# Patient Record
Sex: FEMALE | Race: WHITE | NOT HISPANIC OR LATINO | Employment: UNEMPLOYED | URBAN - METROPOLITAN AREA
[De-identification: names, ages, dates, MRNs, and addresses within clinical notes are randomized per-mention and may not be internally consistent; named-entity substitution may affect disease eponyms.]

---

## 2017-02-04 ENCOUNTER — HOSPITAL ENCOUNTER (EMERGENCY)
Facility: HOSPITAL | Age: 52
Discharge: HOME | End: 2017-02-05
Attending: EMERGENCY MEDICINE
Payer: MEDICAID

## 2017-02-04 DIAGNOSIS — F41.0 ANXIETY ATTACK: Primary | ICD-10-CM

## 2017-02-04 PROCEDURE — 99283 EMERGENCY DEPT VISIT LOW MDM: CPT

## 2017-02-04 RX ORDER — ALPRAZOLAM 0.25 MG/1
0.25 TABLET ORAL 3 TIMES DAILY
Qty: 60 TABLET | Refills: 2 | Status: ON HOLD | OUTPATIENT
Start: 2017-02-04 | End: 2019-04-09

## 2017-02-04 RX ORDER — ALPRAZOLAM 0.25 MG/1
0.5 TABLET ORAL
Status: COMPLETED | OUTPATIENT
Start: 2017-02-05 | End: 2017-02-05

## 2017-02-04 NOTE — ED AVS SNAPSHOT
[facility]  [address]  [place] [address]               Elif Archibald   2017 10:09 PM   ED    Description:  Female : 1965   Department:  [facility]           Your Care was Coordinated By:     Provider Role From To    Caitlin Feliciano MD Attending Provider [phone] --      Reason for Visit     Anxiety           Diagnoses this Visit        Comments    Anxiety attack    -  Primary       ED Disposition     None           To Do List           Follow-up Information     Follow up with Terrie Ireland MD In 3 days.    Specialty:  Internal Medicine    Why:  For further care    Contact information:    [address] [facility]  [place] [address]  [phone]         These Medications        Disp Refills Start End    alprazolam (XANAX) 0.25 MG tablet 60 tablet 2 2017     Take 1 tablet (0.25 mg total) by mouth 3 (three) times daily. - Oral      [facility]     [facility] -  Assistance  Registered nurses in the [facility] provide clinical advisement, health education, appointment booking, and other advisory services.  Call for this free service at [phone].             Medications                Verify that the below list of medications is an accurate representation of the medications you are currently taking.  If none reported, the list may be blank. If incorrect, please contact your healthcare provider. Carry this list with you in case of emergency.           Current Medications     albuterol 90 mcg/actuation inhaler Inhale 1 puff into the lungs every 4 (four) hours as needed for Wheezing.    albuterol 90 mcg/actuation inhaler Inhale 1 puff into the lungs every 4 (four) hours as needed for Wheezing.    alprazolam (XANAX) 0.25 MG tablet Take 1 tablet (0.25 mg total) by mouth 3 (three) times daily.    hydrocodone-acetaminophen 5-325mg (NORCO) 5-325 mg per tablet Take 1 tablet by mouth every 6 (six)  "hours as needed for Pain.    lorazepam (ATIVAN) 1 MG tablet Take 1 tablet (1 mg total) by mouth every 8 (eight) hours as needed for Anxiety.    ondansetron (ZOFRAN) 4 MG tablet Take 1 tablet (4 mg total) by mouth every 8 (eight) hours as needed.    paroxetine (PAXIL) 10 MG tablet Take 10 mg by mouth every morning.    topiramate 25 mg capsule Take 25 mg by mouth 2 (two) times daily.           Clinical Reference Information           Your Vitals Were     BP Pulse Temp Resp Height Weight    150/101 106 97.8 °F (36.6 °C) 18 5' 3" (1.6 m) 49.4 kg (109 lb)    SpO2 BMI             94% 19.31 kg/m2         Allergies as of 2/4/2017     No Known Allergies      Immunizations Administered on Date of Encounter - 2/4/2017     None      ED Micro, Lab, POCT     None      ED Imaging Orders     None        Discharge Instructions         Treating Anxiety Disorders with Therapy    If you have an anxiety disorder, you dont have to suffer anymore. Treatment is available. Therapy (also called counseling) is often a helpful treatment for anxiety disorders. With therapy, a specially trained professional (therapist) helps you face and learn to manage your anxiety. Therapy can be short-term or long-term depending on your needs. In some cases, medication may also be prescribed with therapy. It may take time before you notice how much therapy is helping, but stick with it. With therapy, you can feel better.  Cognitive behavioral therapy (CBT)  Cognitive behavioral therapy (CBT) teaches you to manage anxiety. It does this by helping you understand how you think and act when youre anxious. Research has shown CBT to be a very effective treatment for anxiety disorders. How CBT is run is almost like a class. It involves homework and activities to build skills that teach you to cope with anxiety step by step. It can be done in a group or one-on-one, and often takes place for a set number of sessions. CBT has two main parts:  · Cognitive " therapy helps you identify the negative, irrational thoughts that occur with your anxiety. Youll learn to replace these with more positive, realistic thoughts.  · Behavioral therapy helps you change how you react to anxiety. Youll learn coping skills and methods for relaxing to help you better deal with anxiety.  Other forms of therapy  Other therapy methods may work better for you than CBT. Or, you may move from CBT to another form of therapy as your treatment needs change. This may mean meeting with a therapist by yourself or in a group. Therapy can also help you work through problems in your life, such as drug or alcohol dependence, that may be making your anxiety worse.  Getting better takes time  Therapy will help you feel better and teach you skills to help manage anxiety long term. But change doesnt happen right away. It takes a commitment from you. And treatment only works if you learn to face the causes of your anxiety. So, you might feel worse before you feel better. This can sometimes make it hard to stick with it. But remember: Therapy is a very effective treatment. The results will be well worth it.  Helping yourself  If anxiety is wearing you down, here are some things you can do to cope:  · Dont fight your feelings. Anxiety feeds itself. The more you worry about it, the worse it gets. Instead, try to identify what might have triggered your anxiety. Then try to put this threat in perspective.  · Keep in mind that you cant control everything about a situation. Change what you can and let the rest take its course.  · Exercise -- its a great way to relieve tension and help your body feel relaxed.  · Examine your life for stress, and try to find ways to reduce it.  · Avoid caffeine and nicotine, which can make anxiety symptoms worse.  · Fight the temptation to turn to alcohol or unprescribed drugs for relief. They only make things worse in the long run.   Date Last Reviewed: 1/19/2015  © 1862-7621  [employer]. [address] [place] [address], [place] [address]. All rights reserved. This information is not intended as a substitute for professional medical care. Always follow your healthcare professional's instructions.          Treating Anxiety Disorders with Medication  An anxiety disorder can make you feel nervous or apprehensive, even without a clear reason. Certain anxiety disorders can cause intense feelings of fear or panic. You may even have physical symptoms, such as a racing heartbeat or dizziness. If you have these feelings, you dont have to suffer anymore. Treatment to help you overcome your fears will likely include therapy (also called counseling). Medication may also be prescribed to help control your symptoms.    Medications  Certain medications may be prescribed to help control your symptoms. As a result, you may feel less anxious. You may also feel able to move forward with therapy. At first, medications and dosages may need to be adjusted to find what works best for you. Try to be patient. Tell your health care provider how a medication makes you feel. This way, you can work together to find the treatment thats best for you. Keep in mind that medications can have side effects. Talk to your provider about any side effects that are bothering you. Changing the dose or type of medication may help. Dont stop taking medication on your own because it can cause symptoms to come back.  · Anti-anxiety medication: This medication eases symptoms and helps you relax. Your health care provider will explain when and how to use it. It may be prescribed for use before situations that makes you anxious. Or, you may be told to take it on a regular schedule. Anti-anxiety medication may make you feel a little sleepy or out of it. Dont drive a car or operate machinery while on this medication, until you know how it affects you.  Caution  Never use alcohol or other drugs with anti-anxiety medications.  This could result in loss of muscular control, sedation, coma or death. Also, use only the amount of medication prescribed for you. If you think you may have taken too much, get emergency care right away.   · Antidepressant medication: This kind of medication is often used to treat anxiety, even if you arent depressed. An antidepressant helps balance out brain chemicals. This helps keep anxiety under control. This medication is taken on a schedule. It takes a few weeks to start working. If you dont notice a change at first, you may just need more time. But if you dont notice results after the first few weeks, tell your provider.  Keep taking medications as prescribed  Never change your dosage or stop taking your medications without talking to your health care provider first. Keep the following in mind:  · Some medications must be taken on a schedule. Make this part of your daily routine. For instance, always take your pill before brushing your teeth. A pillbox can help you remember if youve taken your medication each day.  · Medications are often taken for 6 to 12 months. Your health care provider will then evaluate whether you need to stay on them. Many people who have also had therapy may no longer need medication to manage anxiety.  · You may need to stop taking medication slowly to give your body time to adjust. When its time to stop, your health care provider will tell you more. Remember: Never stop taking your medication without talking to your provider first.  · If symptoms return, you may need to start taking medications again. This isnt your fault. Its just the nature of your anxiety disorder.  Special concerns  · Side effects: Medications may cause side effects. Ask your health care provider or pharmacist what you can expect. They may have ideas for avoiding some side effects.  · Sexual problems: Some antidepressants can affect your desire for sex or your ability to have an orgasm. A change in  dosage or medication often solves the problem. If you have a sexual side effect that concerns you, tell your health care provider.  · Addiction: Antidepressants are not addictive. And if youve never had a problem with drugs or alcohol, you likely wont have a problem with anti-anxiety medication. But if you have history of addiction, you may need to avoid this medication.   Date Last Reviewed: 3/27/2015  © 6074-4778 [employer]. [address] [place] [address], [place] [address]. All rights reserved. This information is not intended as a substitute for professional medical care. Always follow your healthcare professional's instructions.          [facility] Sign-Up     Activating your [facility] account is as easy as 1-2-3!     1) Visit [email or web address], select Sign Up Now, enter this activation code and your date of birth, then select Next.  [email or web address]  Expires: 3/21/2017 11:52 PM      2) Create a username and password to use when you visit [facility] in the future and select a security question in case you lose your password and select Next.    3) Enter your e-mail address and click Sign Up!    Additional Information  If you have questions, please e-mail [email or web address] or call [phone] to talk to our [facility] staff. Remember, [facility] is NOT to be used for urgent needs. For medical emergencies, dial 911.         Smoking Cessation     If you would like to quit smoking:   You may be eligible for free services if you are a [place] resident and started smoking cigarettes before September 1, 1988.  Call the Smoking Cessation Trust (SCT) toll free at (575) 685-8409 or (948) 091-7087.   Call 5-800-QUIT-NOW if you do not meet the above criteria.             [facility] complies with applicable Federal civil rights laws and does not discriminate on the basis of race, color, national origin, age, disability, or sex.        Language Assistance Services     ATTENTION: Language  assistance services are available, free of charge. Please call [phone].      ATENCIÓN: Si habla español, tiene a silva disposición servicios gratuitos de asistencia lingüística. Llame al [phone].     CHÚ Ý: N?u b?n nói Ti?ng Vi?t, có các d?ch v? h? tr? ngôn ng? mi?n phí dành cho b?n. G?i s? [phone].

## 2017-02-05 VITALS
WEIGHT: 109 LBS | OXYGEN SATURATION: 94 % | BODY MASS INDEX: 19.31 KG/M2 | HEART RATE: 103 BPM | HEIGHT: 63 IN | TEMPERATURE: 98 F | SYSTOLIC BLOOD PRESSURE: 139 MMHG | DIASTOLIC BLOOD PRESSURE: 100 MMHG | RESPIRATION RATE: 16 BRPM

## 2017-02-05 PROCEDURE — 25000003 PHARM REV CODE 250: Performed by: EMERGENCY MEDICINE

## 2017-02-05 RX ADMIN — ALPRAZOLAM 0.5 MG: 0.25 TABLET ORAL at 12:02

## 2017-02-05 NOTE — ED PROVIDER NOTES
Encounter Date: 2/4/2017       History     Chief Complaint   Patient presents with    Anxiety     pt states she's been drinking today and having anxiety because she's out of her medication     Review of patient's allergies indicates:  No Known Allergies  Patient is a 51 y.o. female presenting with the following complaint: mental health disorder. The history is provided by the patient.   Mental Health Problem   The primary symptoms include dysphoric mood. The current episode started several days ago. This is a chronic problem.   The onset of the illness is precipitated by emotional stress. The degree of incapacity that she is experiencing as a consequence of her illness is mild. Additional symptoms of the illness do not include no anhedonia, no fatigue, no agitation or no euphoric mood. She does not admit to suicidal ideas. She does not have a plan to commit suicide. She does not contemplate harming herself. She has not already injured self. She does not contemplate injuring another person. She has not already  injured another person.     Past Medical History   Diagnosis Date    Anxiety     Asthma     Depression     Seizures      No past medical history pertinent negatives.  Past Surgical History   Procedure Laterality Date    Esophagogastroduodenoscopy      Appendectomy       History reviewed. No pertinent family history.  Social History   Substance Use Topics    Smoking status: Current Every Day Smoker     Packs/day: 0.50     Types: Cigarettes    Smokeless tobacco: None    Alcohol use 25.2 oz/week     42 Cans of beer per week      Comment: daily     Review of Systems   Constitutional: Negative for fatigue.   Psychiatric/Behavioral: Positive for dysphoric mood. Negative for agitation. The patient is nervous/anxious.    All other systems reviewed and are negative.      Physical Exam   Initial Vitals   BP Pulse Resp Temp SpO2   02/04/17 2018 02/04/17 2018 02/04/17 2018 02/04/17 2018 02/04/17 2018   157/104  106 20 96.8 °F (36 °C) 96 %     Physical Exam    Nursing note and vitals reviewed.  Constitutional: She appears well-developed and well-nourished.   HENT:   Head: Normocephalic and atraumatic.   Eyes: EOM are normal.   Neck: Normal range of motion. Neck supple.   Cardiovascular: Normal rate, regular rhythm, normal heart sounds and intact distal pulses.   Pulmonary/Chest: Breath sounds normal.   Abdominal: Soft.   Musculoskeletal: Normal range of motion.   Neurological: She is alert and oriented to person, place, and time.   Skin: Skin is warm and dry.   Psychiatric: Her behavior is normal. Judgment and thought content normal. Her speech is tangential. Her speech is not rapid and/or pressured. She exhibits a depressed mood.         ED Course   Procedures  Labs Reviewed - No data to display                            ED Course     Clinical Impression:   The encounter diagnosis was Anxiety attack.    Disposition:   Disposition: Discharged  Condition: Stable       Caitlin Feliciano MD  02/05/17 1803

## 2017-02-05 NOTE — ED NOTES
"Pt states "I've been out of my medications for a while. I take meds for schizophrenia and bipolar and I haven't been taking them. Last time I was here the doctor gave me xanax and that helped. I was sleeping and gaining weight. Now I'm out and I can't sleep and I get anxiety attacks and get angry. I had a seizure yesterday." Pt reports last anxiety attack today PTA and seizure x last night.  "

## 2017-02-05 NOTE — ED NOTES
"Pt states "I went to the doctor to get a prescription for xanax but they told me they don't write them."  "

## 2017-02-05 NOTE — ED NOTES
"Out of medications - was told she needed to be seen at behavioral health for continued follow up and therapy - has not done so - was recently told she would have to start that process all over again - experiencing severe anxiety, "punched wall last night", brother passed away recently - hx schizophrenia, bi-polar - has been hearing voices that are not present, denies voices telling her to harm others or herself - stated she had seizure activity last night  "

## 2017-02-05 NOTE — DISCHARGE INSTRUCTIONS
Treating Anxiety Disorders with Therapy    If you have an anxiety disorder, you dont have to suffer anymore. Treatment is available. Therapy (also called counseling) is often a helpful treatment for anxiety disorders. With therapy, a specially trained professional (therapist) helps you face and learn to manage your anxiety. Therapy can be short-term or long-term depending on your needs. In some cases, medication may also be prescribed with therapy. It may take time before you notice how much therapy is helping, but stick with it. With therapy, you can feel better.  Cognitive behavioral therapy (CBT)  Cognitive behavioral therapy (CBT) teaches you to manage anxiety. It does this by helping you understand how you think and act when youre anxious. Research has shown CBT to be a very effective treatment for anxiety disorders. How CBT is run is almost like a class. It involves homework and activities to build skills that teach you to cope with anxiety step by step. It can be done in a group or one-on-one, and often takes place for a set number of sessions. CBT has two main parts:  · Cognitive therapy helps you identify the negative, irrational thoughts that occur with your anxiety. Youll learn to replace these with more positive, realistic thoughts.  · Behavioral therapy helps you change how you react to anxiety. Youll learn coping skills and methods for relaxing to help you better deal with anxiety.  Other forms of therapy  Other therapy methods may work better for you than CBT. Or, you may move from CBT to another form of therapy as your treatment needs change. This may mean meeting with a therapist by yourself or in a group. Therapy can also help you work through problems in your life, such as drug or alcohol dependence, that may be making your anxiety worse.  Getting better takes time  Therapy will help you feel better and teach you skills to help manage anxiety long term. But change doesnt happen right away.  It takes a commitment from you. And treatment only works if you learn to face the causes of your anxiety. So, you might feel worse before you feel better. This can sometimes make it hard to stick with it. But remember: Therapy is a very effective treatment. The results will be well worth it.  Helping yourself  If anxiety is wearing you down, here are some things you can do to cope:  · Dont fight your feelings. Anxiety feeds itself. The more you worry about it, the worse it gets. Instead, try to identify what might have triggered your anxiety. Then try to put this threat in perspective.  · Keep in mind that you cant control everything about a situation. Change what you can and let the rest take its course.  · Exercise -- its a great way to relieve tension and help your body feel relaxed.  · Examine your life for stress, and try to find ways to reduce it.  · Avoid caffeine and nicotine, which can make anxiety symptoms worse.  · Fight the temptation to turn to alcohol or unprescribed drugs for relief. They only make things worse in the long run.   Date Last Reviewed: 1/19/2015 © 2000-2016 [employer]. [address] [place] [address]. All rights reserved. This information is not intended as a substitute for professional medical care. Always follow your healthcare professional's instructions.          Treating Anxiety Disorders with Medication  An anxiety disorder can make you feel nervous or apprehensive, even without a clear reason. Certain anxiety disorders can cause intense feelings of fear or panic. You may even have physical symptoms, such as a racing heartbeat or dizziness. If you have these feelings, you dont have to suffer anymore. Treatment to help you overcome your fears will likely include therapy (also called counseling). Medication may also be prescribed to help control your symptoms.    Medications  Certain medications may be prescribed to help control your symptoms. As a  result, you may feel less anxious. You may also feel able to move forward with therapy. At first, medications and dosages may need to be adjusted to find what works best for you. Try to be patient. Tell your health care provider how a medication makes you feel. This way, you can work together to find the treatment thats best for you. Keep in mind that medications can have side effects. Talk to your provider about any side effects that are bothering you. Changing the dose or type of medication may help. Dont stop taking medication on your own because it can cause symptoms to come back.  · Anti-anxiety medication: This medication eases symptoms and helps you relax. Your health care provider will explain when and how to use it. It may be prescribed for use before situations that makes you anxious. Or, you may be told to take it on a regular schedule. Anti-anxiety medication may make you feel a little sleepy or out of it. Dont drive a car or operate machinery while on this medication, until you know how it affects you.  Caution  Never use alcohol or other drugs with anti-anxiety medications. This could result in loss of muscular control, sedation, coma or death. Also, use only the amount of medication prescribed for you. If you think you may have taken too much, get emergency care right away.   · Antidepressant medication: This kind of medication is often used to treat anxiety, even if you arent depressed. An antidepressant helps balance out brain chemicals. This helps keep anxiety under control. This medication is taken on a schedule. It takes a few weeks to start working. If you dont notice a change at first, you may just need more time. But if you dont notice results after the first few weeks, tell your provider.  Keep taking medications as prescribed  Never change your dosage or stop taking your medications without talking to your health care provider first. Keep the following in mind:  · Some medications  must be taken on a schedule. Make this part of your daily routine. For instance, always take your pill before brushing your teeth. A pillbox can help you remember if youve taken your medication each day.  · Medications are often taken for 6 to 12 months. Your health care provider will then evaluate whether you need to stay on them. Many people who have also had therapy may no longer need medication to manage anxiety.  · You may need to stop taking medication slowly to give your body time to adjust. When its time to stop, your health care provider will tell you more. Remember: Never stop taking your medication without talking to your provider first.  · If symptoms return, you may need to start taking medications again. This isnt your fault. Its just the nature of your anxiety disorder.  Special concerns  · Side effects: Medications may cause side effects. Ask your health care provider or pharmacist what you can expect. They may have ideas for avoiding some side effects.  · Sexual problems: Some antidepressants can affect your desire for sex or your ability to have an orgasm. A change in dosage or medication often solves the problem. If you have a sexual side effect that concerns you, tell your health care provider.  · Addiction: Antidepressants are not addictive. And if youve never had a problem with drugs or alcohol, you likely wont have a problem with anti-anxiety medication. But if you have history of addiction, you may need to avoid this medication.   Date Last Reviewed: 3/27/2015  © 7805-3305 [employer]. [address] [place] [address], [place] [address]. All rights reserved. This information is not intended as a substitute for professional medical care. Always follow your healthcare professional's instructions.

## 2017-07-13 DIAGNOSIS — Z12.31 SCREENING MAMMOGRAM FOR HIGH-RISK PATIENT: Primary | ICD-10-CM

## 2017-07-29 ENCOUNTER — HOSPITAL ENCOUNTER (EMERGENCY)
Facility: HOSPITAL | Age: 52
Discharge: HOME | End: 2017-07-29
Payer: MEDICAID

## 2017-07-29 VITALS
RESPIRATION RATE: 16 BRPM | BODY MASS INDEX: 21.6 KG/M2 | HEART RATE: 92 BPM | HEIGHT: 60 IN | DIASTOLIC BLOOD PRESSURE: 104 MMHG | WEIGHT: 110 LBS | SYSTOLIC BLOOD PRESSURE: 132 MMHG | TEMPERATURE: 98 F

## 2017-07-29 DIAGNOSIS — M25.519 SHOULDER PAIN: ICD-10-CM
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
## 2017-07-29 DIAGNOSIS — S01.01XA SCALP LACERATION, INITIAL ENCOUNTER: ICD-10-CM

## 2017-07-29 PROCEDURE — 63600175 PHARM REV CODE 636 W HCPCS: Performed by: NURSE PRACTITIONER

## 2017-07-29 PROCEDURE — 99284 EMERGENCY DEPT VISIT MOD MDM: CPT | Mod: 25

## 2017-07-29 PROCEDURE — 90715 TDAP VACCINE 7 YRS/> IM: CPT | Performed by: NURSE PRACTITIONER

## 2017-07-29 PROCEDURE — 25000003 PHARM REV CODE 250: Performed by: NURSE PRACTITIONER

## 2017-07-29 PROCEDURE — 12001 RPR S/N/AX/GEN/TRNK 2.5CM/<: CPT

## 2017-07-29 PROCEDURE — 90471 IMMUNIZATION ADMIN: CPT | Performed by: NURSE PRACTITIONER

## 2017-07-29 RX ORDER — HYDROCODONE BITARTRATE AND ACETAMINOPHEN 5; 325 MG/1; MG/1
1 TABLET ORAL EVERY 6 HOURS PRN
Qty: 5 TABLET | Refills: 0 | Status: ON HOLD | OUTPATIENT
Start: 2017-07-29 | End: 2019-04-09

## 2017-07-29 RX ORDER — MUPIROCIN 20 MG/G
OINTMENT TOPICAL 3 TIMES DAILY
Qty: 15 G | Refills: 0 | Status: ON HOLD | OUTPATIENT
Start: 2017-07-29 | End: 2019-04-09

## 2017-07-29 RX ADMIN — Medication 5 ML: at 12:07

## 2017-07-29 RX ADMIN — CLOSTRIDIUM TETANI TOXOID ANTIGEN (FORMALDEHYDE INACTIVATED), CORYNEBACTERIUM DIPHTHERIAE TOXOID ANTIGEN (FORMALDEHYDE INACTIVATED), BORDETELLA PERTUSSIS TOXOID ANTIGEN (GLUTARALDEHYDE INACTIVATED), BORDETELLA PERTUSSIS FILAMENTOUS HEMAGGLUTININ ANTIGEN (FORMALDEHYDE INACTIVATED), BORDETELLA PERTUSSIS PERTACTIN ANTIGEN, AND BORDETELLA PERTUSSIS FIMBRIAE 2/3 ANTIGEN 0.5 ML: 5; 2; 2.5; 5; 3; 5 INJECTION, SUSPENSION INTRAMUSCULAR at 12:07

## 2017-07-29 NOTE — ED NOTES
Pt c/o tenderness and swelling to L side of scalp, + dried blood noted to area.  Jennifer JONES at bs for evaluation.
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
## 2017-08-03 ENCOUNTER — HOSPITAL ENCOUNTER (OUTPATIENT)
Dept: RADIOLOGY | Facility: HOSPITAL | Age: 52
Discharge: HOME | End: 2017-08-03
Attending: FAMILY MEDICINE
Payer: MEDICAID

## 2017-08-03 VITALS — BODY MASS INDEX: 21.6 KG/M2 | HEIGHT: 60 IN | WEIGHT: 110 LBS

## 2017-08-03 DIAGNOSIS — Z12.31 SCREENING MAMMOGRAM FOR HIGH-RISK PATIENT: ICD-10-CM

## 2017-08-03 PROCEDURE — 77067 SCR MAMMO BI INCL CAD: CPT | Mod: TC

## 2017-08-09 ENCOUNTER — HOSPITAL ENCOUNTER (EMERGENCY)
Facility: HOSPITAL | Age: 52
Discharge: HOME | End: 2017-08-09
Attending: EMERGENCY MEDICINE
Payer: MEDICAID

## 2017-08-09 VITALS
BODY MASS INDEX: 18.25 KG/M2 | HEIGHT: 63 IN | SYSTOLIC BLOOD PRESSURE: 145 MMHG | TEMPERATURE: 98 F | OXYGEN SATURATION: 96 % | WEIGHT: 103 LBS | DIASTOLIC BLOOD PRESSURE: 90 MMHG | RESPIRATION RATE: 16 BRPM | HEART RATE: 107 BPM

## 2017-08-09 DIAGNOSIS — Z48.02 ENCOUNTER FOR STAPLE REMOVAL: Primary | ICD-10-CM

## 2017-08-09 DIAGNOSIS — M79.18 MUSCULOSKELETAL PAIN: ICD-10-CM

## 2017-08-09 PROCEDURE — 63600175 PHARM REV CODE 636 W HCPCS: Performed by: NURSE PRACTITIONER

## 2017-08-09 PROCEDURE — 99283 EMERGENCY DEPT VISIT LOW MDM: CPT | Mod: 25

## 2017-08-09 PROCEDURE — 96372 THER/PROPH/DIAG INJ SC/IM: CPT

## 2017-08-09 RX ORDER — METHOCARBAMOL 500 MG/1
1000 TABLET, FILM COATED ORAL 3 TIMES DAILY
Qty: 30 TABLET | Refills: 0 | Status: SHIPPED | OUTPATIENT
Start: 2017-08-09 | End: 2017-08-14

## 2017-08-09 RX ORDER — ETODOLAC 400 MG/1
400 TABLET, FILM COATED ORAL 2 TIMES DAILY
Status: ON HOLD | COMMUNITY
End: 2019-04-09

## 2017-08-09 RX ORDER — AMITRIPTYLINE HYDROCHLORIDE 25 MG/1
25 TABLET, FILM COATED ORAL NIGHTLY
Status: ON HOLD | COMMUNITY
End: 2020-03-12 | Stop reason: SDUPTHER

## 2017-08-09 RX ORDER — CLONAZEPAM 1 MG/1
0.5 TABLET ORAL 2 TIMES DAILY PRN
Status: ON HOLD | COMMUNITY
End: 2019-04-09

## 2017-08-09 RX ORDER — RISPERIDONE 0.5 MG/1
0.5 TABLET ORAL 2 TIMES DAILY
Status: ON HOLD | COMMUNITY
End: 2020-03-12 | Stop reason: SDUPTHER

## 2017-08-09 RX ORDER — ORPHENADRINE CITRATE 30 MG/ML
30 INJECTION INTRAMUSCULAR; INTRAVENOUS
Status: COMPLETED | OUTPATIENT
Start: 2017-08-09 | End: 2017-08-09

## 2017-08-09 RX ORDER — ESCITALOPRAM OXALATE 20 MG/1
20 TABLET ORAL DAILY
COMMUNITY
End: 2019-10-25 | Stop reason: ALTCHOICE

## 2017-08-09 RX ADMIN — ORPHENADRINE CITRATE 30 MG: 30 INJECTION INTRAMUSCULAR; INTRAVENOUS at 01:08
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
## 2018-01-27 ENCOUNTER — HOSPITAL ENCOUNTER (EMERGENCY)
Facility: HOSPITAL | Age: 53
Discharge: HOME | End: 2018-01-27
Attending: EMERGENCY MEDICINE
Payer: MEDICAID

## 2018-01-27 VITALS
TEMPERATURE: 99 F | BODY MASS INDEX: 20.76 KG/M2 | HEIGHT: 59 IN | SYSTOLIC BLOOD PRESSURE: 158 MMHG | DIASTOLIC BLOOD PRESSURE: 93 MMHG | HEART RATE: 104 BPM | OXYGEN SATURATION: 97 % | RESPIRATION RATE: 16 BRPM | WEIGHT: 103 LBS

## 2018-01-27 DIAGNOSIS — R07.89 LEFT-SIDED CHEST WALL PAIN: Primary | ICD-10-CM

## 2018-01-27 DIAGNOSIS — S50.311A ABRASION OF RIGHT ELBOW, INITIAL ENCOUNTER: ICD-10-CM
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
## 2018-01-27 PROCEDURE — 99283 EMERGENCY DEPT VISIT LOW MDM: CPT

## 2018-01-27 RX ORDER — PHENYLPROPANOLAMINE/CLEMASTINE 75-1.34MG
200-400 TABLET, EXTENDED RELEASE ORAL
Qty: 30 EACH | Refills: 0 | Status: ON HOLD | OUTPATIENT
Start: 2018-01-27 | End: 2019-04-09
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
## 2018-01-27 NOTE — ED NOTES
Pt arrived to ED stated she has had 2 beers this am and is dischelved. She was assisted into a hospital gown and yellow socks.

## 2018-03-24 ENCOUNTER — HOSPITAL ENCOUNTER (EMERGENCY)
Facility: HOSPITAL | Age: 53
Discharge: HOME | End: 2018-03-24
Attending: EMERGENCY MEDICINE
Payer: MEDICAID

## 2018-03-24 VITALS
BODY MASS INDEX: 21.4 KG/M2 | HEART RATE: 97 BPM | WEIGHT: 109 LBS | TEMPERATURE: 98 F | HEIGHT: 60 IN | SYSTOLIC BLOOD PRESSURE: 119 MMHG | OXYGEN SATURATION: 96 % | RESPIRATION RATE: 20 BRPM | DIASTOLIC BLOOD PRESSURE: 73 MMHG

## 2018-03-24 DIAGNOSIS — M79.601 RIGHT ARM PAIN: ICD-10-CM

## 2018-03-24 DIAGNOSIS — M25.539 WRIST PAIN, ACUTE: ICD-10-CM

## 2018-03-24 DIAGNOSIS — S63.501A WRIST SPRAIN, RIGHT, INITIAL ENCOUNTER: Primary | ICD-10-CM

## 2018-03-24 DIAGNOSIS — L03.114 CELLULITIS OF LEFT HAND: ICD-10-CM

## 2018-03-24 PROCEDURE — 90471 IMMUNIZATION ADMIN: CPT | Performed by: EMERGENCY MEDICINE

## 2018-03-24 PROCEDURE — 63600175 PHARM REV CODE 636 W HCPCS: Performed by: EMERGENCY MEDICINE

## 2018-03-24 PROCEDURE — 99283 EMERGENCY DEPT VISIT LOW MDM: CPT

## 2018-03-24 PROCEDURE — 90715 TDAP VACCINE 7 YRS/> IM: CPT | Performed by: EMERGENCY MEDICINE

## 2018-03-24 RX ORDER — CLINDAMYCIN HYDROCHLORIDE 150 MG/1
300 CAPSULE ORAL 4 TIMES DAILY
Qty: 56 CAPSULE | Refills: 0 | Status: SHIPPED | OUTPATIENT
Start: 2018-03-24 | End: 2018-03-31

## 2018-03-24 RX ORDER — IBUPROFEN 800 MG/1
800 TABLET ORAL EVERY 6 HOURS PRN
Qty: 20 TABLET | Refills: 0 | Status: ON HOLD | OUTPATIENT
Start: 2018-03-24 | End: 2019-04-09

## 2018-03-24 RX ADMIN — CLOSTRIDIUM TETANI TOXOID ANTIGEN (FORMALDEHYDE INACTIVATED), CORYNEBACTERIUM DIPHTHERIAE TOXOID ANTIGEN (FORMALDEHYDE INACTIVATED), BORDETELLA PERTUSSIS TOXOID ANTIGEN (GLUTARALDEHYDE INACTIVATED), BORDETELLA PERTUSSIS FILAMENTOUS HEMAGGLUTININ ANTIGEN (FORMALDEHYDE INACTIVATED), BORDETELLA PERTUSSIS PERTACTIN ANTIGEN, AND BORDETELLA PERTUSSIS FIMBRIAE 2/3 ANTIGEN 0.5 ML: 5; 2; 2.5; 5; 3; 5 INJECTION, SUSPENSION INTRAMUSCULAR at 09:03

## 2018-03-24 NOTE — ED PROVIDER NOTES
"Encounter Date: 3/24/2018       History     Chief Complaint   Patient presents with    Hand Injury     Pt states fell off bed last pm and left wrist "bent backwards."  + redness and swelling noted to left wrist, pt also noted with multiple scratches and abrasions to left wrist, pt states "my cat scratched me."       Patient is a 52-year-old woman with a history of schizophrenia and asthma she comes to emergency Department right hand wrist and left forearm pain.  Symptoms began after rolling out of bed last night and waning for she denies loss of consciousness or neck pain.  Pain is worse with movement.  Patient denies numbness or weakness.  Patient denies fevers.          Review of patient's allergies indicates:  No Known Allergies  Past Medical History:   Diagnosis Date    Anxiety     Asthma     Bipolar 1 disorder     Depression     Schizophrenia     Seizures      Past Surgical History:   Procedure Laterality Date    APPENDECTOMY      ESOPHAGOGASTRODUODENOSCOPY       Family History   Problem Relation Age of Onset    No Known Problems Mother     No Known Problems Father      Social History   Substance Use Topics    Smoking status: Current Every Day Smoker     Packs/day: 0.50     Types: Cigarettes    Smokeless tobacco: Never Used    Alcohol use 25.2 oz/week     42 Cans of beer per week      Comment: daily     Review of Systems   Constitutional: Negative for fatigue and fever.   HENT: Negative for sore throat.    Respiratory: Negative for chest tightness and shortness of breath.    Cardiovascular: Negative for chest pain.   Gastrointestinal: Negative for abdominal distention, abdominal pain, diarrhea, nausea and vomiting.   Genitourinary: Negative for dysuria.   Musculoskeletal: Negative for back pain, neck pain and neck stiffness.        See HPI   Skin: Negative for color change and rash.   Neurological: Negative for dizziness, facial asymmetry, weakness, light-headedness, numbness and headaches. "   Hematological: Does not bruise/bleed easily.   All other systems reviewed and are negative.      Physical Exam     Initial Vitals [03/24/18 0854]   BP Pulse Resp Temp SpO2   118/72 110 20 97.6 °F (36.4 °C) 96 %      MAP       87.33         Physical Exam    Nursing note and vitals reviewed.  Constitutional: Vital signs are normal. She appears well-developed and well-nourished. She is not diaphoretic. No distress.   HENT:   Head: Normocephalic and atraumatic.   Eyes: Conjunctivae and EOM are normal. Pupils are equal, round, and reactive to light.   Neck: Normal range of motion. Neck supple.   Cardiovascular: Normal rate, regular rhythm and normal heart sounds.   Pulmonary/Chest: Breath sounds normal. No respiratory distress. She has no wheezes. She has no rhonchi. She has no rales.   Abdominal: Soft. She exhibits no distension. There is no tenderness. There is no rebound and no guarding.   Musculoskeletal: Normal range of motion. She exhibits tenderness (right dorsal wrist). She exhibits no edema.   Neurological: She is alert and oriented to person, place, and time. She displays normal reflexes. No sensory deficit.   Skin: Skin is warm and dry. There is erythema.   Skin is erythematous and swollen with multiple superficial lacerations patient states these are from her cat and these were present before she fell.   Psychiatric: She has a normal mood and affect.         ED Course   Procedures  Labs Reviewed - No data to display                               Clinical Impression:   The primary encounter diagnosis was Wrist sprain, right, initial encounter. Diagnoses of Wrist pain, acute, Right arm pain, and Cellulitis of left hand were also pertinent to this visit.                           Damian Pool MD  03/24/18 7836

## 2018-03-28 NOTE — PHYSICIAN QUERY
PT Name: Elif Archibald  MR #: [id number]     Physician Query Form - Documentation Clarification      CDS/: Vandana Salazar               Contact information:    This form is a permanent document in the medical record.     Query Date: March 27, 2018    By submitting this query, we are merely seeking further clarification of documentation. Please utilize your independent clinical judgment when addressing the question(s) below.    The Medical record reflects the following:    Supporting Clinical Findings Location in Medical Record     Clincal Impression need Clarity     Chief Complaint states left wrist & Clinical Impression states (R)   Clinical Impression        Clarity needed on which side Chief Complaint state (L) and Clinical Impression state (R)                                                                            Doctor, Please specify diagnosis or diagnoses associated with above clinical findings.    Provider Use Only    Left wrist sprain and cellulitis.  Right forearm contusion                                                                                                                           [  ] Clinically undetermined

## 2018-05-16 ENCOUNTER — HOSPITAL ENCOUNTER (EMERGENCY)
Facility: HOSPITAL | Age: 53
Discharge: PSYCHIATRIC OR SUBSTANCE USE FACILITY | End: 2018-05-16
Attending: SURGERY
Payer: MEDICAID

## 2018-05-16 VITALS
DIASTOLIC BLOOD PRESSURE: 89 MMHG | HEART RATE: 102 BPM | RESPIRATION RATE: 16 BRPM | SYSTOLIC BLOOD PRESSURE: 127 MMHG | HEIGHT: 63 IN | BODY MASS INDEX: 19.84 KG/M2 | WEIGHT: 112 LBS | TEMPERATURE: 98 F | OXYGEN SATURATION: 96 %

## 2018-05-16 DIAGNOSIS — F20.3 UNDIFFERENTIATED SCHIZOPHRENIA: ICD-10-CM

## 2018-05-16 DIAGNOSIS — F14.10 COCAINE ABUSE: ICD-10-CM

## 2018-05-16 DIAGNOSIS — F10.20 ALCOHOLISM: ICD-10-CM

## 2018-05-16 DIAGNOSIS — F23 ACUTE PSYCHOSIS: Primary | ICD-10-CM

## 2018-05-16 DIAGNOSIS — N30.00 ACUTE CYSTITIS WITHOUT HEMATURIA: ICD-10-CM

## 2018-05-16 LAB
ALBUMIN SERPL BCP-MCNC: 4.6 G/DL
ALP SERPL-CCNC: 80 U/L
ALT SERPL W/O P-5'-P-CCNC: 104 U/L
AMMONIA BLD-SCNC: 19 UMOL/L
AMPHET+METHAMPHET UR QL: NEGATIVE
ANION GAP SERPL CALC-SCNC: 14 MMOL/L
APAP SERPL-MCNC: <10 UG/ML
AST SERPL-CCNC: 86 U/L
BACTERIA #/AREA URNS AUTO: ABNORMAL /HPF
BARBITURATES UR QL SCN>200 NG/ML: NEGATIVE
BASOPHILS # BLD AUTO: 0.02 K/UL
BASOPHILS NFR BLD: 0.3 %
BENZODIAZ UR QL SCN>200 NG/ML: NEGATIVE
BILIRUB SERPL-MCNC: 0.4 MG/DL
BILIRUB UR QL STRIP: NEGATIVE
BUN SERPL-MCNC: 6 MG/DL
BZE UR QL SCN: NORMAL
CALCIUM SERPL-MCNC: 9.6 MG/DL
CANNABINOIDS UR QL SCN: NEGATIVE
CHLORIDE SERPL-SCNC: 95 MMOL/L
CLARITY UR REFRACT.AUTO: CLEAR
CO2 SERPL-SCNC: 25 MMOL/L
COLOR UR AUTO: ABNORMAL
CREAT SERPL-MCNC: 0.56 MG/DL
CREAT UR-MCNC: 21.3 MG/DL
DIFFERENTIAL METHOD: ABNORMAL
EOSINOPHIL # BLD AUTO: 0.1 K/UL
EOSINOPHIL NFR BLD: 1.8 %
ERYTHROCYTE [DISTWIDTH] IN BLOOD BY AUTOMATED COUNT: 12.9 %
EST. GFR  (AFRICAN AMERICAN): >60 ML/MIN/1.73 M^2
EST. GFR  (NON AFRICAN AMERICAN): >60 ML/MIN/1.73 M^2
ETHANOL SERPL-MCNC: 42 MG/DL
GLUCOSE SERPL-MCNC: 96 MG/DL
GLUCOSE UR QL STRIP: NEGATIVE
HCT VFR BLD AUTO: 39.9 %
HGB BLD-MCNC: 14 G/DL
HGB UR QL STRIP: NEGATIVE
KETONES UR QL STRIP: NEGATIVE
LACTATE SERPL-SCNC: 1 MMOL/L
LEUKOCYTE ESTERASE UR QL STRIP: NEGATIVE
LYMPHOCYTES # BLD AUTO: 2.1 K/UL
LYMPHOCYTES NFR BLD: 33 %
MCH RBC QN AUTO: 31 PG
MCHC RBC AUTO-ENTMCNC: 35.1 G/DL
MCV RBC AUTO: 89 FL
METHADONE UR QL SCN>300 NG/ML: NEGATIVE
MICROSCOPIC COMMENT: ABNORMAL
MONOCYTES # BLD AUTO: 0.8 K/UL
MONOCYTES NFR BLD: 12.3 %
NEUTROPHILS # BLD AUTO: 3.3 K/UL
NEUTROPHILS NFR BLD: 52.3 %
NITRITE UR QL STRIP: POSITIVE
OPIATES UR QL SCN: NEGATIVE
PCP UR QL SCN>25 NG/ML: NEGATIVE
PH UR STRIP: 5 [PH] (ref 5–8)
PLATELET # BLD AUTO: 217 K/UL
PMV BLD AUTO: 8.3 FL
POTASSIUM SERPL-SCNC: 4.1 MMOL/L
PROT SERPL-MCNC: 8.6 G/DL
PROT UR QL STRIP: NEGATIVE
RBC # BLD AUTO: 4.51 M/UL
RBC #/AREA URNS AUTO: 1 /HPF (ref 0–4)
SODIUM SERPL-SCNC: 134 MMOL/L
SP GR UR STRIP: 1.01 (ref 1–1.03)
SQUAMOUS #/AREA URNS AUTO: ABNORMAL /HPF
TOXICOLOGY INFORMATION: NORMAL
URN SPEC COLLECT METH UR: ABNORMAL
UROBILINOGEN UR STRIP-ACNC: NEGATIVE EU/DL
WBC # BLD AUTO: 6.25 K/UL
WBC #/AREA URNS AUTO: 1 /HPF (ref 0–5)

## 2018-05-16 PROCEDURE — 96361 HYDRATE IV INFUSION ADD-ON: CPT

## 2018-05-16 PROCEDURE — 93010 ELECTROCARDIOGRAM REPORT: CPT | Mod: ,,, | Performed by: INTERNAL MEDICINE

## 2018-05-16 PROCEDURE — 82140 ASSAY OF AMMONIA: CPT

## 2018-05-16 PROCEDURE — 93005 ELECTROCARDIOGRAM TRACING: CPT

## 2018-05-16 PROCEDURE — 99285 EMERGENCY DEPT VISIT HI MDM: CPT | Mod: 25

## 2018-05-16 PROCEDURE — 81000 URINALYSIS NONAUTO W/SCOPE: CPT | Mod: 59

## 2018-05-16 PROCEDURE — 63600175 PHARM REV CODE 636 W HCPCS: Performed by: SURGERY

## 2018-05-16 PROCEDURE — 85025 COMPLETE CBC W/AUTO DIFF WBC: CPT

## 2018-05-16 PROCEDURE — 80329 ANALGESICS NON-OPIOID 1 OR 2: CPT

## 2018-05-16 PROCEDURE — 25000003 PHARM REV CODE 250: Performed by: SURGERY

## 2018-05-16 PROCEDURE — 96374 THER/PROPH/DIAG INJ IV PUSH: CPT

## 2018-05-16 PROCEDURE — 80053 COMPREHEN METABOLIC PANEL: CPT

## 2018-05-16 PROCEDURE — 83605 ASSAY OF LACTIC ACID: CPT

## 2018-05-16 PROCEDURE — 96375 TX/PRO/DX INJ NEW DRUG ADDON: CPT

## 2018-05-16 PROCEDURE — 96376 TX/PRO/DX INJ SAME DRUG ADON: CPT

## 2018-05-16 PROCEDURE — 80320 DRUG SCREEN QUANTALCOHOLS: CPT

## 2018-05-16 PROCEDURE — 80307 DRUG TEST PRSMV CHEM ANLYZR: CPT

## 2018-05-16 RX ORDER — ACETAMINOPHEN 500 MG
1000 TABLET ORAL
Status: COMPLETED | OUTPATIENT
Start: 2018-05-16 | End: 2018-05-16

## 2018-05-16 RX ORDER — CEFTRIAXONE 1 G/1
1 INJECTION, POWDER, FOR SOLUTION INTRAMUSCULAR; INTRAVENOUS
Status: COMPLETED | OUTPATIENT
Start: 2018-05-16 | End: 2018-05-16

## 2018-05-16 RX ADMIN — LORAZEPAM 1 MG: 2 INJECTION INTRAMUSCULAR; INTRAVENOUS at 03:05

## 2018-05-16 RX ADMIN — SODIUM CHLORIDE 1000 ML: 0.9 INJECTION, SOLUTION INTRAVENOUS at 03:05

## 2018-05-16 RX ADMIN — LORAZEPAM 1 MG: 2 INJECTION INTRAMUSCULAR; INTRAVENOUS at 04:05

## 2018-05-16 RX ADMIN — CEFTRIAXONE SODIUM 1 G: 1 INJECTION, POWDER, FOR SOLUTION INTRAMUSCULAR; INTRAVENOUS at 04:05

## 2018-05-16 RX ADMIN — ACETAMINOPHEN 1000 MG: 500 TABLET, FILM COATED ORAL at 03:05

## 2018-05-16 NOTE — ED NOTES
TATUMERE: Faxed packet to [facility],  [place], [place], [place], [place], [facility], [place].

## 2018-05-16 NOTE — ED NOTES
Admit packet faxed to [facility], [facility], [place], [place], [place], [place], [facility], [place], [facility] [place], Waiting for response.

## 2018-05-16 NOTE — ED NOTES
PEC faxed to [facility] and  notified of PEC.  Medical clearance documented in chart by MD.

## 2018-05-16 NOTE — ED NOTES
Admit packet faxed to [facility], [facility], [facility], and [facility].

## 2018-05-16 NOTE — ED NOTES
Pt asleep at this time. Respirations even non labored skin warm and dry. Will do further assessment upon awakening.

## 2018-05-16 NOTE — ED NOTES
"Pt resting with eyes closed, wakens easily to voice.  Pt states headache pain "is easing up" and she still feels worms traveling under her skin "but not as bad."  Dr. Thibodeaux at bedside for re-evaluation, pt describing how "One really big worm traveled from my shoulder all the way down my body, and I stepped on some big ones too."  1:1 sitter remains at bedside for patient safety, will continue to closely monitor.   "

## 2018-05-16 NOTE — ED NOTES
"Pt medicated as ordered for c/o headache rated 9/10 "I get 'em bad in my family."  IV infusing, site benign, 1:1 sitter at bedside for patient safety.   "

## 2018-05-16 NOTE — ED NOTES
Patient accepted by Lisa at [facility] ([facility] [place], [place]) for the service of Dr. Mejia.  Report to be called to [phone], option 2.

## 2018-05-16 NOTE — ED NOTES
"1st encounter, patient ambulatory to exam 10 from triage with steady gait.  Pt alert and oriented to self, covered only in blanket "I had to leave my clothes at home because everything I put on felt funny."  Pt denies hearing voices/delusions but is c/o feeling "worms crawling under my skin.  You see them?"  Denies any SI/HI, states she has been compliant with her psychiatric medications yesterday "But it's been a few days, I haven't been good with them."  Admits to daily alcohol use of 3 16 ounce beers and "Smoking a little weed now and then."  Denies any other drug use, no other c/o at this time.  Pt provided with paper scrubs, IV started and labs collected/sent.  Pt aware of need for urine specimen, will continue to closely monitor.   "

## 2018-05-16 NOTE — ED NOTES
Pt resting quietly, NAD noted, respirations even/unlabored.  1:1 sitter remains at bedside for patient safety.

## 2018-05-16 NOTE — ED PROVIDER NOTES
"Encounter Date: 5/16/2018       History     Chief Complaint   Patient presents with    Psychiatric Evaluation     Pt states "I have worms crawling under my skin.  Watch you can see them moving" Pt reports she has eaten salt this PM which has "made the worms activated".  Pt has presented to ED with only a blanket covering her person     53-year-old female with a history of anxiety and bipolar disease and schizophrenia was dropped off by her sister for evaluation.  The patient presents to the emergency room naked except for a blanket and a sensation of worms crawling all over her body.  She has a history of alcohol use as well as steady use of Klonopin and has a past history of cocaine use.  She denies suicidal or homicidal ideation but is unfocused with tangential thoughts      The history is provided by the patient.   Mental Health Problem   The primary symptoms include hallucinations. The current episode started today. This is a recurrent problem.   The onset of the illness is precipitated by stressful event and emotional stress. The degree of incapacity that she is experiencing as a consequence of her illness is moderate. Sequelae of the illness include an inability to work and harmed interpersonal relations. Additional symptoms of the illness include agitation, attention impairment, flight of ideas, distractible and poor judgment. She does not admit to suicidal ideas. She does not have a plan to commit suicide. She does not contemplate harming herself. She has not already injured self. She does not contemplate injuring another person. She has not already  injured another person.     Review of patient's allergies indicates:  No Known Allergies  Past Medical History:   Diagnosis Date    Anxiety     Asthma     Bipolar 1 disorder     Depression     Schizophrenia     Seizures      Past Surgical History:   Procedure Laterality Date    APPENDECTOMY      ESOPHAGOGASTRODUODENOSCOPY       Family History   Problem " Relation Age of Onset    No Known Problems Mother     No Known Problems Father      Social History   Substance Use Topics    Smoking status: Current Every Day Smoker     Packs/day: 0.50     Types: Cigarettes    Smokeless tobacco: Never Used    Alcohol use 25.2 oz/week     42 Cans of beer per week      Comment: daily     Review of Systems   Constitutional: Negative.    HENT: Negative.    Eyes: Negative.    Respiratory: Negative.    Cardiovascular: Negative.    Gastrointestinal: Negative.    Endocrine: Negative.    Genitourinary: Negative.    Musculoskeletal: Negative.    Skin: Negative.    Allergic/Immunologic: Negative.    Neurological: Negative for dizziness and numbness.   Psychiatric/Behavioral: Positive for agitation, behavioral problems, confusion, decreased concentration, hallucinations and sleep disturbance. The patient is nervous/anxious.    All other systems reviewed and are negative.      Physical Exam     Initial Vitals [05/16/18 0259]   BP Pulse Resp Temp SpO2   (!) 148/89 100 18 97.6 °F (36.4 °C) 100 %      MAP       108.67         Physical Exam    Nursing note and vitals reviewed.  Constitutional: She appears well-developed and well-nourished.   HENT:   Head: Normocephalic.   Eyes: Pupils are equal, round, and reactive to light.   Neck: Normal range of motion.   Cardiovascular: Normal rate, regular rhythm and normal heart sounds.   Pulmonary/Chest: Breath sounds normal.   Abdominal: Soft.   Neurological: No cranial nerve deficit or sensory deficit.   Skin: Skin is warm and dry.   Psychiatric: Her mood appears anxious. Her affect is inappropriate. Her speech is rapid and/or pressured and tangential. She is hyperactive and actively hallucinating. Thought content is delusional. Cognition and memory are impaired. She expresses inappropriate judgment. She is inattentive.         ED Course   Procedures  Labs Reviewed   CBC W/ AUTO DIFFERENTIAL - Abnormal; Notable for the following:        Result Value     MPV 8.3 (*)     All other components within normal limits   COMPREHENSIVE METABOLIC PANEL - Abnormal; Notable for the following:     Sodium 134 (*)     BUN, Bld 6 (*)     Total Protein 8.6 (*)     AST 86 (*)      (*)     All other components within normal limits   URINALYSIS - Abnormal; Notable for the following:     Nitrite, UA Positive (*)     All other components within normal limits   ALCOHOL,MEDICAL (ETHANOL) - Abnormal; Notable for the following:     Alcohol, Medical, Serum 42 (*)     All other components within normal limits   URINALYSIS MICROSCOPIC - Abnormal; Notable for the following:     Bacteria, UA Many (*)     All other components within normal limits   LACTIC ACID, PLASMA   DRUG SCREEN PANEL, URINE EMERGENCY   ACETAMINOPHEN LEVEL   AMMONIA     EKG Readings: (Independently Interpreted)   Rhythm: Normal Sinus Rhythm. Other Findings: Prolonged QT Interval.          Medical Decision Making:   Initial Assessment:   Acute psychosis   ED Management:  Medical workup shows positive cocaine use and a urinary tract infection  on the basis of psychiatric exam she is acutely psychotic with a history of schizophrenia  EKG and blood work was normal  and she is medically cleared for psychiatric placement                      Clinical Impression:   The primary encounter diagnosis was Acute psychosis. Diagnoses of Cocaine abuse, Undifferentiated schizophrenia, Acute cystitis without hematuria, and Alcoholism were also pertinent to this visit.                           ERWIN Thibodeaux III, MD  05/16/18 0437       ERWIN Thibodeaux III, MD  05/16/18 0438       ERWIN Thibodeaux III, MD  05/16/18 0449

## 2018-08-09 DIAGNOSIS — Z12.39 SCREENING BREAST EXAMINATION: Primary | ICD-10-CM

## 2018-08-14 ENCOUNTER — HOSPITAL ENCOUNTER (OUTPATIENT)
Dept: RADIOLOGY | Facility: HOSPITAL | Age: 53
Discharge: HOME | End: 2018-08-14
Attending: NURSE PRACTITIONER
Payer: MEDICAID

## 2018-08-14 DIAGNOSIS — Z12.39 SCREENING BREAST EXAMINATION: ICD-10-CM

## 2018-08-14 PROCEDURE — 77067 SCR MAMMO BI INCL CAD: CPT | Mod: TC,PO

## 2019-01-17 ENCOUNTER — HOSPITAL ENCOUNTER (EMERGENCY)
Facility: HOSPITAL | Age: 54
Discharge: ANOTHER HOSPITAL | End: 2019-01-17
Attending: EMERGENCY MEDICINE
Payer: MEDICAID

## 2019-01-17 VITALS
DIASTOLIC BLOOD PRESSURE: 80 MMHG | OXYGEN SATURATION: 100 % | BODY MASS INDEX: 20.24 KG/M2 | SYSTOLIC BLOOD PRESSURE: 116 MMHG | WEIGHT: 110 LBS | RESPIRATION RATE: 20 BRPM | HEART RATE: 114 BPM | HEIGHT: 62 IN | TEMPERATURE: 98 F
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
## 2019-01-17 DIAGNOSIS — E87.29 RESPIRATORY ACIDOSIS: ICD-10-CM

## 2019-01-17 DIAGNOSIS — S22.42XA CLOSED FRACTURE OF MULTIPLE RIBS OF LEFT SIDE, INITIAL ENCOUNTER: ICD-10-CM

## 2019-01-17 DIAGNOSIS — S27.2XXA TRAUMATIC PNEUMOHEMOTHORAX, INITIAL ENCOUNTER: Primary | ICD-10-CM

## 2019-01-17 DIAGNOSIS — S42.035A CLOSED NONDISPLACED FRACTURE OF ACROMIAL END OF LEFT CLAVICLE, INITIAL ENCOUNTER: ICD-10-CM

## 2019-01-17 DIAGNOSIS — S50.312A ABRASION OF LEFT ELBOW, INITIAL ENCOUNTER: ICD-10-CM

## 2019-01-17 DIAGNOSIS — F20.9 SCHIZOPHRENIA, UNSPECIFIED TYPE: ICD-10-CM

## 2019-01-17 DIAGNOSIS — T14.90XA TRAUMA: ICD-10-CM

## 2019-01-17 DIAGNOSIS — J43.9 PULMONARY EMPHYSEMA, UNSPECIFIED EMPHYSEMA TYPE: ICD-10-CM

## 2019-01-17 DIAGNOSIS — Z78.9 ALCOHOL USE: ICD-10-CM

## 2019-01-17 LAB
ABO + RH BLD: NORMAL
ALBUMIN SERPL BCP-MCNC: 3.9 G/DL
ALLENS TEST: ABNORMAL
ALP SERPL-CCNC: 62 U/L
ALT SERPL W/O P-5'-P-CCNC: 46 U/L
AMPHET+METHAMPHET UR QL: NEGATIVE
ANION GAP SERPL CALC-SCNC: 12 MMOL/L
AST SERPL-CCNC: 51 U/L
B-HCG UR QL: NEGATIVE
BARBITURATES UR QL SCN>200 NG/ML: NEGATIVE
BASOPHILS # BLD AUTO: 0.01 K/UL
BASOPHILS NFR BLD: 0.1 %
BENZODIAZ UR QL SCN>200 NG/ML: NORMAL
BILIRUB SERPL-MCNC: 0.3 MG/DL
BILIRUB UR QL STRIP: NEGATIVE
BLD GP AB SCN CELLS X3 SERPL QL: NORMAL
BUN SERPL-MCNC: 8 MG/DL
BZE UR QL SCN: NORMAL
CALCIUM SERPL-MCNC: 9.2 MG/DL
CANNABINOIDS UR QL SCN: NEGATIVE
CHLORIDE SERPL-SCNC: 97 MMOL/L
CK SERPL-CCNC: 178 U/L
CLARITY UR: CLEAR
CO2 SERPL-SCNC: 24 MMOL/L
COLOR UR: YELLOW
CREAT SERPL-MCNC: 0.8 MG/DL
CREAT UR-MCNC: 70.6 MG/DL
CTP QC/QA: YES
DELSYS: ABNORMAL
DIFFERENTIAL METHOD: ABNORMAL
EOSINOPHIL # BLD AUTO: 0.1 K/UL
EOSINOPHIL NFR BLD: 0.3 %
ERYTHROCYTE [DISTWIDTH] IN BLOOD BY AUTOMATED COUNT: 12.3 %
EST. GFR  (AFRICAN AMERICAN): >60 ML/MIN/1.73 M^2
EST. GFR  (NON AFRICAN AMERICAN): >60 ML/MIN/1.73 M^2
ETHANOL SERPL-MCNC: 51 MG/DL
GLUCOSE SERPL-MCNC: 106 MG/DL
GLUCOSE UR QL STRIP: NEGATIVE
HCO3 UR-SCNC: 24.6 MMOL/L (ref 24–28)
HCT VFR BLD AUTO: 41.5 %
HCT VFR BLD CALC: 39 %PCV (ref 36–54)
HGB BLD-MCNC: 13 G/DL
HGB BLD-MCNC: 14.3 G/DL
HGB UR QL STRIP: NEGATIVE
INR PPP: 1
KETONES UR QL STRIP: NEGATIVE
LEUKOCYTE ESTERASE UR QL STRIP: NEGATIVE
LIPASE SERPL-CCNC: 37 U/L
LYMPHOCYTES # BLD AUTO: 1.5 K/UL
LYMPHOCYTES NFR BLD: 10.2 %
MAGNESIUM SERPL-MCNC: 1.9 MG/DL
MCH RBC QN AUTO: 32.1 PG
MCHC RBC AUTO-ENTMCNC: 34.5 G/DL
MCV RBC AUTO: 93 FL
METHADONE UR QL SCN>300 NG/ML: NEGATIVE
MONOCYTES # BLD AUTO: 0.7 K/UL
MONOCYTES NFR BLD: 4.8 %
NEUTROPHILS # BLD AUTO: 12.2 K/UL
NEUTROPHILS NFR BLD: 84 %
NITRITE UR QL STRIP: NEGATIVE
OPIATES UR QL SCN: NEGATIVE
PCO2 BLDA: 57.3 MMHG (ref 35–45)
PCP UR QL SCN>25 NG/ML: NEGATIVE
PH SMN: 7.24 [PH] (ref 7.35–7.45)
PH UR STRIP: 6 [PH] (ref 5–8)
PLATELET # BLD AUTO: 181 K/UL
PMV BLD AUTO: 8.4 FL
PO2 BLDA: 75 MMHG (ref 80–100)
POC BE: -3 MMOL/L
POC IONIZED CALCIUM: 1.22 MMOL/L (ref 1.06–1.42)
POC SATURATED O2: 92 % (ref 95–100)
POC TCO2: 26 MMOL/L (ref 23–27)
POTASSIUM BLD-SCNC: 3.9 MMOL/L (ref 3.5–5.1)
POTASSIUM SERPL-SCNC: 4.5 MMOL/L
PROT SERPL-MCNC: 7.9 G/DL
PROT UR QL STRIP: NEGATIVE
PROTHROMBIN TIME: 10.6 SEC
RBC # BLD AUTO: 4.45 M/UL
SAMPLE: ABNORMAL
SITE: ABNORMAL
SODIUM BLD-SCNC: 134 MMOL/L (ref 136–145)
SODIUM SERPL-SCNC: 133 MMOL/L
SP GR UR STRIP: <=1.005 (ref 1–1.03)
TOXICOLOGY INFORMATION: NORMAL
TROPONIN I SERPL DL<=0.01 NG/ML-MCNC: <0.006 NG/ML
URN SPEC COLLECT METH UR: ABNORMAL
UROBILINOGEN UR STRIP-ACNC: NEGATIVE EU/DL
WBC # BLD AUTO: 14.48 K/UL

## 2019-01-17 PROCEDURE — 36600 WITHDRAWAL OF ARTERIAL BLOOD: CPT

## 2019-01-17 PROCEDURE — 25500020 PHARM REV CODE 255: Performed by: EMERGENCY MEDICINE

## 2019-01-17 PROCEDURE — 96375 TX/PRO/DX INJ NEW DRUG ADDON: CPT | Mod: 59

## 2019-01-17 PROCEDURE — 99285 EMERGENCY DEPT VISIT HI MDM: CPT | Mod: 25

## 2019-01-17 PROCEDURE — 83690 ASSAY OF LIPASE: CPT

## 2019-01-17 PROCEDURE — 25000003 PHARM REV CODE 250: Performed by: EMERGENCY MEDICINE

## 2019-01-17 PROCEDURE — 25000242 PHARM REV CODE 250 ALT 637 W/ HCPCS: Performed by: EMERGENCY MEDICINE

## 2019-01-17 PROCEDURE — 96374 THER/PROPH/DIAG INJ IV PUSH: CPT | Mod: 59

## 2019-01-17 PROCEDURE — 82550 ASSAY OF CK (CPK): CPT

## 2019-01-17 PROCEDURE — 83735 ASSAY OF MAGNESIUM: CPT

## 2019-01-17 PROCEDURE — 81003 URINALYSIS AUTO W/O SCOPE: CPT | Mod: 59

## 2019-01-17 PROCEDURE — 94640 AIRWAY INHALATION TREATMENT: CPT

## 2019-01-17 PROCEDURE — 81025 URINE PREGNANCY TEST: CPT | Performed by: EMERGENCY MEDICINE

## 2019-01-17 PROCEDURE — 82803 BLOOD GASES ANY COMBINATION: CPT

## 2019-01-17 PROCEDURE — 84484 ASSAY OF TROPONIN QUANT: CPT

## 2019-01-17 PROCEDURE — 85025 COMPLETE CBC W/AUTO DIFF WBC: CPT

## 2019-01-17 PROCEDURE — 85610 PROTHROMBIN TIME: CPT

## 2019-01-17 PROCEDURE — 96361 HYDRATE IV INFUSION ADD-ON: CPT

## 2019-01-17 PROCEDURE — 63600175 PHARM REV CODE 636 W HCPCS: Performed by: EMERGENCY MEDICINE

## 2019-01-17 PROCEDURE — 86901 BLOOD TYPING SEROLOGIC RH(D): CPT

## 2019-01-17 PROCEDURE — 80053 COMPREHEN METABOLIC PANEL: CPT

## 2019-01-17 PROCEDURE — 80320 DRUG SCREEN QUANTALCOHOLS: CPT

## 2019-01-17 PROCEDURE — 96376 TX/PRO/DX INJ SAME DRUG ADON: CPT

## 2019-01-17 PROCEDURE — 80307 DRUG TEST PRSMV CHEM ANLYZR: CPT

## 2019-01-17 RX ORDER — ONDANSETRON 2 MG/ML
4 INJECTION INTRAMUSCULAR; INTRAVENOUS
Status: COMPLETED | OUTPATIENT
Start: 2019-01-17 | End: 2019-01-17

## 2019-01-17 RX ORDER — FENTANYL CITRATE 50 UG/ML
100 INJECTION, SOLUTION INTRAMUSCULAR; INTRAVENOUS
Status: COMPLETED | OUTPATIENT
Start: 2019-01-17 | End: 2019-01-17

## 2019-01-17 RX ORDER — FENTANYL CITRATE 50 UG/ML
50 INJECTION, SOLUTION INTRAMUSCULAR; INTRAVENOUS
Status: COMPLETED | OUTPATIENT
Start: 2019-01-17 | End: 2019-01-17

## 2019-01-17 RX ORDER — FENTANYL CITRATE 50 UG/ML
50 INJECTION, SOLUTION INTRAMUSCULAR; INTRAVENOUS
Status: DISCONTINUED | OUTPATIENT
Start: 2019-01-17 | End: 2019-01-17

## 2019-01-17 RX ORDER — LEVALBUTEROL INHALATION SOLUTION 1.25 MG/3ML
1.25 SOLUTION RESPIRATORY (INHALATION) 4 TIMES DAILY
Status: DISCONTINUED | OUTPATIENT
Start: 2019-01-17 | End: 2019-01-18 | Stop reason: HOSPADM

## 2019-01-17 RX ADMIN — ONDANSETRON 4 MG: 2 INJECTION INTRAMUSCULAR; INTRAVENOUS at 07:01

## 2019-01-17 RX ADMIN — FENTANYL CITRATE 100 MCG: 50 INJECTION, SOLUTION INTRAMUSCULAR; INTRAVENOUS at 07:01

## 2019-01-17 RX ADMIN — FENTANYL CITRATE 50 MCG: 50 INJECTION, SOLUTION INTRAMUSCULAR; INTRAVENOUS at 08:01

## 2019-01-17 RX ADMIN — IOHEXOL 100 ML: 350 INJECTION, SOLUTION INTRAVENOUS at 07:01

## 2019-01-17 RX ADMIN — LEVALBUTEROL 1.25 MG: 1.25 SOLUTION RESPIRATORY (INHALATION) at 08:01

## 2019-01-17 RX ADMIN — SODIUM CHLORIDE 1000 ML: 0.9 INJECTION, SOLUTION INTRAVENOUS at 07:01

## 2019-01-18 NOTE — ED NOTES
Spoke with rodriguez bed assignment [facility] ed [phone] Dr. Morrison. Stat transfer being set up at this time.

## 2019-01-18 NOTE — ED TRIAGE NOTES
Patient state she was on her porch leaning up against the rails when the rail gave out and she fell. Patient complains of L side, shoulder and arm pain at this time with ROM limited to L shoulder. Pain 10/10 at this time.

## 2019-01-18 NOTE — ED PROVIDER NOTES
Encounter Date: 1/17/2019    SCRIBE #1 NOTE: I, Yareli Dykes, am scribing for, and in the presence of,  Dr. Lefort. I have scribed the following portions of the note - the EKG reading. Other sections scribed: HPI, ROS, PE.       History     Chief Complaint   Patient presents with    Fall     patient states she was leaning on her porch railing when it gave out and she fell off of porch about 3-4 ft; patient complains of L side pain, shoulder pain, and leg pain denies LOC.     Elif Archibald is a 53 y.o. female who  has a past medical history of Anxiety, Asthma, Bipolar 1 disorder, Depression, Schizophrenia, and Seizures.    The patient presents to the ED via EMS due to a fall that occurred PTA. The patient reports she was leaning on her porch door when the door came loose and she flipped over falling onto her left side onto the rocks. The patient reports she may have fell approximately 3-4ft. She reports of 10/10 sharp left sided rib, shoulder, and back pain. She reports her pain worsens when she sits up, moves, or talks. She denies head injury or LOC. She denies any neck pain, numbness to her extremities, hip pain, or leg pain. The patient denies being on blood thinners. The patient arrived with C-collar in place. She reports of a history of HTN and is a current everyday smoker.       The history is provided by the patient.     Review of patient's allergies indicates:  No Known Allergies  Past Medical History:   Diagnosis Date    Anxiety     Asthma     Bipolar 1 disorder     Depression     Schizophrenia     Seizures      Past Surgical History:   Procedure Laterality Date    APPENDECTOMY      ESOPHAGOGASTRODUODENOSCOPY      ESOPHAGOSCOPY N/A 12/9/2014    Performed by [facility] at [facility] OR [facility]    LARYNGOSCOPY BRONCHOSCOPY-DIRECT N/A 12/9/2014    Performed by [facility] M [facility]ye MD at [facility] OR [facility]     Family History   Problem Relation Age of Onset    No Known Problems Mother     No Known  Problems Father      Social History     Tobacco Use    Smoking status: Current Every Day Smoker     Packs/day: 0.50     Types: Cigarettes    Smokeless tobacco: Never Used   Substance Use Topics    Alcohol use: Yes     Alcohol/week: 25.2 oz     Types: 42 Cans of beer per week     Comment: daily    Drug use: No     Review of Systems   Constitutional: Negative for chills and fever.   HENT: Negative for congestion, rhinorrhea and sore throat.    Eyes: Negative for redness and visual disturbance.   Respiratory: Negative for cough, shortness of breath and wheezing.    Cardiovascular: Negative for chest pain and palpitations.   Gastrointestinal: Negative for abdominal pain, diarrhea, nausea and vomiting.   Genitourinary: Negative for dysuria and hematuria.   Musculoskeletal: Positive for back pain. Negative for myalgias and neck pain.        (+) left sided shoulder pain  (+) left sided rib pain   Skin: Negative for rash.   Neurological: Negative for dizziness, weakness and light-headedness.   Psychiatric/Behavioral: Negative for confusion.       Physical Exam     Initial Vitals [01/17/19 1848]   BP Pulse Resp Temp SpO2   (!) 137/94 (!) 116 17 97.6 °F (36.4 °C) 97 %      MAP       --         Physical Exam    Nursing note and vitals reviewed.  Constitutional:   Underweight, uncomfortable     HENT:   Head: Normocephalic and atraumatic.   Right Ear: External ear normal.   Left Ear: External ear normal.   Nose: Nose normal.   Mouth/Throat: Oropharynx is clear and moist.   No facial trauma   Eyes: Conjunctivae and EOM are normal. Pupils are equal, round, and reactive to light.   Neck: Neck supple. No tracheal deviation present.   C-collar in place   Cardiovascular: Regular rhythm, normal heart sounds and intact distal pulses. Tachycardia present.    Tachycardic     Pulmonary/Chest: No stridor. Tachypnea noted. No respiratory distress. She has wheezes (mild diffuse bilaterally). She exhibits tenderness and bony tenderness.  She exhibits no crepitus and no swelling.       Abrasion to left lateral ribs  Left chest wall tenderness  Painful respirations  Normal bilateral breath sounds   Abdominal: Soft. Bowel sounds are normal. There is no tenderness.   Musculoskeletal: She exhibits edema and tenderness.   Swelling and ecchymosis on left AC joint.   ROM limited secondary to pain  Pelvis stable     Neurological: She is alert and oriented to person, place, and time. She has normal strength. GCS score is 15. GCS eye subscore is 4. GCS verbal subscore is 5. GCS motor subscore is 6.   Skin: Skin is warm and dry. Capillary refill takes less than 2 seconds.   Small abrasion L posterior elbow     Psychiatric: She has a normal mood and affect.         ED Course   Critical Care  Date/Time: 1/17/2019 7:25 PM  Performed by: Guy J. Lefort, MD  Authorized by: Guy J. Lefort, MD   Direct patient critical care time: 10 minutes  Additional history critical care time: 5 minutes  Ordering / reviewing critical care time: 5 minutes  Documentation critical care time: 5 minutes  Consulting other physicians critical care time: 5 minutes  Consult with family critical care time: 5 minutes  Total critical care time (exclusive of procedural time) : 35 minutes  Critical care time was exclusive of separately billable procedures and treating other patients and teaching time.  Critical care was necessary to treat or prevent imminent or life-threatening deterioration of the following conditions: trauma.  Critical care was time spent personally by me on the following activities: development of treatment plan with patient or surrogate, evaluation of patient's response to treatment, examination of patient, obtaining history from patient or surrogate, ordering and performing treatments and interventions, ordering and review of radiographic studies, blood draw for specimens, ordering and review of laboratory studies and re-evaluation of patient's condition.        Labs Reviewed    CBC W/ AUTO DIFFERENTIAL - Abnormal; Notable for the following components:       Result Value    WBC 14.48 (*)     MCH 32.1 (*)     MPV 8.4 (*)     Gran # (ANC) 12.2 (*)     Gran% 84.0 (*)     Lymph% 10.2 (*)     All other components within normal limits   COMPREHENSIVE METABOLIC PANEL - Abnormal; Notable for the following components:    Sodium 133 (*)     AST 51 (*)     ALT 46 (*)     All other components within normal limits   URINALYSIS - Abnormal; Notable for the following components:    Specific Gravity, UA <=1.005 (*)     All other components within normal limits   ALCOHOL,MEDICAL (ETHANOL) - Abnormal; Notable for the following components:    Alcohol, Medical, Serum 51 (*)     All other components within normal limits   ISTAT PROCEDURE - Abnormal; Notable for the following components:    POC PH 7.241 (*)     POC PCO2 57.3 (*)     POC PO2 75 (*)     POC SATURATED O2 92 (*)     POC Sodium 134 (*)     All other components within normal limits   PROTIME-INR   DRUG SCREEN PANEL, URINE EMERGENCY   MAGNESIUM   LIPASE   CK   TROPONIN I   POCT URINE PREGNANCY   TYPE & SCREEN     EKG Readings: (Independently Interpreted)   Sinus tachycardia, rate of 118 bpm, right atrial enlargement, QRS at 68 ms       Imaging Results          X-Ray Elbow Complete Left (Final result)  Result time 01/17/19 20:32:58    Final result by Luis M Early MD (01/17/19 20:32:58)                 Impression:      No acute displaced fracture-dislocation identified.      Electronically signed by: Luis M Early MD  Date:    01/17/2019  Time:    20:32             Narrative:    EXAMINATION:  XR ELBOW COMPLETE 3 VIEW LEFT    CLINICAL HISTORY:  Unspecified fall, initial encounter    TECHNIQUE:  AP, lateral, and oblique views of the left elbow were performed.    COMPARISON:  Left elbow series 09/12/2016    FINDINGS:  Bones are well mineralized. Alignment is within normal limits. No displaced fracture, dislocation or destructive osseous process.  No  large elbow joint effusion seen.  Joint spaces appear maintained. No subcutaneous emphysema.  IV line in place at the antecubital fossa..                               CT Chest Abdoment Pelvis With Contrast (Final result)  Result time 01/17/19 20:34:46    Final result by Geoff Diop MD (01/17/19 20:34:46)                 Impression:      1. Small left pneumothorax and hemothorax with multiple acute left-sided rib fractures involving the left 3rd through 10th ribs.  2. Acute left distal clavicle fracture.  3. No acute intra-abdominal abnormalities identified.  4. Pulmonary emphysema with scattered bilateral pulmonary nodules.  For multiple solid nodules with any 6 mm or greater, Fleischner Society guidelines recommend follow up with non-contrast chest CT at 3-6 months and 18-24 months after discovery.    COMMUNICATION  This critical result was discovered/received on 1/17/2019 at 20:29.    The critical information above was relayed directly by Geoff Diop MD by telephone to Dr. Guy J. Lefort on 1/17/2019 at 20:29.      Electronically signed by: Geoff Diop MD  Date:    01/17/2019  Time:    20:34             Narrative:    EXAMINATION:  CT CHEST ABDOMEN PELVIS WITH CONTRAST (XPD)    CLINICAL HISTORY:  Chest-abdomen-pelvis trauma, serious/severe, blunt;    TECHNIQUE:  CT of the chest abdomen and pelvis was performed following administration of 100 cc Omnipaque 350 IV contrast.    COMPARISON:  None    FINDINGS:  Aorta is nonaneurysmal.  No evidence of aortic injury or dissection.  Heart is normal in size without pericardial effusion.  Scattered fluid and ingested material is seen within the esophagus.    There is small left-sided pneumothorax and small left hemothorax.  Emphysematous changes are visualized throughout the lungs.  Bilateral scattered pulmonary nodules are seen.  Largest of these measure 1.1 cm within the right upper lobe and 0.6 cm within the right lower lobe.    No significant hepatic  abnormalities are identified.  There is no intra-or extrahepatic biliary ductal dilatation.  The gallbladder is unremarkable.  The stomach, pancreas, spleen, and adrenal glands are unremarkable.    Kidneys, ureters, urinary bladder, and uterus show no significant abnormalities.    Appendix is not definitely visualized.  The visualized loops of small and large bowel show no evidence of obstruction or inflammation.  No free air or free fluid.    Aorta tapers normally.    There is acute fracture seen of the left distal clavicle.  Multiple acute posterior and left lateral rib fractures are seen involving the left 3rd through 10th ribs.  Subcutaneous soft tissue structures are unremarkable.                               CT CERVICAL SPINE WITHOUT CONTRAST (Final result)  Result time 01/17/19 20:26:25    Final result by Luis M Early MD (01/17/19 20:26:25)                 Impression:      No CT evidence of cervical spine acute osseous traumatic injury.    Left apical pneumothorax better delineated on CT thorax performed same date.    Few additional findings as above.      Electronically signed by: Luis M Early MD  Date:    01/17/2019  Time:    20:26             Narrative:    EXAMINATION:  CT CERVICAL SPINE WITHOUT CONTRAST    CLINICAL HISTORY:  C-spine trauma, NEXUS/CCR positive, +risk factor(s);    TECHNIQUE:  Low dose axial images, sagittal and coronal reformations were performed though the cervical spine.  Contrast was not administered.    COMPARISON:  Cervical spine CT 06/02/2015    FINDINGS:  Slight levocurvature with straightening of the cervical lordosis, likely related to positioning or muscle strain.    Vertebral body heights are unchanged.  No displaced fracture, dislocation or significant listhesis.  No prevertebral soft tissue swelling.  No paraspinal mass or fluid collection.    Mild to moderate degenerative change at the atlantodental interval.    Multilevel degenerative disc disease with marginal  osteophytes, endplate changes and uncovertebral and facet arthrosis most prominent at C4-5 through C6-7 levels resulting in at most borderline acquired canal stenosis at C5-6 level and moderate neural foraminal narrowing at left C6-7 level, overall slightly progressed from 2015 exam.    Included airway is midline and patent.  Biapical pleuroparenchymal scarring.  There is small to moderate-sized left-sided pneumothorax, better visualized on CT thorax performed same day.  Centrilobular and paraseptal pulmonary emphysema noted at the imaged lung apices.    Franco cisterna magna noted.  11 mm well-circumscribed partially calcified hyperdense mass within the subcutaneous soft tissues of the left paramedian upper neck which abuts the overlying skin suggestive of a complex sebaceous cyst.                               CT Head Without Contrast (Final result)  Result time 01/17/19 20:01:52    Final result by Scooter Saha MD (01/17/19 20:01:52)                 Impression:      No acute intracranial abnormalities.    Stable senescent changes.    Right maxillary sinus opacification with bony wall thickening suggesting chronic sinusitis.    Metal foreign body versus metal piercing in the soft tissues of the right frontal scalp over the calvarium above the orbit.    Remote blowout fracture medial wall right orbit, unchanged.      Electronically signed by: Scooter Saha MD  Date:    01/17/2019  Time:    20:01             Narrative:    EXAMINATION:  CT HEAD WITHOUT CONTRAST    CLINICAL HISTORY:  Head trauma, minor, GCS>=13, NOC/NEXUS/CCR positive, first study;    TECHNIQUE:  Low dose axial images were obtained through the head.  Coronal and sagittal reformations were also performed. Contrast was not administered.    COMPARISON:  January 29, 2017.    FINDINGS:  The brain parenchyma appears normal for age with good corticomedullary differentiation.  There is no evidence of acute infarct, hemorrhage, or mass.  The ventricular  system is mildly enlarged from generalized atrophy but stable compared to prior.  Mild periventricular decreased supratentorial white matter attenuation most likely related to chronic nonspecific small vessel disease.  Prominent cisterna magna, unchanged.  No mass-effect or midline shift.  There are no abnormal extra-axial fluid collections.  Complete opacification the right maxillary antrum with some thickening of the bony walls.  Partial opacification of the right ethmoid air cell complex.  The remaining paranasal sinuses and mastoid air cells are essentially clear.  The calvarium appears intact. Metal foreign body versus metal piercing in the soft tissues of the right frontal scalp over the calvarium above the orbit.  This was not present on the prior exam.  Remote blowout fracture of the medial wall right orbit, unchanged.                               X-Ray Chest AP Portable (Final result)  Result time 01/17/19 19:26:05    Final result by Alexis Guzmán MD (01/17/19 19:26:05)                 Impression:      No focal consolidation      Electronically signed by: Alexis Guzmán MD  Date:    01/17/2019  Time:    19:26             Narrative:    EXAMINATION:  XR CHEST AP PORTABLE    CLINICAL HISTORY:  Chest Pain;    TECHNIQUE:  Single frontal view of the chest was performed.    COMPARISON:  Chest radiograph 01/27/2018    FINDINGS:  The lungs are clear.  The cardiac silhouette is unremarkable.  Again noted are healing left posterior rib fractures.  The osseous and soft tissue structures demonstrate no acute abnormality.                               X-Ray Pelvis Routine AP (Final result)  Result time 01/17/19 19:29:44    Final result by Alexis Guzmán MD (01/17/19 19:29:44)                 Impression:      No radiographic evidence of fracture      Electronically signed by: Alexis Guzmán MD  Date:    01/17/2019  Time:    19:29             Narrative:    EXAMINATION:  XR PELVIS ROUTINE AP    CLINICAL  HISTORY:  trauma;    TECHNIQUE:  AP view of the pelvis was performed.    COMPARISON:  Pelvic radiograph 09/12/2016    FINDINGS:  There is no evidence of acute fracture or subluxation.  The soft tissues are unremarkable.                                 Medical Decision Making:   Initial Assessment:   Pt with concerning mechanism and evidence of chest wall injury, will evaluate for life threatening trauma.  Fall actually higher than 3-4 feet as initially reported.  Endorses Etoh use.  Differential Diagnosis:   PTX, GM, flail chest, tamponade, tension, c-spine injury, penetrating solid organ injury 2/2 rib fx, pelvic fx,TBI, ICH  Independently Interpreted Test(s):   I have ordered and independently interpreted EKG Reading(s) - see prior notes  Clinical Tests:   Lab Tests: Ordered and Reviewed  Radiological Study: Ordered and Reviewed  Medical Tests: Ordered and Reviewed  ED Management:  Initial blood gas concerning, placed on NRB.  Serial reassessments with no detrimental change.  Satting 100% on NRB.  Discussed imaging findings with radiology, small HPTX with multiple rib fx without flail segment.  Clavicle injury noted, o/w no other injuries observed.  Discussed with Gen sx, Dr. Lazar, given complexity of injury, age, underlying pulmonary dz, agree with plan to transfer to [facility] for further management.  Given relatively small size of HPTX and no worsening in ED, will continue NRB and defer on thoracostomy at this time.  Pt stable for ground transport, discussed with Dr. Morrison who will accept ED transfer.  Other:   I have discussed this case with another health care provider.                      Clinical Impression:     1. Traumatic pneumohemothorax, initial encounter    2. Trauma    3. Fall    4. Closed fracture of multiple ribs of left side, initial encounter    5. Closed nondisplaced fracture of acromial end of left clavicle, initial encounter    6. Pulmonary emphysema, unspecified emphysema type    7.  Respiratory acidosis    8. Schizophrenia, unspecified type    9. Alcohol use    10. Abrasion of left elbow, initial encounter      Disposition:   Disposition: Transferred  Condition: Fair    Scribe attestation I, Dr. Guy Lefort, personally performed the services described in this documentation. All medical record entries made by the scribe were at my direction and in my presence. I have reviewed the chart and agree that the record reflects my personal performance and is accurate and complete. Guy Lefort, MD.  9:39 PM 01/17/2019                      Guy J. Lefort, MD  01/17/19 2144

## 2019-01-27 ENCOUNTER — HOSPITAL ENCOUNTER (EMERGENCY)
Facility: HOSPITAL | Age: 54
Discharge: HOME | End: 2019-01-27
Attending: EMERGENCY MEDICINE
Payer: MEDICAID

## 2019-01-27 VITALS
TEMPERATURE: 98 F | SYSTOLIC BLOOD PRESSURE: 135 MMHG | HEIGHT: 63 IN | OXYGEN SATURATION: 95 % | RESPIRATION RATE: 20 BRPM | WEIGHT: 107.38 LBS | HEART RATE: 103 BPM | BODY MASS INDEX: 19.03 KG/M2 | DIASTOLIC BLOOD PRESSURE: 91 MMHG

## 2019-01-27 DIAGNOSIS — S22.42XD CLOSED FRACTURE OF MULTIPLE RIBS OF LEFT SIDE WITH ROUTINE HEALING, SUBSEQUENT ENCOUNTER: ICD-10-CM

## 2019-01-27 DIAGNOSIS — J93.9 PNEUMOTHORAX: ICD-10-CM

## 2019-01-27 DIAGNOSIS — J43.9 PULMONARY EMPHYSEMA, UNSPECIFIED EMPHYSEMA TYPE: Primary | ICD-10-CM

## 2019-01-27 PROCEDURE — 99283 EMERGENCY DEPT VISIT LOW MDM: CPT | Mod: 25,ER

## 2019-01-27 PROCEDURE — 25000242 PHARM REV CODE 250 ALT 637 W/ HCPCS: Mod: ER

## 2019-01-27 PROCEDURE — 94640 AIRWAY INHALATION TREATMENT: CPT | Mod: ER

## 2019-01-27 RX ORDER — IPRATROPIUM BROMIDE AND ALBUTEROL SULFATE 2.5; .5 MG/3ML; MG/3ML
SOLUTION RESPIRATORY (INHALATION)
Status: COMPLETED
Start: 2019-01-27 | End: 2019-01-27

## 2019-01-27 RX ADMIN — IPRATROPIUM BROMIDE AND ALBUTEROL SULFATE 3 ML: .5; 3 SOLUTION RESPIRATORY (INHALATION) at 10:01

## 2019-01-28 NOTE — ED PROVIDER NOTES
Encounter Date: 1/27/2019       History     Chief Complaint   Patient presents with    Shortness of Breath     Patient states she has SOB that started this morning.   She states she has a collapsed lung and 8 broken ribs from a fall last week.      The patient fell at home on January 17th.  She was seen at [facility].  She was diagnosed with multiple rib fractures and many ribs are broken in 2 places.  She has fractures of the ribs 3rd through 10th on the left side.  She had a small pneumothorax as well.      The history is provided by the patient.   Shortness of Breath   This is a chronic problem. The problem occurs intermittently.Pertinent negatives include no fever, no rhinorrhea, no sore throat, no cough, no hemoptysis, no wheezing, no PND, no abdominal pain and no rash. She has tried beta-agonist inhalers for the symptoms. She has had prior hospitalizations. She has had prior ED visits. She has had no prior ICU admissions.     Review of patient's allergies indicates:  No Known Allergies  Past Medical History:   Diagnosis Date    Anxiety     Asthma     Bipolar 1 disorder     Depression     Schizophrenia     Seizures      Past Surgical History:   Procedure Laterality Date    APPENDECTOMY      ESOPHAGOGASTRODUODENOSCOPY      ESOPHAGOSCOPY N/A 12/9/2014    Performed by [facility] at [facility] OR [address] [place] [address]    LARYNGOSCOPY BRONCHOSCOPY-DIRECT N/A 12/9/2014    Performed by Luis M Nye MD at [facility] OR [address] [place] [address]     Family History   Problem Relation Age of Onset    No Known Problems Mother     No Known Problems Father      Social History     Tobacco Use    Smoking status: Current Every Day Smoker     Packs/day: 2.00     Types: Cigarettes    Smokeless tobacco: Never Used   Substance Use Topics    Alcohol use: Yes     Alcohol/week: 25.2 oz     Types: 42 Cans of beer per week     Comment: daily    Drug use: No     Review of Systems   Constitutional: Negative for fever.   HENT: Negative for rhinorrhea  and sore throat.    Respiratory: Positive for shortness of breath. Negative for cough, hemoptysis and wheezing.    Cardiovascular: Negative for PND.   Gastrointestinal: Negative for abdominal pain.   Skin: Negative for rash.   All other systems reviewed and are negative.      Physical Exam     Initial Vitals [01/27/19 2208]   BP Pulse Resp Temp SpO2   (!) 196/103 (!) 114 (!) 24 97.5 °F (36.4 °C) (!) 91 %      MAP       --         Physical Exam    Nursing note and vitals reviewed.  Constitutional: She appears well-developed and well-nourished.   HENT:   Head: Normocephalic and atraumatic.   Eyes: Conjunctivae and EOM are normal.   Neck: Normal range of motion. Neck supple.   Cardiovascular: Normal rate, regular rhythm and normal heart sounds.   Pulmonary/Chest: She has decreased breath sounds in the right upper field, the right middle field, the right lower field, the left upper field, the left middle field and the left lower field. She has no wheezes. She has no rhonchi. She has no rales.   Patient has a poor inspiratory effort which is causing decreased breath sounds bilaterally. And I am unable to determine if there is wheezing because of this   Abdominal: Soft. She exhibits no distension. There is no rebound and no guarding.   Musculoskeletal: Normal range of motion.   Neurological: She is alert and oriented to person, place, and time. GCS score is 15. GCS eye subscore is 4. GCS verbal subscore is 5. GCS motor subscore is 6.   Skin: Skin is warm and dry. Capillary refill takes less than 2 seconds.   Psychiatric: She has a normal mood and affect. Her behavior is normal. Judgment and thought content normal.         ED Course   Procedures  Labs Reviewed - No data to display       Imaging Results          X-Ray Chest PA And Lateral (Preliminary result)  Result time 01/27/19 22:52:00    ED Interpretation by Caitlin Feliciano MD (01/27/19 22:52:00, [facility], Emergency Medicine)    Multiple left-sided  rib fractures as noted previously.  Previous pneumothorax has improved                            X-Rays:   Independently Interpreted Readings:   Chest X-Ray: Normal heart size.  No infiltrates.  No acute abnormalities. Rib Fracture(s): Left: 3rd, 4th, 5th, 6th, 7th, 8th, 9th and 10th. Patient has multiple rib fractures on the left side which were diagnosed previously.  The pneumothorax that was present previously has resolved.     Medical Decision Making:   Clinical Tests:   Radiological Study: Ordered and Reviewed                      Clinical Impression:   The primary encounter diagnosis was Pulmonary emphysema, unspecified emphysema type. Diagnoses of Pneumothorax and Closed fracture of multiple ribs of left side with routine healing, subsequent encounter were also pertinent to this visit.      Disposition:   Disposition: Discharged  Condition: Stable                        Caitlin Feliciano MD  01/27/19 [id number]

## 2019-01-30 ENCOUNTER — HOSPITAL ENCOUNTER (EMERGENCY)
Facility: HOSPITAL | Age: 54
Discharge: HOME | End: 2019-01-30
Attending: EMERGENCY MEDICINE
Payer: MEDICAID

## 2019-01-30 VITALS
WEIGHT: 100 LBS | HEART RATE: 100 BPM | RESPIRATION RATE: 18 BRPM | HEIGHT: 63 IN | BODY MASS INDEX: 17.72 KG/M2 | TEMPERATURE: 98 F | OXYGEN SATURATION: 96 % | DIASTOLIC BLOOD PRESSURE: 86 MMHG | SYSTOLIC BLOOD PRESSURE: 121 MMHG

## 2019-01-30 DIAGNOSIS — M79.18 MUSCULOSKELETAL PAIN: Primary | ICD-10-CM

## 2019-01-30 DIAGNOSIS — Z87.81 HISTORY OF RIB FRACTURE: ICD-10-CM

## 2019-01-30 PROCEDURE — 99284 EMERGENCY DEPT VISIT MOD MDM: CPT | Mod: 25

## 2019-01-30 PROCEDURE — 63600175 PHARM REV CODE 636 W HCPCS: Performed by: EMERGENCY MEDICINE

## 2019-01-30 PROCEDURE — 96372 THER/PROPH/DIAG INJ SC/IM: CPT

## 2019-01-30 RX ORDER — NAPROXEN 375 MG/1
375 TABLET ORAL 2 TIMES DAILY WITH MEALS
Qty: 60 TABLET | Refills: 0 | Status: ON HOLD | OUTPATIENT
Start: 2019-01-30 | End: 2019-04-09

## 2019-01-30 RX ORDER — LORAZEPAM 0.5 MG/1
0.5 TABLET ORAL
Status: DISCONTINUED | OUTPATIENT
Start: 2019-01-30 | End: 2019-01-30

## 2019-01-30 RX ORDER — KETOROLAC TROMETHAMINE 30 MG/ML
30 INJECTION, SOLUTION INTRAMUSCULAR; INTRAVENOUS
Status: COMPLETED | OUTPATIENT
Start: 2019-01-30 | End: 2019-01-30

## 2019-01-30 RX ORDER — LIDOCAINE 50 MG/G
1 PATCH TOPICAL DAILY
Qty: 15 PATCH | Refills: 0 | Status: ON HOLD | OUTPATIENT
Start: 2019-01-30 | End: 2019-04-09

## 2019-01-30 RX ADMIN — KETOROLAC TROMETHAMINE 30 MG: 30 INJECTION, SOLUTION INTRAMUSCULAR at 08:01

## 2019-01-31 NOTE — ED PROVIDER NOTES
"Encounter Date: 1/30/2019    SCRIBE #1 NOTE: I, Keshia Russell, am scribing for, and in the presence of,  Dr. Quigley. I have scribed the entire note.       History     Chief Complaint   Patient presents with    Fall     To ER per EMS with c/o fall two weeks ago.  Pt c/o Pain to left shoulder arm and back.  Pt states that she is out of pain medication.  Pt staes that she has a broken collar bone and a broken rib.     Elif Archibald is a 53 y.o. female who  has a past medical history of Anxiety, Asthma, Bipolar 1 disorder, Depression, Schizophrenia, and Seizures.    Pt presents to the ED via EMS due to fall x 2 weeks ago. Pt states that she is out of her pain medication prescribed to her from the fall. Pt says "I took one more than usual one day." Pt denies Hx of GI bleed, renal problems, or allergies to NSAIDs. She notes that she has not fallen since the previous accident.   Of note, according to EMS, pt received 100 micrograms of Fentanyl PTA. Pt had two prescriptions filled on 1/22, one for Tramadol (30 tablets) and the other for Oxycodone 10-3.25 (30 tablets), both of which she is out of now.       The history is provided by the patient and the EMS personnel.     Review of patient's allergies indicates:  No Known Allergies  Past Medical History:   Diagnosis Date    Anxiety     Asthma     Bipolar 1 disorder     Depression     Schizophrenia     Seizures      Past Surgical History:   Procedure Laterality Date    APPENDECTOMY      ESOPHAGOGASTRODUODENOSCOPY      ESOPHAGOSCOPY N/A 12/9/2014    Performed by [facility] at [facility] OR [address] [place] [address]    LARYNGOSCOPY BRONCHOSCOPY-DIRECT N/A 12/9/2014    Performed by [facility] M [facility]ye MD at [facility] OR [facility] [place]     Family History   Problem Relation Age of Onset    No Known Problems Mother     No Known Problems Father      Social History     Tobacco Use    Smoking status: Current Every Day Smoker     Packs/day: 2.00     Types: Cigarettes    Smokeless tobacco: Never Used "   Substance Use Topics    Alcohol use: Yes     Alcohol/week: 25.2 oz     Types: 42 Cans of beer per week     Comment: daily    Drug use: No     Review of Systems   Constitutional: Negative for chills and fever.   HENT: Negative for sore throat.    Respiratory: Negative for cough and shortness of breath.    Cardiovascular: Negative for chest pain.   Gastrointestinal: Negative for nausea and vomiting.   Genitourinary: Negative for dysuria, frequency and urgency.   Musculoskeletal: Positive for arthralgias and myalgias. Negative for neck pain and neck stiffness.   Skin: Negative for rash and wound.   Neurological: Negative for syncope and weakness.   Hematological: Does not bruise/bleed easily.   Psychiatric/Behavioral: Negative for agitation, behavioral problems and confusion.       Physical Exam     Initial Vitals [01/30/19 2025]   BP Pulse Resp Temp SpO2   (!) 141/85 101 10 97.5 °F (36.4 °C) 99 %      MAP       --         Physical Exam    Nursing note and vitals reviewed.  Constitutional: She appears well-developed and well-nourished. She is not diaphoretic. No distress.   HENT:   Head: Normocephalic and atraumatic.   Mouth/Throat: Oropharynx is clear and moist.   Eyes: EOM are normal. Pupils are equal, round, and reactive to light.   Neck: No tracheal deviation present.   Cardiovascular: Normal rate, regular rhythm, normal heart sounds and intact distal pulses.   Pulmonary/Chest: Breath sounds normal. No stridor. No respiratory distress. She has no wheezes.   Abdominal: Soft. Bowel sounds are normal. She exhibits no distension and no mass. There is no tenderness.   Musculoskeletal: Normal range of motion. She exhibits no edema.   Neurological: She is alert and oriented to person, place, and time. No cranial nerve deficit or sensory deficit.   Skin: Skin is warm and dry. Capillary refill takes less than 2 seconds. No rash noted.   Ecchymosis to left upper extremity   Psychiatric: She has a normal mood and affect.  Her behavior is normal. Thought content normal.         ED Course   Procedures  Labs Reviewed - No data to display         X-Rays:   Independently Interpreted Readings:   Other Readings:  Reviewed by myself, read by radiology.     Imaging Results          X-Ray Chest AP Portable (Final result)  Result time 01/30/19 21:22:50    Final result by Geoff Diop MD (01/30/19 21:22:50)                 Impression:      Redemonstration of multiple recent left-sided rib fractures and left distal clavicular fracture.    Otherwise no acute cardiopulmonary process identified.      Electronically signed by: Geoff Diop MD  Date:    01/30/2019  Time:    21:22             Narrative:    EXAMINATION:  XR CHEST AP PORTABLE    CLINICAL HISTORY:  broken rib;    TECHNIQUE:  Single frontal view of the chest was performed.    COMPARISON:  01/27/2019.    FINDINGS:  Multiple recent non healed left-sided rib fractures are again seen as well as recent non healed distal left clavicular fracture.  Heart is normal in size.  Lungs are symmetrically expanded.  No pneumothorax seen.  No evidence of new focal consolidation or effusion.                                Medical Decision Making:   Clinical Tests:   Radiological Study: Ordered and Reviewed  ED Management:  53-year-old male status post fall January 17th with clavicle fracture and rib fractures with associated pneumothorax.  Patient presents today with persistent pain. She has been taking tramadol and not Percocet for pain has ran out of those.  She denies shortness of breath or other complaints. She also has prescriptions for gabapentin which she says does not help.  Patient presented with heart rate of 101 however this has improved after given IM Toradol.  X-ray negative for new fracture or pneumonia.  Explained to patient that I will not be prescribing narcotics at this time.  Patient to be discharged with naproxen and lidocaine patches.  I recommended she follow up with PCP for  continued care.  Return precautions for worsening pain shortness of breath new symptoms such as numbness tingling or any other concerns.    After taking into careful account the historical factors and physical exam findings of the patient's presentation today, in conjunction with the empirical and objective data obtained on ED workup, no acute emergent medical condition has been identified. The patient appears to be low risk for an emergent medical condition and I feel it is safe and appropriate at this time for the patient to be discharged to follow-up as detailed in their discharge instructions for reevaluation and possible continued outpatient workup and management. I have discussed the specifics of the workup with the patient and the patient has verbalized understanding of the details of the workup, the diagnosis, the treatment plan, and the need for outpatient follow-up.  Although the patient has no emergent etiology today this does not preclude the development of an emergent condition so in addition, I have advised the patient that they can return to the ED and/or activate EMS at any time with worsening of their symptoms, change of their symptoms, or with any other medical complaint.  The patient remained comfortable and stable during their visit in the ED.  Discharge and follow-up instructions discussed with the patient who expressed understanding and willingness to comply with my recommendations.                        Clinical Impression:     1. Musculoskeletal pain    2. History of rib fracture            Disposition:   Disposition: Discharged  Condition: Stable       Scribe attestation I, Levi Quigley,  personally performed the services described in this documentation. All medical record entries made by the scribe were at my direction and in my presence.  I have reviewed the chart and agree that the record reflects my personal performance and is accurate and complete. Levi Quigley M.D. 9:36  PM01/30/2019                   Levi Quigley Jr., MD  01/30/19 2139

## 2019-04-04 ENCOUNTER — HOSPITAL ENCOUNTER (INPATIENT)
Facility: HOSPITAL | Age: 54
LOS: 5 days | DRG: 871 | End: 2019-04-09
Attending: EMERGENCY MEDICINE | Admitting: HOSPITALIST
Payer: MEDICAID

## 2019-04-04 DIAGNOSIS — J18.9 HCAP (HEALTHCARE-ASSOCIATED PNEUMONIA): ICD-10-CM

## 2019-04-04 DIAGNOSIS — J15.1: ICD-10-CM

## 2019-04-04 DIAGNOSIS — A41.9 SEPSIS, DUE TO UNSPECIFIED ORGANISM: Primary | ICD-10-CM

## 2019-04-04 DIAGNOSIS — R00.0 TACHYCARDIA: ICD-10-CM

## 2019-04-04 LAB
BASOPHILS # BLD AUTO: 0 K/UL (ref 0–0.2)
BASOPHILS NFR BLD: 0.2 % (ref 0–1.9)
DIFFERENTIAL METHOD: ABNORMAL
EOSINOPHIL # BLD AUTO: 0.1 K/UL (ref 0–0.5)
EOSINOPHIL NFR BLD: 1.3 % (ref 0–8)
ERYTHROCYTE [DISTWIDTH] IN BLOOD BY AUTOMATED COUNT: 16.1 % (ref 11.5–14.5)
HCT VFR BLD AUTO: 30.2 % (ref 37–48.5)
HGB BLD-MCNC: 9.5 G/DL (ref 12–16)
LYMPHOCYTES # BLD AUTO: 1.3 K/UL (ref 1–4.8)
LYMPHOCYTES NFR BLD: 15.3 % (ref 18–48)
MCH RBC QN AUTO: 27.8 PG (ref 27–31)
MCHC RBC AUTO-ENTMCNC: 31.5 G/DL (ref 32–36)
MCV RBC AUTO: 88 FL (ref 82–98)
MONOCYTES # BLD AUTO: 0.6 K/UL (ref 0.3–1)
MONOCYTES NFR BLD: 7.1 % (ref 4–15)
NEUTROPHILS # BLD AUTO: 6.6 K/UL (ref 1.8–7.7)
NEUTROPHILS NFR BLD: 76.1 % (ref 38–73)
PLATELET # BLD AUTO: 288 K/UL (ref 150–350)
PMV BLD AUTO: 7.6 FL (ref 9.2–12.9)
RBC # BLD AUTO: 3.42 M/UL (ref 4–5.4)
WBC # BLD AUTO: 8.7 K/UL (ref 3.9–12.7)

## 2019-04-04 PROCEDURE — 83880 ASSAY OF NATRIURETIC PEPTIDE: CPT

## 2019-04-04 PROCEDURE — 83605 ASSAY OF LACTIC ACID: CPT

## 2019-04-04 PROCEDURE — 36415 COLL VENOUS BLD VENIPUNCTURE: CPT

## 2019-04-04 PROCEDURE — 80053 COMPREHEN METABOLIC PANEL: CPT

## 2019-04-04 PROCEDURE — 12000002 HC ACUTE/MED SURGE SEMI-PRIVATE ROOM

## 2019-04-04 PROCEDURE — 85025 COMPLETE CBC W/AUTO DIFF WBC: CPT

## 2019-04-04 PROCEDURE — 87040 BLOOD CULTURE FOR BACTERIA: CPT | Mod: 59

## 2019-04-04 PROCEDURE — 99291 CRITICAL CARE FIRST HOUR: CPT | Mod: 25

## 2019-04-04 PROCEDURE — 84145 PROCALCITONIN (PCT): CPT

## 2019-04-04 PROCEDURE — 25000003 PHARM REV CODE 250: Performed by: EMERGENCY MEDICINE

## 2019-04-04 PROCEDURE — 93005 ELECTROCARDIOGRAM TRACING: CPT

## 2019-04-04 RX ORDER — VANCOMYCIN HCL IN 5 % DEXTROSE 1G/250ML
1000 PLASTIC BAG, INJECTION (ML) INTRAVENOUS
Status: COMPLETED | OUTPATIENT
Start: 2019-04-05 | End: 2019-04-05

## 2019-04-04 RX ORDER — CEFEPIME HYDROCHLORIDE 1 G/50ML
1 INJECTION, SOLUTION INTRAVENOUS
Status: COMPLETED | OUTPATIENT
Start: 2019-04-05 | End: 2019-04-05

## 2019-04-04 RX ADMIN — SODIUM CHLORIDE 1362 ML: 0.9 INJECTION, SOLUTION INTRAVENOUS at 11:04

## 2019-04-05 PROBLEM — Z93.0 TRACHEOSTOMY STATUS: Status: ACTIVE | Noted: 2019-04-05

## 2019-04-05 PROBLEM — Z93.1 PEG (PERCUTANEOUS ENDOSCOPIC GASTROSTOMY) STATUS: Status: ACTIVE | Noted: 2019-04-05

## 2019-04-05 PROBLEM — E86.0 DEHYDRATION: Status: ACTIVE | Noted: 2019-04-05

## 2019-04-05 PROBLEM — R74.8 ELEVATED LIVER ENZYMES: Status: ACTIVE | Noted: 2019-04-05

## 2019-04-05 PROBLEM — J96.10 CHRONIC RESPIRATORY FAILURE: Status: ACTIVE | Noted: 2019-04-05

## 2019-04-05 PROBLEM — R53.2 FUNCTIONAL QUADRIPLEGIA: Status: ACTIVE | Noted: 2019-04-05

## 2019-04-05 PROBLEM — F33.41 RECURRENT MAJOR DEPRESSIVE DISORDER, IN PARTIAL REMISSION: Status: ACTIVE | Noted: 2019-04-05

## 2019-04-05 PROBLEM — A41.9 SEPSIS: Status: ACTIVE | Noted: 2019-04-05

## 2019-04-05 PROBLEM — E88.09 HYPOALBUMINEMIA DUE TO PROTEIN-CALORIE MALNUTRITION: Status: ACTIVE | Noted: 2019-04-05

## 2019-04-05 PROBLEM — R91.8 LUNG NODULES: Status: ACTIVE | Noted: 2019-04-05

## 2019-04-05 PROBLEM — J44.0 CHRONIC OBSTRUCTIVE PULMONARY DISEASE WITH ACUTE LOWER RESPIRATORY INFECTION: Status: ACTIVE | Noted: 2019-04-05

## 2019-04-05 PROBLEM — I10 ESSENTIAL HYPERTENSION: Status: ACTIVE | Noted: 2019-04-05

## 2019-04-05 PROBLEM — R00.0 SINUS TACHYCARDIA: Status: ACTIVE | Noted: 2019-04-05

## 2019-04-05 PROBLEM — Z86.79 HX OF SUBDURAL HEMATOMA: Status: ACTIVE | Noted: 2019-04-05

## 2019-04-05 PROBLEM — D64.9 NORMOCYTIC ANEMIA: Status: ACTIVE | Noted: 2019-04-05

## 2019-04-05 PROBLEM — K70.30 ALCOHOLIC CIRRHOSIS OF LIVER WITHOUT ASCITES: Status: ACTIVE | Noted: 2019-04-05

## 2019-04-05 PROBLEM — E46 HYPOALBUMINEMIA DUE TO PROTEIN-CALORIE MALNUTRITION: Status: ACTIVE | Noted: 2019-04-05

## 2019-04-05 PROBLEM — R53.81 DEBILITY: Status: ACTIVE | Noted: 2019-04-05

## 2019-04-05 PROBLEM — J18.9 HCAP (HEALTHCARE-ASSOCIATED PNEUMONIA): Status: ACTIVE | Noted: 2019-04-05

## 2019-04-05 PROBLEM — E83.42 HYPOMAGNESEMIA: Status: ACTIVE | Noted: 2019-04-05

## 2019-04-05 LAB
ALBUMIN SERPL BCP-MCNC: 2.3 G/DL (ref 3.5–5.2)
ALBUMIN SERPL BCP-MCNC: 2.7 G/DL (ref 3.5–5.2)
ALP SERPL-CCNC: 111 U/L (ref 55–135)
ALP SERPL-CCNC: 136 U/L (ref 55–135)
ALT SERPL W/O P-5'-P-CCNC: 201 U/L (ref 10–44)
ALT SERPL W/O P-5'-P-CCNC: 254 U/L (ref 10–44)
ANION GAP SERPL CALC-SCNC: 10 MMOL/L (ref 8–16)
ANION GAP SERPL CALC-SCNC: 6 MMOL/L (ref 8–16)
AST SERPL-CCNC: 128 U/L (ref 10–40)
AST SERPL-CCNC: 167 U/L (ref 10–40)
BASOPHILS # BLD AUTO: 0 K/UL (ref 0–0.2)
BASOPHILS NFR BLD: 0.3 % (ref 0–1.9)
BILIRUB SERPL-MCNC: 0.3 MG/DL (ref 0.1–1)
BILIRUB SERPL-MCNC: 0.4 MG/DL (ref 0.1–1)
BILIRUB UR QL STRIP: NEGATIVE
BNP SERPL-MCNC: 38 PG/ML (ref 0–99)
BUN SERPL-MCNC: 31 MG/DL (ref 6–20)
BUN SERPL-MCNC: 38 MG/DL (ref 6–20)
CALCIUM SERPL-MCNC: 10.2 MG/DL (ref 8.7–10.5)
CALCIUM SERPL-MCNC: 9.4 MG/DL (ref 8.7–10.5)
CHLORIDE SERPL-SCNC: 102 MMOL/L (ref 95–110)
CHLORIDE SERPL-SCNC: 98 MMOL/L (ref 95–110)
CLARITY UR: CLEAR
CO2 SERPL-SCNC: 28 MMOL/L (ref 23–29)
CO2 SERPL-SCNC: 29 MMOL/L (ref 23–29)
COLOR UR: YELLOW
CREAT SERPL-MCNC: 0.7 MG/DL (ref 0.5–1.4)
CREAT SERPL-MCNC: 0.8 MG/DL (ref 0.5–1.4)
DIFFERENTIAL METHOD: ABNORMAL
EOSINOPHIL # BLD AUTO: 0.1 K/UL (ref 0–0.5)
EOSINOPHIL NFR BLD: 1.6 % (ref 0–8)
ERYTHROCYTE [DISTWIDTH] IN BLOOD BY AUTOMATED COUNT: 15.5 % (ref 11.5–14.5)
EST. GFR  (AFRICAN AMERICAN): >60 ML/MIN/1.73 M^2
EST. GFR  (AFRICAN AMERICAN): >60 ML/MIN/1.73 M^2
EST. GFR  (NON AFRICAN AMERICAN): >60 ML/MIN/1.73 M^2
EST. GFR  (NON AFRICAN AMERICAN): >60 ML/MIN/1.73 M^2
FERRITIN SERPL-MCNC: 413 NG/ML (ref 20–300)
FOLATE SERPL-MCNC: 19.4 NG/ML (ref 4–24)
GLUCOSE SERPL-MCNC: 100 MG/DL (ref 70–110)
GLUCOSE SERPL-MCNC: 112 MG/DL (ref 70–110)
GLUCOSE UR QL STRIP: NEGATIVE
HCT VFR BLD AUTO: 25 % (ref 37–48.5)
HGB BLD-MCNC: 8.2 G/DL (ref 12–16)
HGB UR QL STRIP: NEGATIVE
IRON SERPL-MCNC: 21 UG/DL (ref 30–160)
KETONES UR QL STRIP: NEGATIVE
LACTATE SERPL-SCNC: 1.3 MMOL/L (ref 0.5–2.2)
LACTATE SERPL-SCNC: 1.4 MMOL/L (ref 0.5–2.2)
LEUKOCYTE ESTERASE UR QL STRIP: NEGATIVE
LYMPHOCYTES # BLD AUTO: 1.4 K/UL (ref 1–4.8)
LYMPHOCYTES NFR BLD: 15.6 % (ref 18–48)
MAGNESIUM SERPL-MCNC: 1.6 MG/DL (ref 1.6–2.6)
MCH RBC QN AUTO: 28.4 PG (ref 27–31)
MCHC RBC AUTO-ENTMCNC: 32.6 G/DL (ref 32–36)
MCV RBC AUTO: 87 FL (ref 82–98)
MONOCYTES # BLD AUTO: 0.6 K/UL (ref 0.3–1)
MONOCYTES NFR BLD: 7 % (ref 4–15)
NEUTROPHILS # BLD AUTO: 6.7 K/UL (ref 1.8–7.7)
NEUTROPHILS NFR BLD: 75.5 % (ref 38–73)
NITRITE UR QL STRIP: NEGATIVE
PH UR STRIP: 6 [PH] (ref 5–8)
PLATELET # BLD AUTO: 262 K/UL (ref 150–350)
PMV BLD AUTO: 7.9 FL (ref 9.2–12.9)
POTASSIUM SERPL-SCNC: 4.1 MMOL/L (ref 3.5–5.1)
POTASSIUM SERPL-SCNC: 4.2 MMOL/L (ref 3.5–5.1)
PROCALCITONIN SERPL IA-MCNC: 0.12 NG/ML
PROT SERPL-MCNC: 10.1 G/DL (ref 6–8.4)
PROT SERPL-MCNC: 8.4 G/DL (ref 6–8.4)
PROT UR QL STRIP: NEGATIVE
RBC # BLD AUTO: 2.87 M/UL (ref 4–5.4)
SATURATED IRON: 7 % (ref 20–50)
SODIUM SERPL-SCNC: 136 MMOL/L (ref 136–145)
SODIUM SERPL-SCNC: 137 MMOL/L (ref 136–145)
SP GR UR STRIP: 1.01 (ref 1–1.03)
TOTAL IRON BINDING CAPACITY: 308 UG/DL (ref 250–450)
TRANSFERRIN SERPL-MCNC: 208 MG/DL (ref 200–375)
URN SPEC COLLECT METH UR: NORMAL
UROBILINOGEN UR STRIP-ACNC: NEGATIVE EU/DL
VIT B12 SERPL-MCNC: 683 PG/ML (ref 210–950)
WBC # BLD AUTO: 8.9 K/UL (ref 3.9–12.7)

## 2019-04-05 PROCEDURE — 82607 VITAMIN B-12: CPT

## 2019-04-05 PROCEDURE — 97162 PT EVAL MOD COMPLEX 30 MIN: CPT | Performed by: PHYSICAL THERAPIST

## 2019-04-05 PROCEDURE — 87077 CULTURE AEROBIC IDENTIFY: CPT

## 2019-04-05 PROCEDURE — 25000242 PHARM REV CODE 250 ALT 637 W/ HCPCS: Performed by: NURSE PRACTITIONER

## 2019-04-05 PROCEDURE — G8987 SELF CARE CURRENT STATUS: HCPCS | Mod: CL

## 2019-04-05 PROCEDURE — 25000003 PHARM REV CODE 250: Performed by: NURSE PRACTITIONER

## 2019-04-05 PROCEDURE — 87186 SC STD MICRODIL/AGAR DIL: CPT

## 2019-04-05 PROCEDURE — 83540 ASSAY OF IRON: CPT

## 2019-04-05 PROCEDURE — G8988 SELF CARE GOAL STATUS: HCPCS | Mod: CL

## 2019-04-05 PROCEDURE — 81003 URINALYSIS AUTO W/O SCOPE: CPT

## 2019-04-05 PROCEDURE — 80053 COMPREHEN METABOLIC PANEL: CPT

## 2019-04-05 PROCEDURE — 83735 ASSAY OF MAGNESIUM: CPT

## 2019-04-05 PROCEDURE — 63600175 PHARM REV CODE 636 W HCPCS: Performed by: NURSE PRACTITIONER

## 2019-04-05 PROCEDURE — 25000003 PHARM REV CODE 250: Performed by: EMERGENCY MEDICINE

## 2019-04-05 PROCEDURE — 36415 COLL VENOUS BLD VENIPUNCTURE: CPT

## 2019-04-05 PROCEDURE — 94640 AIRWAY INHALATION TREATMENT: CPT

## 2019-04-05 PROCEDURE — 99233 PR SUBSEQUENT HOSPITAL CARE,LEVL III: ICD-10-PCS | Mod: ,,, | Performed by: INTERNAL MEDICINE

## 2019-04-05 PROCEDURE — 63600175 PHARM REV CODE 636 W HCPCS: Performed by: EMERGENCY MEDICINE

## 2019-04-05 PROCEDURE — 85025 COMPLETE CBC W/AUTO DIFF WBC: CPT

## 2019-04-05 PROCEDURE — 99233 SBSQ HOSP IP/OBS HIGH 50: CPT | Mod: ,,, | Performed by: INTERNAL MEDICINE

## 2019-04-05 PROCEDURE — G8979 MOBILITY GOAL STATUS: HCPCS | Mod: CJ | Performed by: PHYSICAL THERAPIST

## 2019-04-05 PROCEDURE — 97166 OT EVAL MOD COMPLEX 45 MIN: CPT

## 2019-04-05 PROCEDURE — 99900026 HC AIRWAY MAINTENANCE (STAT)

## 2019-04-05 PROCEDURE — 82728 ASSAY OF FERRITIN: CPT

## 2019-04-05 PROCEDURE — 97535 SELF CARE MNGMENT TRAINING: CPT

## 2019-04-05 PROCEDURE — 82746 ASSAY OF FOLIC ACID SERUM: CPT

## 2019-04-05 PROCEDURE — 97530 THERAPEUTIC ACTIVITIES: CPT

## 2019-04-05 PROCEDURE — 97802 MEDICAL NUTRITION INDIV IN: CPT

## 2019-04-05 PROCEDURE — 97530 THERAPEUTIC ACTIVITIES: CPT | Performed by: PHYSICAL THERAPIST

## 2019-04-05 PROCEDURE — 96374 THER/PROPH/DIAG INJ IV PUSH: CPT

## 2019-04-05 PROCEDURE — 83605 ASSAY OF LACTIC ACID: CPT

## 2019-04-05 PROCEDURE — 97110 THERAPEUTIC EXERCISES: CPT | Performed by: PHYSICAL THERAPIST

## 2019-04-05 PROCEDURE — 87205 SMEAR GRAM STAIN: CPT

## 2019-04-05 PROCEDURE — 96375 TX/PRO/DX INJ NEW DRUG ADDON: CPT

## 2019-04-05 PROCEDURE — 12000002 HC ACUTE/MED SURGE SEMI-PRIVATE ROOM

## 2019-04-05 PROCEDURE — G8978 MOBILITY CURRENT STATUS: HCPCS | Mod: CL | Performed by: PHYSICAL THERAPIST

## 2019-04-05 PROCEDURE — 27000221 HC OXYGEN, UP TO 24 HOURS

## 2019-04-05 PROCEDURE — 87070 CULTURE OTHR SPECIMN AEROBIC: CPT

## 2019-04-05 RX ORDER — LEVETIRACETAM 500 MG/1
1000 TABLET ORAL 2 TIMES DAILY
Status: DISCONTINUED | OUTPATIENT
Start: 2019-04-05 | End: 2019-04-09 | Stop reason: HOSPADM

## 2019-04-05 RX ORDER — IBUPROFEN 200 MG
16 TABLET ORAL
Status: DISCONTINUED | OUTPATIENT
Start: 2019-04-05 | End: 2019-04-09 | Stop reason: HOSPADM

## 2019-04-05 RX ORDER — METOPROLOL TARTRATE 50 MG/1
50 TABLET ORAL 4 TIMES DAILY
Status: DISCONTINUED | OUTPATIENT
Start: 2019-04-05 | End: 2019-04-09 | Stop reason: HOSPADM

## 2019-04-05 RX ORDER — AMITRIPTYLINE HYDROCHLORIDE 25 MG/1
25 TABLET, FILM COATED ORAL NIGHTLY PRN
Status: DISCONTINUED | OUTPATIENT
Start: 2019-04-05 | End: 2019-04-09 | Stop reason: HOSPADM

## 2019-04-05 RX ORDER — CYANOCOBALAMIN 1000 UG/ML
1000 INJECTION, SOLUTION INTRAMUSCULAR; SUBCUTANEOUS ONCE
Status: COMPLETED | OUTPATIENT
Start: 2019-04-05 | End: 2019-04-05

## 2019-04-05 RX ORDER — ASCORBIC ACID 500 MG
500 TABLET ORAL 2 TIMES DAILY
COMMUNITY
End: 2019-10-25

## 2019-04-05 RX ORDER — CHLORDIAZEPOXIDE HYDROCHLORIDE 5 MG/1
5 CAPSULE, GELATIN COATED ORAL NIGHTLY
COMMUNITY
End: 2019-11-29

## 2019-04-05 RX ORDER — IBUPROFEN 200 MG
24 TABLET ORAL
Status: DISCONTINUED | OUTPATIENT
Start: 2019-04-05 | End: 2019-04-09 | Stop reason: HOSPADM

## 2019-04-05 RX ORDER — ESCITALOPRAM OXALATE 10 MG/1
20 TABLET ORAL DAILY
Status: DISCONTINUED | OUTPATIENT
Start: 2019-04-05 | End: 2019-04-09 | Stop reason: HOSPADM

## 2019-04-05 RX ORDER — SODIUM CHLORIDE 9 MG/ML
INJECTION, SOLUTION INTRAVENOUS CONTINUOUS
Status: DISCONTINUED | OUTPATIENT
Start: 2019-04-05 | End: 2019-04-05

## 2019-04-05 RX ORDER — GLUCAGON 1 MG
1 KIT INJECTION
Status: DISCONTINUED | OUTPATIENT
Start: 2019-04-05 | End: 2019-04-09 | Stop reason: HOSPADM

## 2019-04-05 RX ORDER — FOLIC ACID 1 MG/1
1 TABLET ORAL DAILY
COMMUNITY
End: 2019-10-25 | Stop reason: ALTCHOICE

## 2019-04-05 RX ORDER — OXYCODONE AND ACETAMINOPHEN 7.5; 325 MG/1; MG/1
1 TABLET ORAL 2 TIMES DAILY PRN
Status: ON HOLD | COMMUNITY
End: 2020-03-09

## 2019-04-05 RX ORDER — VANCOMYCIN HCL IN 5 % DEXTROSE 1G/250ML
1000 PLASTIC BAG, INJECTION (ML) INTRAVENOUS
Status: DISCONTINUED | OUTPATIENT
Start: 2019-04-06 | End: 2019-04-07

## 2019-04-05 RX ORDER — METOPROLOL TARTRATE 50 MG/1
50 TABLET ORAL 2 TIMES DAILY
COMMUNITY
End: 2023-01-12 | Stop reason: SDUPTHER

## 2019-04-05 RX ORDER — POLYETHYLENE GLYCOL 3350 17 G/17G
17 POWDER, FOR SOLUTION ORAL DAILY PRN
COMMUNITY
End: 2021-04-21

## 2019-04-05 RX ORDER — LEVALBUTEROL 1.25 MG/.5ML
1.25 SOLUTION, CONCENTRATE RESPIRATORY (INHALATION) EVERY 8 HOURS
Status: DISCONTINUED | OUTPATIENT
Start: 2019-04-05 | End: 2019-04-09 | Stop reason: HOSPADM

## 2019-04-05 RX ORDER — ONDANSETRON 2 MG/ML
8 INJECTION INTRAMUSCULAR; INTRAVENOUS EVERY 8 HOURS PRN
Status: DISCONTINUED | OUTPATIENT
Start: 2019-04-05 | End: 2019-04-09 | Stop reason: HOSPADM

## 2019-04-05 RX ORDER — LANOLIN ALCOHOL/MO/W.PET/CERES
800 CREAM (GRAM) TOPICAL
Status: DISCONTINUED | OUTPATIENT
Start: 2019-04-05 | End: 2019-04-05

## 2019-04-05 RX ORDER — ACETYLCYSTEINE 100 MG/ML
4 SOLUTION ORAL; RESPIRATORY (INHALATION) 3 TIMES DAILY
Status: DISCONTINUED | OUTPATIENT
Start: 2019-04-05 | End: 2019-04-05

## 2019-04-05 RX ORDER — POTASSIUM CHLORIDE 20 MEQ/15ML
60 SOLUTION ORAL
Status: DISCONTINUED | OUTPATIENT
Start: 2019-04-05 | End: 2019-04-09 | Stop reason: HOSPADM

## 2019-04-05 RX ORDER — DIAZEPAM 5 MG/1
5 TABLET ORAL EVERY 8 HOURS PRN
COMMUNITY
End: 2019-12-26

## 2019-04-05 RX ORDER — IPRATROPIUM BROMIDE AND ALBUTEROL SULFATE 2.5; .5 MG/3ML; MG/3ML
3 SOLUTION RESPIRATORY (INHALATION) EVERY 6 HOURS
Status: DISCONTINUED | OUTPATIENT
Start: 2019-04-05 | End: 2019-04-05

## 2019-04-05 RX ORDER — GABAPENTIN 300 MG/1
300 CAPSULE ORAL 3 TIMES DAILY
Status: DISCONTINUED | OUTPATIENT
Start: 2019-04-05 | End: 2019-04-09 | Stop reason: HOSPADM

## 2019-04-05 RX ORDER — METOPROLOL TARTRATE 50 MG/1
50 TABLET ORAL 4 TIMES DAILY
Status: DISCONTINUED | OUTPATIENT
Start: 2019-04-05 | End: 2019-04-05

## 2019-04-05 RX ORDER — CHLORDIAZEPOXIDE HYDROCHLORIDE 5 MG/1
5 CAPSULE, GELATIN COATED ORAL NIGHTLY
Status: DISCONTINUED | OUTPATIENT
Start: 2019-04-05 | End: 2019-04-09 | Stop reason: HOSPADM

## 2019-04-05 RX ORDER — POTASSIUM CHLORIDE 20 MEQ/15ML
40 SOLUTION ORAL
Status: DISCONTINUED | OUTPATIENT
Start: 2019-04-05 | End: 2019-04-09 | Stop reason: HOSPADM

## 2019-04-05 RX ORDER — RISPERIDONE 0.25 MG/1
0.5 TABLET ORAL NIGHTLY
Status: DISCONTINUED | OUTPATIENT
Start: 2019-04-05 | End: 2019-04-09 | Stop reason: HOSPADM

## 2019-04-05 RX ORDER — PAROXETINE 10 MG/1
10 TABLET, FILM COATED ORAL EVERY MORNING
Status: DISCONTINUED | OUTPATIENT
Start: 2019-04-05 | End: 2019-04-09 | Stop reason: HOSPADM

## 2019-04-05 RX ORDER — ACETAMINOPHEN 500 MG
1000 TABLET ORAL EVERY 6 HOURS PRN
Status: DISCONTINUED | OUTPATIENT
Start: 2019-04-05 | End: 2019-04-09 | Stop reason: HOSPADM

## 2019-04-05 RX ORDER — SODIUM CHLORIDE 0.9 % (FLUSH) 0.9 %
10 SYRINGE (ML) INJECTION
Status: DISCONTINUED | OUTPATIENT
Start: 2019-04-05 | End: 2019-04-09 | Stop reason: HOSPADM

## 2019-04-05 RX ORDER — LANOLIN ALCOHOL/MO/W.PET/CERES
400 CREAM (GRAM) TOPICAL 2 TIMES DAILY
Status: DISCONTINUED | OUTPATIENT
Start: 2019-04-05 | End: 2019-04-09 | Stop reason: HOSPADM

## 2019-04-05 RX ORDER — ASCORBIC ACID 500 MG
500 TABLET ORAL DAILY
Status: DISCONTINUED | OUTPATIENT
Start: 2019-04-05 | End: 2019-04-09 | Stop reason: HOSPADM

## 2019-04-05 RX ORDER — LEVETIRACETAM 100 MG/ML
5 SOLUTION ORAL 2 TIMES DAILY
Status: ON HOLD | COMMUNITY
End: 2020-03-09

## 2019-04-05 RX ORDER — CEFEPIME HYDROCHLORIDE 1 G/50ML
1 INJECTION, SOLUTION INTRAVENOUS
Status: DISCONTINUED | OUTPATIENT
Start: 2019-04-05 | End: 2019-04-09 | Stop reason: HOSPADM

## 2019-04-05 RX ORDER — OXYCODONE AND ACETAMINOPHEN 7.5; 325 MG/1; MG/1
1 TABLET ORAL EVERY 6 HOURS PRN
Status: DISCONTINUED | OUTPATIENT
Start: 2019-04-05 | End: 2019-04-09 | Stop reason: HOSPADM

## 2019-04-05 RX ORDER — ACETYLCYSTEINE 100 MG/ML
4 SOLUTION ORAL; RESPIRATORY (INHALATION) EVERY 8 HOURS
Status: DISCONTINUED | OUTPATIENT
Start: 2019-04-05 | End: 2019-04-09 | Stop reason: HOSPADM

## 2019-04-05 RX ORDER — FOLIC ACID 1 MG/1
1 TABLET ORAL DAILY
Status: DISCONTINUED | OUTPATIENT
Start: 2019-04-05 | End: 2019-04-09 | Stop reason: HOSPADM

## 2019-04-05 RX ORDER — GABAPENTIN 400 MG/1
400 CAPSULE ORAL 3 TIMES DAILY
COMMUNITY
End: 2019-10-25

## 2019-04-05 RX ORDER — DIAZEPAM 5 MG/1
5 TABLET ORAL EVERY 8 HOURS PRN
Status: DISCONTINUED | OUTPATIENT
Start: 2019-04-05 | End: 2019-04-09 | Stop reason: HOSPADM

## 2019-04-05 RX ADMIN — ACETYLCYSTEINE 4 ML: 100 INHALANT RESPIRATORY (INHALATION) at 07:04

## 2019-04-05 RX ADMIN — LEVALBUTEROL 1.25 MG: 1.25 SOLUTION, CONCENTRATE RESPIRATORY (INHALATION) at 03:04

## 2019-04-05 RX ADMIN — CEFEPIME HYDROCHLORIDE 1 G: 1 INJECTION, POWDER, FOR SOLUTION INTRAMUSCULAR; INTRAVENOUS at 07:04

## 2019-04-05 RX ADMIN — RISPERIDONE 0.5 MG: 0.25 TABLET ORAL at 09:04

## 2019-04-05 RX ADMIN — CHLORDIAZEPOXIDE HYDROCHLORIDE 5 MG: 5 CAPSULE ORAL at 09:04

## 2019-04-05 RX ADMIN — LEVETIRACETAM 1000 MG: 500 TABLET ORAL at 09:04

## 2019-04-05 RX ADMIN — MAGNESIUM OXIDE TAB 400 MG (241.3 MG ELEMENTAL MG) 400 MG: 400 (241.3 MG) TAB at 09:04

## 2019-04-05 RX ADMIN — METOPROLOL TARTRATE 50 MG: 50 TABLET, FILM COATED ORAL at 01:04

## 2019-04-05 RX ADMIN — FOLIC ACID 1 MG: 1 TABLET ORAL at 09:04

## 2019-04-05 RX ADMIN — METOPROLOL TARTRATE 50 MG: 50 TABLET, FILM COATED ORAL at 09:04

## 2019-04-05 RX ADMIN — MULTIVITAMIN 15 ML: LIQUID ORAL at 12:04

## 2019-04-05 RX ADMIN — CEFEPIME HYDROCHLORIDE 1 G: 1 INJECTION, POWDER, FOR SOLUTION INTRAMUSCULAR; INTRAVENOUS at 04:04

## 2019-04-05 RX ADMIN — OXYCODONE HYDROCHLORIDE AND ACETAMINOPHEN 1 TABLET: 7.5; 325 TABLET ORAL at 01:04

## 2019-04-05 RX ADMIN — CEFEPIME HYDROCHLORIDE 1 G: 1 INJECTION, SOLUTION INTRAVENOUS at 01:04

## 2019-04-05 RX ADMIN — METOPROLOL TARTRATE 50 MG: 50 TABLET, FILM COATED ORAL at 05:04

## 2019-04-05 RX ADMIN — GABAPENTIN 300 MG: 300 CAPSULE ORAL at 09:04

## 2019-04-05 RX ADMIN — ACETYLCYSTEINE 4 ML: 100 INHALANT RESPIRATORY (INHALATION) at 03:04

## 2019-04-05 RX ADMIN — CYANOCOBALAMIN 1000 MCG: 1000 INJECTION, SOLUTION INTRAMUSCULAR; SUBCUTANEOUS at 12:04

## 2019-04-05 RX ADMIN — SODIUM CHLORIDE: 0.9 INJECTION, SOLUTION INTRAVENOUS at 01:04

## 2019-04-05 RX ADMIN — LEVALBUTEROL 1.25 MG: 1.25 SOLUTION, CONCENTRATE RESPIRATORY (INHALATION) at 07:04

## 2019-04-05 RX ADMIN — OXYCODONE HYDROCHLORIDE AND ACETAMINOPHEN 500 MG: 500 TABLET ORAL at 09:04

## 2019-04-05 RX ADMIN — ESCITALOPRAM OXALATE 20 MG: 10 TABLET, FILM COATED ORAL at 09:04

## 2019-04-05 RX ADMIN — MAGNESIUM SULFATE HEPTAHYDRATE 3 G: 500 INJECTION, SOLUTION INTRAMUSCULAR; INTRAVENOUS at 12:04

## 2019-04-05 RX ADMIN — SODIUM CHLORIDE: 0.9 INJECTION, SOLUTION INTRAVENOUS at 07:04

## 2019-04-05 RX ADMIN — GABAPENTIN 300 MG: 300 CAPSULE ORAL at 03:04

## 2019-04-05 RX ADMIN — PAROXETINE HYDROCHLORIDE 10 MG: 10 TABLET, FILM COATED ORAL at 06:04

## 2019-04-05 RX ADMIN — VANCOMYCIN HYDROCHLORIDE 1000 MG: 1 INJECTION, POWDER, LYOPHILIZED, FOR SOLUTION INTRAVENOUS at 01:04

## 2019-04-05 NOTE — CONSULTS
Elif Archibald [id number] is a 53 y.o. female who has been consulted for vancomycin dosing.    The patient has the following labs:     Date Creatinine (mg/dl)    BUN WBC Count   4/5/2019 Estimated Creatinine Clearance: 61 mL/min (based on SCr of 0.8 mg/dL). Lab Results   Component Value Date    BUN 38 (H) 04/04/2019     Lab Results   Component Value Date    WBC 8.70 04/04/2019        Current weight is 47.5 kg (104 lb 11.5 oz)    pneumonia    The patient received  1000 mg on 4/5 at 0100    The patient will be started on vancomycin at a dose of 1000 mg every 24 hours (20 mg/kg/dose).  The vancomycin trough has been ordered for 4/7 at 0030.      Patient will be followed by pharmacy for changes in renal function, toxicity, and efficacy.   Thank you for allowing us to participate in this patient's care.     Dianna Barger

## 2019-04-05 NOTE — ASSESSMENT & PLAN NOTE
Pt with COPD and chronic respiratory failure requiring trach placement.  Continue supplemental oxygen per trach collar.  Monitor sats closely and titrate oxygen as needed to maintain sats greater than 92%.

## 2019-04-05 NOTE — ASSESSMENT & PLAN NOTE
Pt with left lobar pneumonia.  Hx of MRSA pneumonia.  Was last treated with Abx in February per records.  Continue IV vancomycin and cefepime.  Check sputum culture and adjust Abx as clinically indicated.  Consult with pulmonology-appreciate recommendations.  Utilize bronchodilators and Mucomyst to assist in secretion expectoration.  Titrate oxygen per trach collar as needed to maintain sats greater than 92%.

## 2019-04-05 NOTE — ASSESSMENT & PLAN NOTE
Chronic, stable.  Continue oral antihypertensives, monitor BP closely, and titrate medication as required for sustained BP control.

## 2019-04-05 NOTE — PLAN OF CARE
Problem: Occupational Therapy Goal  Goal: Occupational Therapy Goal  Goals to be met by: 4/19/2019     Patient will increase functional independence with ADLs by performing:    UE Dressing with Stand-by Assistance.  LE Dressing with Minimal Assistance and Assistive Devices as needed.  Grooming while seated at sink with Contact Guard Assistance.  Toileting from bedside commode with Contact Guard Assistance for hygiene and clothing management.   Supine to sit with Minimal Assistance.  Toilet transfer to bedside commode with Minimal Assistance.    Outcome: Ongoing (interventions implemented as appropriate)  OT evaluation completed today. Goals & care plan established.    Sam Samson, OT  4/5/2019

## 2019-04-05 NOTE — CONSULTS
04/05/2019      Admit Date: 4/4/2019  Elif Archibald  New Patient Consult    Chief Complaint   Patient presents with    Chest Congestion     +cough, +green sputum, tachycardia. Pt. sent from [place] for evaluation.       History of Present Illness:   Pt had multiple left rib fx in Jan 2019 ppt couple er visits.  Pt had one visit asking for more pain meds.      Pt was at home recovering from fall/rib rx when pt fell again with  sub dural hematoma with craniotomy with resultant trach.  Pt still needs bone replaced - needs emmett now.      History from daughter.  Pt had mrsa lung infection while at [place].  Pt has right upper extremity weakness and some aphasia- she has been progressing over time. Uses Passey emily valve.  Plans were for decannulation post bone flap replacement.  Pt had prior chr bronchitis with cough and inhaler use.  Pt npo- uses peg.        Pt had cough/green mucous with tachycardia ppt [place] transfer.      PFSH:  Past Medical History:   Diagnosis Date    Anxiety     Asthma     Bipolar 1 disorder     Cirrhosis, alcoholic     Cocaine abuse     COPD (chronic obstructive pulmonary disease)     Depression     Schizophrenia     Seizures      Past Surgical History:   Procedure Laterality Date    APPENDECTOMY      CRANIECTOMY Left 01/31/2019    ESOPHAGOGASTRODUODENOSCOPY      ESOPHAGOSCOPY N/A 12/9/2014    [facility]    LARYNGOSCOPY BRONCHOSCOPY-DIRECT N/A 12/9/2014    Performed by [facility] at [facility] OR [facility]    PEG W/TRACHEOSTOMY PLACEMENT  02/16/2019     Social History     Tobacco Use    Smoking status: Former Smoker     Packs/day: 2.00     Types: Cigarettes    Smokeless tobacco: Never Used   Substance Use Topics    Alcohol use: Not Currently     Alcohol/week: 25.2 oz     Types: 42 Cans of beer per week     Comment: daily    Drug use: No     Family History   Problem Relation Age of Onset    COPD Mother     Cirrhosis Father   "    Review of patient's allergies indicates:  No Known Allergies       Review of Systems:  due to neurologic status/impairments a Review of Systems could not be obtained       Exam:Comprehensive exam done. /73   Pulse 93   Temp 96 °F (35.6 °C)   Resp 18   Ht 5' 2" (1.575 m)   Wt 47.5 kg (104 lb 11.5 oz)   SpO2 97%   Breastfeeding? No   BMI 19.15 kg/m²   Exam included Vitals as listed, and patient's appearance and affect and alertness and mood, oral exam for yeast and hygiene and pharynx lesions and Mallapatti (M) score, neck with inspection for jvd and masses and thyroid abnormalities and lymph nodes (supraclavicular and infraclavicular nodes also examined and noted if abn), chest exam included symmetry and effort and fremitus and percussion and auscultation, cardiac exam included rhythm and gallops and murmur and rubs and jvd and edema, abdominal exam for mass and hepatosplenomegaly and tenderness and hernias and bowel sounds, Musculoskeletal exam with muscle tone and posture and mobility/gait and  strenght, and skin for rashes and cyanosis and pallor and turgor, extremity for clubbing.  Findings were normal except as listed below:   trach, non verbal,chest is symmetric, no distress, normal percussion, normal fremitus and good normal breath sounds  No edema, low muscle mass.  Diffuse weak - total assistance for transfers.    Radiographs reviewed: view by direct vision  Left lung with diffuse haze, possible pneumonia.   Impression       1. Small left pneumothorax and hemothorax with multiple acute left-sided rib fractures involving the left 3rd through 10th ribs.  2. Acute left distal clavicle fracture.  3. No acute intra-abdominal abnormalities identified.  4. Pulmonary emphysema with scattered bilateral pulmonary nodules.  For multiple solid nodules with any 6 mm or greater, Fleischner Society guidelines recommend follow up with non-contrast chest CT at 3-6 months and 18-24 months after " discovery.    COMMUNICATION  This critical result was discovered/received on 1/17/2019 at 20:29.    The critical information above was relayed directly by Geoff Diop MD by telephone to Dr. Guy J. Lefort on 1/17/2019 at 20:29.      Electronically signed by: Geoff Diop MD  Date: 01/17/2019  Time: 20:34         No results found for this or any previous visit.]    Labs     Recent Labs   Lab 04/05/19 0441   WBC 8.90   HGB 8.2*   HCT 25.0*        Recent Labs   Lab 04/04/19 2336 04/05/19 0441    136   K 4.2 4.1   CL 98 102   CO2 29 28   BUN 38* 31*   CREATININE 0.8 0.7   * 100   CALCIUM 10.2 9.4   MG  --  1.6   * 128*   * 201*   ALKPHOS 136* 111   BILITOT 0.3 0.4   PROT 10.1* 8.4   ALBUMIN 2.7* 2.3*   PROCAL 0.12  --    LACTATE 1.4 1.3   BNP 38  --    No results for input(s): PH, PCO2, PO2, HCO3 in the last 24 hours.  Microbiology Results (last 7 days)     Procedure Component Value Units Date/Time    Culture, Respiratory with Gram Stain [[id number]] Collected:  04/05/19 0355    Order Status:  Completed Specimen:  Tracheal Aspirate Updated:  04/05/19 1028     Gram Stain (Respiratory) <10 epithelial cells per low power field.     Gram Stain (Respiratory) Many WBC's     Gram Stain (Respiratory) Few Gram negative rods    Blood culture x two cultures. Draw prior to antibiotics. [[id number]] Collected:  04/04/19 2354    Order Status:  Sent Specimen:  Blood from Antecubital, Right Updated:  04/05/19 0946    Blood culture x two cultures. Draw prior to antibiotics. [[id number]] Collected:  04/04/19 2336    Order Status:  Sent Specimen:  Blood from Antecubital, Left Updated:  04/05/19 0946    Culture, Respiratory with Gram Stain [[id number]] Collected:  04/05/19 0355    Order Status:  Canceled Specimen:  Respiratory from Tracheal Aspirate Updated:  04/05/19 0355          Impression:  Active Hospital Problems    Diagnosis  POA    *HCAP (healthcare-associated pneumonia) [J18.9]  Yes     Chronic respiratory failure [J96.10]  Yes    Tracheostomy status [Z93.0]  Not Applicable    PEG (percutaneous endoscopic gastrostomy) status [Z93.1]  Not Applicable    Chronic obstructive pulmonary disease with acute lower respiratory infection [J44.0]  Yes    Hx of subdural hematoma [Z86.79]  Not Applicable    Recurrent major depressive disorder, in partial remission [F33.41]  Yes    Essential hypertension [I10]  Yes    Alcoholic cirrhosis of liver without ascites [K70.30]  Yes    Sepsis [A41.9]  Yes    Hypoalbuminemia due to protein-calorie malnutrition [E46]  Yes    Normocytic anemia [D64.9]  Yes    Sinus tachycardia [R00.0]  Yes    Elevated liver enzymes [R74.8]  Yes    Dehydration [E86.0]  Yes    Debility [R53.81]  Yes    Hypomagnesemia [E83.42]  Yes    Functional quadriplegia [R53.2]  Yes    Lung nodules [R91.8]  Yes      Resolved Hospital Problems   No resolved problems to display.               Plan:   4/5/19 - cxr min abn but has left density, she has had prior nodules that looked worrisome.      Sputum culture and course abx reasonable.  rx copd exacerbation with lower resp infection.      Cefepime and vanc ordered,   Avoid steroids.  gnr in sputum gram stain.

## 2019-04-05 NOTE — ASSESSMENT & PLAN NOTE
Contributing Nutrition Diagnosis  Moderate chronic illness related malnutrition    Related to (etiology):   Decreased ability to perform nutrition ADLs and trouble swallowing    Signs and Symptoms (as evidenced by):   1) Moderate fat/muscle wasting as charted below  2) PO intakes < 75% EEN x > 1 month-Now improving    * of note 11% wt loss x 8 months- now improving    Interventions/Recommendations (treatment strategy):  1) initiate TF to meet > 75% EEN via PEG    Nutrition Diagnosis Status:   Improving

## 2019-04-05 NOTE — ASSESSMENT & PLAN NOTE
IV Abx as above, scheduled bronchodilators.  Consult pulmonology for further evaluation and management.

## 2019-04-05 NOTE — PT/OT/SLP EVAL
Occupational Therapy   Evaluation    Name: Elif Archibald  MRN: [id number]  Admitting Diagnosis:  HCAP (healthcare-associated pneumonia)      Recommendations:     Discharge Recommendations: nursing facility, skilled, LTACH (long-term acute care hospital)  Discharge Equipment Recommendations:  none  Barriers to discharge:  None    Assessment:     Elif Archibald is a 53 y.o. female with a medical diagnosis of HCAP (healthcare-associated pneumonia).  Performance deficits affecting function: weakness, impaired endurance, impaired self care skills, impaired functional mobilty, gait instability, impaired balance, decreased coordination, decreased upper extremity function, decreased lower extremity function, decreased ROM, impaired joint extensibility, impaired skin, abnormal tone, impaired fine motor, impaired cardiopulmonary response to activity. Pt presents with moderate debility with functional mobility, requiring mod > max A with sit<>stand using RW. Limited AROM and strength in UE's decreases independence with ADLs and mobility. Recommend SNF vs LTACH to address needs.      Rehab Prognosis: Fair; patient would benefit from acute skilled OT services to address these deficits and reach maximum level of function.       Plan:     Patient to be seen 3 x/week to address the above listed problems via self-care/home management, therapeutic activities, therapeutic exercises  · Plan of Care Expires: 04/19/19  · Plan of Care Reviewed with: patient, daughter    Subjective     Chief Complaint: Unable to verbalize  Patient/Family Comments/goals: Unable to verbalize    Occupational Profile:  Living Environment: Prior to January 2019, pt lived alone in a trailer with 6 RITCHIE and B HR. Currently, pt is a resident of [facility].  Previous level of function: Prior to January, pt was independent with ADLs and mobility. Currently at NH, pt required assist with all ADLs and mobility.   Equipment Used at Home:  none  Assistance upon  Discharge: Pt will receive assistance from NH staff.    Pain/Comfort:  · Pain Rating 1: 0/10  · Pain Rating Post-Intervention 1: 0/10    Patients cultural, spiritual, Gnosticism conflicts given the current situation:      Objective:     Communicated with: nurse Huynh prior to session.  Patient found up in chair with tracheostomy, ramsay catheter, telemetry, PEG Tube upon OT entry to room.    General Precautions: Standard, fall   Orthopedic Precautions:N/A   Braces: (helmet)     Occupational Performance:    Bed Mobility:    · Not assessed; pt seated in chair upon arrival. See PT notes.    Functional Mobility/Transfers:  · Patient completed Sit <> Stand Transfer with mod > max A  with  rolling walker       Activities of Daily Living:  · Lower Body Dressing: maximal assistance to don/doff socks while seated in chair.    Cognitive/Visual Perceptual:  Cognitive/Psychosocial Skills:     -       Oriented to: Pt unable to answer orientation 2/2 trach   -       Follows Commands/attention:Follows multistep  commands  -       Communication: Pt non-verbal 2/2 trach  -       Safety awareness/insight to disability: impaired   -       Mood/Affect/Coping skills/emotional control: Appropriate to situation  Visual/Perceptual:      -Intact     Physical Exam:  Balance:    -       Static sitting - fair minus; Static standing - poor  Postural examination/scapula alignment:    -       Rounded shoulders  -       Forward head  -       Kyphosis  Upper Extremity Range of Motion:     -       Right Upper Extremity: Significant limitations at shldr; half range at elbow if supported at elbow.  -       Left Upper Extremity: WFL except at shldr (~75* shldr flex)  Upper Extremity Strength:    -       Right Upper Extremity: 2+/5  -       Left Upper Extremity: 3+/5   Strength:    -       Right Upper Extremity: 2+/5  -       Left Upper Extremity: 3+/5  Fine Motor Coordination:    -       Impaired  2/2 R UE weakness  Gross motor coordination:    Impaired 2/2 R UE weakness    AMPAC 6 Click ADL:  AMPAC Total Score: 13    Treatment & Education:  Pt re-adjusted in chair with pillow placed behind pt for better trunk stability and sitting posture.   Education:    Patient left up in chair with all lines intact and RN and pt's daughter present    GOALS:   Multidisciplinary Problems     Occupational Therapy Goals        Problem: Occupational Therapy Goal    Goal Priority Disciplines Outcome Interventions   Occupational Therapy Goal     OT, PT/OT Ongoing (interventions implemented as appropriate)    Description:  Goals to be met by: 4/19/2019     Patient will increase functional independence with ADLs by performing:    UE Dressing with Stand-by Assistance.  LE Dressing with Minimal Assistance and Assistive Devices as needed.  Grooming while seated at sink with Contact Guard Assistance.  Toileting from bedside commode with Contact Guard Assistance for hygiene and clothing management.   Supine to sit with Minimal Assistance.  Toilet transfer to bedside commode with Minimal Assistance.                      History:     Past Medical History:   Diagnosis Date    Anxiety     Asthma     Bipolar 1 disorder     Cirrhosis, alcoholic     Cocaine abuse     COPD (chronic obstructive pulmonary disease)     Depression     Schizophrenia     Seizures        Past Surgical History:   Procedure Laterality Date    APPENDECTOMY      CRANIECTOMY Left 01/31/2019    ESOPHAGOGASTRODUODENOSCOPY      ESOPHAGOSCOPY N/A 12/9/2014    [facility] 2ND [facility]    LARYNGOSCOPY BRONCHOSCOPY-DIRECT N/A 12/9/2014    [facility] at [facility] 2ND [facility]LR    PEG W/TRACHEOSTOMY PLACEMENT  02/16/2019       Time Tracking:     OT Date of Treatment: 04/05/19  OT Start Time: 1131  OT Stop Time: 1207  OT Total Time (min): 36 min    Billable Minutes:Evaluation 10  Self Care/Home Management 10  Therapeutic Activity 16    Sam Samson, OT  4/5/2019

## 2019-04-05 NOTE — ASSESSMENT & PLAN NOTE
Chronic, uncomplicated.  Peg care per nursing.  Consult with dietitian for feeding recommendations.

## 2019-04-05 NOTE — HPI
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  She presented to the ED from [place] for further evaluation of cough, congestion, and tachycardia.  Nursing staff noted she had a fast heart rate throughout the day with congested breath sounds and increased sputum production per trache.  Hx is limited as patient is nonverbal with trache.  Her family is at bedside and does report a Hx of pneumonia, as well as MRSA sputum infection.  Records reveal she completed treatment for this back in February.  Per family she is due to have a bone flap surgery sooner at [facility] and her plan includes keeping trach until after that surgery.  Patient evaluated in the emergency room where she was noted to have sepsis and pneumonia and admitted for further evaluation treatment.

## 2019-04-05 NOTE — ASSESSMENT & PLAN NOTE
No acute complication.  Continue seizure prophylaxis.  Protective helmet when mobilized.  Consult PT OT.  Maintain fall and seizure precautions.

## 2019-04-05 NOTE — PLAN OF CARE
Problem: Physical Therapy Goal  Goal: Physical Therapy Goal  Goals to be met by: 19     Patient will increase functional independence with mobility by performin. Supine to sit with MInimal Assistance  2. Rolling to Left and Right with Contact Guard Assistance.  3. Sit to stand transfer with Contact Guard Assistance with HHA  4. Bed to chair transfer with Minimal Assistance HHA  5. Sitting at edge of bed x5 minutes with Supervision  6. Lower extremity exercise program x10-20 reps per handout, with independence    Outcome: Ongoing (interventions implemented as appropriate)  PT goals established, bed mobility and transfer training implemented

## 2019-04-05 NOTE — ASSESSMENT & PLAN NOTE
Chronic, stable on current medication regimen.  Continue antidepressants as per outpatient regimen and monitor clinically.

## 2019-04-05 NOTE — PLAN OF CARE
Problem: Malnutrition  Goal: Improved Nutritional Intake    Intervention: Promote and Optimize Nutrition Support  Intervention:  To be determined     Recommendation:   1) Initiate TF via PEG as soon as medically able Isosource 1.5 @ 15 ml/hr advancing to goal of 45 ml/hr + 140 ml flush q 4 hr    ( 100% EEN for wt gain, > 100% EPN, and 820 ml free water)     2) Weigh pt weekly   3) Obtain measured height    Goals: 1) TF to meet > 75% EEN by f/u   Nutrition Goal Status: new  Communication of RD Recs: (POC, sticky note, secure chat, second sign)

## 2019-04-05 NOTE — PLAN OF CARE
Per medical records the pt was admitted to [place] after an admission from [facility] for a fall from her porch. She had a trach and Peg tube placed on 2/16/19. She has 3 family members (2 daughters and 1 son) on her [place] face sheet which I added to Epic.The pt will return to [place] at the time of discharge. Angela Kelley LMSW     04/05/19 1116   Discharge Assessment   Assessment Type Discharge Planning Assessment   Confirmed/corrected address and phone number on facesheet? Yes   Assessment information obtained from? Medical Record

## 2019-04-05 NOTE — PLAN OF CARE
04/05/19 0747   PRE-TX-O2   O2 Device (Oxygen Therapy) Aerosol T-Tube   $ Is the patient on Low Flow Oxygen? Yes   SpO2 95 %   Pulse 91   Resp 18   Aerosol Therapy   $ Aerosol Therapy Charges Aerosol Treatment   Respiratory Treatment Status (SVN) given   Treatment Route (SVN) in-line   Patient Position (SVN) HOB elevated   Post Treatment Assessment (SVN) breath sounds unchanged   Signs of Intolerance (SVN) none   Breath Sounds Post-Respiratory Treatment   Throughout All Fields Post-Treatment All Fields   Throughout All Fields Post-Treatment coarse   Post-treatment Heart Rate (beats/min) 90   Post-treatment Resp Rate (breaths/min) 19

## 2019-04-05 NOTE — PT/OT/SLP EVAL
"Physical Therapy Evaluation    Patient Name:  Elif Archibald   MRN:  [id number]    Recommendations:     Discharge Recommendations:  nursing facility, skilled , LTACH  Discharge Equipment Recommendations: none   Barriers to discharge: None    Assessment:     Elif Archibald is a 53 y.o. female admitted with a medical diagnosis of HCAP (healthcare-associated pneumonia).  She presents with the following impairments/functional limitations:  weakness, impaired endurance, impaired sensation, impaired self care skills, impaired functional mobilty, gait instability, impaired balance, decreased coordination, decreased upper extremity function, decreased lower extremity function, decreased safety awareness, decreased ROM, impaired skin, impaired cardiopulmonary response to activity, impaired joint extensibility, impaired muscle length. Pt was able to participate and tolerate skilled PT today. Pt was found in soiled bed upon entry to the room and PT cleaned pt up before performing evaluation. Pt was maxA for rolling side to side. Once pt was cleaned and history was taken using "yes, no" questions. Pt was able to get to EOB with MaxA x2. Pt was maxA x1 to help scoot hips toward EOB. Pt then performed stand pivot transfer with modA for transfer and for sit to stand to get over to the chair. Pt then performed LE exercises after being suctioned by RT. Pt was left up in chair with all needs and call button within reach and nurse Amina notified.    Rehab Prognosis: Fair; patient would benefit from acute skilled PT services to address these deficits and reach maximum level of function.    Recent Surgery: * No surgery found *      Plan:     During this hospitalization, patient to be seen 6 x/week to address the identified rehab impairments via therapeutic activities, therapeutic exercises and progress toward the following goals:    · Plan of Care Expires:  04/19/19    Subjective     Chief Complaint: unable to " communicate  Patient/Family Comments/goals: none  Pain/Comfort:  ·      Patients cultural, spiritual, Faith conflicts given the current situation: no    Living Environment:  Pt came from SNF and was using a wheelchair to get around.  Prior to admission, patients level of function was needing help from SNF staff.  Equipment used at home: none.  DME owned (not currently used): none.  Upon discharge, patient will have assistance from SNF staff.    Objective:      Patient found supine in heavily soiled bed with bed alarm, ramsay catheter, PEG Tube, peripheral IV, SCD, telemetry, tracheostomy  upon PT entry to room.    General Precautions: Standard, fall   Orthopedic Precautions:N/A   Braces: (helmet)     Exams:  · Cognitive Exam:  Patient is oriented to unable to orient at this time due to communication  · Postural Exam:  Patient presented with the following abnormalities:    · -       Rounded shoulders  · -       Forward head  · -       Posterior pelvic tilt  · Skin Integrity/Edema:      · -       Skin integrity: Wound and rash on buttocks  · RUE Strength: pt able to perform elbow flexion with 2/5 strength and 2/5  strength. Unable to move shoulders in seated position  · LUE Strength: 3/5 UE strength for all motions  · RLE Strength: 3+/5 strength for all LE strength  · LLE Strength: 3+/5 strength for all LE strength    Functional Mobility:  · Bed Mobility:     · Rolling Left:  maximal assistance  · Rolling Right: maximal assistance  · Scooting: maximal assistance  · Supine to Sit: maximal assistance and of 2 persons  · Transfers:     · Bed to Chair: moderate assistance with  hand-held assist  using  Stand Pivot      Therapeutic Activities and Exercises:   Pt performed bilateral ankle pumps, LAQ's, seated marches, pillow squeezes, isometric hip abduction, and isometric knee flexion all x10 reps    AM-PAC 6 CLICK MOBILITY  Total Score:10     Patient left up in chair with all lines intact, call button in reach,  chair alarm on and nurse Amina notified.    GOALS:   Multidisciplinary Problems     Physical Therapy Goals        Problem: Physical Therapy Goal    Goal Priority Disciplines Outcome Goal Variances Interventions   Physical Therapy Goal     PT, PT/OT Ongoing (interventions implemented as appropriate)     Description:  Goals to be met by: 19     Patient will increase functional independence with mobility by performin. Supine to sit with MInimal Assistance  2. Rolling to Left and Right with Contact Guard Assistance.  3. Sit to stand transfer with Contact Guard Assistance with HHA  4. Bed to chair transfer with Minimal Assistance HHA  5. Sitting at edge of bed x5 minutes with Supervision  6. Lower extremity exercise program x10-20 reps per handout, with independence                      History:     Past Medical History:   Diagnosis Date    Anxiety     Asthma     Bipolar 1 disorder     Cirrhosis, alcoholic     Cocaine abuse     COPD (chronic obstructive pulmonary disease)     Depression     Schizophrenia     Seizures        Past Surgical History:   Procedure Laterality Date    APPENDECTOMY      CRANIECTOMY Left 2019    ESOPHAGOGASTRODUODENOSCOPY      ESOPHAGOSCOPY N/A 2014    Performed [facility] at [facility] 2ND [facility]    LARYNGOSCOPY BRONCHOSCOPY-DIRECT N/A 2014    [facility] at [facility] 2ND [facility]LR    PEG W/TRACHEOSTOMY PLACEMENT  2019       Time Tracking:     PT Received On: 19  PT Start Time: 0956     PT Stop Time: 1047  PT Total Time (min): 51 min     Billable Minutes: Evaluation 20, Therapeutic Activity 20 and Therapeutic Exercise 11      DAVION Scherer  2019

## 2019-04-05 NOTE — SUBJECTIVE & OBJECTIVE
Past Medical History:   Diagnosis Date    Anxiety     Asthma     Bipolar 1 disorder     Depression     Schizophrenia     Seizures        Past Surgical History:   Procedure Laterality Date    APPENDECTOMY      ESOPHAGOGASTRODUODENOSCOPY      ESOPHAGOSCOPY N/A 12/9/2014    [facility] OR [address] [place] [address]    LARYNGOSCOPY BRONCHOSCOPY-DIRECT N/A 12/9/2014    Performed by [facility] at [facility] OR [address] [place] [address]       Review of patient's allergies indicates:  No Known Allergies    No current facility-administered medications on file prior to encounter.      Current Outpatient Medications on File Prior to Encounter   Medication Sig    albuterol 90 mcg/actuation inhaler Inhale 1 puff into the lungs every 4 (four) hours as needed for Wheezing.    alprazolam (XANAX) 0.25 MG tablet Take 1 tablet (0.25 mg total) by mouth 3 (three) times daily.    amitriptyline (ELAVIL) 25 MG tablet Take 25 mg by mouth nightly as needed for Insomnia.    clonazePAM (KLONOPIN) 1 MG tablet Take 0.5 mg by mouth 2 (two) times daily as needed for Anxiety.    escitalopram oxalate (LEXAPRO) 10 MG tablet Take 10 mg by mouth once daily.    etodolac (LODINE) 400 MG tablet Take 400 mg by mouth 2 (two) times daily.    hydrocodone-acetaminophen 5-325mg (NORCO) 5-325 mg per tablet Take 1 tablet by mouth every 6 (six) hours as needed for Pain.    hydrocodone-acetaminophen 5-325mg (NORCO) 5-325 mg per tablet Take 1 tablet by mouth every 6 (six) hours as needed for Pain.    ibuprofen (ADVIL,MOTRIN) 800 MG tablet Take 1 tablet (800 mg total) by mouth every 6 (six) hours as needed for Pain.    ibuprofen 200 mg Cap Take 200-400 mg by mouth every meal as needed (pain).    lidocaine (LIDODERM) 5 % Place 1 patch onto the skin once daily. Remove & Discard patch within 12 hours or as directed by MD    lorazepam (ATIVAN) 1 MG tablet Take 1 tablet (1 mg total) by mouth every 8 (eight) hours as needed for Anxiety.    mupirocin  (BACTROBAN) 2 % ointment Apply topically 3 (three) times daily.    naproxen (NAPROSYN) 375 MG tablet Take 1 tablet (375 mg total) by mouth 2 (two) times daily with meals.    neomycin-bacitracin-polymyxin (NEOSPORIN, DCK-ZXB-CXSNY,) ointment Apply topically 2 (two) times daily.    ondansetron (ZOFRAN) 4 MG tablet Take 1 tablet (4 mg total) by mouth every 8 (eight) hours as needed.    paroxetine (PAXIL) 10 MG tablet Take 10 mg by mouth every morning.    risperidone (RISPERDAL) 0.5 MG Tab Take by mouth.    topiramate 25 mg capsule Take 25 mg by mouth 2 (two) times daily.     Family History     Problem Relation (Age of Onset)    COPD Mother    Cirrhosis Father        Tobacco Use    Smoking status: Former Smoker     Packs/day: 2.00     Types: Cigarettes    Smokeless tobacco: Never Used   Substance and Sexual Activity    Alcohol use: Not Currently     Alcohol/week: 25.2 oz     Types: 42 Cans of beer per week     Comment: daily    Drug use: No    Sexual activity: Not on file     Review of Systems   Unable to perform ROS: Patient nonverbal     Objective:     Vital Signs (Most Recent):  Temp: 97.9 °F (36.6 °C) (04/05/19 0051)  Pulse: (!) 120 (04/05/19 0051)  Resp: (!) 26 (04/05/19 0051)  BP: (!) 107/56 (04/05/19 0001)  SpO2: 100 % (04/05/19 0051) Vital Signs (24h Range):  Temp:  [97.7 °F (36.5 °C)-97.9 °F (36.6 °C)] 97.9 °F (36.6 °C)  Pulse:  [120-130] 120  Resp:  [18-26] 26  SpO2:  [99 %-100 %] 100 %  BP: (107-131)/(56-66) 107/56     Weight: 45.4 kg (100 lb)  Body mass index is 18.29 kg/m².    Physical Exam   Constitutional: She appears well-developed and well-nourished. No distress.   HENT:   Head: Normocephalic and atraumatic.   Eyes: Pupils are equal, round, and reactive to light. Conjunctivae and EOM are normal.   Neck: Normal range of motion. Neck supple. No thyromegaly present.   Cardiovascular: Regular rhythm, normal heart sounds and intact distal pulses. Tachycardia present.   No murmur  heard.  Pulmonary/Chest: No respiratory distress. She has rales. She exhibits no tenderness.   Increased respiratory rate; trach without erythema. Congested breath sounds per trach.   Abdominal: Soft. Bowel sounds are normal. She exhibits no distension. There is no tenderness.   Musculoskeletal: Normal range of motion. She exhibits no edema or tenderness.   Neurological: She is alert.   Follows commands; moves all extremities.  RIGHT arm strength 2/5; LEFT 4/5.  BLE 3/5.    Skin: Skin is warm and dry. Capillary refill takes less than 2 seconds. No erythema.   Psychiatric:   Withdrawn.         CRANIAL NERVES     CN III, IV, VI   Pupils are equal, round, and reactive to light.  Extraocular motions are normal.        Significant Labs:   CBC:   Recent Labs   Lab 04/04/19  2336   WBC 8.70   HGB 9.5*   HCT 30.2*        CMP:   Recent Labs   Lab 04/04/19  2336      K 4.2   CL 98   CO2 29   *   BUN 38*   CREATININE 0.8   CALCIUM 10.2   PROT 10.1*   ALBUMIN 2.7*   BILITOT 0.3   ALKPHOS 136*   *   *   ANIONGAP 10   EGFRNONAA >60     Lactic Acid:   Recent Labs   Lab 04/04/19  2336   LACTATE 1.4     Urine Studies:   Recent Labs   Lab 04/05/19  0040   COLORU Yellow   APPEARANCEUA Clear   PHUR 6.0   SPECGRAV 1.010   PROTEINUA Negative   GLUCUA Negative   KETONESU Negative   BILIRUBINUA Negative   OCCULTUA Negative   NITRITE Negative   UROBILINOGEN Negative   LEUKOCYTESUR Negative       Significant Imaging:  CXR:   Patchy airspace opacities in the left hemithorax, concerning for pneumonia.    New small left-sided pleural effusion.    Satisfactory position of tracheostomy tube tip.

## 2019-04-05 NOTE — ASSESSMENT & PLAN NOTE
Chronic, Pt no longer uses ETOH.  Her liver enzymes are elevated from prior labs available.  Her bilirubin is normal.  Encouraged continue avoidance of ETOH.  Follow liver function panel closely.

## 2019-04-05 NOTE — H&P
[facility] Medicine  History & Physical    Patient Name: Elif Archibald  MRN: [id number]  Admission Date: 4/4/2019  Attending Physician: Rosa Isela Jimenez MD   Primary Care Provider: Terrie Ireland MD         Patient information was obtained from patient, spouse/SO, nursing home, past medical records and ER records.     Subjective:     Principal Problem:HCAP (healthcare-associated pneumonia)    Chief Complaint:   Chief Complaint   Patient presents with    Chest Congestion     +cough, +green sputum, tachycardia. Pt. sent from [place] for evaluation.        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  She presented to the ED from [place] for further evaluation of cough, congestion, and tachycardia.  Nursing staff noted she had a fast heart rate throughout the day with congested breath sounds and increased sputum production per trach.  Hx is limited as Pt is nonverbal with trach.  Her family is at bedside and does report a Hx of pneumonia, as well as MRSA sputum infection.  Records reveal she completed treatment for this back in February.  Per family she is due to have a bone flap surgery sooner at [facility] and her plan includes keeping trach until after that surgery.  Other pertinent medical Hx as below:    Past Medical History:   Diagnosis Date    Anxiety     Asthma     Bipolar 1 disorder     Depression     Schizophrenia     Seizures        Past Surgical History:   Procedure Laterality Date    APPENDECTOMY      ESOPHAGOGASTRODUODENOSCOPY      ESOPHAGOSCOPY N/A 12/9/2014    Performed [facility] at [facility] OR 2ND FLR    LARYNGOSCOPY BRONCHOSCOPY-DIRECT N/A 12/9/2014    Performed by [facility] at [facility] OR 2ND FLR       Review of patient's allergies indicates:  No Known  Allergies    No current facility-administered medications on file prior to encounter.      Current Outpatient Medications on File Prior to Encounter   Medication Sig    albuterol 90 mcg/actuation inhaler Inhale 1 puff into the lungs every 4 (four) hours as needed for Wheezing.    alprazolam (XANAX) 0.25 MG tablet Take 1 tablet (0.25 mg total) by mouth 3 (three) times daily.    amitriptyline (ELAVIL) 25 MG tablet Take 25 mg by mouth nightly as needed for Insomnia.    clonazePAM (KLONOPIN) 1 MG tablet Take 0.5 mg by mouth 2 (two) times daily as needed for Anxiety.    escitalopram oxalate (LEXAPRO) 10 MG tablet Take 10 mg by mouth once daily.    etodolac (LODINE) 400 MG tablet Take 400 mg by mouth 2 (two) times daily.    hydrocodone-acetaminophen 5-325mg (NORCO) 5-325 mg per tablet Take 1 tablet by mouth every 6 (six) hours as needed for Pain.    hydrocodone-acetaminophen 5-325mg (NORCO) 5-325 mg per tablet Take 1 tablet by mouth every 6 (six) hours as needed for Pain.    ibuprofen (ADVIL,MOTRIN) 800 MG tablet Take 1 tablet (800 mg total) by mouth every 6 (six) hours as needed for Pain.    ibuprofen 200 mg Cap Take 200-400 mg by mouth every meal as needed (pain).    lidocaine (LIDODERM) 5 % Place 1 patch onto the skin once daily. Remove & Discard patch within 12 hours or as directed by MD    lorazepam (ATIVAN) 1 MG tablet Take 1 tablet (1 mg total) by mouth every 8 (eight) hours as needed for Anxiety.    mupirocin (BACTROBAN) 2 % ointment Apply topically 3 (three) times daily.    naproxen (NAPROSYN) 375 MG tablet Take 1 tablet (375 mg total) by mouth 2 (two) times daily with meals.    neomycin-bacitracin-polymyxin (NEOSPORIN, YMF-OVR-JOLAR,) ointment Apply topically 2 (two) times daily.    ondansetron (ZOFRAN) 4 MG tablet Take 1 tablet (4 mg total) by mouth every 8 (eight) hours as needed.    paroxetine (PAXIL) 10 MG tablet Take 10 mg by mouth every morning.    risperidone (RISPERDAL) 0.5 MG Tab Take  by mouth.    topiramate 25 mg capsule Take 25 mg by mouth 2 (two) times daily.     Family History     Problem Relation (Age of Onset)    COPD Mother    Cirrhosis Father        Tobacco Use    Smoking status: Former Smoker     Packs/day: 2.00     Types: Cigarettes    Smokeless tobacco: Never Used   Substance and Sexual Activity    Alcohol use: Not Currently     Alcohol/week: 25.2 oz     Types: 42 Cans of beer per week     Comment: daily    Drug use: No    Sexual activity: Not on file     Review of Systems   Unable to perform ROS: Patient nonverbal     Objective:     Vital Signs (Most Recent):  Temp: 97.9 °F (36.6 °C) (04/05/19 0051)  Pulse: (!) 120 (04/05/19 0051)  Resp: (!) 26 (04/05/19 0051)  BP: (!) 107/56 (04/05/19 0001)  SpO2: 100 % (04/05/19 0051) Vital Signs (24h Range):  Temp:  [97.7 °F (36.5 °C)-97.9 °F (36.6 °C)] 97.9 °F (36.6 °C)  Pulse:  [120-130] 120  Resp:  [18-26] 26  SpO2:  [99 %-100 %] 100 %  BP: (107-131)/(56-66) 107/56     Weight: 45.4 kg (100 lb)  Body mass index is 18.29 kg/m².    Physical Exam   Constitutional: She appears well-developed and well-nourished. No distress.   HENT:   Head: Normocephalic and atraumatic.   Eyes: Pupils are equal, round, and reactive to light. Conjunctivae and EOM are normal.   Neck: Normal range of motion. Neck supple. No thyromegaly present.   Cardiovascular: Regular rhythm, normal heart sounds and intact distal pulses. Tachycardia present.   No murmur heard.  Pulmonary/Chest: No respiratory distress. She has rales. She exhibits no tenderness.   Increased respiratory rate; trach without erythema. Congested breath sounds per trach.   Abdominal: Soft. Bowel sounds are normal. She exhibits no distension. There is no tenderness.   Musculoskeletal: Normal range of motion. She exhibits no edema or tenderness.   Neurological: She is alert.   Follows commands; moves all extremities.  RIGHT arm strength 2/5; LEFT 4/5.  BLE 3/5.    Skin: Skin is warm and dry. Capillary  refill takes less than 2 seconds. No erythema.   Psychiatric:   Withdrawn.         CRANIAL NERVES     CN III, IV, VI   Pupils are equal, round, and reactive to light.  Extraocular motions are normal.        Significant Labs:   CBC:   Recent Labs   Lab 04/04/19  2336   WBC 8.70   HGB 9.5*   HCT 30.2*        CMP:   Recent Labs   Lab 04/04/19  2336      K 4.2   CL 98   CO2 29   *   BUN 38*   CREATININE 0.8   CALCIUM 10.2   PROT 10.1*   ALBUMIN 2.7*   BILITOT 0.3   ALKPHOS 136*   *   *   ANIONGAP 10   EGFRNONAA >60     Lactic Acid:   Recent Labs   Lab 04/04/19  2336   LACTATE 1.4     Urine Studies:   Recent Labs   Lab 04/05/19  0040   COLORU Yellow   APPEARANCEUA Clear   PHUR 6.0   SPECGRAV 1.010   PROTEINUA Negative   GLUCUA Negative   KETONESU Negative   BILIRUBINUA Negative   OCCULTUA Negative   NITRITE Negative   UROBILINOGEN Negative   LEUKOCYTESUR Negative       Significant Imaging:  CXR:   Patchy airspace opacities in the left hemithorax, concerning for pneumonia.    New small left-sided pleural effusion.    Satisfactory position of tracheostomy tube tip.    Assessment/Plan:     * HCAP (healthcare-associated pneumonia)  Pt with left lobar pneumonia.  Hx of MRSA pneumonia.  Was last treated with Abx in February per records.  Continue IV vancomycin and cefepime.  Check sputum culture and adjust Abx as clinically indicated.  Consult with pulmonology-appreciate recommendations.  Utilize bronchodilators and Mucomyst to assist in secretion expectoration.  Titrate oxygen per trach collar as needed to maintain sats greater than 92%.      Chronic respiratory failure  Pt with COPD and chronic respiratory failure requiring trach placement.  Continue supplemental oxygen per trach collar.  Monitor sats closely and titrate oxygen as needed to maintain sats greater than 92%.      Hypoalbuminemia due to protein-calorie malnutrition  Chronic, consult with dietitian for tube feed  recommendations.      Sepsis  This patient does have evidence of infective focus- PNA  My overall impression is sepsis.  Antibiotics given-   Antibiotics (From admission, onward)    Start     Stop Route Frequency Ordered    04/06/19 0100  vancomycin in dextrose 5 % 1 gram/250 mL IVPB 1,000 mg      -- IV Every 24 hours (non-standard times) 04/05/19 0125    04/05/19 0800  cefepime in dextrose 5 % 1 gram/50 mL IVPB 1 g      -- IV Every 8 hours (non-standard times) 04/05/19 0117    04/05/19 0000  vancomycin in dextrose 5 % 1 gram/250 mL IVPB 1,000 mg  (Pneumonia (Unchecked))      04/05 1159 IV ED 1 Time 04/04/19 2355          Severe Sepsis only-  Latest labs reviewed, they are-  Recent Labs   Lab 04/04/19  2336   BILITOT 0.3     Recent Labs   Lab 04/04/19  2336   LACTATE 1.4     No results for input(s): PH, PO2, PCO2, HCO3, BE in the last 168 hours.    No evidence of organ dysfunction.   Follow blood and sputum cultures & adjust abx as clinically indicated.    Alcoholic cirrhosis of liver without ascites  Chronic, Pt no longer uses ETOH.  Her liver enzymes are elevated from prior labs available.  Her bilirubin is normal.  Encouraged continue avoidance of ETOH.  Follow liver function panel closely.      Essential hypertension  Chronic, stable.  Continue oral antihypertensives, monitor BP closely, and titrate medication as required for sustained BP control.    Recurrent major depressive disorder, in partial remission  Chronic, stable on current medication regimen.  Continue antidepressants as per outpatient regimen and monitor clinically.      Hx of subdural hematoma  No acute complication.  Continue seizure prophylaxis.  Protective helmet when mobilized.  Consult PT OT.  Maintain fall and seizure precautions.      Chronic obstructive pulmonary disease with acute lower respiratory infection  IV Abx as above, scheduled bronchodilators.  Consult pulmonology for further evaluation and management.      PEG (percutaneous  endoscopic gastrostomy) status  Chronic, uncomplicated.  Peg care per nursing.  Consult with dietitian for feeding recommendations.      Tracheostomy status  Trach care per nursing.      Normocytic anemia  New problem.  No evidence of bleeding.  Check iron, folate, B12,     FOBT.  Follow H&H.  Transfuse for H& H less than 7/21 or for     symptomatic anemia.      Sinus Tachycardia  Chronic issue, acutely tachycardic likely due to sepsis as well as Pt overdue for beta-blocker.  Will resume her metoprolol at outpatient dose.  Continue telemetry monitoring.        VTE Risk Mitigation (From admission, onward)        Ordered     IP VTE HIGH RISK PATIENT  Once      04/05/19 0117     Place LOPEZ hose  Until discontinued      04/05/19 0117     Place sequential compression device  Until discontinued      04/05/19 0117             Dolores Mendes NP  Department of Hospital Medicine   [facility]

## 2019-04-05 NOTE — ED PROVIDER NOTES
Encounter Date: 4/4/2019    SCRIBE #1 NOTE: I, Chidi Martin, am scribing for, and in the presence of, Dr. Sylvester Rogers.       History     Chief Complaint   Patient presents with    Chest Congestion     +cough, +green sputum, tachycardia. Pt. sent from [place] for evaluation.       Time seen by provider: 11:13 PM on 04/04/2019    Elif Archibald is a 53 y.o. female with PMHx of Seizures, and Schizophrenia who presents to the ED via EMS with an onset of cough and congestion. Nurses state she's been coughing up sputum and has a high heart rate. She was sent from [facility] for further evaluation of her condition. No pertinent SHx noted. No known drug allergies noted.        The history is provided by medical records. The history is limited by the condition of the patient.     Review of patient's allergies indicates:  No Known Allergies  Past Medical History:   Diagnosis Date    Anxiety     Asthma     Bipolar 1 disorder     Depression     Schizophrenia     Seizures      Past Surgical History:   Procedure Laterality Date    APPENDECTOMY      ESOPHAGOGASTRODUODENOSCOPY      ESOPHAGOSCOPY N/A 12/9/2014    Performed by [facility]ye MD at [facility] OR [address] [place] [address]    LARYNGOSCOPY BRONCHOSCOPY-DIRECT N/A 12/9/2014    Performed by Luis M Nye MD at [facility] OR [address] [place] [address]     Family History   Problem Relation Age of Onset    No Known Problems Mother     No Known Problems Father      Social History     Tobacco Use    Smoking status: Current Every Day Smoker     Packs/day: 2.00     Types: Cigarettes    Smokeless tobacco: Never Used   Substance Use Topics    Alcohol use: Yes     Alcohol/week: 25.2 oz     Types: 42 Cans of beer per week     Comment: daily    Drug use: No     Review of Systems   Unable to perform ROS: Patient nonverbal       Physical Exam     Initial Vitals [04/04/19 2300]   BP Pulse Resp Temp SpO2   108/66 (!) 130 18 97.7 °F (36.5 °C) 99 %      MAP       --         Physical Exam    Nursing note and  vitals reviewed.  Constitutional: She appears well-nourished.   Frail, chronically ill.   HENT:   Head: Normocephalic and atraumatic.   Eyes: Conjunctivae and EOM are normal.   Neck: Normal range of motion. Neck supple. No thyroid mass present.   + Tracheostomy tube.   Cardiovascular: Regular rhythm and normal heart sounds. Tachycardia present.  Exam reveals no gallop and no friction rub.    No murmur heard.  Pulmonary/Chest: She has no wheezes. She has no rhonchi. She has no rales.   Coarse breath sounds. With green aspirate.   Abdominal: Soft. Normal appearance and bowel sounds are normal. She exhibits no distension. There is no tenderness.   Left upper abdominal PEG tube in place without abdominal distention.   Neurological: She is alert. She has normal strength.   Skin: Skin is warm and dry. No rash noted. No erythema.   Psychiatric: She has a normal mood and affect. Her speech is normal. Cognition and memory are normal.         ED Course   Procedures  Labs Reviewed   CBC W/ AUTO DIFFERENTIAL - Abnormal; Notable for the following components:       Result Value    RBC 3.42 (*)     Hemoglobin 9.5 (*)     Hematocrit 30.2 (*)     MCHC 31.5 (*)     RDW 16.1 (*)     MPV 7.6 (*)     Gran% 76.1 (*)     Lymph% 15.3 (*)     All other components within normal limits   CULTURE, BLOOD   CULTURE, BLOOD   COMPREHENSIVE METABOLIC PANEL   LACTIC ACID, PLASMA   URINALYSIS, REFLEX TO URINE CULTURE   PROCALCITONIN   B-TYPE NATRIURETIC PEPTIDE     EKG Readings: (Independently Interpreted)   Initial Reading: No STEMI. Rhythm: Sinus Tachycardia. Ectopy: No Ectopy. Conduction: Normal. ST Segments: Normal ST Segments. T Waves: Normal. Axis: Normal.       Imaging Results          X-Ray Chest AP Portable (Final result)  Result time 04/04/19 23:52:37    Final result by Tony Brown MD (04/04/19 23:52:37)                 Impression:      Patchy airspace opacities in the left hemithorax, concerning for pneumonia.    New small left-sided  pleural effusion.    Satisfactory position of tracheostomy tube tip.      Electronically signed by: Tony Brown MD  Date:    04/04/2019  Time:    23:52             Narrative:    EXAMINATION:  XR CHEST AP PORTABLE    CLINICAL HISTORY:  Sepsis;    TECHNIQUE:  Single frontal view of the chest was performed.    COMPARISON:  01/30/2019.    FINDINGS:  There is a tracheostomy tube with the cannula tip approximately 4.5 cm from the semaj.  The cardiomediastinal silhouette is within normal limits.  The right hemidiaphragm is unremarkable.  There is obscuration of the left hemidiaphragm.  There is no evidence of free air beneath the hemidiaphragms.  There is a new small left-sided pleural effusion.  There is no evidence of a pneumothorax.  There is no evidence of pneumomediastinum.  There are patchy airspace opacities in the left hemithorax.  There are degenerative changes in the osseous structures.  There are deformities involving the left-sided ribs.  There is a chronic fracture involving the distal aspect of the left clavicle.                                 Medical Decision Making:   History:   Old Medical Records: I decided to obtain old medical records.  Clinical Tests:   Lab Tests: Ordered and Reviewed  Radiological Study: Reviewed and Ordered  Medical Tests: Ordered and Reviewed  ED Management:  This patient was interviewed and examined emergently.  Initial vital signs significant for tachycardia.  Examination and chest x-ray concerning for progressing acute infiltrate on the left side.  Patient noted to be tachycardic and tachypneic and septic order set was initiated.  She received 30 cc/kg bolusing and initiated on broad-spectrum antibiotics for HCAP.  There is no leukocytosis or lactic acid elevation.  Patient had mild improvement in tachycardia here but does warrant admission for further IV antibiotics and respiratory monitoring.  Case was discussed with and accepted by the on-call nurse practitioner.  Patient  and family updated on the plan of care and he for admission and they are in agreement with this disposition.  She will be transferred to a telemetry bed in guarded condition.            Scribe Attestation:   Scribe #1: I performed the above scribed service and the documentation accurately describes the services I performed. I attest to the accuracy of the note.    Attending Attestation:         Attending Critical Care:   Critical Care Times:   Direct Patient Care (initial evaluation, reassessments, and time considering the case)................................................................15 minutes.   Additional History from reviewing old medical records or taking additional history from the family, EMS, PCP, etc.......................5 minutes.   Ordering, Reviewing, and Interpreting Diagnostic Studies...............................................................................................................5 minutes.   Documentation..................................................................................................................................................................................5 minutes.   Consultation with other Physicians. .................................................................................................................................................0 minutes.   Consultation with the patient's family directly relating to the patient's condition, care, and DNR status (when patient unable)......5 minutes.   Other..................................................................................................................................................................................................5 minutes.   ==============================================================  · Total Critical Care Time - exclusive of procedural time: 40 minutes.  ==============================================================  Critical care was necessary to treat or prevent  imminent or life-threatening deterioration of the following conditions: sepsis.   The following critical care procedures were done by me (see procedure notes): pulse oximetry.   Critical care was time spent personally by me on the following activities: obtaining history from patient or relative, examination of patient, review of old charts, ordering lab, x-rays, and/or EKG, development of treatment plan with patient or relative, ordering and performing treatments and interventions, interpretation of cardiac measurements, re-evaluation of patient's conition and evaluation of patient's response to treatment.   Critical Care Condition: life-threatening           I, Dr. Sylvester Rogers, personally performed the services described in this documentation. All medical record entries made by the scribe were at my direction and in my presence.  I have reviewed the chart and agree that the record reflects my personal performance and is accurate and complete. Sylvester Rogers MD.  6:33 AM 04/05/2019          Clinical Impression:       ICD-10-CM ICD-9-CM   1. Sepsis, due to unspecified organism A41.9 038.9     995.91   2. Tachycardia R00.0 785.0   3. HCAP (healthcare-associated pneumonia) J18.9 486         Disposition:   Disposition: Admitted  Condition: Serious                        Sylvester Rogers MD  04/05/19 0634

## 2019-04-05 NOTE — CONSULTS
" [facility]  Adult Nutrition  Consult Note    SUMMARY   Intervention:  To be determined     Recommendation:   1) Initiate TF via PEG as soon as medically able Isosource 1.5 @ 15 ml/hr advancing to goal of 45 ml/hr + 140 ml flush q 4 hr    ( 100% EEN for wt gain, > 100% EPN, and 820 ml free water)     2) Weigh pt weekly   3) Obtain measured height    Goals: 1) TF to meet > 75% EEN by f/u   Nutrition Goal Status: new  Communication of RD Recs: (POC, sticky note, secure chat, second sign)    Reason for Assessment    Reason For Assessment: consult  Diagnosis: (HCAP)  Relevant Medical History: COPD, ETOH/substance abuse, moderate malnutrition, [place] resident, traumatic SDH with craniotomy 19, Trach/PEG placement 19, depression, bipolar  Interdisciplinary Rounds: attended    General Information Comments: 52 y/o female admitted with HCAP from [place]. Currently NPO x 1 day. At [place] pet was receiving isosource 1.5 @ 60 ml/hr + 40 ml flush hourly via PEG. NFPE done, moderate-severe wasting seen, pt appears underweight and bony with very sparse hair, missing patches. Slight weight loss noted 10% loss x 8 months, regained 5 lb since.    Nutrition Discharge Planning: to be determined- isosource 1.5 @ 45 ml/hr + 140 ml flush q 4 hr    Nutrition Risk Screen    Nutrition Risk Screen: tube feeding or parenteral nutrition    Nutrition/Diet History    Spiritual, Cultural Beliefs, [employer]al Practices, Values that Affect Care: no  Food Allergies: NKFA(or intolerances)  Factors Affecting Nutritional Intake: NPO    Anthropometrics    Temp: 97.2 °F (36.2 °C)  Height Method: Measured  Height: 5' 2"  Height (inches): 62 in  Weight Method: Bed Scale  Weight: 47.5 kg (104 lb 11.5 oz)  Weight (lb): 104.72 lb  Ideal Body Weight (IBW), Female: 110 lb  % Ideal Body Weight, Female (lb): 95.2 lb  BMI (Calculated): 19.2  BMI Grade: 18.5-24.9 - normal  Usual Body Weight (UBW), k.8 kg(18)  % Usual Body " Weight: 93.7  % Weight Change From Usual Weight: -6.5 %       Lab/Procedures/Meds    Pertinent Labs Reviewed: reviewed  BMP  Lab Results   Component Value Date     04/05/2019    K 4.1 04/05/2019     04/05/2019    CO2 28 04/05/2019    BUN 31 (H) 04/05/2019    CREATININE 0.7 04/05/2019    CALCIUM 9.4 04/05/2019    ANIONGAP 6 (L) 04/05/2019    ESTGFRAFRICA >60 04/05/2019    EGFRNONAA >60 04/05/2019     Lab Results   Component Value Date    ALBUMIN 2.3 (L) 04/05/2019     No results found for: IRON, TIBC, FERRITIN, SATURATEDIRO    Pertinent Medications Reviewed: reviewed  Pertinent Medications Comments: Vitamin C, folic acid, NS @ 100 ml/hr, KCl, zofran    Estimated/Assessed Needs    Weight Used For Calorie Calculations: 47.5 kg (104 lb 11.5 oz)  Energy Calorie Requirements (kcal): [place] ST Jeor ( x 1.5-1.6 wt gain) = 2883-7801 kcal   Energy Need Method: [place]-St Jeor  Protein Requirements: 1.2-1.4 g protein/kg ( muscle loss) = 57-66 g protein  Weight Used For Protein Calculations: 47.5 kg (104 lb 11.5 oz)  Fluid Requirements (mL): 9016-2806 ml  Estimated Fluid Requirement Method: RDA Method  CHO Requirement: N/A      Nutrition Prescription Ordered    Current Diet Order: NPO x 1 day    Evaluation of Received Nutrient/Fluid Intake    Energy Calories Required: not meeting needs  Protein Required: not meeting needs  Fluid Required: exceeds needs  Total Fluid Intake (mL/kg): NS @ 100 ml/hr  Tolerance: other (see comments)(NPO)  % Intake of Estimated Energy Needs: 0%  % Meal Intake: 0%    Nutrition Risk    Level of Risk/Frequency of Follow-up: moderate 2 x weekly     Assessment and Plan    Hypoalbuminemia due to protein-calorie malnutrition  Contributing Nutrition Diagnosis  Moderate chronic illness related malnutrition    Related to (etiology):   Decreased ability to perform nutrition ADLs and trouble swallowing    Signs and Symptoms (as evidenced by):   1) Moderate fat/muscle wasting as charted below  2) PO  intakes < 75% EEN x > 1 month-Now improving    * of note 11% wt loss x 8 months- now improving    Interventions/Recommendations (treatment strategy):  1) initiate TF to meet > 75% EEN via PEG    Nutrition Diagnosis Status:   Improving           Monitor and Evaluation    Food and Nutrient Intake: energy intake, enteral nutrition intake  Food and Nutrient Adminstration: enteral and parenteral nutrition administration  Anthropometric Measurements: weight, height/length  Biochemical Data, Medical Tests and Procedures: electrolyte and renal panel  Nutrition-Focused Physical Findings: overall appearance     Malnutrition Assessment  Malnutrition Type: chronic illness  Energy Intake: moderate energy intake  Skin (Micronutrient): (Du = 15, no PI noted)  Teeth (Micronutrient): (Missing)   Micronutrient Evaluation: suspected deficiency  Micronutrient Evaluation Comments: Check iron, B12, abd folate and replete if needed- pt would benefit from vitamins/minerals in TF   Weight Loss (Malnutrition): (11% x 8 months- 5 lb regain x 3 months)  Energy Intake (Malnutrition): less than 75% for greater than or equal to 1 month  Subcutaneous Fat (Malnutrition): moderate depletion  Muscle Mass (Malnutrition): moderate depletion   Orbital Region (Subcutaneous Fat Loss): severe depletion  Upper Arm Region (Subcutaneous Fat Loss): moderate depletion  Thoracic and Lumbar Region: moderate depletion   Evangelical Region (Muscle Loss): severe depletion  Clavicle Bone Region (Muscle Loss): moderate depletion  Clavicle and Acromion Bone Region (Muscle Loss): moderate depletion  Scapular Bone Region (Muscle Loss): moderate depletion  Dorsal Hand (Muscle Loss): moderate depletion  Patellar Region (Muscle Loss): moderate depletion  Anterior Thigh Region (Muscle Loss): moderate depletion  Posterior Calf Region (Muscle Loss): moderate depletion   Edema (Fluid Accumulation): 0-->no edema present   Subcutaneous Fat Loss (Final Summary): moderate  protein-calorie malnutrition  Muscle Loss Evaluation (Final Summary): moderate protein-calorie malnutrition         Nutrition Follow-Up    RD Follow-up?: Yes

## 2019-04-05 NOTE — ASSESSMENT & PLAN NOTE
This patient does have evidence of infective focus- PNA  My overall impression is sepsis.  Antibiotics given-   Antibiotics (From admission, onward)    Start     Stop Route Frequency Ordered    04/06/19 0100  vancomycin in dextrose 5 % 1 gram/250 mL IVPB 1,000 mg      -- IV Every 24 hours (non-standard times) 04/05/19 0125    04/05/19 0800  cefepime in dextrose 5 % 1 gram/50 mL IVPB 1 g      -- IV Every 8 hours (non-standard times) 04/05/19 0117    04/05/19 0000  vancomycin in dextrose 5 % 1 gram/250 mL IVPB 1,000 mg  (Pneumonia (Unchecked))      04/05 1159 IV ED 1 Time 04/04/19 2355          Severe Sepsis only-  Latest labs reviewed, they are-  Recent Labs   Lab 04/04/19  2336   BILITOT 0.3     Recent Labs   Lab 04/04/19  2336   LACTATE 1.4     No results for input(s): PH, PO2, PCO2, HCO3, BE in the last 168 hours.    No evidence of organ dysfunction.   Follow blood and sputum cultures & adjust abx as clinically indicated.

## 2019-04-06 ENCOUNTER — OUTSIDE PLACE OF SERVICE (OUTPATIENT)
Dept: PULMONOLOGY | Facility: CLINIC | Age: 54
End: 2019-04-06
Payer: MEDICAID

## 2019-04-06 LAB
BASOPHILS # BLD AUTO: 0 K/UL (ref 0–0.2)
BASOPHILS NFR BLD: 0.4 % (ref 0–1.9)
DIFFERENTIAL METHOD: ABNORMAL
EOSINOPHIL # BLD AUTO: 0.2 K/UL (ref 0–0.5)
EOSINOPHIL NFR BLD: 4.3 % (ref 0–8)
ERYTHROCYTE [DISTWIDTH] IN BLOOD BY AUTOMATED COUNT: 15.4 % (ref 11.5–14.5)
HCT VFR BLD AUTO: 26 % (ref 37–48.5)
HGB BLD-MCNC: 8.6 G/DL (ref 12–16)
LYMPHOCYTES # BLD AUTO: 1.2 K/UL (ref 1–4.8)
LYMPHOCYTES NFR BLD: 21.8 % (ref 18–48)
MAGNESIUM SERPL-MCNC: 1.9 MG/DL (ref 1.6–2.6)
MCH RBC QN AUTO: 28.3 PG (ref 27–31)
MCHC RBC AUTO-ENTMCNC: 33 G/DL (ref 32–36)
MCV RBC AUTO: 86 FL (ref 82–98)
MONOCYTES # BLD AUTO: 0.4 K/UL (ref 0.3–1)
MONOCYTES NFR BLD: 8 % (ref 4–15)
NEUTROPHILS # BLD AUTO: 3.5 K/UL (ref 1.8–7.7)
NEUTROPHILS NFR BLD: 65.5 % (ref 38–73)
PLATELET # BLD AUTO: 251 K/UL (ref 150–350)
PMV BLD AUTO: 7.1 FL (ref 9.2–12.9)
RBC # BLD AUTO: 3.03 M/UL (ref 4–5.4)
WBC # BLD AUTO: 5.3 K/UL (ref 3.9–12.7)

## 2019-04-06 PROCEDURE — 25000003 PHARM REV CODE 250: Performed by: NURSE PRACTITIONER

## 2019-04-06 PROCEDURE — 94761 N-INVAS EAR/PLS OXIMETRY MLT: CPT

## 2019-04-06 PROCEDURE — 99232 SBSQ HOSP IP/OBS MODERATE 35: CPT | Mod: ,,, | Performed by: INTERNAL MEDICINE

## 2019-04-06 PROCEDURE — 83735 ASSAY OF MAGNESIUM: CPT

## 2019-04-06 PROCEDURE — 80202 ASSAY OF VANCOMYCIN: CPT

## 2019-04-06 PROCEDURE — 63600175 PHARM REV CODE 636 W HCPCS: Performed by: NURSE PRACTITIONER

## 2019-04-06 PROCEDURE — 63600175 PHARM REV CODE 636 W HCPCS: Performed by: HOSPITALIST

## 2019-04-06 PROCEDURE — 85025 COMPLETE CBC W/AUTO DIFF WBC: CPT

## 2019-04-06 PROCEDURE — 25000003 PHARM REV CODE 250: Performed by: HOSPITALIST

## 2019-04-06 PROCEDURE — 12000002 HC ACUTE/MED SURGE SEMI-PRIVATE ROOM

## 2019-04-06 PROCEDURE — 99232 PR SUBSEQUENT HOSPITAL CARE,LEVL II: ICD-10-PCS | Mod: ,,, | Performed by: INTERNAL MEDICINE

## 2019-04-06 PROCEDURE — 36415 COLL VENOUS BLD VENIPUNCTURE: CPT

## 2019-04-06 PROCEDURE — 25000242 PHARM REV CODE 250 ALT 637 W/ HCPCS: Performed by: NURSE PRACTITIONER

## 2019-04-06 PROCEDURE — 63600175 PHARM REV CODE 636 W HCPCS: Performed by: INTERNAL MEDICINE

## 2019-04-06 PROCEDURE — 94640 AIRWAY INHALATION TREATMENT: CPT

## 2019-04-06 PROCEDURE — 27000221 HC OXYGEN, UP TO 24 HOURS

## 2019-04-06 PROCEDURE — 99900026 HC AIRWAY MAINTENANCE (STAT)

## 2019-04-06 PROCEDURE — 97530 THERAPEUTIC ACTIVITIES: CPT

## 2019-04-06 RX ORDER — LEVOFLOXACIN 5 MG/ML
500 INJECTION, SOLUTION INTRAVENOUS
Status: DISCONTINUED | OUTPATIENT
Start: 2019-04-06 | End: 2019-04-09 | Stop reason: HOSPADM

## 2019-04-06 RX ADMIN — ACETYLCYSTEINE 4 ML: 100 INHALANT RESPIRATORY (INHALATION) at 03:04

## 2019-04-06 RX ADMIN — CEFEPIME HYDROCHLORIDE 1 G: 1 INJECTION, POWDER, FOR SOLUTION INTRAMUSCULAR; INTRAVENOUS at 11:04

## 2019-04-06 RX ADMIN — LEVETIRACETAM 1000 MG: 500 TABLET ORAL at 08:04

## 2019-04-06 RX ADMIN — GABAPENTIN 300 MG: 300 CAPSULE ORAL at 09:04

## 2019-04-06 RX ADMIN — VANCOMYCIN HYDROCHLORIDE 1000 MG: 1 INJECTION, POWDER, LYOPHILIZED, FOR SOLUTION INTRAVENOUS at 01:04

## 2019-04-06 RX ADMIN — CEFEPIME HYDROCHLORIDE 1 G: 1 INJECTION, POWDER, FOR SOLUTION INTRAMUSCULAR; INTRAVENOUS at 12:04

## 2019-04-06 RX ADMIN — CHLORDIAZEPOXIDE HYDROCHLORIDE 5 MG: 5 CAPSULE ORAL at 08:04

## 2019-04-06 RX ADMIN — LEVALBUTEROL 1.25 MG: 1.25 SOLUTION, CONCENTRATE RESPIRATORY (INHALATION) at 01:04

## 2019-04-06 RX ADMIN — LEVALBUTEROL 1.25 MG: 1.25 SOLUTION, CONCENTRATE RESPIRATORY (INHALATION) at 03:04

## 2019-04-06 RX ADMIN — METOPROLOL TARTRATE 50 MG: 50 TABLET, FILM COATED ORAL at 09:04

## 2019-04-06 RX ADMIN — MAGNESIUM OXIDE TAB 400 MG (241.3 MG ELEMENTAL MG) 400 MG: 400 (241.3 MG) TAB at 08:04

## 2019-04-06 RX ADMIN — MULTIVITAMIN 15 ML: LIQUID ORAL at 09:04

## 2019-04-06 RX ADMIN — GABAPENTIN 300 MG: 300 CAPSULE ORAL at 08:04

## 2019-04-06 RX ADMIN — LEVETIRACETAM 1000 MG: 500 TABLET ORAL at 09:04

## 2019-04-06 RX ADMIN — RISPERIDONE 0.5 MG: 0.25 TABLET ORAL at 08:04

## 2019-04-06 RX ADMIN — OXYCODONE HYDROCHLORIDE AND ACETAMINOPHEN 500 MG: 500 TABLET ORAL at 09:04

## 2019-04-06 RX ADMIN — METOPROLOL TARTRATE 50 MG: 50 TABLET, FILM COATED ORAL at 01:04

## 2019-04-06 RX ADMIN — LEVALBUTEROL 1.25 MG: 1.25 SOLUTION, CONCENTRATE RESPIRATORY (INHALATION) at 07:04

## 2019-04-06 RX ADMIN — LEVOFLOXACIN 500 MG: 500 INJECTION, SOLUTION INTRAVENOUS at 02:04

## 2019-04-06 RX ADMIN — CEFEPIME HYDROCHLORIDE 1 G: 1 INJECTION, POWDER, FOR SOLUTION INTRAMUSCULAR; INTRAVENOUS at 04:04

## 2019-04-06 RX ADMIN — OXYCODONE HYDROCHLORIDE AND ACETAMINOPHEN 1 TABLET: 7.5; 325 TABLET ORAL at 08:04

## 2019-04-06 RX ADMIN — GABAPENTIN 300 MG: 300 CAPSULE ORAL at 03:04

## 2019-04-06 RX ADMIN — METOPROLOL TARTRATE 50 MG: 50 TABLET, FILM COATED ORAL at 05:04

## 2019-04-06 RX ADMIN — MAGNESIUM OXIDE TAB 400 MG (241.3 MG ELEMENTAL MG) 400 MG: 400 (241.3 MG) TAB at 09:04

## 2019-04-06 RX ADMIN — OXYCODONE HYDROCHLORIDE AND ACETAMINOPHEN 1 TABLET: 7.5; 325 TABLET ORAL at 06:04

## 2019-04-06 RX ADMIN — AMITRIPTYLINE HYDROCHLORIDE 25 MG: 25 TABLET, FILM COATED ORAL at 09:04

## 2019-04-06 RX ADMIN — FOLIC ACID 1 MG: 1 TABLET ORAL at 09:04

## 2019-04-06 RX ADMIN — DIAZEPAM 5 MG: 5 TABLET ORAL at 05:04

## 2019-04-06 RX ADMIN — CEFEPIME HYDROCHLORIDE 1 G: 1 INJECTION, POWDER, FOR SOLUTION INTRAMUSCULAR; INTRAVENOUS at 08:04

## 2019-04-06 RX ADMIN — PAROXETINE HYDROCHLORIDE 10 MG: 10 TABLET, FILM COATED ORAL at 06:04

## 2019-04-06 RX ADMIN — ACETYLCYSTEINE 4 ML: 100 INHALANT RESPIRATORY (INHALATION) at 01:04

## 2019-04-06 RX ADMIN — ESCITALOPRAM OXALATE 20 MG: 10 TABLET, FILM COATED ORAL at 09:04

## 2019-04-06 RX ADMIN — ACETYLCYSTEINE 4 ML: 100 INHALANT RESPIRATORY (INHALATION) at 07:04

## 2019-04-06 NOTE — ASSESSMENT & PLAN NOTE
With chronic respiratory failure secondary to O2 dependent COPD/ Hx MRSA pneumonia in February 2019-  Monitor O2 sats, supplemental O2 as needed  Orders as per pulmonology -currently on aerosol treatments and IV vancomycin, IV cefepime, and IV Levaquin  Repeat chest x-ray in the morning

## 2019-04-06 NOTE — PLAN OF CARE
04/06/19 0102   Patient Assessment/Suction   Level of Consciousness (AVPU) alert   Respiratory Effort Unlabored   Expansion/Accessory Muscles/Retractions no use of accessory muscles   All Lung Fields Breath Sounds coarse   Suction Method tracheal   $ Suction Charges Inline Suction Procedure Stat Charge   Secretions Amount moderate   Secretions Color yellow   Secretions Characteristics thick   PRE-TX-O2   O2 Device (Oxygen Therapy) Aerosol T-Tube   Flow (L/min) 5   Oxygen Concentration (%) 28   SpO2 98 %   Pulse Oximetry Type Intermittent   $ Pulse Oximetry - Multiple Charge Pulse Oximetry - Multiple   Pulse 78   Resp 18   Aerosol Therapy   $ Aerosol Therapy Charges Aerosol Treatment   Respiratory Treatment Status (SVN) given   Treatment Route (SVN) in-line   Patient Position (SVN) HOB elevated   Signs of Intolerance (SVN) none   Breath Sounds Post-Respiratory Treatment   Throughout All Fields Post-Treatment All Fields   Throughout All Fields Post-Treatment clear   Post-treatment Heart Rate (beats/min) 82   Post-treatment Resp Rate (breaths/min) 18   Ready to Wean/Extubation Screen   FIO2<=50 (chart decimal) 0.28

## 2019-04-06 NOTE — PLAN OF CARE
04/06/19 0745   Patient Assessment/Suction   Level of Consciousness (AVPU) alert   All Lung Fields Breath Sounds coarse   $ Suction Charges Inline Suction Procedure Stat Charge   Secretions Amount large   Secretions Color yellow;green   Secretions Characteristics thick   PRE-TX-O2   O2 Device (Oxygen Therapy) Aerosol T-Tube   $ Is the patient on Low Flow Oxygen? Yes   Flow (L/min) 5   Oxygen Concentration (%) 28   SpO2 98 %   Pulse Oximetry Type Intermittent   $ Pulse Oximetry - Multiple Charge Pulse Oximetry - Multiple   Pulse 88   Resp 18   Aerosol Therapy   $ Aerosol Therapy Charges Aerosol Treatment   Respiratory Treatment Status (SVN) given   Treatment Route (SVN) in-line   Patient Position (SVN) HOB elevated   Post Treatment Assessment (SVN) breath sounds unchanged   Signs of Intolerance (SVN) none   Breath Sounds Post-Respiratory Treatment   Throughout All Fields Post-Treatment All Fields   Throughout All Fields Post-Treatment coarse   Post-treatment Heart Rate (beats/min) 86   Post-treatment Resp Rate (breaths/min) 18   Ready to Wean/Extubation Screen   FIO2<=50 (chart decimal) 0.28

## 2019-04-06 NOTE — NURSING
Found pt's ramsay out, no sign of trauma/blood and balloon was intact. Placed a new ramsay, pt tolerated well.

## 2019-04-06 NOTE — HOSPITAL COURSE
The patient was monitored closely during her stay.  Pulmonary was consulted.  She was continued on supplement O2, aerosol treatments, and IV cefepime, Levaquin, and  vancomycin.  PT and OT were consulted for debility.  IV vancomycin was later discontinued.  Respiratory culture grew Pseudomonas.  It was sensitive to most of the antibiotics tested.  With treatment, her condition improved.  We kept her on T-piece at about 5 liters.  Eventually she was discharged back to the nursing home in good condition.    General appearance: alert, appears stated age and no distress  Neck: no JVD  Lungs: clear to auscultation bilaterally and normal effort.  Heart: regular rate and rhythm, S1, S2 normal, no murmur, click, rub or gallop

## 2019-04-06 NOTE — PROGRESS NOTES
[facility] Hospital Medicine  Progress Note    Patient Name: Elif Archibald  MRN: [id number]  Patient Class: IP- Inpatient   Admission Date: 4/4/2019  Length of Stay: 1 days  Attending Physician: Jeremy Pritchett MD  Primary Care Provider: Terrie Ireland MD      Subjective:     Principal Problem:HCAP (healthcare-associated pneumonia)    HPI:  This is a 53-year-old female with PMHx significant for traumatic SDH with craniotomy on 01/19, chronic respiratory failure with trache dependency, depression, bipolar, schizophrenia, illicit substance and ETOH abuse, and COPD.  She presented to the ED from [place] for further evaluation of cough, congestion, and tachycardia.  Nursing staff noted she had a fast heart rate throughout the day with congested breath sounds and increased sputum production per trache.  Hx is limited as patient is nonverbal with trache.  Her family is at bedside and does report a Hx of pneumonia, as well as MRSA sputum infection.  Records reveal she completed treatment for this back in February.  Per family she is due to have a bone flap surgery sooner at [facility] and her plan includes keeping trach until after that surgery.  Patient evaluated in the emergency room where she was noted to have sepsis and pneumonia and admitted for further evaluation treatment.    Hospital Course:  The patient was monitored closely during her stay.  Pulmonary was consulted.  She was continued on supplement O2, aerosol treatments, and IV cefepime, Levaquin, and  vancomycin.  PT and OT were consulted for debility.    Interval History:  Continue current treatment.  Repeat  chest x-ray in the morning.     Review of Systems   Constitutional: Negative for activity change and fever.   HENT: Negative for facial swelling.    Eyes: Negative for pain and redness.   Respiratory: Positive for cough. Negative for shortness of breath.    Cardiovascular: Negative for leg swelling.   Gastrointestinal: Negative for  abdominal distention, abdominal pain, diarrhea and vomiting.        PEG tube noted   Endocrine: Negative for polydipsia and polyphagia.   Genitourinary: Negative for hematuria.   Musculoskeletal: Positive for gait problem.   Skin: Negative for color change.   Allergic/Immunologic: Negative for food allergies.   Neurological: Positive for weakness. Negative for facial asymmetry.        Chronic debility   Hematological: Does not bruise/bleed easily.   Psychiatric/Behavioral: Positive for confusion.     Objective:     Vital Signs (Most Recent):  Temp: 96.2 °F (35.7 °C) (04/06/19 1511)  Pulse: 88 (04/06/19 1511)  Resp: 20 (04/06/19 1511)  BP: 105/71 (04/06/19 1511)  SpO2: 98 % (04/06/19 1511) Vital Signs (24h Range):  Temp:  [96.2 °F (35.7 °C)-96.7 °F (35.9 °C)] 96.2 °F (35.7 °C)  Pulse:  [76-94] 88  Resp:  [18-20] 20  SpO2:  [92 %-98 %] 98 %  BP: (104-116)/(55-71) 105/71     Weight: 47.1 kg (103 lb 13.4 oz)  Body mass index is 18.99 kg/m².    Physical Exam   Constitutional: She appears well-developed and well-nourished. No distress.   HENT:   Head: Normocephalic and atraumatic.   Eyes: Pupils are equal, round, and reactive to light. Conjunctivae and EOM are normal. Right eye exhibits no discharge. Left eye exhibits no discharge.   Neck: Normal range of motion. Neck supple. No JVD present.   Cardiovascular: Normal rate, regular rhythm, normal heart sounds and intact distal pulses.   No murmur heard.  Pulmonary/Chest: No respiratory distress. She exhibits no tenderness.   Bilateral breath sounds diminished with scattered rhonchi noted throughout-improves with cough   Abdominal: Soft. Bowel sounds are normal. She exhibits no distension. There is no tenderness. There is no guarding.   Peg tube noted   Genitourinary:   Genitourinary Comments: Not examined   Musculoskeletal: Normal range of motion. She exhibits no edema or tenderness.   Neurological: She is alert.   Follows commands; moves all extremities.  RIGHT arm  strength 2/5; LEFT 4/5.  BLE 3/5.    Skin: Skin is warm and dry. Capillary refill takes less than 2 seconds. She is not diaphoretic.   Psychiatric:   Calm and cooperative at this time         CRANIAL NERVES     CN III, IV, VI   Pupils are equal, round, and reactive to light.  Extraocular motions are normal.     Labs reviewed    Assessment/Plan:      * HCAP (healthcare-associated pneumonia)  With chronic respiratory failure secondary to O2 dependent COPD/ Hx MRSA pneumonia in February 2019-  Monitor O2 sats, supplemental O2 as needed  Orders as per pulmonology -currently on aerosol treatments and IV vancomycin, IV cefepime, and IV Levaquin  Repeat chest x-ray in the morning         Hypomagnesemia  Monitor  Supplement as needed      Debility  Fall, skin, aspiration precautions  Turn q.2 hours  Continue PT and OT      Dehydration  Continue IV fluids  Trend BMP      Sinus tachycardia  Stable  Continue home beta blockade  Monitor for fever, monitor O2 status      Normocytic anemia  Patient with low iron, high ferritin  Folate and vitamin B12 levels within normal limits      Hypoalbuminemia due to protein-calorie malnutrition  Chronic  Continue tube feedings per PEG  Patient remains NPO by mouth      Sepsis  Secondary to pneumonia  Continue current treatment- antibiotics as per pulmonology  Blood cultures x2 currently no growth  Sputum culture showing presumptive Pseudomonas species    Alcoholic cirrhosis of liver without ascites  Chronically elevated liver enzymes-  Stable at this time    Essential hypertension  Chronic, stable.    Continue oral antihypertensives, monitor BP closely, and titrate medication as required for sustained BP control.    Recurrent major depressive disorder, in partial remission  Chronic, stable on current medication regimen.  Continue antidepressants as per outpatient regimen and monitor clinically.      Hx of subdural hematoma  With chronic cognitive deficits and debility-  Continue seizure  prophylaxis.  Protective helmet when mobilized.  Consult PT OT.  Maintain fall and seizure precautions.      Chronic obstructive pulmonary disease with acute lower respiratory infection  IV Abx as above, scheduled bronchodilators.  Consult pulmonology for further evaluation and management.      PEG (percutaneous endoscopic gastrostomy) status  Chronic, uncomplicated.  Peg care per nursing.     Tracheostomy status  Trach care per nursing.      Chronic respiratory failure  Patient with O2 dependent COPD and chronic respiratory failure requiring trache placement.    Continue supplemental oxygen per trach collar.    Monitor sats closely and titrate oxygen as needed to maintain sats greater than 92%.        VTE Risk Mitigation (From admission, onward)        Ordered     IP VTE HIGH RISK PATIENT  Once      04/05/19 0117     Place LOPEZ hose  Until discontinued      04/05/19 0117     Place sequential compression device  Until discontinued      04/05/19 0117          JOSE LUIS Lundberg  Department of Hospital Medicine   [facility]    Time spent seeing patient( greater than 1/2 spent in direct contact) : 34 minutes

## 2019-04-06 NOTE — ASSESSMENT & PLAN NOTE
Secondary to pneumonia  Continue current treatment- antibiotics as per pulmonology  Blood cultures x2 currently no growth  Sputum culture showing presumptive Pseudomonas species

## 2019-04-06 NOTE — PROGRESS NOTES
"Progress Note  Pulmonary/Critical Care      Admit Date: 4/4/2019    SUBJECTIVE:     HPI/Interval history (See H&P for complete P,F,SHx) :     4/6/2019 - Stable overnight, she pulled her ramsay and tried to pull trach earlier.  No distress, no new respiratory problems    Review of Systems: List if applicable    Review of Systems   Unable to perform ROS: Mental acuity       OBJECTIVE:     Vital Signs Range (Last 24H):  Temp:  [96.2 °F (35.7 °C)-96.7 °F (35.9 °C)]   Pulse:  [76-94]   Resp:  [18-20]   BP: (104-116)/(55-71)   SpO2:  [92 %-98 %]     I & O (Last 24H):    Intake/Output Summary (Last 24 hours) at 4/6/2019 1242  Last data filed at 4/6/2019 0600  Gross per 24 hour   Intake 400 ml   Output 800 ml   Net -400 ml       Estimated body mass index is 18.99 kg/m² as calculated from the following:    Height as of this encounter: 5' 2" (1.575 m).    Weight as of this encounter: 47.1 kg (103 lb 13.4 oz).    Vent Settings- Oxygen Concentration (%):  [28] 28    ABG  No results for input(s): PH, PO2, PCO2, HCO3, BE in the last 168 hours.    Physical Exam:  Physical Exam   Constitutional: She is well-developed, well-nourished, and in no distress. No distress.   HENT:   S/p left cranial surgery   Eyes: Pupils are equal, round, and reactive to light. Conjunctivae and EOM are normal.   Neck: Normal range of motion. Neck supple. No JVD present. No tracheal deviation present.   Trach in place   Cardiovascular: Normal rate, regular rhythm and normal heart sounds. Exam reveals no gallop and no friction rub.   No murmur heard.  Pulmonary/Chest: Effort normal. No stridor. No respiratory distress. She has no wheezes. She has no rales.   + rhonchi left chest   Abdominal: Soft. Bowel sounds are normal. She exhibits no distension. There is no tenderness. There is no rebound.   + peg   Musculoskeletal: Normal range of motion. She exhibits no edema or tenderness.   Lymphadenopathy:     She has no cervical adenopathy.   Neurological: She is " alert.   Skin: She is not diaphoretic.   Vitals reviewed.      Laboratory/Diagnostic Data:    Recent Labs   Lab 04/05/19  0441 04/06/19  0533   WBC 8.90 5.30   HGB 8.2* 8.6*   HCT 25.0* 26.0*    251     --    K 4.1  --      --    CO2 28  --    BUN 31*  --    CREATININE 0.7  --    *  --    *  --    PROT 8.4  --    ALBUMIN 2.3*  --    BILITOT 0.4  --        Microbiology    Microbiology Results (last 7 days)     Procedure Component Value Units Date/Time    Blood culture x two cultures. Draw prior to antibiotics. [[id number]] Collected:  04/04/19 3514    Order Status:  Completed Specimen:  Blood from Antecubital, Right Updated:  04/06/19 1012     Blood Culture, Routine No Growth to date     Blood Culture, Routine No Growth to date    Narrative:       Aerobic and anaerobic    Blood culture x two cultures. Draw prior to antibiotics. [[id number]] Collected:  04/04/19 4486    Order Status:  Completed Specimen:  Blood from Antecubital, Left Updated:  04/06/19 1012     Blood Culture, Routine No Growth to date     Blood Culture, Routine No Growth to date    Narrative:       Aerobic and anaerobic    Culture, Respiratory with Gram Stain [[id number]] Collected:  04/05/19 0355    Order Status:  Completed Specimen:  Tracheal Aspirate Updated:  04/06/19 0750     Respiratory Culture --     PRESUMPTIVE PSEUDOMONAS SPECIES  Moderate  Identification and susceptibility pending       Gram Stain (Respiratory) <10 epithelial cells per low power field.     Gram Stain (Respiratory) Many WBC's     Gram Stain (Respiratory) Few Gram negative rods    Culture, Respiratory with Gram Stain [[id number]] Collected:  04/05/19 0355    Order Status:  Canceled Specimen:  Respiratory from Tracheal Aspirate Updated:  04/05/19 0355          Radiology        Medications:     acetylcysteine 100 mg/ml (10%)  4 mL Nebulization Q8H    ascorbic acid (vitamin C)  500 mg Oral Daily    ceFEPime (MAXIPIME) IVPB  1 g Intravenous Q8H     chlordiazepoxide  5 mg Per G Tube Nightly    escitalopram oxalate  20 mg Per G Tube Daily    folic acid  1 mg Per G Tube Daily    gabapentin  300 mg Per G Tube TID    levalbuterol  1.25 mg Nebulization Q8H    levETIRAcetam  1,000 mg Per G Tube BID    magnesium oxide  400 mg Per G Tube BID    metoprolol tartrate  50 mg Oral QID    multivit-mins-ferrous gluconat  15 mL Gastrostomy Tube Daily    paroxetine  10 mg Oral QAM    risperiDONE  0.5 mg Per G Tube QHS    vancomycin (VANCOCIN) IVPB  1,000 mg Intravenous Q24H           acetaminophen, amitriptyline, dextrose 50%, dextrose 50%, diazePAM, glucagon (human recombinant), glucose, glucose, ondansetron, oxyCODONE-acetaminophen, potassium chloride 10%, potassium chloride 10%, sodium chloride 0.9%    ASSESSMENT/PLAN:     Active Problems:    Active Hospital Problems    Diagnosis  POA    *HCAP (healthcare-associated pneumonia) [J18.9]  Yes    Chronic respiratory failure [J96.10]  Yes    Tracheostomy status [Z93.0]  Not Applicable    PEG (percutaneous endoscopic gastrostomy) status [Z93.1]  Not Applicable    Chronic obstructive pulmonary disease with acute lower respiratory infection [J44.0]  Yes    Hx of subdural hematoma [Z86.79]  Not Applicable    Recurrent major depressive disorder, in partial remission [F33.41]  Yes    Essential hypertension [I10]  Yes    Alcoholic cirrhosis of liver without ascites [K70.30]  Yes    Sepsis [A41.9]  Yes    Hypoalbuminemia due to protein-calorie malnutrition [E46]  Yes    Normocytic anemia [D64.9]  Yes    Sinus tachycardia [R00.0]  Yes    Elevated liver enzymes [R74.8]  Yes    Dehydration [E86.0]  Yes    Debility [R53.81]  Yes    Hypomagnesemia [E83.42]  Yes    Functional quadriplegia [R53.2]  Yes    Lung nodules [R91.8]  Yes      Resolved Hospital Problems   No resolved problems to display.     Pneumonia - left, + pseudomonas  - add IV levaquin while we await sensitivities  - continue respiratory  treatments  - continue with trach care  - if no GPC will stop vanco soon    Respiratory failure, chronic hypoxemic  - continue with O2 via trach    COPD  - stable, no acute exacerbation    Trach  - stable  - ? If she should be reevaluated by speech to see if she could swallow anything    Sepsis  - due to pneumonia  - better    H/o subdural hematoma  - aware    S/p PEG  - aware        I will continue to follow with the pt.  Please call me at [phone] if you have any questions.      Devin Howell MD

## 2019-04-06 NOTE — ASSESSMENT & PLAN NOTE
With chronic cognitive deficits and debility-  Continue seizure prophylaxis.  Protective helmet when mobilized.  Consult PT OT.  Maintain fall and seizure precautions.

## 2019-04-06 NOTE — SUBJECTIVE & OBJECTIVE
Interval History:  Continue current treatment.  Repeat  chest x-ray in the morning.     Review of Systems   Constitutional: Negative for activity change and fever.   HENT: Negative for facial swelling.    Eyes: Negative for pain and redness.   Respiratory: Positive for cough. Negative for shortness of breath.    Cardiovascular: Negative for leg swelling.   Gastrointestinal: Negative for abdominal distention, abdominal pain, diarrhea and vomiting.        PEG tube noted   Endocrine: Negative for polydipsia and polyphagia.   Genitourinary: Negative for hematuria.   Musculoskeletal: Positive for gait problem.   Skin: Negative for color change.   Allergic/Immunologic: Negative for food allergies.   Neurological: Positive for weakness. Negative for facial asymmetry.        Chronic debility   Hematological: Does not bruise/bleed easily.   Psychiatric/Behavioral: Positive for confusion.     Objective:     Vital Signs (Most Recent):  Temp: 96.2 °F (35.7 °C) (04/06/19 1511)  Pulse: 88 (04/06/19 1511)  Resp: 20 (04/06/19 1511)  BP: 105/71 (04/06/19 1511)  SpO2: 98 % (04/06/19 1511) Vital Signs (24h Range):  Temp:  [96.2 °F (35.7 °C)-96.7 °F (35.9 °C)] 96.2 °F (35.7 °C)  Pulse:  [76-94] 88  Resp:  [18-20] 20  SpO2:  [92 %-98 %] 98 %  BP: (104-116)/(55-71) 105/71     Weight: 47.1 kg (103 lb 13.4 oz)  Body mass index is 18.99 kg/m².    Physical Exam   Constitutional: She appears well-developed and well-nourished. No distress.   HENT:   Head: Normocephalic and atraumatic.   Eyes: Pupils are equal, round, and reactive to light. Conjunctivae and EOM are normal. Right eye exhibits no discharge. Left eye exhibits no discharge.   Neck: Normal range of motion. Neck supple. No JVD present.   Cardiovascular: Normal rate, regular rhythm, normal heart sounds and intact distal pulses.   No murmur heard.  Pulmonary/Chest: No respiratory distress. She exhibits no tenderness.   Bilateral breath sounds diminished with scattered rhonchi noted  throughout-improves with cough   Abdominal: Soft. Bowel sounds are normal. She exhibits no distension. There is no tenderness. There is no guarding.   Peg tube noted   Genitourinary:   Genitourinary Comments: Not examined   Musculoskeletal: Normal range of motion. She exhibits no edema or tenderness.   Neurological: She is alert.   Follows commands; moves all extremities.  RIGHT arm strength 2/5; LEFT 4/5.  BLE 3/5.    Skin: Skin is warm and dry. Capillary refill takes less than 2 seconds. She is not diaphoretic.   Psychiatric:   Calm and cooperative at this time         CRANIAL NERVES     CN III, IV, VI   Pupils are equal, round, and reactive to light.  Extraocular motions are normal.     Labs reviewed

## 2019-04-06 NOTE — ASSESSMENT & PLAN NOTE
Patient with O2 dependent COPD and chronic respiratory failure requiring trache placement.    Continue supplemental oxygen per trach collar.    Monitor sats closely and titrate oxygen as needed to maintain sats greater than 92%.

## 2019-04-06 NOTE — PT/OT/SLP PROGRESS
Speech Language Pathology Treatment    Patient Name:  Elif Archibald   MRN:  [id number]  Admitting Diagnosis: HCAP (healthcare-associated pneumonia)    Recommendations:                 General Recommendations:  One Way Speaking Valve  Diet recommendations:   ,   NPO  Aspiration Precautions: Strict aspiration precautions   General Precautions: Standard,    Communication strategies:  One way speaking valve    Subjective     (mouthing words, unintelligible)       Objective:     Has the patient been evaluated by SLP for swallowing?      Keep patient NPO?     Current Respiratory Status:        Order received for [place] Speaking Valve placement.  Contacted [facility], spoke to RN Supervisor Ms. Alexander who stated she will have pt's personal PM valve delivered today.  Informed SOLO Huynh.     Assessment:     Elif Archibald is a 53 y.o. female with an SLP diagnosis of Aphasia, Dysphagia, Aphonia and S/P Trach.  She already has a PM speaking valve at [facility], which their staff will deliver today.  However, report from SLP who works with her at [facility] was that they have not been able to use it there due to thick tracheal secretions. [facility] knows pt well & will determine when pt is appropriate for po intake or MBSS .  Will work with Respiratory Therapist once valve is delivered.  Will follow.      Goals:   Multidisciplinary Problems     SLP Goals     Not on file                Plan:     · SLP Follow-Up:     Later today     Discharge recommendations:      Barriers to Discharge:  None    Time Tracking:     SLP Treatment Date:      Speech Start Time:     Speech Stop Time:        Speech Total Time (min):       Billable Minutes: none    Claudia Vargas CCC-SLP/A  04/06/2019

## 2019-04-06 NOTE — PLAN OF CARE
Problem: Physical Therapy Goal  Goal: Physical Therapy Goal  Goals to be met by: 19     Patient will increase functional independence with mobility by performin. Supine to sit with MInimal Assistance  2. Rolling to Left and Right with Contact Guard Assistance.  3. Sit to stand transfer with Contact Guard Assistance with HHA  4. Bed to chair transfer with Minimal Assistance HHA  5. Sitting at edge of bed x5 minutes with Supervision  6. Lower extremity exercise program x10-20 reps per handout, with independence     Outcome: Ongoing (interventions implemented as appropriate)  Therapeutic activity ; bed mobility, transfers to sitting EOB. OOB to chair with Nhan REYES

## 2019-04-07 LAB
ALBUMIN SERPL BCP-MCNC: 2.3 G/DL (ref 3.5–5.2)
ALP SERPL-CCNC: 105 U/L (ref 55–135)
ALT SERPL W/O P-5'-P-CCNC: 123 U/L (ref 10–44)
ANION GAP SERPL CALC-SCNC: 7 MMOL/L (ref 8–16)
AST SERPL-CCNC: 67 U/L (ref 10–40)
BASOPHILS # BLD AUTO: 0 K/UL (ref 0–0.2)
BASOPHILS NFR BLD: 0.5 % (ref 0–1.9)
BILIRUB SERPL-MCNC: 0.3 MG/DL (ref 0.1–1)
BUN SERPL-MCNC: 16 MG/DL (ref 6–20)
CALCIUM SERPL-MCNC: 9.6 MG/DL (ref 8.7–10.5)
CHLORIDE SERPL-SCNC: 97 MMOL/L (ref 95–110)
CO2 SERPL-SCNC: 28 MMOL/L (ref 23–29)
CREAT SERPL-MCNC: 0.7 MG/DL (ref 0.5–1.4)
DIFFERENTIAL METHOD: ABNORMAL
EOSINOPHIL # BLD AUTO: 0.2 K/UL (ref 0–0.5)
EOSINOPHIL NFR BLD: 4.2 % (ref 0–8)
ERYTHROCYTE [DISTWIDTH] IN BLOOD BY AUTOMATED COUNT: 16 % (ref 11.5–14.5)
EST. GFR  (AFRICAN AMERICAN): >60 ML/MIN/1.73 M^2
EST. GFR  (NON AFRICAN AMERICAN): >60 ML/MIN/1.73 M^2
GLUCOSE SERPL-MCNC: 95 MG/DL (ref 70–110)
HCT VFR BLD AUTO: 25.4 % (ref 37–48.5)
HGB BLD-MCNC: 8.8 G/DL (ref 12–16)
LYMPHOCYTES # BLD AUTO: 1.1 K/UL (ref 1–4.8)
LYMPHOCYTES NFR BLD: 24 % (ref 18–48)
MCH RBC QN AUTO: 29.5 PG (ref 27–31)
MCHC RBC AUTO-ENTMCNC: 34.6 G/DL (ref 32–36)
MCV RBC AUTO: 85 FL (ref 82–98)
MONOCYTES # BLD AUTO: 0.5 K/UL (ref 0.3–1)
MONOCYTES NFR BLD: 11.8 % (ref 4–15)
NEUTROPHILS # BLD AUTO: 2.8 K/UL (ref 1.8–7.7)
NEUTROPHILS NFR BLD: 59.5 % (ref 38–73)
OB PNL STL: NEGATIVE
PLATELET # BLD AUTO: 262 K/UL (ref 150–350)
PMV BLD AUTO: 7.4 FL (ref 9.2–12.9)
POTASSIUM SERPL-SCNC: 3.9 MMOL/L (ref 3.5–5.1)
PROT SERPL-MCNC: 8 G/DL (ref 6–8.4)
RBC # BLD AUTO: 2.98 M/UL (ref 4–5.4)
SODIUM SERPL-SCNC: 132 MMOL/L (ref 136–145)
VANCOMYCIN TROUGH SERPL-MCNC: 11.5 UG/ML (ref 10–22)
WBC # BLD AUTO: 4.6 K/UL (ref 3.9–12.7)

## 2019-04-07 PROCEDURE — 85025 COMPLETE CBC W/AUTO DIFF WBC: CPT

## 2019-04-07 PROCEDURE — 99232 PR SUBSEQUENT HOSPITAL CARE,LEVL II: ICD-10-PCS | Mod: ,,, | Performed by: INTERNAL MEDICINE

## 2019-04-07 PROCEDURE — 80053 COMPREHEN METABOLIC PANEL: CPT

## 2019-04-07 PROCEDURE — 36415 COLL VENOUS BLD VENIPUNCTURE: CPT

## 2019-04-07 PROCEDURE — 25000003 PHARM REV CODE 250: Performed by: HOSPITALIST

## 2019-04-07 PROCEDURE — 25000003 PHARM REV CODE 250: Performed by: NURSE PRACTITIONER

## 2019-04-07 PROCEDURE — 12000002 HC ACUTE/MED SURGE SEMI-PRIVATE ROOM

## 2019-04-07 PROCEDURE — 99232 SBSQ HOSP IP/OBS MODERATE 35: CPT | Mod: ,,, | Performed by: INTERNAL MEDICINE

## 2019-04-07 PROCEDURE — 25000242 PHARM REV CODE 250 ALT 637 W/ HCPCS: Performed by: NURSE PRACTITIONER

## 2019-04-07 PROCEDURE — 27200966 HC CLOSED SUCTION SYSTEM

## 2019-04-07 PROCEDURE — 63600175 PHARM REV CODE 636 W HCPCS: Performed by: HOSPITALIST

## 2019-04-07 PROCEDURE — 94761 N-INVAS EAR/PLS OXIMETRY MLT: CPT

## 2019-04-07 PROCEDURE — 63600175 PHARM REV CODE 636 W HCPCS: Performed by: NURSE PRACTITIONER

## 2019-04-07 PROCEDURE — 94640 AIRWAY INHALATION TREATMENT: CPT

## 2019-04-07 PROCEDURE — 82272 OCCULT BLD FECES 1-3 TESTS: CPT

## 2019-04-07 PROCEDURE — 63600175 PHARM REV CODE 636 W HCPCS: Performed by: INTERNAL MEDICINE

## 2019-04-07 PROCEDURE — 99900026 HC AIRWAY MAINTENANCE (STAT)

## 2019-04-07 RX ORDER — SODIUM CHLORIDE 9 MG/ML
INJECTION, SOLUTION INTRAVENOUS ONCE
Status: COMPLETED | OUTPATIENT
Start: 2019-04-07 | End: 2019-04-07

## 2019-04-07 RX ORDER — SODIUM CHLORIDE 9 MG/ML
INJECTION, SOLUTION INTRAVENOUS CONTINUOUS
Status: DISCONTINUED | OUTPATIENT
Start: 2019-04-07 | End: 2019-04-09 | Stop reason: HOSPADM

## 2019-04-07 RX ADMIN — LEVALBUTEROL 1.25 MG: 1.25 SOLUTION, CONCENTRATE RESPIRATORY (INHALATION) at 03:04

## 2019-04-07 RX ADMIN — SODIUM CHLORIDE: 0.9 INJECTION, SOLUTION INTRAVENOUS at 10:04

## 2019-04-07 RX ADMIN — ACETYLCYSTEINE 4 ML: 100 INHALANT RESPIRATORY (INHALATION) at 08:04

## 2019-04-07 RX ADMIN — MAGNESIUM OXIDE TAB 400 MG (241.3 MG ELEMENTAL MG) 400 MG: 400 (241.3 MG) TAB at 10:04

## 2019-04-07 RX ADMIN — LEVETIRACETAM 1000 MG: 500 TABLET ORAL at 09:04

## 2019-04-07 RX ADMIN — ACETYLCYSTEINE 4 ML: 100 INHALANT RESPIRATORY (INHALATION) at 03:04

## 2019-04-07 RX ADMIN — LEVALBUTEROL 1.25 MG: 1.25 SOLUTION, CONCENTRATE RESPIRATORY (INHALATION) at 08:04

## 2019-04-07 RX ADMIN — OXYCODONE HYDROCHLORIDE AND ACETAMINOPHEN 500 MG: 500 TABLET ORAL at 09:04

## 2019-04-07 RX ADMIN — LEVOFLOXACIN 500 MG: 500 INJECTION, SOLUTION INTRAVENOUS at 03:04

## 2019-04-07 RX ADMIN — ACETYLCYSTEINE 4 ML: 100 INHALANT RESPIRATORY (INHALATION) at 12:04

## 2019-04-07 RX ADMIN — LEVALBUTEROL 1.25 MG: 1.25 SOLUTION, CONCENTRATE RESPIRATORY (INHALATION) at 12:04

## 2019-04-07 RX ADMIN — MAGNESIUM OXIDE TAB 400 MG (241.3 MG ELEMENTAL MG) 400 MG: 400 (241.3 MG) TAB at 09:04

## 2019-04-07 RX ADMIN — ACETYLCYSTEINE 4 ML: 100 INHALANT RESPIRATORY (INHALATION) at 11:04

## 2019-04-07 RX ADMIN — CHLORDIAZEPOXIDE HYDROCHLORIDE 5 MG: 5 CAPSULE ORAL at 10:04

## 2019-04-07 RX ADMIN — METOPROLOL TARTRATE 50 MG: 50 TABLET, FILM COATED ORAL at 10:04

## 2019-04-07 RX ADMIN — PAROXETINE HYDROCHLORIDE 10 MG: 10 TABLET, FILM COATED ORAL at 06:04

## 2019-04-07 RX ADMIN — MULTIVITAMIN 15 ML: LIQUID ORAL at 09:04

## 2019-04-07 RX ADMIN — LEVETIRACETAM 1000 MG: 500 TABLET ORAL at 10:04

## 2019-04-07 RX ADMIN — OXYCODONE HYDROCHLORIDE AND ACETAMINOPHEN 1 TABLET: 7.5; 325 TABLET ORAL at 09:04

## 2019-04-07 RX ADMIN — ESCITALOPRAM OXALATE 20 MG: 10 TABLET, FILM COATED ORAL at 09:04

## 2019-04-07 RX ADMIN — CEFEPIME HYDROCHLORIDE 1 G: 1 INJECTION, POWDER, FOR SOLUTION INTRAMUSCULAR; INTRAVENOUS at 08:04

## 2019-04-07 RX ADMIN — VANCOMYCIN HYDROCHLORIDE 750 MG: 750 INJECTION, POWDER, LYOPHILIZED, FOR SOLUTION INTRAVENOUS at 12:04

## 2019-04-07 RX ADMIN — LEVALBUTEROL 1.25 MG: 1.25 SOLUTION, CONCENTRATE RESPIRATORY (INHALATION) at 11:04

## 2019-04-07 RX ADMIN — CEFEPIME HYDROCHLORIDE 1 G: 1 INJECTION, POWDER, FOR SOLUTION INTRAMUSCULAR; INTRAVENOUS at 04:04

## 2019-04-07 RX ADMIN — FOLIC ACID 1 MG: 1 TABLET ORAL at 09:04

## 2019-04-07 RX ADMIN — VANCOMYCIN HYDROCHLORIDE 750 MG: 750 INJECTION, POWDER, LYOPHILIZED, FOR SOLUTION INTRAVENOUS at 01:04

## 2019-04-07 RX ADMIN — RISPERIDONE 0.5 MG: 0.25 TABLET ORAL at 10:04

## 2019-04-07 RX ADMIN — GABAPENTIN 300 MG: 300 CAPSULE ORAL at 09:04

## 2019-04-07 RX ADMIN — GABAPENTIN 300 MG: 300 CAPSULE ORAL at 04:04

## 2019-04-07 RX ADMIN — SODIUM CHLORIDE: 0.9 INJECTION, SOLUTION INTRAVENOUS at 06:04

## 2019-04-07 RX ADMIN — GABAPENTIN 300 MG: 300 CAPSULE ORAL at 10:04

## 2019-04-07 RX ADMIN — CEFEPIME HYDROCHLORIDE 1 G: 1 INJECTION, POWDER, FOR SOLUTION INTRAMUSCULAR; INTRAVENOUS at 11:04

## 2019-04-07 RX ADMIN — METOPROLOL TARTRATE 50 MG: 50 TABLET, FILM COATED ORAL at 09:04

## 2019-04-07 NOTE — CONSULTS
Elif Archibald [id number] is a 53 y.o. female who has been consulted for vancomycin dosing.    The patient has the following labs:     Date Creatinine (mg/dl)    BUN WBC Count   4/7/2019 Estimated Creatinine Clearance: 69.1 mL/min (based on SCr of 0.7 mg/dL). Lab Results   Component Value Date    BUN 31 (H) 04/05/2019     Lab Results   Component Value Date    WBC 5.30 04/06/2019        Current weight is 47.1 kg (103 lb 13.4 oz)    Pt is receiving vancomycin 1000 mg every 24 hours.  Vancomycin trough from 4/6 at 2347 was 11.5 mg/dL.  Target trough range is 15-20 mg/dL.   Trough was drawn 1.5 hours early and anticipate it is subtherapeutic. Renal function has improved. Pharmacy will increase current dose.     The patient will be changed to a vancomycin dose of 750 mg every 12 hours. A vancomycin trough has been ordered prior to 4th dose due 4/8 at 1230.      Patient will be followed by pharmacy for changes in renal function, toxicity, and efficacy.  Thank you for allowing us to participate in this patient's care.     Dianna Barger

## 2019-04-07 NOTE — ASSESSMENT & PLAN NOTE
Functional quadriplegia-  Chronic debility due to physical and mental status  Fall, skin, aspiration precautions  Turn q.2 hours  Continue PT and OT

## 2019-04-07 NOTE — PROGRESS NOTES
[facility] Hospital Medicine  Progress Note    Patient Name: Elif Archibald  MRN: [id number]  Patient Class: IP- Inpatient   Admission Date: 4/4/2019  Length of Stay: 2 days  Attending Physician: Jeremy Pritchett MD  Primary Care Provider: Terrie Ireland MD      Subjective:     Principal Problem:HCAP (healthcare-associated pneumonia)    HPI:  This is a 53-year-old female with PMHx significant for traumatic SDH with craniotomy on 01/19, chronic respiratory failure with trache dependency, depression, bipolar, schizophrenia, illicit substance and ETOH abuse, and COPD.  She presented to the ED from [place] for further evaluation of cough, congestion, and tachycardia.  Nursing staff noted she had a fast heart rate throughout the day with congested breath sounds and increased sputum production per trache.  Hx is limited as patient is nonverbal with trache.  Her family is at bedside and does report a Hx of pneumonia, as well as MRSA sputum infection.  Records reveal she completed treatment for this back in February.  Per family she is due to have a bone flap surgery sooner at [facility] and her plan includes keeping trach until after that surgery.  Patient evaluated in the emergency room where she was noted to have sepsis and pneumonia and admitted for further evaluation treatment.    Hospital Course:  The patient was monitored closely during her stay.  Pulmonary was consulted.  She was continued on supplement O2, aerosol treatments, and IV cefepime, Levaquin, and  vancomycin.  PT and OT were consulted for debility.    Interval History:  Continue current treatment.  Repeat  chest x-ray in the morning.     Review of Systems   Constitutional: Negative for activity change and fever.   HENT: Negative for facial swelling.    Eyes: Negative for pain and redness.   Respiratory: Positive for cough. Negative for shortness of breath.    Cardiovascular: Negative for leg swelling.   Gastrointestinal: Negative for  abdominal distention, abdominal pain, diarrhea and vomiting.        PEG tube noted   Endocrine: Negative for polydipsia and polyphagia.   Genitourinary: Negative for hematuria.   Musculoskeletal: Positive for gait problem.   Skin: Negative for color change.   Allergic/Immunologic: Negative for food allergies.   Neurological: Positive for weakness. Negative for facial asymmetry.        Chronic debility   Hematological: Does not bruise/bleed easily.   Psychiatric/Behavioral: Positive for confusion.     Objective:     Vital Signs (Most Recent):  Temp: 98.6 °F (37 °C) (04/07/19 1129)  Pulse: (!) 119 (04/07/19 1129)  Resp: 18 (04/07/19 1129)  BP: (!) 89/65 (04/07/19 1129)  SpO2: (!) 94 % (04/07/19 1129) Vital Signs (24h Range):  Temp:  [96.2 °F (35.7 °C)-99.2 °F (37.3 °C)] 98.6 °F (37 °C)  Pulse:  [] 119  Resp:  [14-20] 18  SpO2:  [90 %-98 %] 94 %  BP: ()/(55-71) 89/65     Weight: 47.1 kg (103 lb 13.4 oz)  Body mass index is 18.99 kg/m².    Physical Exam   Constitutional: She appears well-developed and well-nourished. No distress.   HENT:   Head: Normocephalic and atraumatic.   Eyes: Pupils are equal, round, and reactive to light. Conjunctivae and EOM are normal. Right eye exhibits no discharge. Left eye exhibits no discharge.   Neck: Normal range of motion. Neck supple. No JVD present.   Cardiovascular: Normal rate, regular rhythm, normal heart sounds and intact distal pulses.   No murmur heard.  Pulmonary/Chest: No respiratory distress. She exhibits no tenderness.   Bilateral breath sounds diminished with scattered rhonchi noted throughout-improves with cough   Abdominal: Soft. Bowel sounds are normal. She exhibits no distension. There is no tenderness. There is no guarding.   Peg tube noted   Genitourinary:   Genitourinary Comments: Not examined   Musculoskeletal: Normal range of motion. She exhibits no edema or tenderness.   Neurological: She is alert.   Follows commands; moves all extremities.  RIGHT arm  strength 2/5; LEFT 4/5.  BLE 3/5.    Skin: Skin is warm and dry. Capillary refill takes less than 2 seconds. She is not diaphoretic.   Psychiatric:   Calm and cooperative at this time         CRANIAL NERVES     CN III, IV, VI   Pupils are equal, round, and reactive to light.  Extraocular motions are normal.     Labs reviewed    Assessment/Plan:      * HCAP (healthcare-associated pneumonia)  With chronic respiratory failure secondary to O2 dependent COPD/ Hx MRSA pneumonia in February 2019-  Monitor O2 sats, supplemental O2 as needed  Orders as per pulmonology -currently on aerosol treatments and IV vancomycin, IV cefepime, and IV Levaquin           Hypomagnesemia  Monitor  Supplement as needed      Debility  Functional quadriplegia-  Chronic debility due to physical and mental status  Fall, skin, aspiration precautions  Turn q.2 hours  Continue PT and OT      Dehydration  Patient received a bolus of normal saline IV fluids this a.m. -500 mL  Trend BMP      Elevated liver enzymes  Stable  Trending downward      Sinus tachycardia  Records reviewed showed patient's tachycardia is chronic and not new  Continue home beta blockade  Monitor for fever, monitor O2 status        Normocytic anemia  Patient with low iron, high ferritin  Folate and vitamin B12 levels within normal limits      Hypoalbuminemia due to protein-calorie malnutrition  Chronic  Continue tube feedings per PEG  Patient remains NPO by mouth      Sepsis  Secondary to pneumonia  Continue current treatment- antibiotics as per pulmonology  Blood cultures x2 currently no growth  Sputum culture showing Pseudomonas aeruginosa-sensitive to patient's cefepime    Alcoholic cirrhosis of liver without ascites  Chronically elevated liver enzymes-  Stable at this time    Essential hypertension  Chronic, stable.    Continue oral antihypertensives, monitor BP closely, and titrate medication as required for sustained BP control.    Recurrent major depressive disorder, in  partial remission  Chronic, stable on current medication regimen.  Continue antidepressants as per outpatient regimen and monitor clinically.      Hx of subdural hematoma  With chronic cognitive deficits and debility-  Continue seizure prophylaxis.  Protective helmet when mobilized.  Continue PT OT.  Maintain fall and seizure precautions.      Chronic obstructive pulmonary disease with acute lower respiratory infection  Orders as per pulmonology      PEG (percutaneous endoscopic gastrostomy) status  Chronic, uncomplicated.  Peg care per nursing.     Tracheostomy status  Trache care per nursing.      Chronic respiratory failure  Patient with O2 dependent COPD and chronic respiratory failure requiring trache placement.    Continue supplemental oxygen per trach collar.    Monitor sats closely and titrate oxygen as needed to maintain sats greater than 92%.        VTE Risk Mitigation (From admission, onward)        Ordered     IP VTE HIGH RISK PATIENT  Once      04/05/19 0117     Place LOPEZ hose  Until discontinued      04/05/19 0117     Place sequential compression device  Until discontinued      04/05/19 0117          JOSE LUIS Lundberg  Department of Hospital Medicine   [facility]    Time spent seeing patient( greater than 1/2 spent in direct contact) : 32 minutes

## 2019-04-07 NOTE — ASSESSMENT & PLAN NOTE
Records reviewed showed patient's tachycardia is chronic and not new  Continue home beta blockade  Monitor for fever, monitor O2 status

## 2019-04-07 NOTE — ASSESSMENT & PLAN NOTE
With chronic respiratory failure secondary to O2 dependent COPD/ Hx MRSA pneumonia in February 2019-  Monitor O2 sats, supplemental O2 as needed  Orders as per pulmonology -currently on aerosol treatments and IV vancomycin, IV cefepime, and IV Levaquin

## 2019-04-07 NOTE — ASSESSMENT & PLAN NOTE
Secondary to pneumonia  Continue current treatment- antibiotics as per pulmonology  Blood cultures x2 currently no growth  Sputum culture showing Pseudomonas aeruginosa-sensitive to patient's cefepime

## 2019-04-07 NOTE — PROGRESS NOTES
"Progress Note  Pulmonary/Critical Care      Admit Date: 4/4/2019    SUBJECTIVE:     HPI/Interval history (See H&P for complete P,F,SHx) :     4/6/2019 - Stable overnight, she pulled her ramsay and tried to pull trach earlier.  No distress, no new respiratory problems    4/7/2019 - Stable overnight, no new issues.  Is being suctioned about every 2 hours but not much in the way of secretiond.  No distress.    Review of Systems: List if applicable    Review of Systems   Unable to perform ROS: Mental acuity       OBJECTIVE:     Vital Signs Range (Last 24H):  Temp:  [96.2 °F (35.7 °C)-99.2 °F (37.3 °C)]   Pulse:  []   Resp:  [14-20]   BP: ()/(55-71)   SpO2:  [90 %-98 %]     I & O (Last 24H):    Intake/Output Summary (Last 24 hours) at 4/7/2019 1403  Last data filed at 4/7/2019 1200  Gross per 24 hour   Intake 0 ml   Output 2450 ml   Net -2450 ml       Estimated body mass index is 18.99 kg/m² as calculated from the following:    Height as of this encounter: 5' 2" (1.575 m).    Weight as of this encounter: 47.1 kg (103 lb 13.4 oz).    Vent Settings- Oxygen Concentration (%):  [28] 28    ABG  No results for input(s): PH, PO2, PCO2, HCO3, BE in the last 168 hours.    Physical Exam:  Physical Exam   Constitutional: She is well-developed, well-nourished, and in no distress. No distress.   HENT:   S/p left cranial surgery   Eyes: Pupils are equal, round, and reactive to light. Conjunctivae and EOM are normal.   Neck: Normal range of motion. Neck supple. No JVD present. No tracheal deviation present.   Trach in place   Cardiovascular: Normal rate, regular rhythm and normal heart sounds. Exam reveals no gallop and no friction rub.   No murmur heard.  Pulmonary/Chest: Effort normal. No stridor. No respiratory distress. She has no wheezes. She has no rales.   + rhonchi left chest   Abdominal: Soft. Bowel sounds are normal. She exhibits no distension. There is no tenderness. There is no rebound.   + peg   Musculoskeletal: " Normal range of motion. She exhibits no edema or tenderness.   Lymphadenopathy:     She has no cervical adenopathy.   Neurological: She is alert.   Skin: She is not diaphoretic.   Vitals reviewed.      Laboratory/Diagnostic Data:    Recent Labs   Lab 04/07/19  0508   WBC 4.60   HGB 8.8*   HCT 25.4*      *   K 3.9   CL 97   CO2 28   BUN 16   CREATININE 0.7   AST 67*   *   PROT 8.0   ALBUMIN 2.3*   BILITOT 0.3       Microbiology    Microbiology Results (last 7 days)     Procedure Component Value Units Date/Time    Culture, Respiratory with Gram Stain [[id number]]  (Susceptibility) Collected:  04/05/19 0355    Order Status:  Completed Specimen:  Tracheal Aspirate Updated:  04/07/19 1220     Respiratory Culture --     PSEUDOMONAS AERUGINOSA  Moderate       Gram Stain (Respiratory) <10 epithelial cells per low power field.     Gram Stain (Respiratory) Many WBC's     Gram Stain (Respiratory) Few Gram negative rods    Blood culture x two cultures. Draw prior to antibiotics. [[id number]] Collected:  04/04/19 2354    Order Status:  Completed Specimen:  Blood from Antecubital, Right Updated:  04/07/19 1012     Blood Culture, Routine No Growth to date     Blood Culture, Routine No Growth to date     Blood Culture, Routine No Growth to date    Narrative:       Aerobic and anaerobic    Blood culture x two cultures. Draw prior to antibiotics. [[id number]] Collected:  04/04/19 2336    Order Status:  Completed Specimen:  Blood from Antecubital, Left Updated:  04/07/19 1012     Blood Culture, Routine No Growth to date     Blood Culture, Routine No Growth to date     Blood Culture, Routine No Growth to date    Narrative:       Aerobic and anaerobic    Culture, Respiratory with Gram Stain [[id number]] Collected:  04/05/19 0355    Order Status:  Canceled Specimen:  Respiratory from Tracheal Aspirate Updated:  04/05/19 0355          Radiology        Medications:     acetylcysteine 100 mg/ml (10%)  4 mL Nebulization Q8H     ascorbic acid (vitamin C)  500 mg Oral Daily    ceFEPime (MAXIPIME) IVPB  1 g Intravenous Q8H    chlordiazepoxide  5 mg Per G Tube Nightly    escitalopram oxalate  20 mg Per G Tube Daily    folic acid  1 mg Per G Tube Daily    gabapentin  300 mg Per G Tube TID    levalbuterol  1.25 mg Nebulization Q8H    levETIRAcetam  1,000 mg Per G Tube BID    levoFLOXacin  500 mg Intravenous Q24H    magnesium oxide  400 mg Per G Tube BID    metoprolol tartrate  50 mg Oral QID    multivit-mins-ferrous gluconat  15 mL Gastrostomy Tube Daily    paroxetine  10 mg Oral QAM    risperiDONE  0.5 mg Per G Tube QHS    vancomycin (VANCOCIN) IVPB  750 mg Intravenous Q12H           acetaminophen, amitriptyline, dextrose 50%, dextrose 50%, diazePAM, glucagon (human recombinant), glucose, glucose, ondansetron, oxyCODONE-acetaminophen, potassium chloride 10%, potassium chloride 10%, sodium chloride 0.9%    ASSESSMENT/PLAN:     Active Problems:    Active Hospital Problems    Diagnosis  POA    *HCAP (healthcare-associated pneumonia) [J18.9]  Yes    Chronic respiratory failure [J96.10]  Yes    Tracheostomy status [Z93.0]  Not Applicable    PEG (percutaneous endoscopic gastrostomy) status [Z93.1]  Not Applicable    Chronic obstructive pulmonary disease with acute lower respiratory infection [J44.0]  Yes    Hx of subdural hematoma [Z86.79]  Not Applicable    Recurrent major depressive disorder, in partial remission [F33.41]  Yes    Essential hypertension [I10]  Yes    Alcoholic cirrhosis of liver without ascites [K70.30]  Yes    Sepsis [A41.9]  Yes    Hypoalbuminemia due to protein-calorie malnutrition [E46]  Yes    Normocytic anemia [D64.9]  Yes    Sinus tachycardia [R00.0]  Yes    Elevated liver enzymes [R74.8]  Yes    Dehydration [E86.0]  Yes    Debility [R53.81]  Yes    Hypomagnesemia [E83.42]  Yes    Functional quadriplegia [R53.2]  Yes    Lung nodules [R91.8]  Yes      Resolved Hospital Problems   No  resolved problems to display.     Pneumonia - left, + pseudomonas  - add IV levaquin while we await sensitivities (still pending)  - continue respiratory treatments  - continue with trach care  - no GPC will stop vanco today    Respiratory failure, chronic hypoxemic  - continue with O2 via trach    COPD  - stable, no acute exacerbation    Trach  - stable  - ? If she should be reevaluated by speech to see if she could swallow anything    Sepsis  - due to pneumonia  - better    H/o subdural hematoma  - aware    S/p PEG  - aware    Tachycardia  - follow        I will continue to follow with the pt.  Please call me at [phone] if you have any questions.      Devin Howell MD

## 2019-04-07 NOTE — ASSESSMENT & PLAN NOTE
With chronic cognitive deficits and debility-  Continue seizure prophylaxis.  Protective helmet when mobilized.  Continue PT OT.  Maintain fall and seizure precautions.

## 2019-04-07 NOTE — PLAN OF CARE
04/07/19 0817   Patient Assessment/Suction   Level of Consciousness (AVPU) alert   Respiratory Effort Normal;Unlabored   Expansion/Accessory Muscles/Retractions no use of accessory muscles;no retractions   All Lung Fields Breath Sounds coarse   Rhythm/Pattern, Respiratory unlabored;pattern regular;depth regular   Cough Frequency with stimulation   Cough Type productive   $ Suction Charges Close Suction System Equipment   Secretions Amount moderate   Secretions Color green;yellow   Secretions Characteristics thick   PRE-TX-O2   O2 Device (Oxygen Therapy) Aerosol T-Tube   Flow (L/min) 5   Oxygen Concentration (%) 28   SpO2 98 %   Pulse Oximetry Type Intermittent   $ Pulse Oximetry - Multiple Charge Pulse Oximetry - Multiple   Pulse (!) 118   Resp 18   Aerosol Therapy   $ Aerosol Therapy Charges Aerosol Treatment   Respiratory Treatment Status (SVN) given   Treatment Route (SVN) in-line   Patient Position (SVN) HOB elevated   Post Treatment Assessment (SVN) breath sounds unchanged   Signs of Intolerance (SVN) none   Breath Sounds Post-Respiratory Treatment   Throughout All Fields Post-Treatment All Fields   Throughout All Fields Post-Treatment no change   Post-treatment Heart Rate (beats/min) 108   Post-treatment Resp Rate (breaths/min) 18   Ready to Wean/Extubation Screen   FIO2<=50 (chart decimal) 0.28

## 2019-04-07 NOTE — PLAN OF CARE
Problem: Adult Inpatient Plan of Care  Goal: Plan of Care Review  Outcome: Ongoing (interventions implemented as appropriate)     04/07/19 1717   Plan of Care Review   Plan of Care Reviewed With patient   Progress no change     POC reviewed with pt. Pt AAOx4, alert and responsive,  semi-grant's with sitter at bedside and bed alarm active. VSS with NAD noted; pt free of injury or falls.  Metoprolol parameters in place to be held in case SBP <110. Pt  tachycardic between 110 and 120 heart rate. IV fluid infusing at 125 mL/hour to maintain blood pressure. Will continue to monitor.     Problem: Skin Injury Risk Increased  Goal: Skin Health and Integrity    Intervention: Optimize Skin Protection     04/07/19 1717   Prevent Additional Skin Injury   Head of Bed (HOB) HOB at 20-30 degrees   Pressure Reduction Devices specialty bed utilized   Pressure Reduction Techniques weight shift assistance provided;pressure points protected   Monitor and Manage Hypervolemia   Skin Protection incontinence pads utilized;tubing/devices free from skin contact     Pt positioned with pillows to bony prominences. Tubings from PEG tube, IV fluid, Trach suction and Maher catheter positioned away from pt skin to reduce instance of tissue injury.        Problem: Malnutrition  Goal: Improved Nutritional Intake    Intervention: Promote and Optimize Nutrition Support     04/07/19 1717   Optimize Eating and Swallowing   Nutrition Support Management tube feeding rate increased      Pt NPO. Tube feedings infusing via PEG tube 35mL/hr with free water flush at 140 mL q 4 hours .  Residual from PEG tube between 10ml and 35mL. Sponges dipped with water to moisten mouth.

## 2019-04-07 NOTE — PLAN OF CARE
04/07/19 0026   Patient Assessment/Suction   Level of Consciousness (AVPU) alert  (Simultaneous filing. User may not have seen previous data.)   Respiratory Effort Unlabored   Expansion/Accessory Muscles/Retractions no use of accessory muscles   All Lung Fields Breath Sounds coarse   $ Suction Charges Inline Suction Procedure Stat Charge   Secretions Amount moderate   Secretions Color yellow;pale   Secretions Characteristics thick   PRE-TX-O2   O2 Device (Oxygen Therapy) Aerosol T-Tube   Flow (L/min) 5   Oxygen Concentration (%) 28   SpO2 95 %   Pulse Oximetry Type Intermittent   Pulse 80   Resp 14   Aerosol Therapy   $ Aerosol Therapy Charges Aerosol Treatment   Respiratory Treatment Status (SVN) given   Treatment Route (SVN) in-line   Patient Position (SVN) HOB elevated   Signs of Intolerance (SVN) none   Breath Sounds Post-Respiratory Treatment   Throughout All Fields Post-Treatment All Fields   Throughout All Fields Post-Treatment aeration increased   Post-treatment Heart Rate (beats/min) 88   Post-treatment Resp Rate (breaths/min) 16   Ready to Wean/Extubation Screen   FIO2<=50 (chart decimal) 0.28

## 2019-04-07 NOTE — SUBJECTIVE & OBJECTIVE
Interval History:  Continue current treatment.  Repeat  chest x-ray in the morning.     Review of Systems   Constitutional: Negative for activity change and fever.   HENT: Negative for facial swelling.    Eyes: Negative for pain and redness.   Respiratory: Positive for cough. Negative for shortness of breath.    Cardiovascular: Negative for leg swelling.   Gastrointestinal: Negative for abdominal distention, abdominal pain, diarrhea and vomiting.        PEG tube noted   Endocrine: Negative for polydipsia and polyphagia.   Genitourinary: Negative for hematuria.   Musculoskeletal: Positive for gait problem.   Skin: Negative for color change.   Allergic/Immunologic: Negative for food allergies.   Neurological: Positive for weakness. Negative for facial asymmetry.        Chronic debility   Hematological: Does not bruise/bleed easily.   Psychiatric/Behavioral: Positive for confusion.     Objective:     Vital Signs (Most Recent):  Temp: 98.6 °F (37 °C) (04/07/19 1129)  Pulse: (!) 119 (04/07/19 1129)  Resp: 18 (04/07/19 1129)  BP: (!) 89/65 (04/07/19 1129)  SpO2: (!) 94 % (04/07/19 1129) Vital Signs (24h Range):  Temp:  [96.2 °F (35.7 °C)-99.2 °F (37.3 °C)] 98.6 °F (37 °C)  Pulse:  [] 119  Resp:  [14-20] 18  SpO2:  [90 %-98 %] 94 %  BP: ()/(55-71) 89/65     Weight: 47.1 kg (103 lb 13.4 oz)  Body mass index is 18.99 kg/m².    Physical Exam   Constitutional: She appears well-developed and well-nourished. No distress.   HENT:   Head: Normocephalic and atraumatic.   Eyes: Pupils are equal, round, and reactive to light. Conjunctivae and EOM are normal. Right eye exhibits no discharge. Left eye exhibits no discharge.   Neck: Normal range of motion. Neck supple. No JVD present.   Cardiovascular: Normal rate, regular rhythm, normal heart sounds and intact distal pulses.   No murmur heard.  Pulmonary/Chest: No respiratory distress. She exhibits no tenderness.   Bilateral breath sounds diminished with scattered rhonchi  noted throughout-improves with cough   Abdominal: Soft. Bowel sounds are normal. She exhibits no distension. There is no tenderness. There is no guarding.   Peg tube noted   Genitourinary:   Genitourinary Comments: Not examined   Musculoskeletal: Normal range of motion. She exhibits no edema or tenderness.   Neurological: She is alert.   Follows commands; moves all extremities.  RIGHT arm strength 2/5; LEFT 4/5.  BLE 3/5.    Skin: Skin is warm and dry. Capillary refill takes less than 2 seconds. She is not diaphoretic.   Psychiatric:   Calm and cooperative at this time         CRANIAL NERVES     CN III, IV, VI   Pupils are equal, round, and reactive to light.  Extraocular motions are normal.     Labs reviewed

## 2019-04-08 LAB
ALBUMIN SERPL BCP-MCNC: 2.2 G/DL (ref 3.5–5.2)
ALP SERPL-CCNC: 92 U/L (ref 55–135)
ALT SERPL W/O P-5'-P-CCNC: 89 U/L (ref 10–44)
ANION GAP SERPL CALC-SCNC: 3 MMOL/L (ref 8–16)
AST SERPL-CCNC: 49 U/L (ref 10–40)
BACTERIA SPEC AEROBE CULT: NORMAL
BACTERIA SPEC AEROBE CULT: NORMAL
BASOPHILS # BLD AUTO: 0 K/UL (ref 0–0.2)
BASOPHILS NFR BLD: 0.4 % (ref 0–1.9)
BILIRUB SERPL-MCNC: 0.2 MG/DL (ref 0.1–1)
BUN SERPL-MCNC: 11 MG/DL (ref 6–20)
CALCIUM SERPL-MCNC: 9.1 MG/DL (ref 8.7–10.5)
CHLORIDE SERPL-SCNC: 104 MMOL/L (ref 95–110)
CO2 SERPL-SCNC: 28 MMOL/L (ref 23–29)
CREAT SERPL-MCNC: 0.6 MG/DL (ref 0.5–1.4)
DIFFERENTIAL METHOD: ABNORMAL
EOSINOPHIL # BLD AUTO: 0.1 K/UL (ref 0–0.5)
EOSINOPHIL NFR BLD: 2.4 % (ref 0–8)
ERYTHROCYTE [DISTWIDTH] IN BLOOD BY AUTOMATED COUNT: 15.6 % (ref 11.5–14.5)
EST. GFR  (AFRICAN AMERICAN): >60 ML/MIN/1.73 M^2
EST. GFR  (NON AFRICAN AMERICAN): >60 ML/MIN/1.73 M^2
GLUCOSE SERPL-MCNC: 100 MG/DL (ref 70–110)
GRAM STN SPEC: NORMAL
HCT VFR BLD AUTO: 23.6 % (ref 37–48.5)
HGB BLD-MCNC: 8 G/DL (ref 12–16)
LYMPHOCYTES # BLD AUTO: 1.2 K/UL (ref 1–4.8)
LYMPHOCYTES NFR BLD: 23.6 % (ref 18–48)
MAGNESIUM SERPL-MCNC: 1.4 MG/DL (ref 1.6–2.6)
MCH RBC QN AUTO: 29.2 PG (ref 27–31)
MCHC RBC AUTO-ENTMCNC: 34 G/DL (ref 32–36)
MCV RBC AUTO: 86 FL (ref 82–98)
MONOCYTES # BLD AUTO: 0.6 K/UL (ref 0.3–1)
MONOCYTES NFR BLD: 11.9 % (ref 4–15)
NEUTROPHILS # BLD AUTO: 3.1 K/UL (ref 1.8–7.7)
NEUTROPHILS NFR BLD: 61.7 % (ref 38–73)
PLATELET # BLD AUTO: 237 K/UL (ref 150–350)
PMV BLD AUTO: 7.4 FL (ref 9.2–12.9)
POTASSIUM SERPL-SCNC: 4 MMOL/L (ref 3.5–5.1)
PROT SERPL-MCNC: 7.5 G/DL (ref 6–8.4)
RBC # BLD AUTO: 2.75 M/UL (ref 4–5.4)
SODIUM SERPL-SCNC: 135 MMOL/L (ref 136–145)
VANCOMYCIN TROUGH SERPL-MCNC: 9 UG/ML (ref 10–22)
WBC # BLD AUTO: 5 K/UL (ref 3.9–12.7)

## 2019-04-08 PROCEDURE — 12000002 HC ACUTE/MED SURGE SEMI-PRIVATE ROOM

## 2019-04-08 PROCEDURE — 25000003 PHARM REV CODE 250: Performed by: NURSE PRACTITIONER

## 2019-04-08 PROCEDURE — 83735 ASSAY OF MAGNESIUM: CPT

## 2019-04-08 PROCEDURE — 36415 COLL VENOUS BLD VENIPUNCTURE: CPT

## 2019-04-08 PROCEDURE — 25000242 PHARM REV CODE 250 ALT 637 W/ HCPCS: Performed by: NURSE PRACTITIONER

## 2019-04-08 PROCEDURE — 63600175 PHARM REV CODE 636 W HCPCS: Performed by: INTERNAL MEDICINE

## 2019-04-08 PROCEDURE — 94640 AIRWAY INHALATION TREATMENT: CPT

## 2019-04-08 PROCEDURE — 27000221 HC OXYGEN, UP TO 24 HOURS

## 2019-04-08 PROCEDURE — 99232 SBSQ HOSP IP/OBS MODERATE 35: CPT | Mod: ,,, | Performed by: INTERNAL MEDICINE

## 2019-04-08 PROCEDURE — 99232 PR SUBSEQUENT HOSPITAL CARE,LEVL II: ICD-10-PCS | Mod: ,,, | Performed by: INTERNAL MEDICINE

## 2019-04-08 PROCEDURE — 25000003 PHARM REV CODE 250: Performed by: HOSPITALIST

## 2019-04-08 PROCEDURE — 97530 THERAPEUTIC ACTIVITIES: CPT

## 2019-04-08 PROCEDURE — G8988 SELF CARE GOAL STATUS: HCPCS | Mod: CL

## 2019-04-08 PROCEDURE — G8987 SELF CARE CURRENT STATUS: HCPCS | Mod: CM

## 2019-04-08 PROCEDURE — 80053 COMPREHEN METABOLIC PANEL: CPT

## 2019-04-08 PROCEDURE — 99900026 HC AIRWAY MAINTENANCE (STAT)

## 2019-04-08 PROCEDURE — 94761 N-INVAS EAR/PLS OXIMETRY MLT: CPT

## 2019-04-08 PROCEDURE — 63600175 PHARM REV CODE 636 W HCPCS: Performed by: NURSE PRACTITIONER

## 2019-04-08 PROCEDURE — 92507 TX SP LANG VOICE COMM INDIV: CPT

## 2019-04-08 PROCEDURE — 80202 ASSAY OF VANCOMYCIN: CPT

## 2019-04-08 PROCEDURE — 85025 COMPLETE CBC W/AUTO DIFF WBC: CPT

## 2019-04-08 RX ADMIN — LEVETIRACETAM 1000 MG: 500 TABLET ORAL at 08:04

## 2019-04-08 RX ADMIN — MAGNESIUM OXIDE TAB 400 MG (241.3 MG ELEMENTAL MG) 400 MG: 400 (241.3 MG) TAB at 09:04

## 2019-04-08 RX ADMIN — OXYCODONE HYDROCHLORIDE AND ACETAMINOPHEN 1 TABLET: 7.5; 325 TABLET ORAL at 05:04

## 2019-04-08 RX ADMIN — CEFEPIME HYDROCHLORIDE 1 G: 1 INJECTION, POWDER, FOR SOLUTION INTRAMUSCULAR; INTRAVENOUS at 04:04

## 2019-04-08 RX ADMIN — PAROXETINE HYDROCHLORIDE 10 MG: 10 TABLET, FILM COATED ORAL at 06:04

## 2019-04-08 RX ADMIN — GABAPENTIN 300 MG: 300 CAPSULE ORAL at 08:04

## 2019-04-08 RX ADMIN — FOLIC ACID 1 MG: 1 TABLET ORAL at 09:04

## 2019-04-08 RX ADMIN — CEFEPIME HYDROCHLORIDE 1 G: 1 INJECTION, POWDER, FOR SOLUTION INTRAMUSCULAR; INTRAVENOUS at 09:04

## 2019-04-08 RX ADMIN — ACETYLCYSTEINE 4 ML: 100 INHALANT RESPIRATORY (INHALATION) at 04:04

## 2019-04-08 RX ADMIN — MULTIVITAMIN 15 ML: LIQUID ORAL at 09:04

## 2019-04-08 RX ADMIN — DIAZEPAM 5 MG: 5 TABLET ORAL at 09:04

## 2019-04-08 RX ADMIN — GABAPENTIN 300 MG: 300 CAPSULE ORAL at 09:04

## 2019-04-08 RX ADMIN — METOPROLOL TARTRATE 50 MG: 50 TABLET, FILM COATED ORAL at 09:04

## 2019-04-08 RX ADMIN — ACETAMINOPHEN 1000 MG: 500 TABLET ORAL at 06:04

## 2019-04-08 RX ADMIN — RISPERIDONE 0.5 MG: 0.25 TABLET ORAL at 08:04

## 2019-04-08 RX ADMIN — OXYCODONE HYDROCHLORIDE AND ACETAMINOPHEN 500 MG: 500 TABLET ORAL at 09:04

## 2019-04-08 RX ADMIN — MAGNESIUM OXIDE TAB 400 MG (241.3 MG ELEMENTAL MG) 400 MG: 400 (241.3 MG) TAB at 08:04

## 2019-04-08 RX ADMIN — LEVALBUTEROL 1.25 MG: 1.25 SOLUTION, CONCENTRATE RESPIRATORY (INHALATION) at 07:04

## 2019-04-08 RX ADMIN — MAGNESIUM SULFATE HEPTAHYDRATE 3 G: 500 INJECTION, SOLUTION INTRAMUSCULAR; INTRAVENOUS at 12:04

## 2019-04-08 RX ADMIN — CEFEPIME HYDROCHLORIDE 1 G: 1 INJECTION, POWDER, FOR SOLUTION INTRAMUSCULAR; INTRAVENOUS at 11:04

## 2019-04-08 RX ADMIN — METOPROLOL TARTRATE 50 MG: 50 TABLET, FILM COATED ORAL at 01:04

## 2019-04-08 RX ADMIN — METOPROLOL TARTRATE 50 MG: 50 TABLET, FILM COATED ORAL at 05:04

## 2019-04-08 RX ADMIN — LEVALBUTEROL 1.25 MG: 1.25 SOLUTION, CONCENTRATE RESPIRATORY (INHALATION) at 04:04

## 2019-04-08 RX ADMIN — GABAPENTIN 300 MG: 300 CAPSULE ORAL at 02:04

## 2019-04-08 RX ADMIN — CHLORDIAZEPOXIDE HYDROCHLORIDE 5 MG: 5 CAPSULE ORAL at 08:04

## 2019-04-08 RX ADMIN — LEVETIRACETAM 1000 MG: 500 TABLET ORAL at 09:04

## 2019-04-08 RX ADMIN — LEVOFLOXACIN 500 MG: 500 INJECTION, SOLUTION INTRAVENOUS at 02:04

## 2019-04-08 RX ADMIN — ESCITALOPRAM OXALATE 20 MG: 10 TABLET, FILM COATED ORAL at 09:04

## 2019-04-08 NOTE — PT/OT/SLP PROGRESS
Physical Therapy Treatment    Patient Name:  Elif Archibald   MRN:  [id number]    Recommendations:     Discharge Recommendations:  nursing facility, skilled, LTACH (long-term acute care hospital)       Assessment:     Elif Archibald is a 53 y.o. female admitted with a medical diagnosis of HCAP (healthcare-associated pneumonia).  She presents with the following impairments/functional limitations:  weakness, impaired self care skills, impaired functional mobilty, impaired endurance, gait instability, impaired balance, impaired cognition, decreased coordination, decreased upper extremity function, decreased lower extremity function, decreased safety awareness, impaired cardiopulmonary response to activity .    Rehab Prognosis: Good; patient would benefit from acute skilled PT services to address these deficits and reach maximum level of function.    Recent Surgery: * No surgery found *      Plan:     During this hospitalization, patient to be seen 6 x/week to address the identified rehab impairments via therapeutic activities, therapeutic exercises and progress toward the following goals:    · Plan of Care Expires:  04/19/19    Subjective     Chief Complaint: none expressed  Patient/Family Comments/goals: pt non verbal but able to make gestures to indicate she wanted to mobilize  Pain/Comfort:  · Pain Rating 1: 0/10      Objective:     Communicated with nurse Reyes prior to session.  Patient found supine with sitter present with telemetry, tracheostomy, PEG Tube, ramsay catheter upon PT entry to room.     General Precautions: Standard, fall   Orthopedic Precautions:N/A   Braces: (helmet)     Functional Mobility:  · Bed Mobility:     · Scooting: moderate assistance  · Supine to Sit: maximal assistance    · Transfers:     · Sit to Stand:  moderate assistance and of 2 for safety with hand-held assist  · Bed to Chair: moderate assistance and of 2 for safety with  hand-held assist  using  Stand Pivot    · Gait: few steps  to chair with mod A of 2 HHA, additional assistance required for line management      Functional Mobility: (BTB [id number])  · Bed Mobility:     · Bridging: A of 2 utilizing draw sheet  · Sit to Supine: maximal assistance    · Transfers:     · Sit to Stand:  moderate assistance and of 2 for safety with hand-held assist  · Chair to Bed: moderate assistance and of 2 for safety with  hand-held assist  using  Stand Pivot    · Gait: few steps to chair with mod A of 2 HHA, additional assistance required for line management     Patient left Supine after sitting up in chair x approx 4 hours with all lines intact, call button in reach, chair alarm on, nurse Amy notified and sitter present..    GOALS:   Multidisciplinary Problems     Physical Therapy Goals        Problem: Physical Therapy Goal    Goal Priority Disciplines Outcome Goal Variances Interventions   Physical Therapy Goal     PT, PT/OT Ongoing (interventions implemented as appropriate)     Description:  Goals to be met by: 19     Patient will increase functional independence with mobility by performin. Supine to sit with MInimal Assistance  2. Rolling to Left and Right with Contact Guard Assistance.  3. Sit to stand transfer with Contact Guard Assistance with HHA  4. Bed to chair transfer with Minimal Assistance HHA  5. Sitting at edge of bed x5 minutes with Supervision  6. Lower extremity exercise program x10-20 reps per handout, with independence                      Time Tracking:     PT Received On: 19  PT Start Time: 906     PT Stop Time: 933  PT Total Time (min): 27 min and 13 minutes    Billable Minutes: Therapeutic Activity  36    Treatment Type: Treatment  PT/PTA: PTA     PTA Visit Number: 2     Mikki Gloria PTA  2019

## 2019-04-08 NOTE — SUBJECTIVE & OBJECTIVE
Interval History:  Continue current treatment.  Remains confused intermittently.     Review of Systems   Constitutional: Negative for activity change and fever.   HENT: Negative for facial swelling.    Eyes: Negative for pain and redness.   Respiratory: Positive for cough. Negative for shortness of breath.    Cardiovascular: Negative for leg swelling.   Gastrointestinal: Negative for abdominal distention, abdominal pain, diarrhea and vomiting.        PEG tube noted   Endocrine: Negative for polydipsia and polyphagia.   Genitourinary: Negative for hematuria.   Musculoskeletal: Positive for gait problem.   Skin: Negative for color change.   Allergic/Immunologic: Negative for food allergies.   Neurological: Positive for weakness. Negative for facial asymmetry.        Chronic debility   Hematological: Does not bruise/bleed easily.   Psychiatric/Behavioral: Positive for confusion.     Objective:     Vital Signs (Most Recent):  Temp: 97.1 °F (36.2 °C) (04/08/19 1214)  Pulse: 89 (04/08/19 1214)  Resp: 16 (04/08/19 1214)  BP: 110/68 (04/08/19 1214)  SpO2: 97 % (04/08/19 1214) Vital Signs (24h Range):  Temp:  [97.1 °F (36.2 °C)-98.9 °F (37.2 °C)] 97.1 °F (36.2 °C)  Pulse:  [] 89  Resp:  [16-22] 16  SpO2:  [93 %-100 %] 97 %  BP: (102-111)/(63-73) 110/68     Weight: 47.1 kg (103 lb 13.4 oz)  Body mass index is 18.99 kg/m².    Physical Exam   Constitutional: She appears well-developed and well-nourished. No distress.   HENT:   Head: Normocephalic and atraumatic.   Eyes: Pupils are equal, round, and reactive to light. Conjunctivae and EOM are normal. Right eye exhibits no discharge. Left eye exhibits no discharge.   Neck: Normal range of motion. Neck supple. No JVD present.   Cardiovascular: Normal rate, regular rhythm, normal heart sounds and intact distal pulses.   No murmur heard.  Pulmonary/Chest: No respiratory distress. She exhibits no tenderness.   Bilateral breath sounds diminished with scattered rhonchi noted  throughout-improves with cough   Abdominal: Soft. Bowel sounds are normal. She exhibits no distension. There is no tenderness. There is no guarding.   Peg tube noted   Genitourinary:   Genitourinary Comments: Not examined   Musculoskeletal: Normal range of motion. She exhibits no edema or tenderness.   Neurological: She is alert.   Follows commands; moves all extremities.  RIGHT arm strength 2/5; LEFT 4/5.  BLE 3/5.    Skin: Skin is warm and dry. Capillary refill takes less than 2 seconds. She is not diaphoretic.   Psychiatric:   Calm and cooperative at this time         CRANIAL NERVES     CN III, IV, VI   Pupils are equal, round, and reactive to light.  Extraocular motions are normal.     Labs reviewed

## 2019-04-08 NOTE — PROGRESS NOTES
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  She presented to the ED from [place] for further evaluation of cough, congestion, and tachycardia.  Nursing staff noted she had a fast heart rate throughout the day with congested breath sounds and increased sputum production per trache.  Hx is limited as patient is nonverbal with trache.  Her family is at bedside and does report a Hx of pneumonia, as well as MRSA sputum infection.  Records reveal she completed treatment for this back in February.  Per family she is due to have a bone flap surgery sooner at [facility] and her plan includes keeping trach until after that surgery.  Patient evaluated in the emergency room where she was noted to have sepsis and pneumonia and admitted for further evaluation treatment.    Hospital Course:  The patient was monitored closely during her stay.  Pulmonary was consulted.  She was continued on supplement O2, aerosol treatments, and IV cefepime, Levaquin, and  vancomycin.  PT and OT were consulted for debility.  IV vancomycin was later discontinued.    Interval History:  Continue current treatment.  Remains confused intermittently.     Review of Systems   Constitutional: Negative for activity change and fever.   HENT: Negative for facial swelling.    Eyes: Negative for pain and redness.   Respiratory: Positive for cough. Negative for shortness of breath.    Cardiovascular: Negative for leg swelling.    Gastrointestinal: Negative for abdominal distention, abdominal pain, diarrhea and vomiting.        PEG tube noted   Endocrine: Negative for polydipsia and polyphagia.   Genitourinary: Negative for hematuria.   Musculoskeletal: Positive for gait problem.   Skin: Negative for color change.   Allergic/Immunologic: Negative for food allergies.   Neurological: Positive for weakness. Negative for facial asymmetry.        Chronic debility   Hematological: Does not bruise/bleed easily.   Psychiatric/Behavioral: Positive for confusion.     Objective:     Vital Signs (Most Recent):  Temp: 97.1 °F (36.2 °C) (04/08/19 1214)  Pulse: 89 (04/08/19 1214)  Resp: 16 (04/08/19 1214)  BP: 110/68 (04/08/19 1214)  SpO2: 97 % (04/08/19 1214) Vital Signs (24h Range):  Temp:  [97.1 °F (36.2 °C)-98.9 °F (37.2 °C)] 97.1 °F (36.2 °C)  Pulse:  [] 89  Resp:  [16-22] 16  SpO2:  [93 %-100 %] 97 %  BP: (102-111)/(63-73) 110/68     Weight: 47.1 kg (103 lb 13.4 oz)  Body mass index is 18.99 kg/m².    Physical Exam   Constitutional: She appears well-developed and well-nourished. No distress.   HENT:   Head: Normocephalic and atraumatic.   Eyes: Pupils are equal, round, and reactive to light. Conjunctivae and EOM are normal. Right eye exhibits no discharge. Left eye exhibits no discharge.   Neck: Normal range of motion. Neck supple. No JVD present.   Cardiovascular: Normal rate, regular rhythm, normal heart sounds and intact distal pulses.   No murmur heard.  Pulmonary/Chest: No respiratory distress. She exhibits no tenderness.   Bilateral breath sounds diminished with scattered rhonchi noted throughout-improves with cough   Abdominal: Soft. Bowel sounds are normal. She exhibits no distension. There is no tenderness. There is no guarding.   Peg tube noted   Genitourinary:   Genitourinary Comments: Not examined   Musculoskeletal: Normal range of motion. She exhibits no edema or tenderness.   Neurological: She is alert.   Follows commands; moves  all extremities.  RIGHT arm strength 2/5; LEFT 4/5.  BLE 3/5.    Skin: Skin is warm and dry. Capillary refill takes less than 2 seconds. She is not diaphoretic.   Psychiatric:   Calm and cooperative at this time         CRANIAL NERVES     CN III, IV, VI   Pupils are equal, round, and reactive to light.  Extraocular motions are normal.     Labs reviewed    Assessment/Plan:      * HCAP (healthcare-associated pneumonia)  With chronic respiratory failure secondary to O2 dependent COPD/ Hx MRSA pneumonia in February 2019-  Monitor O2 sats, supplemental O2 as needed  Orders as per pulmonology -currently on aerosol treatments IV cefepime and IV Levaquin           Hypomagnesemia  Monitor  Supplement as needed      Debility  Functional quadriplegia-  Chronic debility due to physical and mental status  Fall, skin, aspiration precautions  Turn q.2 hours  Continue PT and OT      Dehydration  Continue IV fluids  Trend BMP      Elevated liver enzymes  Stable  Trending downward      Sinus tachycardia  Records reviewed showed patient's tachycardia is chronic and not new  Continue home beta blockade  Monitor for fever, monitor O2 status        Normocytic anemia  Patient with low iron, high ferritin  Folate and vitamin B12 levels within normal limits      Hypoalbuminemia due to protein-calorie malnutrition  Chronic  Continue tube feedings per PEG  Patient remains NPO by mouth      Sepsis  Secondary to pneumonia  Continue current treatment- antibiotics as per pulmonology  Blood cultures x2 currently no growth  Sputum culture showing Pseudomonas aeruginosa-sensitive to patient's cefepime    Alcoholic cirrhosis of liver without ascites  Chronically elevated liver enzymes-  Stable at this time    Essential hypertension  Chronic, stable.    Continue oral antihypertensives, monitor BP closely, and titrate medication as required for sustained BP control.    Recurrent major depressive disorder, in partial remission  Chronic, stable on  current medication regimen.  Continue antidepressants as per outpatient regimen and monitor clinically.      Hx of subdural hematoma  With chronic cognitive deficits and debility-  Continue seizure prophylaxis.  Protective helmet when mobilized.  Continue PT OT.  Maintain fall and seizure precautions.      Chronic obstructive pulmonary disease with acute lower respiratory infection  Orders as per pulmonology      PEG (percutaneous endoscopic gastrostomy) status  Chronic, uncomplicated.  Peg care per nursing.     Tracheostomy status  Trache care per nursing.      Chronic respiratory failure  Patient with O2 dependent COPD and chronic respiratory failure requiring trache placement.    Continue supplemental oxygen per trach collar.    Monitor sats closely and titrate oxygen as needed to maintain sats greater than 92%.        VTE Risk Mitigation (From admission, onward)        Ordered     IP VTE HIGH RISK PATIENT  Once      04/05/19 0117     Place LOPEZ hose  Until discontinued      04/05/19 0117     Place sequential compression device  Until discontinued      04/05/19 0117          JOSE LUIS Lundberg  Department of Hospital Medicine   [facility]    Time spent seeing patient( greater than 1/2 spent in direct contact) :  32 minutes

## 2019-04-08 NOTE — ASSESSMENT & PLAN NOTE
With chronic respiratory failure secondary to O2 dependent COPD/ Hx MRSA pneumonia in February 2019-  Monitor O2 sats, supplemental O2 as needed  Orders as per pulmonology -currently on aerosol treatments IV cefepime and IV Levaquin

## 2019-04-08 NOTE — PROGRESS NOTES
Progress Note  PULMONARY    Admit Date: 4/4/2019 04/08/2019      SUBJECTIVE:     April 8, no changes nmore animated, bright, not verbal.      PFSH and Allergies reviewed.    OBJECTIVE:     Vitals (Most recent):  Vitals:    04/08/19 1702   BP: 122/70   Pulse: 103   Resp: 20   Temp: 98.4 °F (36.9 °C)       Vitals (24 hour range):  Temp:  [97.1 °F (36.2 °C)-98.9 °F (37.2 °C)]   Pulse:  []   Resp:  [16-20]   BP: (109-122)/(63-73)   SpO2:  [93 %-100 %]       Intake/Output Summary (Last 24 hours) at 4/8/2019 1710  Last data filed at 4/8/2019 0500  Gross per 24 hour   Intake --   Output 2200 ml   Net -2200 ml          Physical Exam:  The patient's neuro status (alertness,orientation,cognitive function,motor skills,), pharyngeal exam (oral lesions, hygiene, abn dentition,), Neck (jvd,mass,thyroid,nodes in neck and above/below clavicle),RESPIRATORY(symmetry,effort,fremitus,percussion,auscultation),  Cor(rhythm,heart tones including gallops,perfusion,edema)ABD(distention,hepatic&splenomegaly,tenderness,masses), Skin(rash,cyanosis),Psyc(affect,judgement,).  Exam negative except for these pertinent findings:    Trach, chest is symmetric, no distress, normal percussion, normal fremitus and good normal breath sounds      Radiographs reviewed: view by direct vision  4/7/19 cxr left pneumonia  Results for orders placed during the hospital encounter of 04/04/19   X-Ray Chest 1 View    Narrative EXAMINATION:  XR CHEST 1 VIEW    CLINICAL HISTORY:  Read evaluate pneumonia;    TECHNIQUE:  Single frontal view of the chest was performed.    COMPARISON:  Chest of January 27, 2019 and April 4, 2018.    FINDINGS:  A tracheostomy tube is now noted in position.  The mediastinal and cardiac size and contours are normal.  Multiple old left rib fractures are identified.  There is however increasing density and fluffy infiltrate in the left lung consistent with pneumonia.  There is a possible trace left pleural effusion.  No pneumothorax  is seen.  The right lung is clear.      Impression Patchy infiltrate is now seen in the left mid and lower lung field consistent with probable pneumonia.  Small left pleural effusion is noted.  Multiple old left rib fractures are unchanged.  Tracheostomy tube now in position.      Electronically signed by: Arnav Mccrary MD  Date:    04/07/2019  Time:    09:40   ]    Labs     Recent Labs   Lab 04/08/19  0457   WBC 5.00   HGB 8.0*   HCT 23.6*        Recent Labs   Lab 04/08/19  0457   *   K 4.0      CO2 28   BUN 11   CREATININE 0.6      CALCIUM 9.1   MG 1.4*   AST 49*   ALT 89*   ALKPHOS 92   BILITOT 0.2   PROT 7.5   ALBUMIN 2.2*   No results for input(s): PH, PCO2, PO2, HCO3 in the last 24 hours.  Microbiology Results (last 7 days)     Procedure Component Value Units Date/Time    Culture, Respiratory with Gram Stain [[id number]]  (Susceptibility) Collected:  04/05/19 0355    Order Status:  Completed Specimen:  Tracheal Aspirate Updated:  04/08/19 1101     Respiratory Culture No S aureus isolated.     Respiratory Culture --     PSEUDOMONAS AERUGINOSA  Moderate       Gram Stain (Respiratory) <10 epithelial cells per low power field.     Gram Stain (Respiratory) Many WBC's     Gram Stain (Respiratory) Few Gram negative rods    Blood culture x two cultures. Draw prior to antibiotics. [[id number]] Collected:  04/04/19 2336    Order Status:  Completed Specimen:  Blood from Antecubital, Left Updated:  04/08/19 1012     Blood Culture, Routine No Growth to date     Blood Culture, Routine No Growth to date     Blood Culture, Routine No Growth to date     Blood Culture, Routine No Growth to date    Narrative:       Aerobic and anaerobic    Blood culture x two cultures. Draw prior to antibiotics. [[id number]] Collected:  04/04/19 0124    Order Status:  Completed Specimen:  Blood from Antecubital, Right Updated:  04/08/19 1012     Blood Culture, Routine No Growth to date     Blood Culture, Routine No  Growth to date     Blood Culture, Routine No Growth to date     Blood Culture, Routine No Growth to date    Narrative:       Aerobic and anaerobic    Culture, Respiratory with Gram Stain [[id number]] Collected:  04/05/19 0355    Order Status:  Canceled Specimen:  Respiratory from Tracheal Aspirate Updated:  04/05/19 0355          Impression:  Active Hospital Problems    Diagnosis  POA    *HCAP (healthcare-associated pneumonia) [J18.9]  Yes    Chronic respiratory failure [J96.10]  Yes    Tracheostomy status [Z93.0]  Not Applicable    PEG (percutaneous endoscopic gastrostomy) status [Z93.1]  Not Applicable    Chronic obstructive pulmonary disease with acute lower respiratory infection [J44.0]  Yes    Hx of subdural hematoma [Z86.79]  Not Applicable    Recurrent major depressive disorder, in partial remission [F33.41]  Yes    Essential hypertension [I10]  Yes    Alcoholic cirrhosis of liver without ascites [K70.30]  Yes    Sepsis [A41.9]  Yes    Hypoalbuminemia due to protein-calorie malnutrition [E46]  Yes    Normocytic anemia [D64.9]  Yes    Sinus tachycardia [R00.0]  Yes    Elevated liver enzymes [R74.8]  Yes    Dehydration [E86.0]  Yes    Debility [R53.81]  Yes    Hypomagnesemia [E83.42]  Yes    Functional quadriplegia [R53.2]  Yes    Lung nodules [R91.8]  Yes      Resolved Hospital Problems   No resolved problems to display.               Plan:   April 8, 2019 - pseudomonas s to cipro noted.      Consider outpt on cipro     Secretions are much better per nurse report.  Pull trach planned after skull bone replaced.  outpt to ecf am good on cipro.                                       .

## 2019-04-08 NOTE — PHYSICIAN QUERY
PT Name: Elif Archibald  MR #: [id number]    Physician Query Form - Nutrition Clarification     CDS/: Janel Hurd RN CDI             Contact information:  [email or web address]    This form is a permanent document in the medical record.     Query Date: April 8, 2019    By submitting this query, we are merely seeking further clarification of documentation.. Please utilize your independent clinical judgment when addressing the question(s) below.    The Medical record contains the following:   Indicators  Supporting Clinical Findings Location in Medical Record   X % of Estimated Energy Intake over a time frame from p.o., TF, or TPN Energy Intake (Malnutrition): less than 75% for greater than or equal to 1 month   Nutrition note 4/5 1023 am   X Weight Status over a time frame Weight Loss (Malnutrition): (11% x 8 months- 5 lb regain x 3 months)   Nutrition note 4/5 1023 am   X Subcutaneous Fat and/or Muscle Loss -Subcutaneous Fat (Malnutrition): moderate depletion  -Muscle Mass (Malnutrition): moderate depletion   -Orbital Region (Subcutaneous Fat Loss): severe depletion  -Upper Arm Region (Subcutaneous Fat Loss): moderate depletion  -Thoracic and Lumbar Region: moderate depletion   -Confucianism Region (Muscle Loss): severe depletion  -Clavicle Bone Region (Muscle Loss): moderate depletion  -Clavicle and Acromion Bone Region (Muscle Loss): moderate depletion  -Scapular Bone Region (Muscle Loss): moderate depletion  -Dorsal Hand (Muscle Loss): moderate depletion  -Patellar Region (Muscle Loss): moderate depletion  -Anterior Thigh Region (Muscle Loss): moderate depletion  -Posterior Calf Region (Muscle Loss): moderate depletion   -Edema (Fluid Accumulation): 0-->no edema present   -Subcutaneous Fat Loss (Final Summary): moderate protein-calorie malnutrition  -Muscle Loss Evaluation (Final Summary): moderate protein-calorie malnutrition       Nutrition note 4/5 1023 am    Fluid Accumulation or Edema      Reduced   "Strength     X Wt / BMI / Usual Body Weight Height - 5'2"  Weight - 47.5 kg   104lb 11.5 oz  BMI - 19.2 Nutrition note 4/5 1023 am    Delayed Wound Healing / Failure to Thrive      Acute or Chronic Illness      Medication     X Treatment Nutrition recommendations -- 1) Initiate TF via PEG as soon as medically able Isosource 1.5 @ 15 ml/hr advancing to goal of 45 ml/hr + 140 ml flush q 4 hr    ( 100% EEN for wt gain, > 100% EPN, and 820 ml free water)      Nutrition note 4/5 1023 am   X Other Hypoalbuminemia due to protein-calorie malnutrition  Chronic, consult with dietitian for tube feed recommendations. Hospital medicine note 4/8 1255     AND / JORDANEN Clinical Characteristics (October 2011)  A minimum of two characteristics is recommended for diagnosing either moderate or severe malnutrition   Mild Malnutrition Moderate Malnutrition Severe Malnutrition   Energy Intake from p.o., TF or TPN. < 75% intake of estimated energy needs for less than 7 days < 75% intake of estimated energy needs for greater than 7 days < 50% intake of estimated energy needs for > 5 days   Weight Loss 1-2% in 1 month  5% in 3 months  7.5% in 6 months  10% in 1 year 1-2 % in 1 week  5% in 1 month  7.5% in 3 months  10% in 6 months  20% in 1 year > 2% in 1 week  > 5% in 1 month  > 7.5% in 3 months  > 10% in 6 months  > 20% in 1 year   Physical Findings     None *Mild subcutaneous fat and/or muscle loss  *Mild fluid accumulation  *Stage II decubitus  *Surgical wound or non-healing wound *Mod/severe subcutaneous fat and/or muscle loss  *Mod/severe fluid accumulation  *Stage III or IV decubitus  *Non-healing surgical wound     Provider, please specify diagnosis or diagnoses associated with above clinical findings.    [  ] Mild Protein-Calorie Malnutrition   [ x ] Moderate Protein-Calorie Malnutrition   [  ] Cachexia   [  ] Other Nutritional Diagnosis (please specify):    [  ] Other:    [  ] Clinically Undetermined       Please document in your " progress notes daily for the duration of treatment until resolved and include in your discharge summary.

## 2019-04-08 NOTE — PLAN OF CARE
Problem: Physical Therapy Goal  Goal: Physical Therapy Goal  Goals to be met by: 19     Patient will increase functional independence with mobility by performin. Supine to sit with MInimal Assistance  2. Rolling to Left and Right with Contact Guard Assistance.  3. Sit to stand transfer with Contact Guard Assistance with HHA  4. Bed to chair transfer with Minimal Assistance HHA  5. Sitting at edge of bed x5 minutes with Supervision  6. Lower extremity exercise program x10-20 reps per handout, with independence     Outcome: Ongoing (interventions implemented as appropriate)  PT for bed mobility, transfer training OOB to chair

## 2019-04-08 NOTE — PT/OT/SLP PROGRESS
"Occupational Therapy  Treatment    Elif Archibald   MRN: [id number]   Admitting Diagnosis: HCAP (healthcare-associated pneumonia)    OT Date of Treatment: 04/08/19   OT Start Time: 1005  OT Stop Time: 1017  OT Total Time (min): 12 min    Billable Minutes:  Therapeutic Activity 12    General Precautions: Standard, fall  Orthopedic Precautions: N/A  Braces: (helmet)         Subjective:  Communicated with nurse and sitter prior to session.    Pain/Comfort  Pain Rating 1: 0/10  Pain Rating Post-Intervention 1: 0/10    Objective:  Patient found with: telemetry, tracheostomy, PEG Tube, ramsay catheter  Therapeutic Activities and Exercises:  1. Instructed pt in self ROM shoulder flexion.  2. PROM and gentle stretch applied to B UE: er, ir and shoulder flexion limited/tight.  3. Oral care with sponge.    AM-PAC 6 CLICK ADL   How much help from another person does this patient currently need?   1 = Unable, Total/Dependent Assistance  2 = A lot, Maximum/Moderate Assistance  3 = A little, Minimum/Contact Guard/Supervision  4 = None, Modified [place]/Independent    Putting on and taking off regular lower body clothing? : 2  Bathing (including washing, rinsing, drying)?: 1(pt is bathed by NH staff)  Toileting, which includes using toilet, bedpan, or urinal? : 1  Putting on and taking off regular upper body clothing?: 2  Taking care of personal grooming such as brushing teeth?: 2  Eating meals?: 1(PEG tube)  Daily Activity Total Score: 9     AM-PAC Raw Score CMS "G-Code Modifier Level of Impairment Assistance   6 % Total / Unable   7 - 8 CM 80 - 100% Maximal Assist   9-13 CL 60 - 80% Moderate Assist   14 - 19 CK 40 - 60% Moderate Assist   20 - 22 CJ 20 - 40% Minimal Assist   23 CI 1-20% SBA / CGA   24 CH 0% Independent/ Mod I       Patient left up in chair with all lines intact, call button in reach, chair alarm on and sitter present    ASSESSMENT:  Elif Archibald is a 53 y.o. female with a medical diagnosis of HCAP " (healthcare-associated pneumonia) and presents with performance deficits of physical skills including impaired balance, mobility, strength, dexterity, fine motor coordination, gross motor coordination and endurance.  These performance deficits have resulted in activity limitations including, but not limited to: bed mobility, transfers, ambulating short distances, navigating around obstacles during ambulation, transitional movement patterns ( kneeling, bending, reaching), upper body dressing, lower body dressing, grooming, toileting, bathing and carrying objects.   Pt's role as independent adult  has been significantly affected.  Patient will benefit from skilled OT services to maximize level of independence with deficits listed above.  .    Rehab identified problem list/impairments: Rehab identified problem list/impairments: impaired self care skills, impaired functional mobilty, impaired cognition, decreased upper extremity function, decreased safety awareness, impaired endurance, weakness, impaired cardiopulmonary response to activity    Rehab potential is fair.    Activity tolerance: Good    Discharge recommendations: Discharge Facility/Level of Care Needs: nursing facility, basic, nursing facility, skilled     Barriers to discharge: Barriers to Discharge: None    Equipment recommendations: none     GOALS:   Multidisciplinary Problems     Occupational Therapy Goals        Problem: Occupational Therapy Goal    Goal Priority Disciplines Outcome Interventions   Occupational Therapy Goal     OT, PT/OT Ongoing (interventions implemented as appropriate)    Description:  Goals to be met by: 4/19/2019     Patient will increase functional independence with ADLs by performing:    UE Dressing with Stand-by Assistance.  LE Dressing with Minimal Assistance and Assistive Devices as needed.  Grooming while seated at sink with Contact Guard Assistance.  Toileting from bedside commode with Contact Guard Assistance for hygiene and  clothing management.   Supine to sit with Minimal Assistance.  Toilet transfer to bedside commode with Minimal Assistance.                      Plan:  Patient to be seen 3 x/week to address the above listed problems via self-care/home management, therapeutic activities, therapeutic exercises, neuromuscular re-education  Plan of Care expires: 04/19/19  Plan of Care reviewed with: patient    OT G-codes  Functional Assessment Tool Used: [facility]  Score: 9  Functional Limitation: Self care  Self Care Current Status (): LEIGH ANN  Self Care Goal Status (): YUMIKO Crook  04/08/2019

## 2019-04-08 NOTE — PLAN OF CARE
Problem: Occupational Therapy Goal  Goal: Occupational Therapy Goal  Goals to be met by: 4/19/2019     Patient will increase functional independence with ADLs by performing:    UE Dressing with Stand-by Assistance.  LE Dressing with Minimal Assistance and Assistive Devices as needed.  Grooming while seated at sink with Contact Guard Assistance.  Toileting from bedside commode with Contact Guard Assistance for hygiene and clothing management.   Supine to sit with Minimal Assistance.  Toilet transfer to bedside commode with Minimal Assistance.     Outcome: Ongoing (interventions implemented as appropriate)  Will continue working on goals from initial poc.

## 2019-04-08 NOTE — PLAN OF CARE
Problem: Adult Inpatient Plan of Care  Goal: Plan of Care Review  Outcome: Ongoing (interventions implemented as appropriate)  Bed is in low position, sitter is at bedside, SR up x 2, Cardiac monitored ST, PRN pain medication given for indication of head pain demonstrated by touch, Feeding tolerated, patient safety maintained, will continue to monitor and round.

## 2019-04-08 NOTE — PT/OT/SLP PROGRESS
"Speech Language Pathology Treatment    Patient Name:  Elif Archibald   MRN:  [id number]  Admitting Diagnosis: HCAP (healthcare-associated pneumonia)    Recommendations:                 General Recommendations:  follow up upon requrn to [place]  Diet recommendations:   ,     Aspiration Precautions: NPO   General Precautions: Standard, aspiration, fall  Communication strategies:  communication board    Subjective     (gestured "smoke" & used spoon to gesture "eat")  Patient goals: as above      Objective:     Has the patient been evaluated by SLP for swallowing?   No  Keep patient NPO? Yes   Current Respiratory Status: trach without vent      Pt seen for follow up on order from Dr. Becker for [place] speaking valve trials.  Pt's PM Valve requested from [facility], & received Saturday; however it has not been successfully used at [place] due to thick tracheal secretions, and Respiratory Therapist here stated she had no order for the trial, & agreed that secretions were too thick.  No trial on Saturday.  Pt is npo with PEG tube placed in February; pulmonology note raises question if pt should have po intake assessment.  Discussion with KAREN Mccord today determined pt is not optimal for either po intake of PM valve at this time.  Pt provided with new communication board & its use reviewed with pt & renny.    Assessment:     Elif Archibald is a 53 y.o. female with an SLP diagnosis of Dysphagia, Aphonia and S/P Trach.  She is not recommended for po trials or PM valve use at this time.  Recommend continue SLP services on return to [place].    Goals:   Multidisciplinary Problems     SLP Goals     Not on file                Plan:     · Plan of Care reviewed with:    Pt  · SLP Follow-Up:  No       Barriers to Discharge:  None    Time Tracking:     SLP Treatment Date:   04/08/19  Speech Start Time:  1122  Speech Stop Time:  1133     Speech Total Time (min):  11 min    Billable Minutes: Speech Therapy Individual 11    Claudia " CATHERINE Vargas-SLP/A  04/08/2019

## 2019-04-08 NOTE — CONSULTS
Elif Archibald [id number] is a 53 y.o. female who had been consulted for vancomycin dosing.    Vancomycin has been discontinued.  Pharmacy consult for vancomycin dosing in no longer required.      Thank you for allowing us to participate in this patient's care.     Greg Vogel, PharmD

## 2019-04-09 VITALS
SYSTOLIC BLOOD PRESSURE: 118 MMHG | BODY MASS INDEX: 19.1 KG/M2 | OXYGEN SATURATION: 98 % | WEIGHT: 103.81 LBS | DIASTOLIC BLOOD PRESSURE: 70 MMHG | HEART RATE: 88 BPM | TEMPERATURE: 99 F | HEIGHT: 62 IN | RESPIRATION RATE: 17 BRPM

## 2019-04-09 PROBLEM — E83.42 HYPOMAGNESEMIA: Status: RESOLVED | Noted: 2019-04-05 | Resolved: 2019-04-09

## 2019-04-09 PROBLEM — R00.0 SINUS TACHYCARDIA: Status: RESOLVED | Noted: 2019-04-05 | Resolved: 2019-04-09

## 2019-04-09 PROBLEM — J15.1 PSEUDOMONAL PNEUMONIA: Status: ACTIVE | Noted: 2019-04-05

## 2019-04-09 PROBLEM — E86.0 DEHYDRATION: Status: RESOLVED | Noted: 2019-04-05 | Resolved: 2019-04-09

## 2019-04-09 PROBLEM — A41.9 SEPSIS: Status: RESOLVED | Noted: 2019-04-05 | Resolved: 2019-04-09

## 2019-04-09 PROBLEM — R74.8 ELEVATED LIVER ENZYMES: Status: RESOLVED | Noted: 2019-04-05 | Resolved: 2019-04-09

## 2019-04-09 LAB — MAGNESIUM SERPL-MCNC: 1.7 MG/DL (ref 1.6–2.6)

## 2019-04-09 PROCEDURE — 99900026 HC AIRWAY MAINTENANCE (STAT)

## 2019-04-09 PROCEDURE — 25000003 PHARM REV CODE 250: Performed by: NURSE PRACTITIONER

## 2019-04-09 PROCEDURE — 97803 MED NUTRITION INDIV SUBSEQ: CPT

## 2019-04-09 PROCEDURE — 97110 THERAPEUTIC EXERCISES: CPT

## 2019-04-09 PROCEDURE — 27000221 HC OXYGEN, UP TO 24 HOURS

## 2019-04-09 PROCEDURE — 83735 ASSAY OF MAGNESIUM: CPT

## 2019-04-09 PROCEDURE — 63600175 PHARM REV CODE 636 W HCPCS: Performed by: NURSE PRACTITIONER

## 2019-04-09 PROCEDURE — 25000003 PHARM REV CODE 250: Performed by: HOSPITALIST

## 2019-04-09 PROCEDURE — 94640 AIRWAY INHALATION TREATMENT: CPT

## 2019-04-09 PROCEDURE — 36415 COLL VENOUS BLD VENIPUNCTURE: CPT

## 2019-04-09 PROCEDURE — 94761 N-INVAS EAR/PLS OXIMETRY MLT: CPT

## 2019-04-09 PROCEDURE — 25000242 PHARM REV CODE 250 ALT 637 W/ HCPCS: Performed by: NURSE PRACTITIONER

## 2019-04-09 RX ORDER — ALBUTEROL SULFATE 2.5 MG/.5ML
2.5 SOLUTION RESPIRATORY (INHALATION) EVERY 6 HOURS PRN
COMMUNITY
End: 2019-11-29

## 2019-04-09 RX ORDER — ALBUTEROL SULFATE 2.5 MG/.5ML
2.5 SOLUTION RESPIRATORY (INHALATION) 2 TIMES DAILY
COMMUNITY
End: 2019-10-25

## 2019-04-09 RX ORDER — CIPROFLOXACIN 750 MG/1
750 TABLET, FILM COATED ORAL EVERY 12 HOURS
Qty: 8 TABLET | Refills: 0
Start: 2019-04-09 | End: 2019-04-13

## 2019-04-09 RX ORDER — ACETYLCYSTEINE 200 MG/ML
2 SOLUTION ORAL; RESPIRATORY (INHALATION) 2 TIMES DAILY
COMMUNITY
End: 2019-11-29

## 2019-04-09 RX ADMIN — MAGNESIUM OXIDE TAB 400 MG (241.3 MG ELEMENTAL MG) 400 MG: 400 (241.3 MG) TAB at 10:04

## 2019-04-09 RX ADMIN — FOLIC ACID 1 MG: 1 TABLET ORAL at 10:04

## 2019-04-09 RX ADMIN — ESCITALOPRAM OXALATE 20 MG: 10 TABLET, FILM COATED ORAL at 10:04

## 2019-04-09 RX ADMIN — ACETYLCYSTEINE 4 ML: 100 INHALANT RESPIRATORY (INHALATION) at 08:04

## 2019-04-09 RX ADMIN — METOPROLOL TARTRATE 50 MG: 50 TABLET, FILM COATED ORAL at 10:04

## 2019-04-09 RX ADMIN — LEVALBUTEROL 1.25 MG: 1.25 SOLUTION, CONCENTRATE RESPIRATORY (INHALATION) at 12:04

## 2019-04-09 RX ADMIN — MULTIVITAMIN 15 ML: LIQUID ORAL at 10:04

## 2019-04-09 RX ADMIN — LEVALBUTEROL 1.25 MG: 1.25 SOLUTION, CONCENTRATE RESPIRATORY (INHALATION) at 08:04

## 2019-04-09 RX ADMIN — PAROXETINE HYDROCHLORIDE 10 MG: 10 TABLET, FILM COATED ORAL at 06:04

## 2019-04-09 RX ADMIN — GABAPENTIN 300 MG: 300 CAPSULE ORAL at 10:04

## 2019-04-09 RX ADMIN — SODIUM CHLORIDE: 0.9 INJECTION, SOLUTION INTRAVENOUS at 10:04

## 2019-04-09 RX ADMIN — OXYCODONE HYDROCHLORIDE AND ACETAMINOPHEN 500 MG: 500 TABLET ORAL at 10:04

## 2019-04-09 RX ADMIN — LEVETIRACETAM 1000 MG: 500 TABLET ORAL at 10:04

## 2019-04-09 RX ADMIN — ACETYLCYSTEINE 4 ML: 100 INHALANT RESPIRATORY (INHALATION) at 12:04

## 2019-04-09 RX ADMIN — CEFEPIME HYDROCHLORIDE 1 G: 1 INJECTION, POWDER, FOR SOLUTION INTRAMUSCULAR; INTRAVENOUS at 10:04

## 2019-04-09 NOTE — NURSING
Transportation here to  pt, pt left via w/c. Called and s/w sang to inform please watch for pt to urinate, ramsay just removed, she stated she will

## 2019-04-09 NOTE — PLAN OF CARE
04/09/19 1557   Final Note   Assessment Type Final Discharge Note   Anticipated Discharge Disposition halfway [facility]

## 2019-04-09 NOTE — PROGRESS NOTES
"[facility]  Adult Nutrition  Progress Note    SUMMARY   Intervention:  Enteral Nutrition Therapy     Current Intake:  Continue Isosource 1.5 @ 45 ml/hr + 140 ml flush q 4 hr    ( 100% EEN for wt gain, > 100% EPN, and 820 ml free water)     Recommendation:   1) Continue Isosource 1.5 @ 45 ml/hr + 140 ml flush q 4 hr   2) Weigh pt weekly  3) Obtain measured height as able  4) Replete iron as needed    Goals: 1) TF to meet > 75% EEN by f/u   Nutrition Goal Status: Met  Communication of RD Recs: (POC, sticky note)     Reason for Assessment     Reason For Assessment: Follow-Up  Diagnosis: (HCAP)  Relevant Medical History: COPD, ETOH/substance abuse, moderate malnutrition, [place] resident, traumatic SDH with craniotomy 1/19/19, Trach/PEG placement 2/16/19, depression, bipolar  Interdisciplinary Rounds: attended     General Information Comments: 52 y/o female admitted with HCAP from [place]. Currently NPO x 1 day. At [place] pet was receiving isosource 1.5 @ 60 ml/hr + 40 ml flush hourly via PEG. NFPE done, moderate-severe wasting seen, pt appears underweight and bony with very sparse hair, missing patches. Slight weight loss noted 10% loss x 8 months, regained 5 lb since.  4/9/19 Pt tolerating TF at goal + flushes as ordered. Plan to return to [place] today.     Nutrition Discharge Planning: to be determined- isosource 1.5 @ 45 ml/hr + 140 ml flush q 4 hr     Nutrition Risk Screen     Nutrition Risk Screen: tube feeding or parenteral nutrition     Nutrition/Diet History     Spiritual, Cultural Beliefs, Hindu Practices, Values that Affect Care: no  Food Allergies: NKFA(or intolerances)  Factors Affecting Nutritional Intake: NPO     Anthropometrics     Height Method: Correction- stated  Height: 5' 2"  Height (inches): 62 in  Weight Method: Bed Scale  Weight: 47.1 kg (4/6/19)  Weight (lb): 104.72 lb  Ideal Body Weight (IBW), Female: 110 lb  % Ideal Body Weight, Female (lb): 95.2 lb  BMI (Calculated): " 19.2 Admission  BMI Grade: 18.5-24.9 - normal  Usual Body Weight (UBW), k.8 kg(18)     Lab/Procedures/Meds     Pertinent Labs Reviewed: reviewed  BMP  Lab Results   Component Value Date     (L) 2019    K 4.0 2019     2019    CO2 28 2019    BUN 11 2019    CREATININE 0.6 2019    CALCIUM 9.1 2019    ANIONGAP 3 (L) 2019    ESTGFRAFRICA >60 2019    EGFRNONAA >60 2019     Lab Results   Component Value Date    ALBUMIN 2.2 (L) 2019     Lab Results   Component Value Date    IRON 21 (L) 2019    TIBC 308 2019    FERRITIN 413 (H) 2019       Pertinent Medications Reviewed: reviewed  Pertinent Medications Comments: Vitamin C, folic acid, MVI, iron, NS @ 125 ml/hr, KCl, zofran     Estimated/Assessed Needs   Admission  Weight Used For Calorie Calculations: 47.5 kg (104 lb 11.5 oz)  Energy Calorie Requirements (kcal): Collin ST Jeor ( x 1.5-1.6 wt gain) = 0787-0367 kcal   Energy Need Method: Collin-St Jeor  Protein Requirements: 1.2-1.4 g protein/kg ( muscle loss) = 57-66 g protein  Weight Used For Protein Calculations: 47.5 kg (104 lb 11.5 oz)  Fluid Requirements (mL): 5638-7971 ml  Estimated Fluid Requirement Method: RDA Method  CHO Requirement: N/A        Nutrition Prescription Ordered     Current Diet Order: NPO+ TF Isosource 1.5     Evaluation of Received Nutrient/Fluid Intake     Energy Calories Required: meeting needs  Protein Required: meeting needs  Fluid Required: exceeds needs  Total Fluid Intake (mL/kg): NS @ 125 ml/hr + flushes  Tolerance: other (see comments)(NPO)  % Intake of Estimated Energy Needs: 100%  % Meal Intake: NPO + TF     Nutrition Risk     Level of Risk/Frequency of Follow-up: moderate 2 x weekly      Assessment and Plan   Hypoalbuminemia due to protein-calorie malnutrition  Contributing Nutrition Diagnosis  Moderate chronic illness related malnutrition    Related to (etiology):   Decreased ability  to perform nutrition ADLs and trouble swallowing    Signs and Symptoms (as evidenced by):   1) Moderate fat/muscle wasting as charted below  2) PO intakes < 75% EEN x > 1 month-Now improving    * of note 11% wt loss x 8 months- now improving    Interventions/Recommendations (treatment strategy):  1) initiate TF to meet > 75% EEN via PEG    Nutrition Diagnosis Status:   Improving     Monitor and Evaluation     Food and Nutrient Intake: energy intake, enteral nutrition intake  Food and Nutrient Adminstration: enteral and parenteral nutrition administration  Anthropometric Measurements: weight, height/length  Biochemical Data, Medical Tests and Procedures: electrolyte and renal panel  Nutrition-Focused Physical Findings: overall appearance      Malnutrition Assessment  Malnutrition Type: chronic illness  Energy Intake: moderate energy intake  Skin (Micronutrient): (Du = 14, no PI noted)  Teeth (Micronutrient): (Missing)   Micronutrient Evaluation: suspected deficiency  Micronutrient Evaluation Comments: Check iron, B12, abd folate and replete if needed- pt would benefit from vitamins/minerals in TF   Weight Loss (Malnutrition): (11% x 8 months- 5 lb regain x 3 months)  Energy Intake (Malnutrition): less than 75% for greater than or equal to 1 month  Subcutaneous Fat (Malnutrition): moderate depletion  Muscle Mass (Malnutrition): moderate depletion   Orbital Region (Subcutaneous Fat Loss): severe depletion  Upper Arm Region (Subcutaneous Fat Loss): moderate depletion  Thoracic and Lumbar Region: moderate depletion   [place] Region (Muscle Loss): severe depletion  Clavicle Bone Region (Muscle Loss): moderate depletion  Clavicle and Acromion Bone Region (Muscle Loss): moderate depletion  Scapular Bone Region (Muscle Loss): moderate depletion  Dorsal Hand (Muscle Loss): moderate depletion  Patellar Region (Muscle Loss): moderate depletion  Anterior Thigh Region (Muscle Loss): moderate depletion  Posterior Calf Region  (Muscle Loss): moderate depletion   Edema (Fluid Accumulation): 2+ hand  Subcutaneous Fat Loss (Final Summary): moderate protein-calorie malnutrition  Muscle Loss Evaluation (Final Summary): moderate protein-calorie malnutrition       Nutrition Follow-Up     RD Follow-up?: Yes

## 2019-04-09 NOTE — PLAN OF CARE
I sent discharge orders and AVS to [place] via [facility].   Angela Kelley LMSW     [facility] [place] [phone] called to get the number for the second floor nursing station for report. Angela Kelley LMSW     Discharge destination booked in [facility].        04/09/19 1147   Post-Acute Status   Post-Acute Authorization Placement   Post-Acute Placement Status Pending Post-Acute Medical Review

## 2019-04-09 NOTE — PLAN OF CARE
Problem: Adult Inpatient Plan of Care  Goal: Plan of Care Review  Outcome: Ongoing (interventions implemented as appropriate)  Pt rested well during the night with bed alarm on and sitter at the bedside. IV antibiotics infused, normal saline infusing at 125 mL. PEG feeding going at 45 mL/hr (goal), tolerating well. Respiratory treatments q 8 hrs. Turn q 2 hrs. SCDs on. NPO. PT/OT. Tele monitor showing normal sinus. Labs monitored. PRN pain medication given. Ice applied to swollen right hand. Will continue to monitor.

## 2019-04-09 NOTE — DISCHARGE INSTRUCTIONS
Discharge to [facility] for [facility].  Diet:  See tube feed order.  Meds:  See list.  Oxygen:  T-piece at 5 LPM.    Diagnoses:  Active Hospital Problems    Diagnosis  POA    *Pseudomonal pneumonia [J15.1]  Yes    Chronic respiratory failure [J96.10]  Yes    Tracheostomy status [Z93.0]  Not Applicable    PEG (percutaneous endoscopic gastrostomy) status [Z93.1]  Not Applicable    Chronic obstructive pulmonary disease with acute lower respiratory infection [J44.0]  Yes    Hx of subdural hematoma [Z86.79]  Not Applicable    Recurrent major depressive disorder, in partial remission [F33.41]  Yes    Essential hypertension [I10]  Yes    Alcoholic cirrhosis of liver without ascites [K70.30]  Yes    Hypoalbuminemia due to protein-calorie malnutrition [E46]  Yes    Normocytic anemia [D64.9]  Yes    Debility [R53.81]  Yes    Functional quadriplegia [R53.2]  Yes    Lung nodules [R91.8]  Yes      Resolved Hospital Problems    Diagnosis Date Resolved POA    Sepsis [A41.9] 04/09/2019 Yes    Sinus tachycardia [R00.0] 04/09/2019 Yes    Elevated liver enzymes [R74.8] 04/09/2019 Yes    Dehydration [E86.0] 04/09/2019 Yes    Hypomagnesemia [E83.42] 04/09/2019 Yes

## 2019-04-09 NOTE — PT/OT/SLP PROGRESS
Physical Therapy Treatment    Patient Name:  Elif Archibald   MRN:  [id number]    Recommendations:     Discharge Recommendations:  nursing facility, skilled, LTACH (long-term acute care hospital)     Assessment:     Elif Archibald is a 53 y.o. female admitted with a medical diagnosis of Pseudomonal pneumonia.  She presents with the following impairments/functional limitations:  weakness, impaired self care skills, impaired functional mobilty, impaired endurance, gait instability, impaired balance, impaired cognition, decreased coordination, decreased upper extremity function, decreased lower extremity function, decreased safety awareness, impaired cardiopulmonary response to activity.    Rehab Prognosis: Good and Fair; patient would benefit from acute skilled PT services to address these deficits and reach maximum level of function.    Recent Surgery: * No surgery found *      Plan:     During this hospitalization, patient to be seen 6 x/week to address the identified rehab impairments via therapeutic activities, therapeutic exercises and progress toward the following goals:    · Plan of Care Expires:  04/19/19    Subjective     Chief Complaint: pt nonverbal but able to mouth and express dry mouth and needs to be suctioned.  Patient/Family Comments/goals: Eager to work with PT.  Pain/Comfort:  · Pain Rating 1: 0/10      Objective:     Communicated with nurse Dominguez prior to session.  Patient found HOB elevated with telemetry, tracheostomy, PEG Tube, ramsay catheter SCD's upon PT entry to room. (pt unable to be seen per 2 attempts in AM, 2' being soiled and then in nursing care)    General Precautions: Standard, aspiration, fall   Orthopedic Precautions:N/A   Braces: N/A       Therapeutic Activities and Exercises:   Therapist instructed pt to perform bilateral lower extremity AROM/AAROM therapeutic exercises. Pt performed bilateral lower extremity AROM/AAROM ankle pumps, ankle circles, knee flexion/extension, SLRs  and hip ABD/ADD x 10 reps each.    Pt noted to be soiled. Nurse Angelica notified/aware.     Patient left HOB elevated with all lines intact, call button in reach, nurse Angelica notified and sitter present..    GOALS:   Multidisciplinary Problems     Physical Therapy Goals        Problem: Physical Therapy Goal    Goal Priority Disciplines Outcome Goal Variances Interventions   Physical Therapy Goal     PT, PT/OT Ongoing (interventions implemented as appropriate)     Description:  Goals to be met by: 19     Patient will increase functional independence with mobility by performin. Supine to sit with MInimal Assistance  2. Rolling to Left and Right with Contact Guard Assistance.  3. Sit to stand transfer with Contact Guard Assistance with HHA  4. Bed to chair transfer with Minimal Assistance HHA  5. Sitting at edge of bed x5 minutes with Supervision  6. Lower extremity exercise program x10-20 reps per handout, with independence                      Time Tracking:     PT Received On: 19  PT Start Time: 1400     PT Stop Time: 1411  PT Total Time (min): 11 min     Billable Minutes: Therapeutic Exercise 11    Treatment Type: Treatment  PT/PTA: PTA     PTA Visit Number: 3     SARAH Aguirre    I certify that I was present in the room directing the student in service delivery and guiding them using my skilled judgment. As the co-signing therapist I have reviewed the students documentation and am responsible for the treatment, assessment, and plan.       Mikki Gloria, ADALI  2019

## 2019-04-09 NOTE — CONSULTS
Elif Archibald [id number] is a 53 y.o. female who had been consulted for vancomycin dosing.    Vancomycin has been discontinued.  Pharmacy consult for vancomycin dosing in no longer required.      Thank you for allowing us to participate in this patient's care.     Aminta Wilder

## 2019-04-09 NOTE — PLAN OF CARE
04/09/19 0825   Patient Assessment/Suction   Level of Consciousness (AVPU) alert   All Lung Fields Breath Sounds coarse   $ Suction Charges Inline Suction Procedure Stat Charge   Secretions Amount moderate   Secretions Color white;yellow   Secretions Characteristics thick   PRE-TX-O2   O2 Device (Oxygen Therapy) Aerosol T-Tube   $ Is the patient on Low Flow Oxygen? Yes   Flow (L/min) 5   Oxygen Concentration (%) 28   SpO2 100 %   Pulse Oximetry Type Intermittent   $ Pulse Oximetry - Multiple Charge Pulse Oximetry - Multiple   Pulse 104   Resp 18   Aerosol Therapy   $ Aerosol Therapy Charges Aerosol Treatment   Respiratory Treatment Status (SVN) given   Treatment Route (SVN) in-line   Patient Position (SVN) HOB elevated   Post Treatment Assessment (SVN) breath sounds unchanged   Signs of Intolerance (SVN) none   Breath Sounds Post-Respiratory Treatment   Throughout All Fields Post-Treatment All Fields   Throughout All Fields Post-Treatment no change   Post-treatment Heart Rate (beats/min) 104   Post-treatment Resp Rate (breaths/min) 18   Ready to Wean/Extubation Screen   FIO2<=50 (chart decimal) 0.28

## 2019-04-09 NOTE — PLAN OF CARE
Problem: Adult Inpatient Plan of Care  Goal: Plan of Care Review  Outcome: Ongoing (interventions implemented as appropriate)     04/08/19 2008   Plan of Care Review   Plan of Care Reviewed With patient   Progress improving     POC reviewed with pt. Pt alert and responsive, unable to verbalize needs but able to indicate with visual aids. AAOx2 (self and place) with sitter at bedside and bed alarm active. VSS with NAD noted; some facial flushing and warmness noted but pt afebrile. Tylenol administrated per PEG tube. Pt free of injury or falls.      Problem: Skin Injury Risk Increased  Goal: Skin Health and Integrity    Intervention: Optimize Skin Protection     04/08/19 2008   Prevent Additional Skin Injury   Head of Bed (HOB) HOB at 20-30 degrees   Pressure Reduction Devices pressure-redistributing mattress utilized   Pressure Reduction Techniques weight shift assistance provided   Monitor and Manage Hypervolemia   Skin Protection tubing/devices free from skin contact     Pt turned q 2 hour and pillows positioned to protect bony prominences.       Problem: Malnutrition  Goal: Improved Nutritional Intake    Intervention: Promote and Optimize Nutrition Support     04/08/19 2008   Optimize Eating and Swallowing   Nutrition Support Management tube feeding rate increased     Pt tube feeding rate increased to 45 mL/hr. 50 mL of residual aspirated per tube feeding change.

## 2019-04-09 NOTE — PLAN OF CARE
Problem: Malnutrition  Goal: Improved Nutritional Intake    Intervention: Promote and Optimize Nutrition Support  Intervention:  Enteral Nutrition Therapy     Current Intake:  Continue Isosource 1.5 @ 45 ml/hr + 140 ml flush q 4 hr    ( 100% EEN for wt gain, > 100% EPN, and 820 ml free water)      Recommendation:   1) Continue Isosource 1.5 @ 45 ml/hr + 140 ml flush q 4 hr   2) Weigh pt weekly  3) Obtain measured height as able  4) Replete iron as needed     Goals: 1) TF to meet > 75% EEN by f/u   Nutrition Goal Status: Met  Communication of RD Recs: (POC, sticky note)

## 2019-04-09 NOTE — PLAN OF CARE
04/09/19 0011   Patient Assessment/Suction   Level of Consciousness (AVPU) alert   Respiratory Effort Unlabored   Expansion/Accessory Muscles/Retractions no use of accessory muscles   All Lung Fields Breath Sounds coarse   Rhythm/Pattern, Respiratory unlabored   Cough Frequency frequent   Cough Type assisted   $ Suction Charges Inline Suction Procedure Stat Charge   Secretions Amount moderate   Secretions Color white   Secretions Characteristics thick   PRE-TX-O2   O2 Device (Oxygen Therapy) Aerosol T-Tube   Flow (L/min) 5   Oxygen Concentration (%) 28   SpO2 95 %   Pulse Oximetry Type Intermittent   Pulse 73   Resp 16   Aerosol Therapy   $ Aerosol Therapy Charges Aerosol Treatment   Respiratory Treatment Status (SVN) given   Treatment Route (SVN) in-line   Patient Position (SVN) HOB elevated   Post Treatment Assessment (SVN) breath sounds improved   Signs of Intolerance (SVN) none   Breath Sounds Post-Respiratory Treatment   Throughout All Fields Post-Treatment All Fields   Throughout All Fields Post-Treatment aeration increased   Post-treatment Heart Rate (beats/min) 73   Post-treatment Resp Rate (breaths/min) 16        Surgical Airway Shiley Cuffed   No Placement Date or Time found.   Present Prior to Hospital Arrival?: Yes  Type: Tracheostomy  Brand: Shiley  Airway Device Size: 6.0  Style: Cuffed   Cuff Inflation? Deflated   Status Secured   Site Assessment Clean;Dry   Site Care Cleansed;Dried;Dressing applied   Inner Cannula Care Changed/new   Ties Assessment Clean;Dry;Intact   Ready to Wean/Extubation Screen   FIO2<=50 (chart decimal) 0.28

## 2019-04-09 NOTE — PLAN OF CARE
Problem: Physical Therapy Goal  Goal: Physical Therapy Goal  Goals to be met by: 19     Patient will increase functional independence with mobility by performin. Supine to sit with MInimal Assistance  2. Rolling to Left and Right with Contact Guard Assistance.  3. Sit to stand transfer with Contact Guard Assistance with HHA  4. Bed to chair transfer with Minimal Assistance HHA  5. Sitting at edge of bed x5 minutes with Supervision  6. Lower extremity exercise program x10-20 reps per handout, with independence     Outcome: Ongoing (interventions implemented as appropriate)  PT for bilateral lower extremity therapeutic exercises AROM/AAROM.

## 2019-04-10 ENCOUNTER — TELEPHONE (OUTPATIENT)
Dept: MEDSURG UNIT | Facility: HOSPITAL | Age: 54
End: 2019-04-10

## 2019-04-10 LAB
BACTERIA BLD CULT: NORMAL
BACTERIA BLD CULT: NORMAL

## 2019-04-10 NOTE — DISCHARGE SUMMARY
[facility] Hospital Medicine  Discharge Summary      Patient Name: Elif Archibald  MRN: [id number]  Admission Date: 4/4/2019  Hospital Length of Stay: 4 days  Discharge Date and Time: 4/9/2019  3:08 PM  Attending Physician: No att. 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  She presented to the ED from [place] for further evaluation of cough, congestion, and tachycardia.  Nursing staff noted she had a fast heart rate throughout the day with congested breath sounds and increased sputum production per trache.  Hx is limited as patient is nonverbal with trache.  Her family is at bedside and does report a Hx of pneumonia, as well as MRSA sputum infection.  Records reveal she completed treatment for this back in February.  Per family she is due to have a bone flap surgery sooner at [facility] and her plan includes keeping trach until after that surgery.  Patient evaluated in the emergency room where she was noted to have sepsis and pneumonia and admitted for further evaluation treatment.    * No surgery found *      Hospital Course:   The patient was monitored closely during her stay.  Pulmonary was consulted.  She was continued on supplement O2, aerosol treatments, and IV cefepime, Levaquin, and  vancomycin.  PT and OT were consulted for debility.  IV vancomycin was later discontinued.  Respiratory culture grew Pseudomonas.  It was sensitive to most of the antibiotics tested.  With treatment, her condition improved.  We kept her on T-piece at about 5 liters.  Eventually she was discharged back to the nursing home in good condition.    General appearance: alert, appears stated age and no distress  Neck: no  JVD  Lungs: clear to auscultation bilaterally and normal effort.  Heart: regular rate and rhythm, S1, S2 normal, no murmur, click, rub or gallop       Consults:   Consults (From admission, onward)        Status Ordering Provider     Inpatient consult to Pulmonology  Once     Provider:  MD Emely Menezes JESSICA M.     Inpatient consult to Registered Dietitian/Nutritionist  Once     Provider:  (Not yet assigned)    MILTON Schaefer     Inpatient consult to Social Work/Case Management  Once     Provider:  (Not yet assigned)    MILTON Schaefer            Final Active Diagnoses:    Diagnosis Date Noted POA    PRINCIPAL PROBLEM:  Pseudomonal pneumonia [J15.1] 04/05/2019 Yes    Chronic respiratory failure [J96.10] 04/05/2019 Yes    Tracheostomy status [Z93.0] 04/05/2019 Not Applicable    PEG (percutaneous endoscopic gastrostomy) status [Z93.1] 04/05/2019 Not Applicable    Chronic obstructive pulmonary disease with acute lower respiratory infection [J44.0] 04/05/2019 Yes    Hx of subdural hematoma [Z86.79] 04/05/2019 Not Applicable    Recurrent major depressive disorder, in partial remission [F33.41] 04/05/2019 Yes    Essential hypertension [I10] 04/05/2019 Yes    Alcoholic cirrhosis of liver without ascites [K70.30] 04/05/2019 Yes    Hypoalbuminemia due to protein-calorie malnutrition [E46] 04/05/2019 Yes    Normocytic anemia [D64.9] 04/05/2019 Yes    Debility [R53.81] 04/05/2019 Yes    Functional quadriplegia [R53.2] 04/05/2019 Yes    Lung nodules [R91.8] 04/05/2019 Yes      Problems Resolved During this Admission:    Diagnosis Date Noted Date Resolved POA    Sepsis [A41.9] 04/05/2019 04/09/2019 Yes    Sinus tachycardia [R00.0] 04/05/2019 04/09/2019 Yes    Elevated liver enzymes [R74.8] 04/05/2019 04/09/2019 Yes    Dehydration [E86.0] 04/05/2019 04/09/2019 Yes    Hypomagnesemia [E83.42] 04/05/2019 04/09/2019 Yes       Discharged Condition: good    Disposition:  nursing home Nursing Home    Follow Up:  Follow-up Information     [facility].    Why:  nursing home  Contact information:  [address]  [facility] [id number]               Patient Instructions:      Tube Feedings/Formulas     Order Specific Question Answer Comments   Select Adult Formula: Isosource 1.5 justin    Route: Gastrostomy    Formula Rate (mL/hr): 60      Activity as tolerated       Significant Diagnostic Studies:   Lab Results   Component Value Date    WBC 5.00 04/08/2019    HGB 8.0 (L) 04/08/2019    HCT 23.6 (L) 04/08/2019    MCV 86 04/08/2019     04/08/2019     BMP  Lab Results   Component Value Date     (L) 04/08/2019    K 4.0 04/08/2019     04/08/2019    CO2 28 04/08/2019    BUN 11 04/08/2019    CREATININE 0.6 04/08/2019    CALCIUM 9.1 04/08/2019    ANIONGAP 3 (L) 04/08/2019    ESTGFRAFRICA >60 04/08/2019    EGFRNONAA >60 04/08/2019         Pending Diagnostic Studies:     None         Medications:  Reconciled Home Medications:      Medication List      START taking these medications    ciprofloxacin HCl 750 MG tablet  Commonly known as:  CIPRO  1 tablet (750 mg total) by Per G Tube route every 12 (twelve) hours. Diagnosis:  Pseudomonas pneumonia. for 4 days        CONTINUE taking these medications    acetylcysteine 200 mg/ml (20%) 200 mg/mL (20 %) nebulizer solution  Commonly known as:  MUCOMYST  Take 2 mLs by nebulization 2 (two) times daily.     * albuterol sulfate 2.5 mg/0.5 mL Nebu  Take 2.5 mg by nebulization every 6 (six) hours as needed. Rescue     * albuterol sulfate 2.5 mg/0.5 mL Nebu  Take 2.5 mg by nebulization 2 (two) times daily.     * albuterol 90 mcg/actuation inhaler  Commonly known as:  PROVENTIL/VENTOLIN HFA  Inhale 1 puff into the lungs every 4 (four) hours as needed for Wheezing.     amitriptyline 25 MG tablet  Commonly known as:  ELAVIL  25 mg by Per G Tube route every evening.     chlordiazepoxide 5 MG capsule  Commonly known as:  LIBRIUM  5 mg by Per G  Tube route nightly.     diazePAM 5 MG tablet  Commonly known as:  VALIUM  5 mg by Per G Tube route every 8 (eight) hours as needed for Anxiety.     escitalopram oxalate 20 MG tablet  Commonly known as:  LEXAPRO  20 mg by Per G Tube route once daily.     folic acid 1 MG tablet  Commonly known as:  FOLVITE  1 mg by Per G Tube route once daily.     gabapentin 300 MG capsule  Commonly known as:  NEURONTIN  300 mg by Per G Tube route 3 (three) times daily.     PADDY ORAL  1 packet by Per G Tube route 2 (two) times daily.     levETIRAcetam 1000 MG tablet  Commonly known as:  KEPPRA  1,000 mg by Per G Tube route 2 (two) times daily.     metoprolol tartrate 50 MG tablet  Commonly known as:  LOPRESSOR  50 mg by Per G Tube route 4 (four) times daily.     multivitamin tablet  Commonly known as:  THERAGRAN  1 tablet by Per G Tube route once daily.     oxyCODONE-acetaminophen 7.5-325 mg per tablet  Commonly known as:  PERCOCET  1 tablet by Per G Tube route every 6 (six) hours as needed for Pain.     polyethylene glycol 17 gram Pwpk  Commonly known as:  GLYCOLAX  17 g by Per G Tube route once daily.     risperiDONE 0.5 MG Tab  Commonly known as:  RISPERDAL  0.5 mg by Per G Tube route every evening.     VITAMIN C 500 MG tablet  Generic drug:  ascorbic acid (vitamin C)  500 mg by Per G Tube route 2 (two) times daily.         * This list has 3 medication(s) that are the same as other medications prescribed for you. Read the directions carefully, and ask your doctor or other care provider to review them with you.            STOP taking these medications    ALPRAZolam 0.25 MG tablet  Commonly known as:  XANAX     clonazePAM 1 MG tablet  Commonly known as:  KLONOPIN     etodolac 400 MG tablet  Commonly known as:  LODINE     HYDROcodone-acetaminophen 5-325 mg per tablet  Commonly known as:  NORCO     ibuprofen 200 mg Cap     ibuprofen 800 MG tablet  Commonly known as:  ADVIL,MOTRIN     lidocaine 5 %  Commonly known as:  LIDODERM      LORazepam 1 MG tablet  Commonly known as:  ATIVAN     mupirocin 2 % ointment  Commonly known as:  BACTROBAN     naproxen 375 MG tablet  Commonly known as:  NAPROSYN     neomycin-bacitracin-polymyxin ointment  Commonly known as:  NEOSPORIN (GJI-HPZ-UZOXD)     ondansetron 4 MG tablet  Commonly known as:  ZOFRAN     paroxetine 10 MG tablet  Commonly known as:  PAXIL     topiramate 25 mg capsule            Indwelling Lines/Drains at time of discharge:   Lines/Drains/Airways     Airway                 Surgical Airway Shiley Cuffed -- days                Time spent on the discharge of patient: 36 minutes  Patient was seen and examined on the date of discharge and determined to be suitable for discharge.         Jeremy Pritchett MD  Department of Hospital Medicine  [facility]

## 2019-04-15 ENCOUNTER — HOSPITAL ENCOUNTER (EMERGENCY)
Facility: HOSPITAL | Age: 54
Discharge: HOME | End: 2019-04-15
Attending: EMERGENCY MEDICINE
Payer: MEDICAID

## 2019-04-15 VITALS
BODY MASS INDEX: 18.88 KG/M2 | SYSTOLIC BLOOD PRESSURE: 101 MMHG | WEIGHT: 103.25 LBS | HEART RATE: 98 BPM | DIASTOLIC BLOOD PRESSURE: 62 MMHG | RESPIRATION RATE: 20 BRPM | TEMPERATURE: 97 F | OXYGEN SATURATION: 100 %
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
## 2019-04-15 PROCEDURE — 99284 EMERGENCY DEPT VISIT MOD MDM: CPT

## 2019-04-15 PROCEDURE — 99900026 HC AIRWAY MAINTENANCE (STAT)

## 2019-04-15 RX ORDER — HYDROXYZINE PAMOATE 50 MG/1
50 CAPSULE ORAL EVERY 6 HOURS PRN
Status: ON HOLD | COMMUNITY
End: 2020-03-09

## 2019-04-15 RX ORDER — ALBUTEROL SULFATE 90 UG/1
1 AEROSOL, METERED RESPIRATORY (INHALATION) EVERY 6 HOURS PRN
COMMUNITY
End: 2020-09-03

## 2019-04-15 NOTE — ED NOTES
Patient's helmet brought to ED room 6 by staff member from [place]. Daughter reports that patient is to have helmet on at all times when she is out of bed. Patient did not arrive to ED with helmet on.

## 2019-04-15 NOTE — ED PROVIDER NOTES
Encounter Date: 4/15/2019    SCRIBE #1 NOTE: IAlice, am scribing for, and in the presence of, Dr. Ty Powell.       History     Chief Complaint   Patient presents with    Fall     unwitnessed fall out of wheelchair. Sent from [place] for evaluation. No known injury       Time seen by provider: 3:52 PM on 04/15/2019    Elif Archibald is a 53 y.o. female with PMHx of bipolar 1 disorder, schizophrenia, seizures, and COPD who presents to the ED for evaluation s/p an unwitnessed fall from [facility]. Patient denies onset of any pain. She denies neck pain and back pain. She has no other medical concerns or complaints at this moment. SHx includes ED, appendectomy, craniectomy, and PEG w/ tracheostomy placement. NKDA noted.     The history is provided by the patient.     Review of patient's allergies indicates:  No Known Allergies  Past Medical History:   Diagnosis Date    Anxiety     Asthma     Bipolar 1 disorder     Cirrhosis, alcoholic     Cocaine abuse     COPD (chronic obstructive pulmonary disease)     Depression     Schizophrenia     Seizures      Past Surgical History:   Procedure Laterality Date    APPENDECTOMY      CRANIECTOMY Left 01/31/2019    ESOPHAGOGASTRODUODENOSCOPY      ESOPHAGOSCOPY N/A 12/9/2014    [facility] 2ND F[facility]    LARYNGOSCOPY BRONCHOSCOPY-DIRECT N/A 12/9/2014    [facility]formed [facility] OR 2ND FLR    PEG W/TRACHEOSTOMY PLACEMENT  02/16/2019     Family History   Problem Relation Age of Onset    COPD Mother     Cirrhosis Father      Social History     Tobacco Use    Smoking status: Former Smoker     Packs/day: 2.00     Types: Cigarettes    Smokeless tobacco: Never Used   Substance Use Topics    Alcohol use: Not Currently     Alcohol/week: 25.2 oz     Types: 42 Cans of beer per week     Comment: daily    Drug use: No     Review of Systems   Constitutional: Negative for activity change.   Respiratory: Negative for  shortness of breath.    Cardiovascular: Negative for chest pain.   Gastrointestinal: Negative for abdominal pain, nausea and vomiting.   Musculoskeletal: Negative for back pain and neck pain.   Skin: Negative for wound.   Neurological: Negative for headaches.       Physical Exam     Initial Vitals [04/15/19 1544]   BP Pulse Resp Temp SpO2   101/65 90 20 97.3 °F (36.3 °C) 100 %      MAP       --         Physical Exam    Nursing note and vitals reviewed.  Constitutional: She appears well-developed and well-nourished. She is not diaphoretic.  Non-toxic appearance. She does not have a sickly appearance. She does not appear ill. No distress.   No visible signs of trauma. Thin female no distress   HENT:   Head: Normocephalic and atraumatic.   Previous left hemicraniectomy. Tracheostomy tube in place.    Eyes: EOM are normal. Pupils are equal, round, and reactive to light.   Neck: Normal range of motion. Neck supple.   Cardiovascular: Normal rate, regular rhythm and normal heart sounds. Exam reveals no gallop and no friction rub.    No murmur heard.  Pulmonary/Chest: Breath sounds normal. No respiratory distress. She has no wheezes. She has no rhonchi. She has no rales.   Abdominal: She exhibits no distension. There is no tenderness. There is no rebound.   PEG tube intact and well appearing.    Musculoskeletal: Normal range of motion. She exhibits no edema or tenderness.        Right shoulder: Normal. She exhibits normal range of motion.        Left shoulder: Normal. She exhibits normal range of motion.        Right elbow: Normal.       Left elbow: Normal.        Right wrist: Normal.        Left wrist: Normal.        Right hip: Normal. She exhibits normal range of motion.        Left hip: Normal. She exhibits normal range of motion.        Right knee: Normal.        Left knee: Normal.        Right ankle: Normal.        Left ankle: Normal.   Full ROM of BUE's and BLE's.  No tenderness or swelling to any major joint or long  bone    Neurological: She is alert and oriented to person, place, and time.   Skin: Skin is warm and dry.   Psychiatric: She has a normal mood and affect. Her behavior is normal. Judgment and thought content normal.         ED Course   Procedures  Labs Reviewed - No data to display       Imaging Results          CT Head Without Contrast (Final result)  Result time 04/15/19 17:32:42    Final result by Devin Foy MD (04/15/19 17:32:42)                 Impression:      No acute intracranial abnormality.  Mild white matter disease.  Calvarial postsurgical change as described.      Electronically signed by: Devin Foy  Date:    04/15/2019  Time:    17:32             Narrative:    EXAMINATION:  CT HEAD WITHOUT CONTRAST    CLINICAL HISTORY:  Headache, post trauma;    TECHNIQUE:  Low dose axial images were obtained through the head.  Coronal and sagittal reformations were also performed. Contrast was not administered.    COMPARISON:  01/17/2019    FINDINGS:  Mild diffuse increase in ventricular volume, likely compensatory.  Prominent CSF density lesion overlying the cerebellar vermis at the midline likely arachnoid cyst or vanessa cisterna magna variant.  Periventricular white matter hypoattenuation is nonspecific but suggestive of chronic microvascular ischemic change.  No mass, hemorrhage or acute major vascular distribution infarct. No midline shift.  Included orbits are unremarkable. Paranasal sinuses and mastoid air cells are clear.  There is a left frontal craniectomy with subjacent CSF density extra-axial fluid collection along the left frontotemporal convexity likely in keeping with hygroma.  Atherosclerosis.                                 Medical Decision Making:   History:   Old Medical Records: I decided to obtain old medical records.  Clinical Tests:   Radiological Study: Reviewed and Ordered            Scribe Attestation:   Scribe #1: I performed the above scribed service and the documentation  accurately describes the services I performed. I attest to the accuracy of the note.      I, Dr. Powell, personally performed the services described in this documentation. All medical record entries made by the scribe were at my direction and in my presence.  I have reviewed the chart and agree that the record reflects my personal performance and is accurate and complete.6:09 PM 04/15/2019              ED Course as of Apr 15 1809   Mon Apr 15, 2019   1740 CT Head Without Contrast [EF]   1743 SpO2: 100 % [EF]   1743 Resp: 20 [EF]   1743 Pulse: 90 [EF]   1743 Temp src: Oral [EF]   1743 Temp: 97.3 °F (36.3 °C) [EF]   1743 BP: 101/65 [EF]      ED Course User Index  [EF] Ty Powell MD     Clinical Impression:       ICD-10-CM ICD-9-CM   1. Fall, initial encounter W19.XXXA E888.9         Disposition:   Disposition: Discharged  Condition: Stable           53-year-old with a previous left mariano craniectomy presents with a fall from her wheelchair at [place] which was unwitnessed.  No complaints in the emergency department.  CT of the head is unremarkable. No extremity trauma or complaints.  No indication for any extremity imaging.  Patient will be discharged back to [place].             Ty Powell MD  04/15/19 1809

## 2019-04-15 NOTE — ED NOTES
Presents to the ER with c/o fall that occurred while at [place]. Patient was found on the floor, out of her wheel chair. Patient denies any pain at this time. Mucous membranes are pink and moist. Skin is warm, dry and intact. Lungs are clear bilaterally, respirations are regular and unlabored. Denies cough, congestion, rhinorrhea or SOB. BS active x4, no tenderness with palpation, abd is soft and not distended. Denies any appetite or activity change. S1S2, capillary refill is < 2 seconds. Denies dysuria, difficulty urinating, frequency, numbness, tingling or weakness. GELY BROWNES

## 2019-04-16 NOTE — ED NOTES
[facility] staff is at the bedside to transfer patient back to nursing home.   Upon discharge, patient is AAOx4, no cardiac or respiratory complications. Follow up care reviewed with patient and has been instructed to return to the ER if needed. Patient verbalized understanding and was assisted to transfer van. JESSICA HONG

## 2019-04-24 ENCOUNTER — HOSPITAL ENCOUNTER (EMERGENCY)
Facility: HOSPITAL | Age: 54
Discharge: HOME | End: 2019-04-25
Attending: EMERGENCY MEDICINE
Payer: MEDICAID

## 2019-04-24 DIAGNOSIS — S09.90XA INJURY OF HEAD, INITIAL ENCOUNTER: Primary | ICD-10-CM

## 2019-04-24 PROCEDURE — 99284 EMERGENCY DEPT VISIT MOD MDM: CPT | Mod: 25

## 2019-04-24 PROCEDURE — 96367 TX/PROPH/DG ADDL SEQ IV INF: CPT

## 2019-04-25 VITALS
HEART RATE: 97 BPM | BODY MASS INDEX: 17.48 KG/M2 | HEIGHT: 62 IN | DIASTOLIC BLOOD PRESSURE: 61 MMHG | WEIGHT: 95 LBS | SYSTOLIC BLOOD PRESSURE: 101 MMHG | OXYGEN SATURATION: 100 % | RESPIRATION RATE: 18 BRPM

## 2019-04-25 NOTE — ED PROVIDER NOTES
Encounter Date: 4/24/2019    SCRIBE #1 NOTE: I, Lourdes Leal, am scribing for, and in the presence of, Dr. Hogue.       History     Chief Complaint   Patient presents with    Head Injury     Pt's roommate's iv pole fell and struck head        Time seen by provider: 10:32 PM on 04/24/2019    The patient is a 54 y.o. female Hx of COPD, seizures, cirrhosis who presents to the ED via EMS with complaint of head injury shortly prior to arrival. Per [facility], another patient's IV pole fell, striking the patient in the head near her craniectomy. Denies any other symptoms at this time. No LOC. PSHx of craniectomy (L, 1/31/2019).     The history is provided by the patient and the nursing home.     Review of patient's allergies indicates:  No Known Allergies  Past Medical History:   Diagnosis Date    Anxiety     Asthma     Bipolar 1 disorder     Cirrhosis, alcoholic     Cocaine abuse     COPD (chronic obstructive pulmonary disease)     Depression     Schizophrenia     Seizures      Past Surgical History:   Procedure Laterality Date    APPENDECTOMY      CRANIECTOMY Left 01/31/2019    ESOPHAGOGASTRODUODENOSCOPY      ESOPHAGOSCOPY N/A 12/9/2014    [facility]    LARYNGOSCOPY BRONCHOSCOPY-DIRECT N/A 12/9/2014    [facility] at [facility]    PEG W/TRACHEOSTOMY PLACEMENT  02/16/2019     Family History   Problem Relation Age of Onset    COPD Mother     Cirrhosis Father      Social History     Tobacco Use    Smoking status: Former Smoker     Packs/day: 2.00     Types: Cigarettes    Smokeless tobacco: Never Used   Substance Use Topics    Alcohol use: Not Currently     Alcohol/week: 25.2 oz     Types: 42 Cans of beer per week     Comment: daily    Drug use: No     Review of Systems   Constitutional: Negative for fever.   HENT: Negative for sore throat.    Respiratory: Negative for shortness of breath.    Cardiovascular: Negative for chest pain.    Gastrointestinal: Negative for nausea.   Genitourinary: Negative for dysuria.   Musculoskeletal: Negative for back pain.   Skin: Negative for rash.   Neurological: Negative for weakness.   Hematological: Does not bruise/bleed easily.       Physical Exam     Initial Vitals [04/24/19 2217]   BP Pulse Resp Temp SpO2   98/63 97 18 -- 100 %      MAP       --         Physical Exam    Nursing note and vitals reviewed.  Constitutional: She appears well-developed and well-nourished.  Non-toxic appearance. No distress.   HENT:   Head: Normocephalic.   Craniectomy defect left side.   Trache in place.   Eyes: EOM are normal. Pupils are equal, round, and reactive to light.   Neck: Normal range of motion. Neck supple. No neck rigidity. No JVD present.   Cardiovascular: Normal rate, regular rhythm, normal heart sounds and intact distal pulses. Exam reveals no gallop and no friction rub.    No murmur heard.  Pulmonary/Chest: Breath sounds normal. She has no wheezes. She has no rhonchi. She has no rales.   Abdominal: Soft. Bowel sounds are normal. She exhibits no distension. There is no rigidity.   Peg tube in place.   Musculoskeletal: She exhibits no tenderness.   Neurological: She is alert. GCS eye subscore is 4. GCS verbal subscore is 4. GCS motor subscore is 6.   Skin: Skin is warm and dry.   Psychiatric: She has a normal mood and affect. Her speech is normal. She is not actively hallucinating.         ED Course   Procedures  Labs Reviewed - No data to display       Imaging Results          CT Head Without Contrast (In process)                  Medical Decision Making:   History:   Old Medical Records: I decided to obtain old medical records.  Initial Assessment:   Patient is a 54-year-old woman who presents emergency department from [place] for evaluation of head injury. Patient's roommate had an IV pole that fell over and hit the patient on the, on the side of her craniotomy.  Patient has no complaints. CT head performed  showed no acute intracranial process.  Reassurance provided.  Discharged in no acute distress.  Clinical Tests:   Radiological Study: Ordered and Reviewed            Scribe Attestation:   Scribe #1: I performed the above scribed service and the documentation accurately describes the services I performed. I attest to the accuracy of the note.    I, Mykel Momin, personally performed the services described in this documentation. All medical record entries made by the scribe were at my direction and in my presence.  I have reviewed the chart and agree that the record reflects my personal performance and is accurate and complete. Joe Hogue MD.  5:21 AM 04/25/2019       DISCLAIMER: This note was prepared with [employer] Direct voice recognition transcription software. Garbled syntax, mangled pronouns, and other bizarre constructions may be attributed to that software system.             Clinical Impression:     1. Injury of head, initial encounter          Disposition:   Disposition: Discharged  Condition: Stable                        Joe Hogue MD  04/25/19 0521

## 2019-04-25 NOTE — DISCHARGE INSTRUCTIONS
CT of your head was performed and showed no acute abnormalities.  Return if you developed vomiting, change in mental status or any other concerns.

## 2019-05-09 ENCOUNTER — HOSPITAL ENCOUNTER (EMERGENCY)
Facility: HOSPITAL | Age: 54
Discharge: HOME | End: 2019-05-09
Attending: EMERGENCY MEDICINE
Payer: MEDICAID

## 2019-05-09 VITALS
RESPIRATION RATE: 14 BRPM | BODY MASS INDEX: 17.48 KG/M2 | HEART RATE: 84 BPM | DIASTOLIC BLOOD PRESSURE: 66 MMHG | SYSTOLIC BLOOD PRESSURE: 98 MMHG | HEIGHT: 62 IN | OXYGEN SATURATION: 100 % | WEIGHT: 95 LBS | TEMPERATURE: 97 F

## 2019-05-09 DIAGNOSIS — S00.03XA TRAUMATIC HEMATOMA OF SCALP, INITIAL ENCOUNTER: Primary | ICD-10-CM
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
## 2019-05-09 PROCEDURE — 99284 EMERGENCY DEPT VISIT MOD MDM: CPT

## 2019-05-09 RX ORDER — ACETAMINOPHEN 325 MG/1
650 TABLET ORAL EVERY 6 HOURS PRN
Status: ON HOLD | COMMUNITY
End: 2020-03-09

## 2019-05-09 NOTE — ED PROVIDER NOTES
Encounter Date: 5/9/2019    SCRIBE #1 NOTE: IMamta, am scribing for, and in the presence of, Dr. Blount.       History     Chief Complaint   Patient presents with    Fall     Fell hitting back of head. No LOC, + hematoma       Time seen by provider: 2:31 PM on 05/09/2019    Elif Archibald is a 54 y.o. female who presents to the ED via EMS for evaluation of a bruise to the back of her head s/p a fall from standing immediately PTA. The patient denies neck  or any other symptoms at this time. PMHx includes seizures, bipolar 1 disorder, schizophrenia, cirrhosis, and COPD. SHx includes a craniectomy (1/31/2019) and PEG with tracheostomy placement (2/16/2019). No known drug allergies noted. HPI and ROS are limited secondary to pt's trach placement.    The history is provided by the patient and the EMS personnel. The history is limited by the condition of the patient.     Review of patient's allergies indicates:  No Known Allergies  Past Medical History:   Diagnosis Date    Anxiety     Asthma     Bipolar 1 disorder     Cirrhosis, alcoholic     Cocaine abuse     COPD (chronic obstructive pulmonary disease)     Depression     Schizophrenia     Seizures      Past Surgical History:   Procedure Laterality Date    APPENDECTOMY      CRANIECTOMY Left 01/31/2019    ESOPHAGOGASTRODUODENOSCOPY      ESOPHAGOSCOPY N/A 12/9/2014    Performed [facility]    LARYNGOSCOPY BRONCHOSCOPY-DIRECT N/A 12/9/2014    [facility] at [facility]    PEG W/TRACHEOSTOMY PLACEMENT  02/16/2019     Family History   Problem Relation Age of Onset    COPD Mother     Cirrhosis Father      Social History     Tobacco Use    Smoking status: Former Smoker     Packs/day: 2.00     Types: Cigarettes    Smokeless tobacco: Never Used   Substance Use Topics    Alcohol use: Not Currently     Alcohol/week: 25.2 oz     Types: 42 Cans of beer per week     Comment: daily    Drug use:  No     Review of Systems   Unable to perform ROS: Patient nonverbal (trach in place)   Gastrointestinal: Negative for abdominal pain.   Genitourinary: Positive for menstrual problem.   Musculoskeletal: Negative for back pain and neck pain.   Neurological: Positive for headaches.       Physical Exam     Initial Vitals [05/09/19 1429]   BP Pulse Resp Temp SpO2   99/66 85 14 97 °F (36.1 °C) 99 %      MAP       --         Physical Exam    Nursing note and vitals reviewed.  Constitutional: She appears well-developed and well-nourished. She is not diaphoretic. No distress.   HENT:   Head: Normocephalic.   Chronic skull deformity to the left side of skull. Hematoma to right occipital region.   Eyes: EOM are normal. Pupils are equal, round, and reactive to light.   Neck: Normal range of motion. Neck supple.   Trach in place. PEG tube in place.   Cardiovascular: Normal rate, regular rhythm, normal heart sounds and intact distal pulses. Exam reveals no gallop and no friction rub.    No murmur heard.  Pulmonary/Chest: Breath sounds normal. No respiratory distress. She has no wheezes. She has no rhonchi. She has no rales.   Abdominal: Soft. Bowel sounds are normal. There is no tenderness. There is no rebound and no guarding.   Musculoskeletal: Normal range of motion.   Neurological: She is alert and oriented to person, place, and time. GCS eye subscore is 4. GCS verbal subscore is 5. GCS motor subscore is 6.   Skin: Skin is warm and dry.   Psychiatric: She has a normal mood and affect. Her behavior is normal. Judgment and thought content normal.         ED Course   Procedures  Labs Reviewed - No data to display       Imaging Results          CT Head Without Contrast (Final result)  Result time 05/09/19 15:04:45    Final result by Lisa White MD (05/09/19 15:04:45)                 Impression:      No acute intracranial findings or significant change compared to the prior exam of 04/24/2019.  There again noted postsurgical  changes of left temporal, frontal, parietal craniotomy with extra-axial fluid collection at the craniotomy site consistent with subdural hygroma.      Electronically signed by: Lisa White MD  Date:    05/09/2019  Time:    15:04             Narrative:    EXAMINATION:  CT HEAD WITHOUT CONTRAST    CLINICAL HISTORY:  Head trauma, headache;    TECHNIQUE:  Low dose axial images were obtained through the head.  Coronal and sagittal reformations were also performed. Contrast was not administered.    COMPARISON:  4/24/2019    FINDINGS:  As redemonstrated and not significantly changed compared to the prior exam there is left frontal, parietal, temporal craniotomy.  There is low-density fluid collection adjacent to the overlying skin consistent with subdural hygroma appearing unchanged..  There is mild dilatation of ventricles, sulci, fissures and subtle low density in white matter suggesting microvascular ischemic change with findings not appearing significantly.  There is prominent cisterna magna.  There is no acute intracranial hemorrhage.  There is no intracranial mass effect.  There is no acute major vascular territory infarct.  Note is made that MRI is typically more sensitive than CT particularly for detection of early or small nonhemorrhagic infarcts.    There is chronic deformity of the right lamina papyracea.  Trace fluid was noted in the right maxillary sinus on the prior exam with the maxillary sinuses not as fully visualized today.  Visualized segments today appear clear.  Mastoids appear pneumatized.  No depressed skull fracture.                                 Medical Decision Making:   History:   Old Medical Records: I decided to obtain old medical records.  Initial Assessment:   54-year-old female presented with a chief complaint of a head injury status post fall.  Differential Diagnosis:   Initial differential diagnosis included but not limited to intracranial hemorrhage, skull fracture, and head  contusion.  Clinical Tests:   Radiological Study: Ordered and Reviewed  ED Management:  The patient was emergently evaluated in the emergency department, her evaluation was significant for a middle-age female who is at her normal neurologic status.  Patient does have a hematoma noted to the occipital region of her head.  The patient's CT scan of her head showed no acute abnormalities per Radiology.  The patient's diagnosis is likely a scalp hematoma.  She is stable for discharge back to her nursing home.  She needs to keep her helmet on at all times.  She is to otherwise follow up with her PCP for further care.            Scribe Attestation:   Scribe #1: I performed the above scribed service and the documentation accurately describes the services I performed. I attest to the accuracy of the note.        I, Dr. Tereso Blount, personally performed the services described in this documentation. All medical record entries made by the scribe were at my direction and in my presence.  I have reviewed the chart and agree that the record reflects my personal performance and is accurate and complete. Tereso Blount MD.  4:38 PM 05/09/2019          Clinical Impression:       ICD-10-CM ICD-9-CM   1. Traumatic hematoma of scalp, initial encounter S00.03XA 920   2. Fall, initial encounter W19.XXXA E888.9         Disposition:   Disposition: Discharged  Condition: Stable                        Tereso Blount MD  05/09/19 1640

## 2019-05-09 NOTE — ED NOTES
Report called to Jennifer at [facility]. Per Jennifer the [facility] transport van will come to transport the pt back to [facility].

## 2019-05-09 NOTE — ED NOTES
Pt presents to ED via EMS from [facility] for evaluation after a fall. Per EMS the staff at [facility] reported that the pt got up unsupervised without her helmet on and fell. Pt is noted to have a hematoma to the back of her head. Pt is awake, alert, follows commands. Pt does not speak at baseline. Pt had a craniectomy in January 2019. Surgical scars and skull deformity noted to left side of head. Hematoma is noted to right occipital area. Pt is smiling. Moves all extremities. Helmet in place at this time.

## 2019-05-12 ENCOUNTER — HOSPITAL ENCOUNTER (INPATIENT)
Facility: HOSPITAL | Age: 54
LOS: 2 days | Discharge: NURSING, REHAB OR LONG-TERM CARE | DRG: 871 | End: 2019-05-14
Attending: EMERGENCY MEDICINE | Admitting: HOSPITALIST
Payer: MEDICAID

## 2019-05-12 DIAGNOSIS — A41.9 SEPSIS: ICD-10-CM

## 2019-05-12 LAB
ALBUMIN SERPL BCP-MCNC: 2.7 G/DL (ref 3.5–5.2)
ALP SERPL-CCNC: 79 U/L (ref 55–135)
ALT SERPL W/O P-5'-P-CCNC: 142 U/L (ref 10–44)
ANION GAP SERPL CALC-SCNC: 6 MMOL/L (ref 8–16)
AST SERPL-CCNC: 91 U/L (ref 10–40)
BACTERIA #/AREA URNS HPF: ABNORMAL /HPF
BASOPHILS NFR BLD: 0 % (ref 0–1.9)
BILIRUB SERPL-MCNC: 0.3 MG/DL (ref 0.1–1)
BILIRUB UR QL STRIP: NEGATIVE
BUN SERPL-MCNC: 23 MG/DL (ref 6–20)
CALCIUM SERPL-MCNC: 9.9 MG/DL (ref 8.7–10.5)
CHLORIDE SERPL-SCNC: 94 MMOL/L (ref 95–110)
CLARITY UR: ABNORMAL
CO2 SERPL-SCNC: 33 MMOL/L (ref 23–29)
COLOR UR: YELLOW
CREAT SERPL-MCNC: 0.7 MG/DL (ref 0.5–1.4)
DIFFERENTIAL METHOD: ABNORMAL
EOSINOPHIL NFR BLD: 0 % (ref 0–8)
ERYTHROCYTE [DISTWIDTH] IN BLOOD BY AUTOMATED COUNT: 14.4 % (ref 11.5–14.5)
EST. GFR  (AFRICAN AMERICAN): >60 ML/MIN/1.73 M^2
EST. GFR  (NON AFRICAN AMERICAN): >60 ML/MIN/1.73 M^2
GLUCOSE SERPL-MCNC: 117 MG/DL (ref 70–110)
GLUCOSE UR QL STRIP: NEGATIVE
HCT VFR BLD AUTO: 32.8 % (ref 37–48.5)
HGB BLD-MCNC: 10.6 G/DL (ref 12–16)
HGB UR QL STRIP: ABNORMAL
HYALINE CASTS #/AREA URNS LPF: 0 /LPF
KETONES UR QL STRIP: NEGATIVE
LACTATE SERPL-SCNC: 0.8 MMOL/L (ref 0.5–2.2)
LACTATE SERPL-SCNC: 1.6 MMOL/L (ref 0.5–2.2)
LEUKOCYTE ESTERASE UR QL STRIP: ABNORMAL
LYMPHOCYTES NFR BLD: 13 % (ref 18–48)
MAGNESIUM SERPL-MCNC: 1.9 MG/DL (ref 1.6–2.6)
MCH RBC QN AUTO: 27.2 PG (ref 27–31)
MCHC RBC AUTO-ENTMCNC: 32.3 G/DL (ref 32–36)
MCV RBC AUTO: 84 FL (ref 82–98)
MICROSCOPIC COMMENT: ABNORMAL
MONOCYTES NFR BLD: 5 % (ref 4–15)
NEUTROPHILS NFR BLD: 79 % (ref 38–73)
NEUTS BAND NFR BLD MANUAL: 3 %
NITRITE UR QL STRIP: NEGATIVE
PH UR STRIP: >8 [PH] (ref 5–8)
PHOSPHATE SERPL-MCNC: 3.4 MG/DL (ref 2.7–4.5)
PLATELET # BLD AUTO: 317 K/UL (ref 150–350)
PMV BLD AUTO: 7 FL (ref 9.2–12.9)
POTASSIUM SERPL-SCNC: 4.3 MMOL/L (ref 3.5–5.1)
PROCALCITONIN SERPL IA-MCNC: 0.1 NG/ML
PROT SERPL-MCNC: 9.3 G/DL (ref 6–8.4)
PROT UR QL STRIP: ABNORMAL
RBC # BLD AUTO: 3.9 M/UL (ref 4–5.4)
RBC #/AREA URNS HPF: 40 /HPF (ref 0–4)
SODIUM SERPL-SCNC: 133 MMOL/L (ref 136–145)
SP GR UR STRIP: <=1.005 (ref 1–1.03)
SQUAMOUS #/AREA URNS HPF: 2 /HPF
URN SPEC COLLECT METH UR: ABNORMAL
UROBILINOGEN UR STRIP-ACNC: NEGATIVE EU/DL
WBC # BLD AUTO: 17.7 K/UL (ref 3.9–12.7)
WBC #/AREA URNS HPF: >100 /HPF (ref 0–5)

## 2019-05-12 PROCEDURE — 87088 URINE BACTERIA CULTURE: CPT

## 2019-05-12 PROCEDURE — 93005 ELECTROCARDIOGRAM TRACING: CPT

## 2019-05-12 PROCEDURE — 85007 BL SMEAR W/DIFF WBC COUNT: CPT

## 2019-05-12 PROCEDURE — 87040 BLOOD CULTURE FOR BACTERIA: CPT

## 2019-05-12 PROCEDURE — 87077 CULTURE AEROBIC IDENTIFY: CPT

## 2019-05-12 PROCEDURE — 87086 URINE CULTURE/COLONY COUNT: CPT

## 2019-05-12 PROCEDURE — 99291 CRITICAL CARE FIRST HOUR: CPT | Mod: 25

## 2019-05-12 PROCEDURE — 85027 COMPLETE CBC AUTOMATED: CPT

## 2019-05-12 PROCEDURE — 63600175 PHARM REV CODE 636 W HCPCS: Performed by: HOSPITALIST

## 2019-05-12 PROCEDURE — 36415 COLL VENOUS BLD VENIPUNCTURE: CPT

## 2019-05-12 PROCEDURE — 80053 COMPREHEN METABOLIC PANEL: CPT

## 2019-05-12 PROCEDURE — 83605 ASSAY OF LACTIC ACID: CPT | Mod: 91

## 2019-05-12 PROCEDURE — 94640 AIRWAY INHALATION TREATMENT: CPT

## 2019-05-12 PROCEDURE — 25000242 PHARM REV CODE 250 ALT 637 W/ HCPCS: Performed by: HOSPITALIST

## 2019-05-12 PROCEDURE — 96365 THER/PROPH/DIAG IV INF INIT: CPT

## 2019-05-12 PROCEDURE — 96375 TX/PRO/DX INJ NEW DRUG ADDON: CPT

## 2019-05-12 PROCEDURE — 25000003 PHARM REV CODE 250: Performed by: EMERGENCY MEDICINE

## 2019-05-12 PROCEDURE — 96361 HYDRATE IV INFUSION ADD-ON: CPT

## 2019-05-12 PROCEDURE — 84145 PROCALCITONIN (PCT): CPT

## 2019-05-12 PROCEDURE — 63600175 PHARM REV CODE 636 W HCPCS: Performed by: EMERGENCY MEDICINE

## 2019-05-12 PROCEDURE — 87186 SC STD MICRODIL/AGAR DIL: CPT

## 2019-05-12 PROCEDURE — 12000002 HC ACUTE/MED SURGE SEMI-PRIVATE ROOM

## 2019-05-12 PROCEDURE — 83735 ASSAY OF MAGNESIUM: CPT

## 2019-05-12 PROCEDURE — 25000003 PHARM REV CODE 250: Performed by: HOSPITALIST

## 2019-05-12 PROCEDURE — 84100 ASSAY OF PHOSPHORUS: CPT

## 2019-05-12 PROCEDURE — 81000 URINALYSIS NONAUTO W/SCOPE: CPT

## 2019-05-12 RX ORDER — ACETAMINOPHEN 325 MG/1
650 TABLET ORAL EVERY 6 HOURS PRN
Status: DISCONTINUED | OUTPATIENT
Start: 2019-05-12 | End: 2019-05-15 | Stop reason: HOSPADM

## 2019-05-12 RX ORDER — ACETAMINOPHEN 325 MG/1
325 TABLET ORAL EVERY 6 HOURS PRN
Status: DISCONTINUED | OUTPATIENT
Start: 2019-05-12 | End: 2019-05-13

## 2019-05-12 RX ORDER — IBUPROFEN 200 MG
24 TABLET ORAL
Status: DISCONTINUED | OUTPATIENT
Start: 2019-05-12 | End: 2019-05-15 | Stop reason: HOSPADM

## 2019-05-12 RX ORDER — GABAPENTIN 400 MG/1
400 CAPSULE ORAL 3 TIMES DAILY
Status: DISCONTINUED | OUTPATIENT
Start: 2019-05-12 | End: 2019-05-15 | Stop reason: HOSPADM

## 2019-05-12 RX ORDER — LANOLIN ALCOHOL/MO/W.PET/CERES
800 CREAM (GRAM) TOPICAL
Status: DISCONTINUED | OUTPATIENT
Start: 2019-05-12 | End: 2019-05-15 | Stop reason: HOSPADM

## 2019-05-12 RX ORDER — METOPROLOL TARTRATE 50 MG/1
50 TABLET ORAL 4 TIMES DAILY
Status: DISCONTINUED | OUTPATIENT
Start: 2019-05-12 | End: 2019-05-15 | Stop reason: HOSPADM

## 2019-05-12 RX ORDER — OXYCODONE AND ACETAMINOPHEN 7.5; 325 MG/1; MG/1
1 TABLET ORAL EVERY 6 HOURS PRN
Status: DISCONTINUED | OUTPATIENT
Start: 2019-05-12 | End: 2019-05-13

## 2019-05-12 RX ORDER — AMITRIPTYLINE HYDROCHLORIDE 25 MG/1
25 TABLET, FILM COATED ORAL NIGHTLY
Status: DISCONTINUED | OUTPATIENT
Start: 2019-05-12 | End: 2019-05-15 | Stop reason: HOSPADM

## 2019-05-12 RX ORDER — SODIUM CHLORIDE 9 MG/ML
INJECTION, SOLUTION INTRAVENOUS CONTINUOUS
Status: DISCONTINUED | OUTPATIENT
Start: 2019-05-12 | End: 2019-05-13

## 2019-05-12 RX ORDER — ASCORBIC ACID 500 MG
500 TABLET ORAL 2 TIMES DAILY
Status: DISCONTINUED | OUTPATIENT
Start: 2019-05-12 | End: 2019-05-15 | Stop reason: HOSPADM

## 2019-05-12 RX ORDER — SODIUM CHLORIDE 0.9 % (FLUSH) 0.9 %
10 SYRINGE (ML) INJECTION
Status: DISCONTINUED | OUTPATIENT
Start: 2019-05-12 | End: 2019-05-15 | Stop reason: HOSPADM

## 2019-05-12 RX ORDER — AMOXICILLIN 250 MG
1 CAPSULE ORAL DAILY PRN
Status: DISCONTINUED | OUTPATIENT
Start: 2019-05-12 | End: 2019-05-15 | Stop reason: HOSPADM

## 2019-05-12 RX ORDER — ESCITALOPRAM OXALATE 10 MG/1
20 TABLET ORAL DAILY
Status: DISCONTINUED | OUTPATIENT
Start: 2019-05-13 | End: 2019-05-15 | Stop reason: HOSPADM

## 2019-05-12 RX ORDER — GLUCAGON 1 MG
1 KIT INJECTION
Status: DISCONTINUED | OUTPATIENT
Start: 2019-05-12 | End: 2019-05-15 | Stop reason: HOSPADM

## 2019-05-12 RX ORDER — LEVETIRACETAM 100 MG/ML
1000 SOLUTION ORAL 2 TIMES DAILY
Status: DISCONTINUED | OUTPATIENT
Start: 2019-05-12 | End: 2019-05-15 | Stop reason: HOSPADM

## 2019-05-12 RX ORDER — DIAZEPAM 5 MG/1
5 TABLET ORAL EVERY 8 HOURS PRN
Status: DISCONTINUED | OUTPATIENT
Start: 2019-05-12 | End: 2019-05-15 | Stop reason: HOSPADM

## 2019-05-12 RX ORDER — FOLIC ACID 1 MG/1
1 TABLET ORAL DAILY
Status: DISCONTINUED | OUTPATIENT
Start: 2019-05-13 | End: 2019-05-15 | Stop reason: HOSPADM

## 2019-05-12 RX ORDER — POLYETHYLENE GLYCOL 3350 17 G/17G
17 POWDER, FOR SOLUTION ORAL DAILY
Status: DISCONTINUED | OUTPATIENT
Start: 2019-05-13 | End: 2019-05-15 | Stop reason: HOSPADM

## 2019-05-12 RX ORDER — IPRATROPIUM BROMIDE AND ALBUTEROL SULFATE 2.5; .5 MG/3ML; MG/3ML
3 SOLUTION RESPIRATORY (INHALATION) EVERY 6 HOURS
Status: DISCONTINUED | OUTPATIENT
Start: 2019-05-12 | End: 2019-05-15 | Stop reason: HOSPADM

## 2019-05-12 RX ORDER — ACETYLCYSTEINE 200 MG/ML
2 SOLUTION ORAL; RESPIRATORY (INHALATION) 2 TIMES DAILY
Status: DISCONTINUED | OUTPATIENT
Start: 2019-05-12 | End: 2019-05-15 | Stop reason: HOSPADM

## 2019-05-12 RX ORDER — IBUPROFEN 200 MG
16 TABLET ORAL
Status: DISCONTINUED | OUTPATIENT
Start: 2019-05-12 | End: 2019-05-15 | Stop reason: HOSPADM

## 2019-05-12 RX ORDER — POTASSIUM CHLORIDE 20 MEQ/15ML
40 SOLUTION ORAL
Status: DISCONTINUED | OUTPATIENT
Start: 2019-05-12 | End: 2019-05-15 | Stop reason: HOSPADM

## 2019-05-12 RX ORDER — ONDANSETRON 2 MG/ML
4 INJECTION INTRAMUSCULAR; INTRAVENOUS EVERY 8 HOURS PRN
Status: DISCONTINUED | OUTPATIENT
Start: 2019-05-12 | End: 2019-05-15 | Stop reason: HOSPADM

## 2019-05-12 RX ORDER — ENOXAPARIN SODIUM 100 MG/ML
40 INJECTION SUBCUTANEOUS EVERY 24 HOURS
Status: DISCONTINUED | OUTPATIENT
Start: 2019-05-12 | End: 2019-05-15 | Stop reason: HOSPADM

## 2019-05-12 RX ORDER — CHLORDIAZEPOXIDE HYDROCHLORIDE 5 MG/1
5 CAPSULE, GELATIN COATED ORAL NIGHTLY
Status: DISCONTINUED | OUTPATIENT
Start: 2019-05-12 | End: 2019-05-15 | Stop reason: HOSPADM

## 2019-05-12 RX ADMIN — IPRATROPIUM BROMIDE AND ALBUTEROL SULFATE 3 ML: .5; 3 SOLUTION RESPIRATORY (INHALATION) at 07:05

## 2019-05-12 RX ADMIN — AMITRIPTYLINE HYDROCHLORIDE 25 MG: 25 TABLET, FILM COATED ORAL at 09:05

## 2019-05-12 RX ADMIN — ACETYLCYSTEINE 2 ML: 200 SOLUTION ORAL; RESPIRATORY (INHALATION) at 07:05

## 2019-05-12 RX ADMIN — ENOXAPARIN SODIUM 40 MG: 100 INJECTION SUBCUTANEOUS at 05:05

## 2019-05-12 RX ADMIN — VANCOMYCIN HYDROCHLORIDE 750 MG: 750 INJECTION, POWDER, LYOPHILIZED, FOR SOLUTION INTRAVENOUS at 03:05

## 2019-05-12 RX ADMIN — SODIUM CHLORIDE: 0.9 INJECTION, SOLUTION INTRAVENOUS at 05:05

## 2019-05-12 RX ADMIN — PIPERACILLIN AND TAZOBACTAM 4.5 G: 4; .5 INJECTION, POWDER, LYOPHILIZED, FOR SOLUTION INTRAVENOUS; PARENTERAL at 09:05

## 2019-05-12 RX ADMIN — LEVETIRACETAM 1000 MG: 100 SOLUTION ORAL at 09:05

## 2019-05-12 RX ADMIN — CHLORDIAZEPOXIDE HYDROCHLORIDE 5 MG: 5 CAPSULE ORAL at 09:05

## 2019-05-12 RX ADMIN — OXYCODONE HYDROCHLORIDE AND ACETAMINOPHEN 500 MG: 500 TABLET ORAL at 09:05

## 2019-05-12 RX ADMIN — OXYCODONE HYDROCHLORIDE AND ACETAMINOPHEN 1 TABLET: 7.5; 325 TABLET ORAL at 05:05

## 2019-05-12 RX ADMIN — METOPROLOL TARTRATE 50 MG: 50 TABLET ORAL at 09:05

## 2019-05-12 RX ADMIN — SODIUM CHLORIDE 1293 ML: 0.9 INJECTION, SOLUTION INTRAVENOUS at 01:05

## 2019-05-12 RX ADMIN — PIPERACILLIN AND TAZOBACTAM 4.5 G: 4; .5 INJECTION, POWDER, LYOPHILIZED, FOR SOLUTION INTRAVENOUS; PARENTERAL at 02:05

## 2019-05-12 RX ADMIN — GABAPENTIN 400 MG: 400 CAPSULE ORAL at 09:05

## 2019-05-12 NOTE — CONSULTS
Elif Archibald [id number] is a 54 y.o. female who has been consulted for vancomycin dosing.    The patient has the following labs:     Date Creatinine (mg/dl)    BUN WBC Count   5/12/2019 Estimated Creatinine Clearance: 62.5 mL/min (based on SCr of 0.7 mg/dL). Lab Results   Component Value Date    BUN 23 (H) 05/12/2019     Lab Results   Component Value Date    WBC 17.70 (H) 05/12/2019        Current weight is 43.1 kg (95 lb)      The patient received  750 mg on 05/12 at 1556 (if pt has already received first dose including doses in ED)    The patient will be started on vancomycin at a dose of 750 mg every 12 hours (17 mg/kg/dose).  The vancomycin trough has been ordered for 05/13 at 1530.  Target trough goal is 15 to 20 mg/dL for HCAP.    Patient will be followed by pharmacy for changes in renal function, toxicity, and efficacy.   Thank you for allowing us to participate in this patient's care.     Greg Vogel, LesterD

## 2019-05-12 NOTE — ED NOTES
Pt awake alert in no acute distress, EMS reports [facility] nursing staff reports pt is baseline and family reports pt is more lethargic than normal, pt has trach in place and is on 5L oxygen, breath sounds clear, apical rate regular, no edema noted

## 2019-05-12 NOTE — SUBJECTIVE & OBJECTIVE
Past Medical History:   Diagnosis Date    Anxiety     Asthma     Bipolar 1 disorder     Cirrhosis, alcoholic     Cocaine abuse     COPD (chronic obstructive pulmonary disease)     Depression     Schizophrenia     Seizures        Past Surgical History:   Procedure Laterality Date    APPENDECTOMY      CRANIECTOMY Left 01/31/2019    ESOPHAGOGASTRODUODENOSCOPY      ESOPHAGOSCOPY N/A 12/9/2014    [facility]formed [facility] 2ND [facility]    LARYNGOSCOPY BRONCHOSCOPY-DIRECT N/A 12/9/2014    [facility] at [facility]    PEG W/TRACHEOSTOMY PLACEMENT  02/16/2019       Review of patient's allergies indicates:  No Known Allergies    No current facility-administered medications on file prior to encounter.      Current Outpatient Medications on File Prior to Encounter   Medication Sig    acetaminophen (TYLENOL) 325 MG tablet 325 mg by Per G Tube route every 12 (twelve) hours as needed for Pain.    acetylcysteine 200 mg/ml, 20%, (MUCOMYST) 200 mg/mL (20 %) nebulizer solution Take 2 mLs by nebulization 2 (two) times daily.    albuterol (PROVENTIL/VENTOLIN HFA) 90 mcg/actuation inhaler Inhale 1 puff into the lungs every 4 (four) hours as needed for Wheezing. Rescue    albuterol sulfate 2.5 mg/0.5 mL Nebu Take 2.5 mg by nebulization every 6 (six) hours as needed. Rescue    albuterol sulfate 2.5 mg/0.5 mL Nebu Take 2.5 mg by nebulization 2 (two) times daily.    amitriptyline (ELAVIL) 25 MG tablet 25 mg by Per G Tube route every evening.     arginine/glutamine/calcium bmb (PADDY ORAL) 1 packet by Per G Tube route 2 (two) times daily.    ascorbic acid, vitamin C, (VITAMIN C) 500 MG tablet 500 mg by Per G Tube route 2 (two) times daily.     chlordiazepoxide (LIBRIUM) 5 MG capsule 5 mg by Per G Tube route nightly.    diazePAM (VALIUM) 5 MG tablet 5 mg by Per G Tube route every 8 (eight) hours as needed for Anxiety.    escitalopram oxalate (LEXAPRO) 20 MG tablet 20 mg by Per G  Tube route once daily.     folic acid (FOLVITE) 1 MG tablet 1 mg by Per G Tube route once daily.    gabapentin (NEURONTIN) 400 MG capsule 400 mg by Per G Tube route 3 (three) times daily.     hydrOXYzine pamoate (VISTARIL) 50 MG Cap 50 mg by Per G Tube route every 6 (six) hours as needed (itching).    levetiracetam oral soln 500 mg/5 mL (5 mL) Soln 1,000 mg by Per G Tube route 2 (two) times daily.    metoprolol tartrate (LOPRESSOR) 50 MG tablet 50 mg by Per G Tube route 4 (four) times daily.     multivitamin liquid (THERAGRAN) Liqd 1 tablet by Per G Tube route once daily.    oxyCODONE-acetaminophen (PERCOCET) 7.5-325 mg per tablet 1 tablet by Per G Tube route every 6 (six) hours as needed for Pain.     polyethylene glycol (GLYCOLAX) 17 gram PwPk 17 g by Per G Tube route once daily.     risperidone (RISPERDAL) 0.5 MG Tab 0.5 mg by Per G Tube route every evening.      Family History     Problem Relation (Age of Onset)    COPD Mother    Cirrhosis Father        Tobacco Use    Smoking status: Former Smoker     Packs/day: 2.00     Types: Cigarettes    Smokeless tobacco: Never Used   Substance and Sexual Activity    Alcohol use: Not Currently     Alcohol/week: 25.2 oz     Types: 42 Cans of beer per week     Comment: daily    Drug use: No    Sexual activity: Not on file     Review of Systems   Unable to perform ROS: Other   Constitutional: Negative for appetite change and fever.   HENT: Negative for congestion and dental problem.    Eyes: Negative for discharge and itching.   Respiratory: Positive for cough. Negative for chest tightness.         Trach x 2 months    Cardiovascular: Negative for chest pain and leg swelling.   Gastrointestinal: Negative for abdominal distention and abdominal pain.   Endocrine: Negative for cold intolerance and heat intolerance.   Genitourinary: Negative for difficulty urinating and dysuria.   Musculoskeletal: Negative for arthralgias and back pain.   Skin: Negative for color  change and pallor.   Allergic/Immunologic: Negative for environmental allergies and food allergies.   Neurological: Negative for light-headedness and headaches.   Hematological: Negative for adenopathy. Does not bruise/bleed easily.   Psychiatric/Behavioral: Negative for agitation and behavioral problems.   mouths words-has trach-unable to understand   Lethargic today per daughters   Objective:     Vital Signs (Most Recent):  Temp: 99 °F (37.2 °C) (05/12/19 1645)  Pulse: 100 (05/12/19 1645)  Resp: 20 (05/12/19 1220)  BP: 120/74 (05/12/19 1645)  SpO2: 97 % (05/12/19 1432) Vital Signs (24h Range):  Temp:  [98.3 °F (36.8 °C)-100.1 °F (37.8 °C)] 99 °F (37.2 °C)  Pulse:  [] 100  Resp:  [20] 20  SpO2:  [95 %-100 %] 97 %  BP: ()/(56-74) 120/74     Weight: 43.1 kg (95 lb)  Body mass index is 17.38 kg/m².    Physical Exam   Constitutional: She is oriented to person, place, and time. She appears well-developed and well-nourished. No distress.   HENT:   Head: Normocephalic and atraumatic.   Right Ear: External ear normal.   Left Ear: External ear normal.   Nose: Nose normal.   Mouth/Throat: Oropharynx is clear and moist. No oropharyngeal exudate.   Teeth are absent   Eyes: Pupils are equal, round, and reactive to light. Conjunctivae and EOM are normal. Right eye exhibits no discharge. Left eye exhibits no discharge. No scleral icterus.   Neck: Normal range of motion. Neck supple. No thyromegaly present.   Trach in place with dressing clean dry intact   Cardiovascular: Normal rate, regular rhythm, normal heart sounds and intact distal pulses. Exam reveals no gallop and no friction rub.   No murmur heard.  Pulmonary/Chest: Effort normal. No respiratory distress. She has decreased breath sounds in the right lower field and the left lower field. She has no wheezes. She has rales in the left lower field.   Abdominal: Soft. Bowel sounds are normal. She exhibits no distension and no mass. There is no tenderness. There is  no rebound and no guarding.   Peg tube in site with dried blood but no purulent discharge   Genitourinary:   Genitourinary Comments: Maher in yellow urine   Musculoskeletal: She exhibits no edema or tenderness.   Lymphadenopathy:     She has no cervical adenopathy.   Neurological: She is alert and oriented to person, place, and time. No cranial nerve deficit. Coordination normal.   Skin: Skin is warm and dry. No rash noted. No erythema.   Wound right elbow see --photo.  dressing intact   Psychiatric: She has a normal mood and affect. Her behavior is normal.   Pleasant and cooperative   Nursing note and vitals reviewed.        CRANIAL NERVES     CN III, IV, VI   Pupils are equal, round, and reactive to light.  Extraocular motions are normal.        Significant Labs:   CBC:   Recent Labs   Lab 05/12/19  1241   WBC 17.70*   HGB 10.6*   HCT 32.8*        CMP:   Recent Labs   Lab 05/12/19  1241   *   K 4.3   CL 94*   CO2 33*   *   BUN 23*   CREATININE 0.7   CALCIUM 9.9   PROT 9.3*   ALBUMIN 2.7*   BILITOT 0.3   ALKPHOS 79   AST 91*   *   ANIONGAP 6*   EGFRNONAA >60       Significant Imaging: I have reviewed and interpreted all pertinent imaging results/findings within the past 24 hours.

## 2019-05-12 NOTE — ED PROVIDER NOTES
Encounter Date: 5/12/2019    SCRIBE #1 NOTE: I, Zeeshan Le, am scribing for, and in the presence of, Dr. Powell.       History     Chief Complaint   Patient presents with    Altered Mental Status     Time seen by provider: 12:25 PM on 05/12/2019      Elif Archibald is a 54 y.o. female with a PMHx of anxiety, seizures, schizophrenia, cirrhosis, and ICH who presents to the ED via EMS from [facility] for reported lethargy per daughter that started this am. The daughter relayed the that the patient looked more lethargic than her baseline. Per nursing home, the patient appears to be at the baseline, but the daughter wants the patient to be evaluated. Patient has recently had a SDH and has undergone peg tube placement, along with having a tracheostomy placed. Patient was recently seen for a fall that occurred 3 days ago as well. History and ROS is limited secondary to patients condition.    The history is provided by the EMS personnel and the nursing home. The history is limited by the condition of the patient.     Review of patient's allergies indicates:  No Known Allergies  Past Medical History:   Diagnosis Date    Anxiety     Asthma     Bipolar 1 disorder     Cirrhosis, alcoholic     Cocaine abuse     COPD (chronic obstructive pulmonary disease)     Depression     Schizophrenia     Seizures      Past Surgical History:   Procedure Laterality Date    APPENDECTOMY      CRANIECTOMY Left 01/31/2019    ESOPHAGOGASTRODUODENOSCOPY      ESOPHAGOSCOPY N/A 12/9/2014    [facility]formed by [facility] at [facility] 2ND FLR    LARYNGOSCOPY BRONCHOSCOPY-DIRECT N/A 12/9/2014    Performed by [facility] at [facility] OR 2ND FLR    PEG W/TRACHEOSTOMY PLACEMENT  02/16/2019     Family History   Problem Relation Age of Onset    COPD Mother     Cirrhosis Father      Social History     Tobacco Use    Smoking status: Former Smoker     Packs/day: 2.00     Types: Cigarettes    Smokeless tobacco: Never Used    Substance Use Topics    Alcohol use: Not Currently     Alcohol/week: 25.2 oz     Types: 42 Cans of beer per week     Comment: daily    Drug use: No     Review of Systems   Unable to perform ROS: Acuity of condition       Physical Exam     Initial Vitals [05/12/19 1220]   BP Pulse Resp Temp SpO2   94/62 100 20 100.1 °F (37.8 °C) 99 %      MAP       --         Physical Exam    Nursing note and vitals reviewed.  Constitutional: She appears well-developed and well-nourished. She is not diaphoretic. No distress.   Thin appearing   HENT:   Head: Normocephalic.   Left sided craniectomy    Eyes: EOM are normal.   Neck: Normal range of motion. Neck supple. A tracheostomy is present.   Tracheostomy is well appearing   Cardiovascular: Normal rate, regular rhythm and normal heart sounds. Exam reveals no gallop and no friction rub.    No murmur heard.  Pulmonary/Chest: Breath sounds normal. No respiratory distress. She has no wheezes. She has no rhonchi. She has no rales.   Abdominal: Soft. She exhibits no distension. There is no tenderness. There is no rebound.   Peg tube is well appearing   Musculoskeletal: Normal range of motion.   Neurological: She is alert.   Tracts with eyes well. RUE hemiplegia. Follows commands on the left side. Trace lower extremity movement   Skin: Skin is warm and dry. There is erythema.   Multiple tattoos. Erythematous rash following the T-10 dermatoma on the back. Bandage to the right elbow   Psychiatric: She has a normal mood and affect. Her behavior is normal. Judgment and thought content normal.         ED Course   Critical Care  Date/Time: 5/12/2019 2:40 PM  Performed by: Ty Powell MD  Authorized by: Ty Powell MD   Direct patient critical care time: 15 minutes  Additional history critical care time: 10 minutes  Ordering / reviewing critical care time: 9 minutes  Documentation critical care time: 8 minutes  Consulting other physicians critical care time: 6 minutes  Consult  "with family critical care time: 8 minutes  Total critical care time (exclusive of procedural time) : 56 minutes  Critical care was necessary to treat or prevent imminent or life-threatening deterioration of the following conditions: sepsis (hcap).  Critical care was time spent personally by me on the following activities: discussions with consultants, evaluation of patient's response to treatment, ordering and review of laboratory studies, obtaining history from patient or surrogate, pulse oximetry, review of old charts, re-evaluation of patient's condition, ordering and review of radiographic studies, ordering and performing treatments and interventions and examination of patient.        Labs Reviewed - No data to display       Imaging Results    None          Medical Decision Making:   History:   Old Medical Records: I decided to obtain old medical records.  Clinical Tests:   Lab Tests: Ordered and Reviewed  Radiological Study: Ordered and Reviewed  Medical Tests: Ordered and Reviewed            Scribe Attestation:   Scribe #1: I performed the above scribed service and the documentation accurately describes the services I performed. I attest to the accuracy of the note.    I, Dr. Powell, personally performed the services described in this documentation. All medical record entries made by the scribe were at my direction and in my presence.  I have reviewed the chart and agree that the record reflects my personal performance and is accurate and complete.2:41 PM 05/12/2019            ED Course as of May 12 1440   Sun May 12, 2019   1224 BP: 94/62 [EF]   1224 Temp: 100.1 °F (37.8 °C) [EF]   1224 Temp src: Axillary [EF]   1224 Pulse: 100 [EF]   1224 Resp: 20 [EF]   1224 SpO2: 99 % [EF]   1236 I spoke with Ryan Benjamin at [facility] who states that patient's daughter wanted the patient sent to the emergency room because the patient looked "lethargic."  According to the nursing staff they feel that she appears to be " at her baseline.  Axillary temperature of a 100° F in there.  Valium and Norco last night at 11:00 p.m..  Heart rate was 112 respiratory rate 16 blood pressure 100/71 prior to transfer.    [EF]   1239 Patient is awake alert following commands.  No distress.     [EF]   1251 EKG independently interpreted by myself:  99 beats per minute sinus rhythm normal axis no ST segment elevation or depression or T-wave inversion.    [EF]   1341 Meets sepsis criteria, sepsis order set entered on initial encounter.    [EF]   1342 X-Ray Chest AP Portable [EF]   1343 Vancomycin and Zosyn added for healthcare associated pneumonia.  Recent admission 1 month ago for healthcare associated pneumonia.    [EF]   1347 CT Head Without Contrast [EF]   1349 To whit+   WBC(!): 17.70 [EF]   1351 Dr ta to admit for hcap, doing well in ER no distress, BP c/w prior    [EF]   1353 Urinalysis pending at this time.  CT of the head is unremarkable for any acute changes.    [EF]   1436 Leukocytes, UA(!): 3+ [EF]   1436 NITRITE UA: Negative [EF]   1436 Bacteria, UA(!): Many [EF]   1436 WBC, UA(!): >100 [EF]   1436 RBC, UA(!): 40 [EF]   1436 Urinalysis appears to demonstrate a UTI, Zosyn should cover this as well.    [EF]      ED Course User Index  [EF] Ty Powell MD     Clinical Impression:       ICD-10-CM ICD-9-CM   1. Sepsis A41.9 038.9     995.91         Disposition:   Disposition: Admitted         54-year-old female medical history of schizophrenia bipolar disorder recent left-sided craniectomy with resultant right-sided hemiplegia presents to the emergency room for lethargy.  Low-grade temperature in the ER.  Meets sepsis criteria with tachycardia leukocytosis and source.  Broad-spectrum antibiotics given in the ER.  Patient is well-appearing blood pressure consistent with baseline.  No indication for ICU admission.  No distress in the emergency department.               Ty Powell MD  05/12/19 4091

## 2019-05-12 NOTE — ASSESSMENT & PLAN NOTE
Chronic and recent status post evacuation.  Plan is to continue PT and OT and discharge patient back to skilled nursing when medically stable.

## 2019-05-12 NOTE — H&P
[facility] Medicine  History & Physical    Patient Name: Elif Archibald  MRN: [id number]  Admission Date: 5/12/2019  Attending Physician: Shirley Schuster MD   Primary Care Provider: Terrie Ireland MD         Patient information was obtained from relative(s), past medical records and ER records.     Subjective:     Principal Problem:Sepsis    Chief Complaint:   Chief Complaint   Patient presents with    Altered Mental Status        HPI: Elif Archibald is a 54 y.o. female with a PMHx of anxiety, seizures, schizophrenia, cirrhosis, and ICH who presents to the ED via EMS from [facility] for reported lethargy per daughter that started this am. The daughter relayed the that the patient looked more lethargic than her baseline. Per nursing home, the patient appears to be at the baseline, but the daughter wants the patient to be evaluated. Patient has recently had a SDH s/p surgical treatment/scalp removal  and has undergone peg tube placement, along with having a tracheostomy placed. Patient was recently seen for a fall that occurred 3 days ago as well. History and ROS is limited secondary to patients condition.     The history is provided by the  daughters and EMR. The history is limited by the condition of the patient.   She is awake/alert but voice weak and trach present.         Past Medical History:   Diagnosis Date    Anxiety     Asthma     Bipolar 1 disorder     Cirrhosis, alcoholic     Cocaine abuse     COPD (chronic obstructive pulmonary disease)     Depression     Schizophrenia     Seizures        Past Surgical History:   Procedure Laterality Date    APPENDECTOMY      CRANIECTOMY Left 01/31/2019    ESOPHAGOGASTRODUODENOSCOPY      ESOPHAGOSCOPY N/A 12/9/2014    Performed by Luis M Nye MD at [facility] OR 2ND FLR    LARYNGOSCOPY BRONCHOSCOPY-DIRECT N/A 12/9/2014    Performed by [facility] M [facility]ye MD at [facility] OR 2ND FLR    PEG W/TRACHEOSTOMY PLACEMENT  02/16/2019        Review of patient's allergies indicates:  No Known Allergies    No current facility-administered medications on file prior to encounter.      Current Outpatient Medications on File Prior to Encounter   Medication Sig    acetaminophen (TYLENOL) 325 MG tablet 325 mg by Per G Tube route every 12 (twelve) hours as needed for Pain.    acetylcysteine 200 mg/ml, 20%, (MUCOMYST) 200 mg/mL (20 %) nebulizer solution Take 2 mLs by nebulization 2 (two) times daily.    albuterol (PROVENTIL/VENTOLIN HFA) 90 mcg/actuation inhaler Inhale 1 puff into the lungs every 4 (four) hours as needed for Wheezing. Rescue    albuterol sulfate 2.5 mg/0.5 mL Nebu Take 2.5 mg by nebulization every 6 (six) hours as needed. Rescue    albuterol sulfate 2.5 mg/0.5 mL Nebu Take 2.5 mg by nebulization 2 (two) times daily.    amitriptyline (ELAVIL) 25 MG tablet 25 mg by Per G Tube route every evening.     arginine/glutamine/calcium bmb (PADDY ORAL) 1 packet by Per G Tube route 2 (two) times daily.    ascorbic acid, vitamin C, (VITAMIN C) 500 MG tablet 500 mg by Per G Tube route 2 (two) times daily.     chlordiazepoxide (LIBRIUM) 5 MG capsule 5 mg by Per G Tube route nightly.    diazePAM (VALIUM) 5 MG tablet 5 mg by Per G Tube route every 8 (eight) hours as needed for Anxiety.    escitalopram oxalate (LEXAPRO) 20 MG tablet 20 mg by Per G Tube route once daily.     folic acid (FOLVITE) 1 MG tablet 1 mg by Per G Tube route once daily.    gabapentin (NEURONTIN) 400 MG capsule 400 mg by Per G Tube route 3 (three) times daily.     hydrOXYzine pamoate (VISTARIL) 50 MG Cap 50 mg by Per G Tube route every 6 (six) hours as needed (itching).    levetiracetam oral soln 500 mg/5 mL (5 mL) Soln 1,000 mg by Per G Tube route 2 (two) times daily.    metoprolol tartrate (LOPRESSOR) 50 MG tablet 50 mg by Per G Tube route 4 (four) times daily.     multivitamin liquid (THERAGRAN) Liqd 1 tablet by Per G Tube route once daily.     oxyCODONE-acetaminophen (PERCOCET) 7.5-325 mg per tablet 1 tablet by Per G Tube route every 6 (six) hours as needed for Pain.     polyethylene glycol (GLYCOLAX) 17 gram PwPk 17 g by Per G Tube route once daily.     risperidone (RISPERDAL) 0.5 MG Tab 0.5 mg by Per G Tube route every evening.      Family History     Problem Relation (Age of Onset)    COPD Mother    Cirrhosis Father        Tobacco Use    Smoking status: Former Smoker     Packs/day: 2.00     Types: Cigarettes    Smokeless tobacco: Never Used   Substance and Sexual Activity    Alcohol use: Not Currently     Alcohol/week: 25.2 oz     Types: 42 Cans of beer per week     Comment: daily    Drug use: No    Sexual activity: Not on file     Review of Systems   Unable to perform ROS: Other   Constitutional: Negative for appetite change and fever.   HENT: Negative for congestion and dental problem.    Eyes: Negative for discharge and itching.   Respiratory: Positive for cough. Negative for chest tightness.         Trach x 2 months    Cardiovascular: Negative for chest pain and leg swelling.   Gastrointestinal: Negative for abdominal distention and abdominal pain.   Endocrine: Negative for cold intolerance and heat intolerance.   Genitourinary: Negative for difficulty urinating and dysuria.   Musculoskeletal: Negative for arthralgias and back pain.   Skin: Negative for color change and pallor.   Allergic/Immunologic: Negative for environmental allergies and food allergies.   Neurological: Negative for light-headedness and headaches.   Hematological: Negative for adenopathy. Does not bruise/bleed easily.   Psychiatric/Behavioral: Negative for agitation and behavioral problems.   mouths words-has trach-unable to understand   Lethargic today per daughters   Objective:     Vital Signs (Most Recent):  Temp: 99 °F (37.2 °C) (05/12/19 1645)  Pulse: 100 (05/12/19 1645)  Resp: 20 (05/12/19 1220)  BP: 120/74 (05/12/19 1645)  SpO2: 97 % (05/12/19 1432) Vital Signs  (24h Range):  Temp:  [98.3 °F (36.8 °C)-100.1 °F (37.8 °C)] 99 °F (37.2 °C)  Pulse:  [] 100  Resp:  [20] 20  SpO2:  [95 %-100 %] 97 %  BP: ()/(56-74) 120/74     Weight: 43.1 kg (95 lb)  Body mass index is 17.38 kg/m².    Physical Exam   Constitutional: She is oriented to person, place, and time. She appears well-developed and well-nourished. No distress.   HENT:   Head: Normocephalic and atraumatic.   Right Ear: External ear normal.   Left Ear: External ear normal.   Nose: Nose normal.   Mouth/Throat: Oropharynx is clear and moist. No oropharyngeal exudate.   Teeth are absent   Eyes: Pupils are equal, round, and reactive to light. Conjunctivae and EOM are normal. Right eye exhibits no discharge. Left eye exhibits no discharge. No scleral icterus.   Neck: Normal range of motion. Neck supple. No thyromegaly present.   Trach in place with dressing clean dry intact   Cardiovascular: Normal rate, regular rhythm, normal heart sounds and intact distal pulses. Exam reveals no gallop and no friction rub.   No murmur heard.  Pulmonary/Chest: Effort normal. No respiratory distress. She has decreased breath sounds in the right lower field and the left lower field. She has no wheezes. She has rales in the left lower field.   Abdominal: Soft. Bowel sounds are normal. She exhibits no distension and no mass. There is no tenderness. There is no rebound and no guarding.   Peg tube in site with dried blood but no purulent discharge   Genitourinary:   Genitourinary Comments: Maher in yellow urine   Musculoskeletal: She exhibits no edema or tenderness.   Lymphadenopathy:     She has no cervical adenopathy.   Neurological: She is alert and oriented to person, place, and time. No cranial nerve deficit. Coordination normal.   Skin: Skin is warm and dry. No rash noted. No erythema.   Wound right elbow see --photo.  dressing intact   Psychiatric: She has a normal mood and affect. Her behavior is normal.   Pleasant and cooperative    Nursing note and vitals reviewed.        CRANIAL NERVES     CN III, IV, VI   Pupils are equal, round, and reactive to light.  Extraocular motions are normal.        Significant Labs:   CBC:   Recent Labs   Lab 05/12/19  1241   WBC 17.70*   HGB 10.6*   HCT 32.8*        CMP:   Recent Labs   Lab 05/12/19  1241   *   K 4.3   CL 94*   CO2 33*   *   BUN 23*   CREATININE 0.7   CALCIUM 9.9   PROT 9.3*   ALBUMIN 2.7*   BILITOT 0.3   ALKPHOS 79   AST 91*   *   ANIONGAP 6*   EGFRNONAA >60       Significant Imaging: I have reviewed and interpreted all pertinent imaging results/findings within the past 24 hours.    Assessment/Plan:     * Sepsis  This patient does have evidence of infective focus-urine/possible pneumonia     My overall impression is sepsis.  Antibiotics given-   Antibiotics (From admission, onward)    Start     Stop Route Frequency Ordered    05/12/19 1745  piperacillin-tazobactam 4.5 g in dextrose 5 % 100 mL IVPB (ready to mix system)      05/13 0544 IV ED 1 Time 05/12/19 1735        Vancomycin    Blood  Urine culture   Severe Sepsis only-  Latest labs reviewed, they are-  Recent Labs   Lab 05/12/19  1241   BILITOT 0.3     Recent Labs   Lab 05/12/19  1241   LACTATE 0.8     No results for input(s): PH, PO2, PCO2, HCO3, BE in the last 168 hours.    Organ dysfunction indicated by Encephalopathy    Septic Shock only-  Shock with decreased perfusion noted, Fluid challenge  was given at 30cc/kg    Post- resuscitation assessment- (Done after fluids given for shock)  Vital signs post fluid administrations were-    Temp Readings from Last 1 Encounters:   05/12/19 99 °F (37.2 °C)     BP Readings from Last 1 Encounters:   05/12/19 120/74     Pulse Readings from Last 1 Encounters:   05/12/19 100       Perfusion assessment post bolus shows Good distal pulses, adequate capillary refill    Will Not start Pressors- Levophed for MAP of 65      Hx of subdural hematoma  Chronic and recent status post  evacuation.  Plan is to continue PT and OT and discharge patient back to skilled nursing when medically stable.    Chronic obstructive pulmonary disease with acute lower respiratory infection  Chronic and stable.  Will trend checks x-rays.     PEG (percutaneous endoscopic gastrostomy) status  Routine PEG care and monitor for skin changes for infection      Tracheostomy status  Routine trach care      Chronic respiratory failure    Chronic and stable.  Will continue nebulizer treatments and monitor pulse ox.  With oxygen as needed.  On admit tolerating room air.  Lower pneumonia of the left lung which appears slightly improved over the last x-ray and small pleural effusion with healed rib fractures.  Tracheostomy tube is in place.      VTE Risk Mitigation (From admission, onward)        Ordered     enoxaparin injection 40 mg  Daily      05/12/19 1722     IP VTE HIGH RISK PATIENT  Once      05/12/19 1722     Place LOPEZ hose  Until discontinued      05/12/19 1633             Shirley Schuster MD  Department of Hospital Medicine   [facility]

## 2019-05-12 NOTE — HPI
Elif Archibald is a 54 y.o. female with a PMHx of anxiety, seizures, schizophrenia, cirrhosis, and ICH who presents to the ED via EMS from [facility] for reported lethargy per daughter that started this am. The daughter relayed the that the patient looked more lethargic than her baseline. Per nursing home, the patient appears to be at the baseline, but the daughter wants the patient to be evaluated. Patient has recently had a SDH s/p surgical treatment/scalp removal  and has undergone peg tube placement, along with having a tracheostomy placed. Patient was recently seen for a fall that occurred 3 days ago as well. History and ROS is limited secondary to patients condition.     The history is provided by the daughters and EMR. The history is limited by the condition of the patient.   She is awake/alert but voice weak and trach present.

## 2019-05-12 NOTE — ASSESSMENT & PLAN NOTE
Chronic and stable.  Will continue nebulizer treatments and monitor pulse ox.  With oxygen as needed.  On admit tolerating room air.  Lower pneumonia of the left lung which appears slightly improved over the last x-ray and small pleural effusion with healed rib fractures.  Tracheostomy tube is in place.

## 2019-05-13 PROBLEM — E44.0 MODERATE MALNUTRITION: Status: ACTIVE | Noted: 2019-05-13

## 2019-05-13 LAB
ANION GAP SERPL CALC-SCNC: 5 MMOL/L (ref 8–16)
BUN SERPL-MCNC: 11 MG/DL (ref 6–20)
CALCIUM SERPL-MCNC: 9.1 MG/DL (ref 8.7–10.5)
CHLORIDE SERPL-SCNC: 100 MMOL/L (ref 95–110)
CO2 SERPL-SCNC: 29 MMOL/L (ref 23–29)
CREAT SERPL-MCNC: 0.6 MG/DL (ref 0.5–1.4)
EST. GFR  (AFRICAN AMERICAN): >60 ML/MIN/1.73 M^2
EST. GFR  (NON AFRICAN AMERICAN): >60 ML/MIN/1.73 M^2
GLUCOSE SERPL-MCNC: 112 MG/DL (ref 70–110)
MAGNESIUM SERPL-MCNC: 1.5 MG/DL (ref 1.6–2.6)
PHOSPHATE SERPL-MCNC: 3 MG/DL (ref 2.7–4.5)
POTASSIUM SERPL-SCNC: 3.6 MMOL/L (ref 3.5–5.1)
SODIUM SERPL-SCNC: 134 MMOL/L (ref 136–145)
VANCOMYCIN TROUGH SERPL-MCNC: 9.5 UG/ML (ref 10–22)

## 2019-05-13 PROCEDURE — 97530 THERAPEUTIC ACTIVITIES: CPT | Performed by: PHYSICAL THERAPIST

## 2019-05-13 PROCEDURE — 25000003 PHARM REV CODE 250: Performed by: NURSE PRACTITIONER

## 2019-05-13 PROCEDURE — 94761 N-INVAS EAR/PLS OXIMETRY MLT: CPT

## 2019-05-13 PROCEDURE — 97802 MEDICAL NUTRITION INDIV IN: CPT

## 2019-05-13 PROCEDURE — 94640 AIRWAY INHALATION TREATMENT: CPT

## 2019-05-13 PROCEDURE — 27000221 HC OXYGEN, UP TO 24 HOURS

## 2019-05-13 PROCEDURE — 25000242 PHARM REV CODE 250 ALT 637 W/ HCPCS: Performed by: HOSPITALIST

## 2019-05-13 PROCEDURE — 80202 ASSAY OF VANCOMYCIN: CPT

## 2019-05-13 PROCEDURE — 36415 COLL VENOUS BLD VENIPUNCTURE: CPT

## 2019-05-13 PROCEDURE — 25000003 PHARM REV CODE 250: Performed by: HOSPITALIST

## 2019-05-13 PROCEDURE — G8979 MOBILITY GOAL STATUS: HCPCS | Mod: CK | Performed by: PHYSICAL THERAPIST

## 2019-05-13 PROCEDURE — 80048 BASIC METABOLIC PNL TOTAL CA: CPT

## 2019-05-13 PROCEDURE — 63600175 PHARM REV CODE 636 W HCPCS: Performed by: HOSPITALIST

## 2019-05-13 PROCEDURE — 83735 ASSAY OF MAGNESIUM: CPT

## 2019-05-13 PROCEDURE — 12000002 HC ACUTE/MED SURGE SEMI-PRIVATE ROOM

## 2019-05-13 PROCEDURE — G8978 MOBILITY CURRENT STATUS: HCPCS | Mod: CL | Performed by: PHYSICAL THERAPIST

## 2019-05-13 PROCEDURE — 99900026 HC AIRWAY MAINTENANCE (STAT)

## 2019-05-13 PROCEDURE — 84100 ASSAY OF PHOSPHORUS: CPT

## 2019-05-13 PROCEDURE — 97161 PT EVAL LOW COMPLEX 20 MIN: CPT | Performed by: PHYSICAL THERAPIST

## 2019-05-13 RX ORDER — KETOROLAC TROMETHAMINE 30 MG/ML
15 INJECTION, SOLUTION INTRAMUSCULAR; INTRAVENOUS EVERY 6 HOURS PRN
Status: DISCONTINUED | OUTPATIENT
Start: 2019-05-13 | End: 2019-05-15 | Stop reason: HOSPADM

## 2019-05-13 RX ORDER — OXYCODONE AND ACETAMINOPHEN 7.5; 325 MG/1; MG/1
1 TABLET ORAL EVERY 4 HOURS PRN
Status: DISCONTINUED | OUTPATIENT
Start: 2019-05-13 | End: 2019-05-15 | Stop reason: HOSPADM

## 2019-05-13 RX ADMIN — IPRATROPIUM BROMIDE AND ALBUTEROL SULFATE 3 ML: .5; 3 SOLUTION RESPIRATORY (INHALATION) at 07:05

## 2019-05-13 RX ADMIN — ACETYLCYSTEINE 2 ML: 200 SOLUTION ORAL; RESPIRATORY (INHALATION) at 07:05

## 2019-05-13 RX ADMIN — SODIUM CHLORIDE: 0.9 INJECTION, SOLUTION INTRAVENOUS at 01:05

## 2019-05-13 RX ADMIN — MULTIVITAMIN 15 ML: LIQUID ORAL at 09:05

## 2019-05-13 RX ADMIN — PIPERACILLIN AND TAZOBACTAM 4.5 G: 4; .5 INJECTION, POWDER, LYOPHILIZED, FOR SOLUTION INTRAVENOUS; PARENTERAL at 09:05

## 2019-05-13 RX ADMIN — IPRATROPIUM BROMIDE AND ALBUTEROL SULFATE 3 ML: .5; 3 SOLUTION RESPIRATORY (INHALATION) at 12:05

## 2019-05-13 RX ADMIN — KETOROLAC TROMETHAMINE 15 MG: 30 INJECTION, SOLUTION INTRAMUSCULAR at 07:05

## 2019-05-13 RX ADMIN — VANCOMYCIN HYDROCHLORIDE 750 MG: 750 INJECTION, POWDER, LYOPHILIZED, FOR SOLUTION INTRAVENOUS at 04:05

## 2019-05-13 RX ADMIN — METOPROLOL TARTRATE 50 MG: 50 TABLET ORAL at 01:05

## 2019-05-13 RX ADMIN — AMITRIPTYLINE HYDROCHLORIDE 25 MG: 25 TABLET, FILM COATED ORAL at 09:05

## 2019-05-13 RX ADMIN — LEVETIRACETAM 1000 MG: 100 SOLUTION ORAL at 09:05

## 2019-05-13 RX ADMIN — FOLIC ACID 1 MG: 1 TABLET ORAL at 09:05

## 2019-05-13 RX ADMIN — OXYCODONE HYDROCHLORIDE AND ACETAMINOPHEN 1 TABLET: 7.5; 325 TABLET ORAL at 10:05

## 2019-05-13 RX ADMIN — IPRATROPIUM BROMIDE AND ALBUTEROL SULFATE 3 ML: .5; 3 SOLUTION RESPIRATORY (INHALATION) at 01:05

## 2019-05-13 RX ADMIN — OXYCODONE HYDROCHLORIDE AND ACETAMINOPHEN 1 TABLET: 7.5; 325 TABLET ORAL at 04:05

## 2019-05-13 RX ADMIN — SODIUM CHLORIDE: 0.9 INJECTION, SOLUTION INTRAVENOUS at 04:05

## 2019-05-13 RX ADMIN — CHLORDIAZEPOXIDE HYDROCHLORIDE 5 MG: 5 CAPSULE ORAL at 09:05

## 2019-05-13 RX ADMIN — ENOXAPARIN SODIUM 40 MG: 100 INJECTION SUBCUTANEOUS at 06:05

## 2019-05-13 RX ADMIN — GABAPENTIN 400 MG: 400 CAPSULE ORAL at 09:05

## 2019-05-13 RX ADMIN — OXYCODONE HYDROCHLORIDE AND ACETAMINOPHEN 500 MG: 500 TABLET ORAL at 09:05

## 2019-05-13 RX ADMIN — PIPERACILLIN AND TAZOBACTAM 4.5 G: 4; .5 INJECTION, POWDER, LYOPHILIZED, FOR SOLUTION INTRAVENOUS; PARENTERAL at 06:05

## 2019-05-13 RX ADMIN — PIPERACILLIN AND TAZOBACTAM 4.5 G: 4; .5 INJECTION, POWDER, LYOPHILIZED, FOR SOLUTION INTRAVENOUS; PARENTERAL at 01:05

## 2019-05-13 RX ADMIN — ESCITALOPRAM OXALATE 20 MG: 10 TABLET, FILM COATED ORAL at 09:05

## 2019-05-13 RX ADMIN — GABAPENTIN 400 MG: 400 CAPSULE ORAL at 03:05

## 2019-05-13 RX ADMIN — POLYETHYLENE GLYCOL 3350 17 G: 17 POWDER, FOR SOLUTION ORAL at 09:05

## 2019-05-13 RX ADMIN — VANCOMYCIN HYDROCHLORIDE 750 MG: 750 INJECTION, POWDER, LYOPHILIZED, FOR SOLUTION INTRAVENOUS at 06:05

## 2019-05-13 NOTE — PLAN OF CARE
Problem: Adult Inpatient Plan of Care  Goal: Plan of Care Review  Outcome: Ongoing (interventions implemented as appropriate)  Pt on 4L trach collar with Q6 duoneb and TID mucamyst with trach care per shift.

## 2019-05-13 NOTE — ASSESSMENT & PLAN NOTE
This patient does have evidence of infective focus-urine/possible pneumonia     My overall impression is sepsis.  Antibiotics given-   Antibiotics (From admission, onward)    Start     Stop Route Frequency Ordered    05/13/19 0400  vancomycin (VANCOCIN) 750 mg in dextrose 5 % 250 mL IVPB      -- IV Every 12 hours (non-standard times) 05/12/19 1752    05/12/19 2200  piperacillin-tazobactam 4.5 g in dextrose 5 % 100 mL IVPB (ready to mix system)      -- IV Every 8 hours (non-standard times) 05/12/19 1803        Vancomycin    Blood-neg  Urine culture -pend  Severe Sepsis only-  Latest labs reviewed, they are-  No results for input(s): CBC, BILITOT in the last 24 hours.    Invalid input(s): CREATININE.1  No results for input(s): LACTATE in the last 24 hours.  No results for input(s): PH, PO2, PCO2, HCO3, BE in the last 168 hours.    Organ dysfunction indicated by Encephalopathy    Septic Shock only-  Shock with decreased perfusion noted, Fluid challenge  was given at 30cc/kg    Post- resuscitation assessment- (Done after fluids given for shock)  Vital signs post fluid administrations were-    Temp Readings from Last 1 Encounters:   05/13/19 96.3 °F (35.7 °C)     BP Readings from Last 1 Encounters:   05/13/19 95/65     Pulse Readings from Last 1 Encounters:   05/13/19 87       Perfusion assessment post bolus shows Good distal pulses, adequate capillary refill    Will Not start Pressors- Levophed for MAP of 65

## 2019-05-13 NOTE — PT/OT/SLP EVAL
Physical Therapy Evaluation    Patient Name:  Elif Archibald   MRN:  [id number]    Recommendations:     Discharge Recommendations:  other (see comments), nursing facility, skilled(Return to [facility])   Discharge Equipment Recommendations: none   Barriers to discharge: None    Assessment:     Elif Archibald is a 54 y.o. female admitted with a medical diagnosis of Sepsis.  She presents with the following impairments/functional limitations:  weakness, gait instability, decreased upper extremity function, impaired cardiopulmonary response to activity, impaired endurance, impaired balance, decreased lower extremity function, decreased safety awareness, impaired self care skills, impaired cognition, pain, decreased coordination, impaired functional mobilty.  During PT evaluation, pt required Min A for rolling each direction in bed because the pt thought she had a BM, but she did not.  Helmet donned in the bed.  She then required Max A for transfer supine<>sit.  She required CGA for static sitting on the EOB.  Pt then required Mod A for sit<>stand with RW and Mod A for transfer bed<>chair with RW.  Recommend she return to [facility] and continue her PT once she is discharged from acute care setting.     Rehab Prognosis: Good; patient would benefit from acute skilled PT services to address these deficits and reach maximum level of function.    Recent Surgery: * No surgery found *      Plan:     During this hospitalization, patient to be seen 6 x/week to address the identified rehab impairments via gait training, therapeutic activities, therapeutic exercises and progress toward the following goals:    · Plan of Care Expires:  05/27/19    Subjective     Chief Complaint: pain in R side from fall a few days ago.   Patient/Family Comments/goals: to get back to [facility]  Pain/Comfort:  · Pain Rating 1: (pt did not rate)  · Location - Side 1: Right  · Location 1: leg  · Pain Addressed 2: Reposition, Distraction    Patients cultural,  spiritual, Christianity conflicts given the current situation: no    Living Environment:  Pt currently staying at [facility] and receiving PT there.  Dtr states that she has been able to ambulate short distances with RW and assistance.    Prior to admission, patients level of function was assistance with RW for all functional mobility.  Equipment used at home: wheelchair, walker, rolling(equipment provided by facility).  DME owned (not currently used): none.  Upon discharge, patient will have assistance from staff.    Objective:     Communicated with nurse Jennifer prior to session.  Patient found HOB elevated with peripheral IV, tracheostomy, oxygen, PEG Tube  upon PT entry to room.    General Precautions: Standard, fall, aspiration   Orthopedic Precautions:N/A   Braces: (Helmet - awaiting bone flap to be replaced.  Appointment on May 29th. )     Exams:  · Cognitive Exam:  Patient is oriented to Person, Place and Pt is difficult to understand at times, she attempts to mouth words, but she has a very soft voice.   · Postural Exam:  Patient presented with the following abnormalities:    · -       Rounded shoulders  · -       Forward head  · -       vc's to hold head up, but fatigues easily  · RLE ROM: WFL  · RLE Strength: WFL  · LLE ROM: WFL  · LLE Strength: WFL    Functional Mobility:  · Bed Mobility:     · Rolling Left:  minimum assistance  · Rolling Right: minimum assistance  · Supine to Sit: maximal assistance and HOB completely raised  · Transfers:     · Sit to Stand:  moderate assistance with rolling walker  · Bed to Chair: moderate assistance with  rolling walker  using  Step Transfer and posterior leaning, and vc's to keep hands on RW, and turn all the way around prior to sitting down.   · Balance: poor -she requires CGA for static sitting balance, and Mod A for standing      Therapeutic Activities and Exercises:   Increased time for all functional mobility.  Pt had initially thought that she had a BM, but she had not.       AM-PAC 6 CLICK MOBILITY  Total Score:13     Patient left up in chair with all lines intact, call button in reach, chair alarm on, nurse Jennifer notified and dtr present.    GOALS:   Multidisciplinary Problems     Physical Therapy Goals        Problem: Physical Therapy Goal    Goal Priority Disciplines Outcome Goal Variances Interventions   Physical Therapy Goal     PT, PT/OT Ongoing (interventions implemented as appropriate)     Description:  Goals to be met by: 2017     Patient will increase functional independence with mobility by performin. Supine to sit with Stand-by Assistance  2. Sit to supine with Stand-by Assistance  3. Sit to stand transfer with Contact Guard Assistance  4. Bed to chair transfer with Contact Guard Assistance using Rolling Walker  5. Gait  x 50 feet with Minimal Assistance using Rolling Walker.   6. Lower extremity exercise program x10-20 reps per handout, with supervision                      History:     Past Medical History:   Diagnosis Date    Anxiety     Asthma     Bipolar 1 disorder     Cirrhosis, alcoholic     Cocaine abuse     COPD (chronic obstructive pulmonary disease)     Depression     Schizophrenia     Seizures        Past Surgical History:   Procedure Laterality Date    APPENDECTOMY      CRANIECTOMY Left 2019    ESOPHAGOGASTRODUODENOSCOPY      ESOPHAGOSCOPY N/A 2014    [facility] 2ND F[facility]    LARYNGOSCOPY BRONCHOSCOPY-DIRECT N/A 2014    Performed [facility] at [facility] OR 2ND FLR    PEG W/TRACHEOSTOMY PLACEMENT  2019       Time Tracking:     PT Received On: 19  PT Start Time: 955     PT Stop Time: 0  PT Total Time (min): 35 min     Billable Minutes: Evaluation 15 and Therapeutic Activity 20      Claudia Caldwell, PT  2019

## 2019-05-13 NOTE — PLAN OF CARE
Problem: Physical Therapy Goal  Goal: Physical Therapy Goal  Goals to be met by: 2017     Patient will increase functional independence with mobility by performin. Supine to sit with Stand-by Assistance  2. Sit to supine with Stand-by Assistance  3. Sit to stand transfer with Contact Guard Assistance  4. Bed to chair transfer with Contact Guard Assistance using Rolling Walker  5. Gait  x 50 feet with Minimal Assistance using Rolling Walker.   6. Lower extremity exercise program x10-20 reps per handout, with supervision    Outcome: Ongoing (interventions implemented as appropriate)  Pt was able to transfer bed<>chair with RW, and Mod A.

## 2019-05-13 NOTE — ASSESSMENT & PLAN NOTE
Chronically elevated liver enzymes.  Monitor for any neurologic changes such as hyperammonemia.  MELD-Na score: 6 at 1/17/2019  7:28 PM  MELD score: 6 at 1/17/2019  7:28 PM  Calculated from:  Serum Creatinine: 0.8 mg/dL (Rounded to 1 mg/dL) at 1/17/2019  7:28 PM  Serum Sodium: 133 mmol/L at 1/17/2019  7:28 PM  Total Bilirubin: 0.3 mg/dL (Rounded to 1 mg/dL) at 1/17/2019  7:28 PM  INR(ratio): 1.0 at 1/17/2019  7:28 PM  Age: 53 years

## 2019-05-13 NOTE — ASSESSMENT & PLAN NOTE
Chronic and stable.  Hypertension Medications             metoprolol tartrate (LOPRESSOR) 50 MG tablet 50 mg by Per G Tube route 4 (four) times daily.       Hospital Medications             metoprolol tartrate (LOPRESSOR) tablet 50 mg 1 tablet (50 mg total) by Per G Tube route 4 (four) times daily.

## 2019-05-13 NOTE — CONSULTS
"  [facility]  Adult Nutrition  Consult Note    SUMMARY   Intervention: enteral feeding    Recommendations: 1) Increase flow rate to 40 mls/hr of Peptamen Prebio 1.5. Flush with 120 mls water q 4 hrs. (1440 calories (100% EEN), 65 g protein (100% EPN)  Goals: 1) Pt will receive >=85% EEN by f/u.   Nutrition Goal Status: new  Communication of RD Recs: (POC, sticky note)    Reason for Assessment    Reason For Assessment: identified at risk by screening criteria  Diagnosis: infection/sepsis  Relevant Medical History: Admitted with sepsis. PMH: PEG, seizure, cirrhosis, ICH  Interdisciplinary Rounds: did not attend  General Information Comments: Admitted with [facility] with PEG/trache. History of being on Isosource 1.5 @ 45 mls/hr. Wt loss noted. NFPE completed. See below. Pt speaks only at a whisper and appears to have much difficulty speaking. Asked if she could have sips of water. Explained that she could not and conferred with nursing about possiblity of swabs for mouth.   Nutrition Discharge Planning: Discharge on TF above.     Nutrition Risk Screen    Nutrition Risk Screen: tube feeding or parenteral nutrition    Nutrition/Diet History    Patient Reported Diet/Restrictions/Preferences: no oral intake  Spiritual, Cultural Beliefs, Christianity Practices, Values that Affect Care: no  Factors Affecting Nutritional Intake: NPO    Anthropometrics    Temp: 96.7 °F (35.9 °C)  Height Method: Stated  Height: 5' 2"  Height (inches): 62 in  Weight Method: Bed Scale  Weight: 43.1 kg (95 lb 0.3 oz)  Weight (lb): 95.02 lb  Ideal Body Weight (IBW), Female: 110 lb  % Ideal Body Weight, Female (lb): 86.38 lb  BMI (Calculated): 17.4  Weight Loss: unintentional  Usual Body Weight (UBW), k.1 kg(Chart review 19.  Pt was 47.1 kg on 19. )  % Usual Body Weight: 91.7  % Weight Change From Usual Weight: -8.49 %       Lab/Procedures/Meds    Pertinent Labs Reviewed: reviewed  Pertinent Labs Comments: bmp, alb, " wbc  Pertinent Medications Reviewed: reviewed  Pertinent Medications Comments: vitamin C, folic acid, MVI, vanc, polyethylene glycol,  mls/hr    Physical Findings/Assessment         Estimated/Assessed Needs    Weight Used For Calorie Calculations: 43.1 kg (95 lb 0.3 oz)  Energy Calorie Requirements (kcal): 4378-0267 (AF 1.3-1.5 for wt gain)  Energy Need Method: [place]-St Jeor  Protein Requirements: 52-65 (1.2-1.5 2/2 malnutrition)  Weight Used For Protein Calculations: 43.1 kg (95 lb 0.3 oz)     Estimated Fluid Requirement Method: RDA Method(or per MD)  RDA Method (mL): 1280  CHO Requirement: n/a      Nutrition Prescription Ordered    Current Diet Order: NPO + TF  Current Nutrition Support Formula Ordered: Peptamen 1.5 w/Prebio  Current Nutrition Support Rate Ordered: 20 (ml)(mls/hr)    Evaluation of Received Nutrient/Fluid Intake    Enteral Calories (kcal): 720  Enteral Protein (gm): 33  Free Water Flush Fluid (mL): 370  Energy Calories Required: not meeting needs  Protein Required: not meeting needs  Fluid Required: (?)  Tolerance: tolerating  % Intake of Estimated Energy Needs: 25 - 50 %  % Meal Intake: NPO    Nutrition Risk    Level of Risk/Frequency of Follow-up: (2 x wkly)     Assessment and Plan    Moderate malnutrition  Contributing Nutrition Diagnosis  Moderate malnutrition in context of acute on chronic illness    Related to (etiology):   Unknown etiology (Hx of enteral feeds from NH unknown)    Signs and Symptoms (as evidenced by):   1) 8.49% weight loss x 1 month, 1 week (5/12/19, 43.1 kg and 4/6/19, 47.1 kg) Also Weight loss of 13.63% x 4 months, (5/12/19, 43.1 kg and 1/17/19, 49.9 kg)  2) Moderate fat/muscle losses (see RD note)    Interventions/Recommendations (treatment strategy):  Increase enteral feeding flow rate to meet EEN per pt tolerance.     Nutrition Diagnosis Status:   New           Monitor and Evaluation    Food and Nutrient Intake: energy intake  Food and Nutrient Adminstration:  diet order  Anthropometric Measurements: weight  Biochemical Data, Medical Tests and Procedures: electrolyte and renal panel, inflammatory profile  Nutrition-Focused Physical Findings: overall appearance, skin     Malnutrition Assessment  Malnutrition Type: chronic illness  Skin (Micronutrient): (Du score 16, wound of unknown stage on rt elbow)  Teeth (Micronutrient): none       Weight Loss (Malnutrition): greater than 5% in 1 month   Orbital Region (Subcutaneous Fat Loss): moderate depletion  Upper Arm Region (Subcutaneous Fat Loss): severe depletion   [place] Region (Muscle Loss): mild depletion  Clavicle Bone Region (Muscle Loss): well nourished  Clavicle and Acromion Bone Region (Muscle Loss): well nourished  Dorsal Hand (Muscle Loss): well nourished  Patellar Region (Muscle Loss): moderate depletion  Anterior Thigh Region (Muscle Loss): moderate depletion  Posterior Calf Region (Muscle Loss): moderate depletion       Subcutaneous Fat Loss (Final Summary): moderate protein-calorie malnutrition  Muscle Loss Evaluation (Final Summary): moderate protein-calorie malnutrition    Severe Weight Loss (Malnutrition): greater than 5% in 1 month    Nutrition Follow-Up  yes

## 2019-05-13 NOTE — ASSESSMENT & PLAN NOTE
Contributing Nutrition Diagnosis  Moderate malnutrition in context of acute on chronic illness    Related to (etiology):   Unknown etiology (Hx of enteral feeds from NH unknown)    Signs and Symptoms (as evidenced by):   1) 8.49% weight loss x 1 month, 1 week (5/12/19, 43.1 kg and 4/6/19, 47.1 kg) Also Weight loss of 13.63% x 4 months, (5/12/19, 43.1 kg and 1/17/19, 49.9 kg)  2) Moderate fat/muscle losses (see RD note)    Interventions/Recommendations (treatment strategy):  Increase enteral feeding flow rate to meet EEN per pt tolerance.     Nutrition Diagnosis Status:   New

## 2019-05-13 NOTE — ASSESSMENT & PLAN NOTE
Chronic and recent status post evacuation.  Plan is to continue PT and OT and discharge patient back to skilled nursing when medically stable.  With follow-up for neurosurgery.

## 2019-05-13 NOTE — PLAN OF CARE
Problem: Adult Inpatient Plan of Care  Goal: Plan of Care Review  Outcome: Ongoing (interventions implemented as appropriate)  Plan of care reviewed with patient. IV fluids & IV antibiotics given as per orders. Peg tube intact & patent with Peptamen 1.5 @ 20cc an hr . No residual noted, Hob up 45 degrees. Trach care done per respiratory therapist. Trach collar 4L. Maher catheter intact & patent with cloudy yellow urine noted. Remains free from falls/injury. Frequent rounds made for safety. Bed in low & locked position.

## 2019-05-13 NOTE — PROGRESS NOTES
Pt. Arrived on day shift . She has a 6.0 shiley trach . I sx her several times with large ammts of cloudy , thick secretions. Trach care done

## 2019-05-13 NOTE — CONSULTS
Elif Archibald [id number] is a 54 y.o. female who has been consulted for vancomycin dosing.    The patient has the following labs:     Date Creatinine (mg/dl)    BUN WBC Count   5/13/2019 Estimated Creatinine Clearance: 72.9 mL/min (based on SCr of 0.6 mg/dL). Lab Results   Component Value Date    BUN 11 05/13/2019     Lab Results   Component Value Date    WBC 17.70 (H) 05/12/2019        Current weight is 43.1 kg (95 lb 0.3 oz)    Pt is receiving vancomycin 750 mg every 12 hours.  Vancomycin trough from 5/13 at 1640 was 9.5 mg/dL.  Target trough range is 15-20 mg/dL.   Trough was drawn on time and anticipate it is trending therapeutic. Pharmacy will continue current dose.   A vancomycin trough has been ordered for 5/14 at 1730.      Patient will be followed by pharmacy for changes in renal function, toxicity, and efficacy.  Thank you for allowing us to participate in this patient's care.     Aminta Wilder

## 2019-05-13 NOTE — SUBJECTIVE & OBJECTIVE
Interval History:   Pt seen/examined-she is mouthing words  Reportedly wants to get pain meds more often.  Pain is generalized.  Tolerating low-dose tube feeding.  Awake and alert.    Review of Systems   Unable to perform ROS: Other     Objective:     Vital Signs (Most Recent):  Temp: 96.7 °F (35.9 °C) (05/13/19 1257)  Pulse: 92 (05/13/19 1353)  Resp: 18 (05/13/19 1353)  BP: 139/61 (05/13/19 1257)  SpO2: 99 % (05/13/19 1353) Vital Signs (24h Range):  Temp:  [96.7 °F (35.9 °C)-98.9 °F (37.2 °C)] 96.7 °F (35.9 °C)  Pulse:  [] 92  Resp:  [15-24] 18  SpO2:  [96 %-100 %] 99 %  BP: ()/(57-66) 139/61     Weight: 43.1 kg (95 lb 0.3 oz)  Body mass index is 17.38 kg/m².    Intake/Output Summary (Last 24 hours) at 5/13/2019 1655  Last data filed at 5/13/2019 0500  Gross per 24 hour   Intake 870 ml   Output 1225 ml   Net -355 ml      Physical Exam   Constitutional: She is oriented to person, place, and time. She appears well-developed and well-nourished. No distress.   HENT:   Head: Normocephalic and atraumatic.   Right Ear: External ear normal.   Left Ear: External ear normal.   Nose: Nose normal.   Mouth/Throat: Oropharynx is clear and moist. No oropharyngeal exudate.   Teeth are absent   Eyes: Pupils are equal, round, and reactive to light. Conjunctivae and EOM are normal. Right eye exhibits no discharge. Left eye exhibits no discharge. No scleral icterus.   Neck: Normal range of motion. Neck supple. No thyromegaly present.   Trach in place with dressing clean dry intact   Cardiovascular: Normal rate, regular rhythm, normal heart sounds and intact distal pulses. Exam reveals no gallop and no friction rub.   No murmur heard.  Pulmonary/Chest: Effort normal. No respiratory distress. She has decreased breath sounds in the right lower field and the left lower field. She has no wheezes. She has rales in the left lower field.   Abdominal: Soft. Bowel sounds are normal. She exhibits no distension and no mass. There is  no tenderness. There is no rebound and no guarding.   Peg tube in site with dried blood but no purulent discharge   Genitourinary:   Genitourinary Comments: Maher in yellow urine   Musculoskeletal: She exhibits no edema or tenderness.   Lymphadenopathy:     She has no cervical adenopathy.   Neurological: She is alert and oriented to person, place, and time. No cranial nerve deficit. Coordination normal.   Skin: Skin is warm and dry. No rash noted. No erythema.   Wound right elbow see --photo.  dressing intact   Psychiatric: She has a normal mood and affect. Her behavior is normal.   Pleasant and cooperative   Nursing note and vitals reviewed.      Significant Labs: All pertinent labs within the past 24 hours have been reviewed.    Significant Imaging: I have reviewed and interpreted all pertinent imaging results/findings within the past 24 hours.

## 2019-05-13 NOTE — PROGRESS NOTES
[facility] Hospital Medicine  Progress Note    Patient Name: Elif Archibald  MRN: [id number]  Patient Class: IP- Inpatient   Admission Date: 5/12/2019  Length of Stay: 1 days  Attending Physician: Shirley Schuster MD  Primary Care Provider: Terrie Ireland MD        Subjective:     Principal Problem:Sepsis    HPI:  Elif Archibald is a 54 y.o. female with a PMHx of anxiety, seizures, schizophrenia, cirrhosis, and ICH who presents to the ED via EMS from [facility] for reported lethargy per daughter that started this am. The daughter relayed the that the patient looked more lethargic than her baseline. Per nursing home, the patient appears to be at the baseline, but the daughter wants the patient to be evaluated. Patient has recently had a SDH s/p surgical treatment/scalp removal  and has undergone peg tube placement, along with having a tracheostomy placed. Patient was recently seen for a fall that occurred 3 days ago as well. History and ROS is limited secondary to patients condition.     The history is provided by the  daughters and EMR. The history is limited by the condition of the patient.   She is awake/alert but voice weak and trach present.         Hospital Course:  No notes on file    Interval History:   Pt seen/examined-she is mouthing words  Reportedly wants to get pain meds more often.  Pain is generalized.  Tolerating low-dose tube feeding.  Awake and alert.    Review of Systems   Unable to perform ROS: Other     Objective:     Vital Signs (Most Recent):  Temp: 96.7 °F (35.9 °C) (05/13/19 1257)  Pulse: 92 (05/13/19 1353)  Resp: 18 (05/13/19 1353)  BP: 139/61 (05/13/19 1257)  SpO2: 99 % (05/13/19 1353) Vital Signs (24h Range):  Temp:  [96.7 °F (35.9 °C)-98.9 °F (37.2 °C)] 96.7 °F (35.9 °C)  Pulse:  [] 92  Resp:  [15-24] 18  SpO2:  [96 %-100 %] 99 %  BP: ()/(57-66) 139/61     Weight: 43.1 kg (95 lb 0.3 oz)  Body mass index is 17.38 kg/m².    Intake/Output Summary  (Last 24 hours) at 5/13/2019 1655  Last data filed at 5/13/2019 0500  Gross per 24 hour   Intake 870 ml   Output 1225 ml   Net -355 ml      Physical Exam   Constitutional: She is oriented to person, place, and time. She appears well-developed and well-nourished. No distress.   HENT:   Head: Normocephalic and atraumatic.   Right Ear: External ear normal.   Left Ear: External ear normal.   Nose: Nose normal.   Mouth/Throat: Oropharynx is clear and moist. No oropharyngeal exudate.   Teeth are absent   Eyes: Pupils are equal, round, and reactive to light. Conjunctivae and EOM are normal. Right eye exhibits no discharge. Left eye exhibits no discharge. No scleral icterus.   Neck: Normal range of motion. Neck supple. No thyromegaly present.   Trach in place with dressing clean dry intact   Cardiovascular: Normal rate, regular rhythm, normal heart sounds and intact distal pulses. Exam reveals no gallop and no friction rub.   No murmur heard.  Pulmonary/Chest: Effort normal. No respiratory distress. She has decreased breath sounds in the right lower field and the left lower field. She has no wheezes. She has rales in the left lower field.   Abdominal: Soft. Bowel sounds are normal. She exhibits no distension and no mass. There is no tenderness. There is no rebound and no guarding.   Peg tube in site with dried blood but no purulent discharge   Genitourinary:   Genitourinary Comments: Maher in yellow urine   Musculoskeletal: She exhibits no edema or tenderness.   Lymphadenopathy:     She has no cervical adenopathy.   Neurological: She is alert and oriented to person, place, and time. No cranial nerve deficit. Coordination normal.   Skin: Skin is warm and dry. No rash noted. No erythema.   Wound right elbow see --photo.  dressing intact   Psychiatric: She has a normal mood and affect. Her behavior is normal.   Pleasant and cooperative   Nursing note and vitals reviewed.      Significant Labs: All pertinent labs within the  past 24 hours have been reviewed.    Significant Imaging: I have reviewed and interpreted all pertinent imaging results/findings within the past 24 hours.    Assessment/Plan:      * Sepsis  This patient does have evidence of infective focus-urine/possible pneumonia     My overall impression is sepsis.  Antibiotics given-   Antibiotics (From admission, onward)    Start     Stop Route Frequency Ordered    05/13/19 0400  vancomycin (VANCOCIN) 750 mg in dextrose 5 % 250 mL IVPB      -- IV Every 12 hours (non-standard times) 05/12/19 1752    05/12/19 2200  piperacillin-tazobactam 4.5 g in dextrose 5 % 100 mL IVPB (ready to mix system)      -- IV Every 8 hours (non-standard times) 05/12/19 1803        Vancomycin    Blood-neg  Urine culture -pend  Severe Sepsis only-  Latest labs reviewed, they are-  No results for input(s): CBC, BILITOT in the last 24 hours.    Invalid input(s): CREATININE.1  No results for input(s): LACTATE in the last 24 hours.  No results for input(s): PH, PO2, PCO2, HCO3, BE in the last 168 hours.    Organ dysfunction indicated by Encephalopathy    Septic Shock only-  Shock with decreased perfusion noted, Fluid challenge  was given at 30cc/kg    Post- resuscitation assessment- (Done after fluids given for shock)  Vital signs post fluid administrations were-    Temp Readings from Last 1 Encounters:   05/13/19 96.3 °F (35.7 °C)     BP Readings from Last 1 Encounters:   05/13/19 95/65     Pulse Readings from Last 1 Encounters:   05/13/19 87       Perfusion assessment post bolus shows Good distal pulses, adequate capillary refill    Will Not start Pressors- Levophed for MAP of 65      Alcoholic cirrhosis of liver without ascites    Chronically elevated liver enzymes.  Monitor for any neurologic changes such as hyperammonemia.  MELD-Na score: 6 at 1/17/2019  7:28 PM  MELD score: 6 at 1/17/2019  7:28 PM  Calculated from:  Serum Creatinine: 0.8 mg/dL (Rounded to 1 mg/dL) at 1/17/2019  7:28 PM  Serum Sodium:  133 mmol/L at 1/17/2019  7:28 PM  Total Bilirubin: 0.3 mg/dL (Rounded to 1 mg/dL) at 1/17/2019  7:28 PM  INR(ratio): 1.0 at 1/17/2019  7:28 PM  Age: 53 years    Essential hypertension  Chronic and stable.  Hypertension Medications             metoprolol tartrate (LOPRESSOR) 50 MG tablet 50 mg by Per G Tube route 4 (four) times daily.       Hospital Medications             metoprolol tartrate (LOPRESSOR) tablet 50 mg 1 tablet (50 mg total) by Per G Tube route 4 (four) times daily.          Recurrent major depressive disorder, in partial remission  Chronic and stable.  Continue home medications.  On Lexapro and gabapentin.      Hx of subdural hematoma  Chronic and recent status post evacuation.  Plan is to continue PT and OT and discharge patient back to skilled nursing when medically stable.  With follow-up for neurosurgery.    Chronic obstructive pulmonary disease with acute lower respiratory infection  Chronic and stable.  Will trend checks x-rays.  CC chronic respiratory failure.  Sputum culture not done on admit is she is asymptomatic.  No discolored drainage from her trach.    PEG (percutaneous endoscopic gastrostomy) status  Routine PEG care and monitor for skin changes for infection      Tracheostomy status  Routine trach care      Chronic respiratory failure    Chronic and stable.  Will continue nebulizer treatments and monitor pulse ox.  With oxygen as needed.  On admit tolerating room air.  Lower pneumonia of the left lung which appears slightly improved over the last x-ray and small pleural effusion with healed rib fractures.  Tracheostomy tube is in place.      VTE Risk Mitigation (From admission, onward)        Ordered     enoxaparin injection 40 mg  Daily      05/12/19 1722     IP VTE HIGH RISK PATIENT  Once      05/12/19 1722     Place LOPEZ hose  Until discontinued      05/12/19 1633              Shirley Schuster MD  Department of Hospital Medicine   [facility]

## 2019-05-13 NOTE — PLAN OF CARE
Cm completed the assessment on the phone with daughter Duane.  Pt came from [facility]-long term and will return to [facility] on discharge.  Disposition:  Pt will return to [facility] on discharge.       05/13/19 1550   Discharge Assessment   Assessment Type Discharge Planning Assessment   Confirmed/corrected address and phone number on facesheet? Yes   Assessment information obtained from?   (daughter- Duane)   Prior to hospitilization cognitive status: Alert/Oriented   Prior to hospitalization functional status: Assistive Equipment;Needs Assistance   Current cognitive status: Alert/Oriented   Current Functional Status: Assistive Equipment;Needs Assistance   Facility Arrived From: [facility]   Lives With facility resident   Able to Return to Prior Arrangements yes   Who are your caregiver(s) and their phone number(s)? daughters: Duane Archibald - [phone]/Roseline Angel - [phone]   Patient's perception of discharge disposition nursing home   Readmission Within the Last 30 Days no previous admission in last 30 days   Patient currently being followed by outpatient case management? No   Patient currently receives any other outside agency services? No   Equipment Currently Used at Home wheelchair   Do you have any problems affording any of your prescribed medications? No   Is the patient taking medications as prescribed? yes   Does the patient have transportation home? Yes   Transportation Anticipated agency   Dialysis Name and Scheduled days na   Discharge Plan A Return to nursing home   DME Needed Upon Discharge  none   Patient/Family in Agreement with Plan yes

## 2019-05-13 NOTE — PLAN OF CARE
Problem: Feeding Intolerance (Enteral Nutrition)  Goal: Feeding Tolerance    Intervention: Prevent and Manage Feeding Intolerance  Intervention: enteral feeding    Recommendations: 1) Increase flow rate to 40 mls/hr of Peptamen Prebio 1.5. Flush with 120 mls water q 4 hrs. (1440 calories (100% EEN), 65 g protein (100% EPN)  Goals: 1) Pt will receive >=85% EEN by f/u.   Nutrition Goal Status: new  Communication of RD Recs: (POC, sticky note)

## 2019-05-13 NOTE — ASSESSMENT & PLAN NOTE
Chronic and stable.  Will trend checks x-rays.  CC chronic respiratory failure.  Sputum culture not done on admit is she is asymptomatic.  No discolored drainage from her trach.

## 2019-05-14 VITALS
OXYGEN SATURATION: 98 % | RESPIRATION RATE: 18 BRPM | TEMPERATURE: 98 F | HEART RATE: 69 BPM | HEIGHT: 62 IN | DIASTOLIC BLOOD PRESSURE: 60 MMHG | SYSTOLIC BLOOD PRESSURE: 102 MMHG | WEIGHT: 95 LBS | BODY MASS INDEX: 17.48 KG/M2

## 2019-05-14 PROBLEM — A41.50 SEVERE SEPSIS WITH ACUTE ORGAN DYSFUNCTION DUE TO GRAM-NEGATIVE BACTERIA: Status: ACTIVE | Noted: 2019-05-12

## 2019-05-14 PROBLEM — R65.20 SEVERE SEPSIS WITH ACUTE ORGAN DYSFUNCTION DUE TO GRAM-NEGATIVE BACTERIA: Status: ACTIVE | Noted: 2019-05-12

## 2019-05-14 PROBLEM — E87.6 HYPOKALEMIA DUE TO INADEQUATE POTASSIUM INTAKE: Status: ACTIVE | Noted: 2019-05-14

## 2019-05-14 LAB
ANION GAP SERPL CALC-SCNC: 5 MMOL/L (ref 8–16)
BUN SERPL-MCNC: 10 MG/DL (ref 6–20)
CALCIUM SERPL-MCNC: 9 MG/DL (ref 8.7–10.5)
CHLORIDE SERPL-SCNC: 104 MMOL/L (ref 95–110)
CO2 SERPL-SCNC: 29 MMOL/L (ref 23–29)
CREAT SERPL-MCNC: 0.6 MG/DL (ref 0.5–1.4)
EST. GFR  (AFRICAN AMERICAN): >60 ML/MIN/1.73 M^2
EST. GFR  (NON AFRICAN AMERICAN): >60 ML/MIN/1.73 M^2
GLUCOSE SERPL-MCNC: 100 MG/DL (ref 70–110)
MAGNESIUM SERPL-MCNC: 1.4 MG/DL (ref 1.6–2.6)
PHOSPHATE SERPL-MCNC: 3.6 MG/DL (ref 2.7–4.5)
POTASSIUM SERPL-SCNC: 3.2 MMOL/L (ref 3.5–5.1)
SODIUM SERPL-SCNC: 138 MMOL/L (ref 136–145)

## 2019-05-14 PROCEDURE — 80048 BASIC METABOLIC PNL TOTAL CA: CPT

## 2019-05-14 PROCEDURE — 12000002 HC ACUTE/MED SURGE SEMI-PRIVATE ROOM

## 2019-05-14 PROCEDURE — 83735 ASSAY OF MAGNESIUM: CPT

## 2019-05-14 PROCEDURE — 97530 THERAPEUTIC ACTIVITIES: CPT | Performed by: PHYSICAL THERAPIST

## 2019-05-14 PROCEDURE — 25000003 PHARM REV CODE 250: Performed by: NURSE PRACTITIONER

## 2019-05-14 PROCEDURE — 36415 COLL VENOUS BLD VENIPUNCTURE: CPT

## 2019-05-14 PROCEDURE — 25000003 PHARM REV CODE 250: Performed by: HOSPITALIST

## 2019-05-14 PROCEDURE — G8978 MOBILITY CURRENT STATUS: HCPCS | Mod: CL | Performed by: PHYSICAL THERAPIST

## 2019-05-14 PROCEDURE — 94761 N-INVAS EAR/PLS OXIMETRY MLT: CPT

## 2019-05-14 PROCEDURE — G8979 MOBILITY GOAL STATUS: HCPCS | Mod: CK | Performed by: PHYSICAL THERAPIST

## 2019-05-14 PROCEDURE — 99900026 HC AIRWAY MAINTENANCE (STAT)

## 2019-05-14 PROCEDURE — 84100 ASSAY OF PHOSPHORUS: CPT

## 2019-05-14 PROCEDURE — 25000242 PHARM REV CODE 250 ALT 637 W/ HCPCS: Performed by: HOSPITALIST

## 2019-05-14 PROCEDURE — 63600175 PHARM REV CODE 636 W HCPCS: Performed by: HOSPITALIST

## 2019-05-14 PROCEDURE — 94640 AIRWAY INHALATION TREATMENT: CPT

## 2019-05-14 PROCEDURE — 27000221 HC OXYGEN, UP TO 24 HOURS

## 2019-05-14 RX ORDER — CEFUROXIME AXETIL 250 MG/1
250 TABLET ORAL EVERY 12 HOURS
Qty: 14 TABLET | Refills: 0
Start: 2019-05-14 | End: 2019-05-21

## 2019-05-14 RX ADMIN — ESCITALOPRAM OXALATE 20 MG: 10 TABLET, FILM COATED ORAL at 09:05

## 2019-05-14 RX ADMIN — KETOROLAC TROMETHAMINE 15 MG: 30 INJECTION, SOLUTION INTRAMUSCULAR at 10:05

## 2019-05-14 RX ADMIN — MULTIVITAMIN 15 ML: LIQUID ORAL at 09:05

## 2019-05-14 RX ADMIN — IPRATROPIUM BROMIDE AND ALBUTEROL SULFATE 3 ML: .5; 3 SOLUTION RESPIRATORY (INHALATION) at 12:05

## 2019-05-14 RX ADMIN — IPRATROPIUM BROMIDE AND ALBUTEROL SULFATE 3 ML: .5; 3 SOLUTION RESPIRATORY (INHALATION) at 07:05

## 2019-05-14 RX ADMIN — LEVETIRACETAM 1000 MG: 100 SOLUTION ORAL at 09:05

## 2019-05-14 RX ADMIN — METOPROLOL TARTRATE 50 MG: 50 TABLET ORAL at 09:05

## 2019-05-14 RX ADMIN — GABAPENTIN 400 MG: 400 CAPSULE ORAL at 09:05

## 2019-05-14 RX ADMIN — ACETYLCYSTEINE 2 ML: 200 SOLUTION ORAL; RESPIRATORY (INHALATION) at 07:05

## 2019-05-14 RX ADMIN — FOLIC ACID 1 MG: 1 TABLET ORAL at 09:05

## 2019-05-14 RX ADMIN — PIPERACILLIN AND TAZOBACTAM 4.5 G: 4; .5 INJECTION, POWDER, LYOPHILIZED, FOR SOLUTION INTRAVENOUS; PARENTERAL at 07:05

## 2019-05-14 RX ADMIN — VANCOMYCIN HYDROCHLORIDE 750 MG: 750 INJECTION, POWDER, LYOPHILIZED, FOR SOLUTION INTRAVENOUS at 05:05

## 2019-05-14 RX ADMIN — OXYCODONE HYDROCHLORIDE AND ACETAMINOPHEN 500 MG: 500 TABLET ORAL at 09:05

## 2019-05-14 NOTE — DISCHARGE INSTRUCTIONS
[facility] Facility Transfer Orders        Admit to: [facility]    Diagnoses:   Active Hospital Problems    Diagnosis  POA    *Severe sepsis with acute organ dysfunction due to gram-negative bacteria [A41.50, R65.20]  Yes    Hypokalemia due to inadequate potassium intake [E87.6]  No    Moderate malnutrition [E44.0]  Yes    Functional quadriplegia [R53.2]  Yes    PEG (percutaneous endoscopic gastrostomy) status [Z93.1]  Not Applicable    Chronic obstructive pulmonary disease with acute lower respiratory infection [J44.0]  Yes    Hx of subdural hematoma [Z86.79]  Not Applicable    Recurrent major depressive disorder, in partial remission [F33.41]  Yes    Alcoholic cirrhosis of liver without ascites [K70.30]  Yes    Tracheostomy status [Z93.0]  Not Applicable    Chronic respiratory failure [J96.10]  Yes    Essential hypertension [I10]  Yes      Resolved Hospital Problems   No resolved problems to display.     Allergies: Review of patient's allergies indicates:  No Known Allergies    Code Status:   Full     Vitals: Routine       Diet: tube feedings: tube feed    Activity: Activity as tolerated    Nursing Precautions: Aspiration , Fall, Seizure and Pressure ulcer prevention    Bed/Surface: Low Air Loss    Consults: PT to evaluate and treat- 5 times a week and OT to evaluate and treat- 5 times a week    Oxygen: 3 liters/min via nasal cannula    Dialysis: Patient is not on dialysis.     Labs: cbc, bmp, mag and ua/culture in 10 days    Pending Diagnostic Studies:     None        Imaging: na      Miscellaneous Care:   PEG Care:  Clean site every 24 hours  Maher Care: Empty Maher bag every shift.  Change Maher every month  Routine Skin for Bedridden Patients:  Apply moisture barrier cream to all    IV Access: na     Medications: Discontinue all previous medication orders, if any. See new list below.      ceftin 250mg one tab bid x 7 days for pyelonephritis  #14      Current Discharge  Medication List      CONTINUE these medications which have NOT CHANGED    Details   acetaminophen (TYLENOL) 325 MG tablet 325 mg by Per G Tube route every 12 (twelve) hours as needed for Pain.      acetylcysteine 200 mg/ml, 20%, (MUCOMYST) 200 mg/mL (20 %) nebulizer solution Take 2 mLs by nebulization 2 (two) times daily.      albuterol (PROVENTIL/VENTOLIN HFA) 90 mcg/actuation inhaler Inhale 1 puff into the lungs every 4 (four) hours as needed for Wheezing. Rescue      !! albuterol sulfate 2.5 mg/0.5 mL Nebu Take 2.5 mg by nebulization every 6 (six) hours as needed. Rescue      !! albuterol sulfate 2.5 mg/0.5 mL Nebu Take 2.5 mg by nebulization 2 (two) times daily.      amitriptyline (ELAVIL) 25 MG tablet 25 mg by Per G Tube route every evening.       arginine/glutamine/calcium bmb (PADDY ORAL) 1 packet by Per G Tube route 2 (two) times daily.      ascorbic acid, vitamin C, (VITAMIN C) 500 MG tablet 500 mg by Per G Tube route 2 (two) times daily.       chlordiazepoxide (LIBRIUM) 5 MG capsule 5 mg by Per G Tube route nightly.      diazePAM (VALIUM) 5 MG tablet 5 mg by Per G Tube route every 8 (eight) hours as needed for Anxiety.      escitalopram oxalate (LEXAPRO) 20 MG tablet 20 mg by Per G Tube route once daily.       folic acid (FOLVITE) 1 MG tablet 1 mg by Per G Tube route once daily.      gabapentin (NEURONTIN) 400 MG capsule 400 mg by Per G Tube route 3 (three) times daily.       hydrOXYzine pamoate (VISTARIL) 50 MG Cap 50 mg by Per G Tube route every 6 (six) hours as needed (itching).      levetiracetam oral soln 500 mg/5 mL (5 mL) Soln 1,000 mg by Per G Tube route 2 (two) times daily.      metoprolol tartrate (LOPRESSOR) 50 MG tablet 50 mg by Per G Tube route 4 (four) times daily.       multivitamin liquid (THERAGRAN) Liqd 1 tablet by Per G Tube route once daily.      oxyCODONE-acetaminophen (PERCOCET) 7.5-325 mg per tablet 1 tablet by Per G Tube route every 6 (six) hours as needed for Pain.        polyethylene glycol (GLYCOLAX) 17 gram PwPk 17 g by Per G Tube route once daily.       risperidone (RISPERDAL) 0.5 MG Tab 0.5 mg by Per G Tube route every evening.        !! - Potential duplicate medications found. Please discuss with provider.        Follow up:   Follow-up Information     Terrie Ireland MD.    Specialty:  Internal Medicine  Contact information:  [address] [place] [address]  [place] [address] [phone]             Danyel Camarena MD In 1 week.    Specialty:  Family Medicine  Why:  at [facility]   Contact information:  [place] [address] [phone]

## 2019-05-14 NOTE — PLAN OF CARE
Problem: Adult Inpatient Plan of Care  Goal: Plan of Care Review  Outcome: Ongoing (interventions implemented as appropriate)  Plan of care reviewed with patient. SR on telemetry. O2- 2L trach collar in use. Tracheostomy intact.Patient NPO.  Peg tube intact & patent. Peptamen 1.5 @ 40cc hr, no residual noted. Maher catheter intact & patent with cloudy yellow urine. IV antibiotics given as per orders. Remains free from falls/injury. Instructed to call for assistance as needed during night. Call light in reach. Bed in low & locked position, frequent rounds made for safety.

## 2019-05-14 NOTE — PLAN OF CARE
05/14/19 1246   Final Note   Assessment Type Final Discharge Note   Anticipated Discharge Disposition [facility]  ([facility]

## 2019-05-14 NOTE — PLAN OF CARE
Problem: Physical Therapy Goal  Goal: Physical Therapy Goal  Goals to be met by: 2017     Patient will increase functional independence with mobility by performin. Supine to sit with Stand-by Assistance  2. Sit to supine with Stand-by Assistance  3. Sit to stand transfer with Contact Guard Assistance  4. Bed to chair transfer with Contact Guard Assistance using Rolling Walker  5. Gait  x 50 feet with Minimal Assistance using Rolling Walker.   6. Lower extremity exercise program x10-20 reps per handout, with supervision     Outcome: Ongoing (interventions implemented as appropriate)  Pt able to transfer bed<>chair with RW and Mod A.

## 2019-05-14 NOTE — PLAN OF CARE
Aerosol tx tid and q6. Trach care qshift       05/14/19 0740 05/14/19 0741   PRE-TX-O2   O2 Device (Oxygen Therapy) Trach Collar Trach Collar   $ Is the patient on Low Flow Oxygen? Yes Yes   Flow (L/min)  --  2   SpO2 99 % 99 %   Pulse Oximetry Type  --  Intermittent   $ Pulse Oximetry - Multiple Charge  --  Pulse Oximetry - Multiple   Pulse 99 99   Resp 18 18   Aerosol Therapy   $ Aerosol Therapy Charges Aerosol Treatment Aerosol Treatment   Respiratory Treatment Status (SVN) given given   Treatment Route (SVN) tracheostomy tracheostomy   Patient Position (SVN) HOB elevated HOB elevated   Signs of Intolerance (SVN) none none   Breath Sounds Post-Respiratory Treatment   Throughout All Fields Post-Treatment All Fields  --    Throughout All Fields Post-Treatment no change no change   Post-treatment Heart Rate (beats/min) 98 98   Post-treatment Resp Rate (breaths/min) 19 18

## 2019-05-14 NOTE — NURSING
Report called to Aspen at [facility].     PIV removed; pressure and bandage applied. Telemetry removed. Tube feedings stopped and tube clamped. Maher catheter removed. Awaiting [facility] transport.     1630 Called [facility] about ETA on transport. Was told bus was coming back from [facility] and they did not know what time it would be.     1745 Called [facility] again to get ETA on transport. Said they would be leaving at 1845 to come and get patient. Updated charge nurse, Isabelle.

## 2019-05-14 NOTE — PLAN OF CARE
Pt's discharge order, labs, AVS faxed through [facility].  Left message for Saloni to update her on the discharge and to call me.    12:25 p.m. - spoke to Marivel at [facility], [phone] who stated they would check over the orders and call us back.         05/14/19 5525   Discharge Reassessment   Assessment Type Discharge Planning Reassessment   Discharge Plan A Skilled Nursing Facility

## 2019-05-14 NOTE — PLAN OF CARE
05/13/19 1939   Patient Assessment/Suction   Level of Consciousness (AVPU) alert   Respiratory Effort Normal;Unlabored   Expansion/Accessory Muscles/Retractions no use of accessory muscles   Rhythm/Pattern, Respiratory depth regular;pattern regular;unlabored   Cough Type fair   PRE-TX-O2   O2 Device (Oxygen Therapy) Trach Collar   $ Is the patient on Low Flow Oxygen? Yes   Flow (L/min) 2   SpO2 99 %   Pulse Oximetry Type Intermittent   $ Pulse Oximetry - Multiple Charge Pulse Oximetry - Multiple   Pulse 90   Resp 20   Aerosol Therapy   $ Aerosol Therapy Charges Aerosol Treatment   Daily Review of Necessity (SVN) completed   Respiratory Treatment Status (SVN) given   Treatment Route (SVN) in-line   Patient Position (SVN) semi-Olvera's   Post Treatment Assessment (SVN) breath sounds improved   Signs of Intolerance (SVN) none   Breath Sounds Post-Respiratory Treatment   Post-treatment Heart Rate (beats/min) 88   Post-treatment Resp Rate (breaths/min) 20

## 2019-05-14 NOTE — NURSING
Situation-   Who are you? Who is the patient? What is going on with the patient currently?          I am Mary Michaels NP from 3rd floor   in regards to Elif Archibald who is a 54 y.o. year old female admitted with Sepsis who is due lopressor    Background- What is the patient's significant medical history (CAD, ESRD, HIV positive, history of recent surgery/chemo.transfusion) and recent labs Has a past medical history of   Past Medical History:   Diagnosis Date    Anxiety     Asthma     Bipolar 1 disorder     Cirrhosis, alcoholic     Cocaine abuse     COPD (chronic obstructive pulmonary disease)     Depression     Schizophrenia     Seizures      Vitals:    05/13/19 2226   BP: (!) 93/57   Pulse: 80   Resp:    Temp:                 Assessment-   Has the following issues (abnormal labs, abnormal vitals, change in status, uncontrolled symptoms, patient request) Who is due lopressor 50mg. Bp is 93/57, hr 80   Recommendation- What is the change in the plan of care requested? Do you think we should hold tonights dose?   Plan-  What did we decide to do?   Plan was discussed and is as follows  Order received to hold lopressor tonight.

## 2019-05-14 NOTE — PLAN OF CARE
Faxed [facility] the updated AVS with the antibiotic typed in under instructions.  Updated C[facility] at [facility].     05/14/19 1240   Discharge Reassessment   Assessment Type Discharge Planning Reassessment

## 2019-05-14 NOTE — PLAN OF CARE
Problem: Adult Inpatient Plan of Care  Goal: Patient-Specific Goal (Individualization)  Outcome: Ongoing (interventions implemented as appropriate)  Pt is  AAOX4. POC reviewed with pt. Pt verbalized understanding. Pt has a trach and can speak over when valve closed. Pt has PEG tube with continuous feedings at 40ml/hr. VSS. Remains afebrile. IVF infusing per order. IV abx infused per order. Pain controlled with PRN medications. Remains free of injury. Bed low, breaks locked, call light in reach. Will monitor.

## 2019-05-14 NOTE — PROGRESS NOTES
Elif Archibald [id number] is a 54 y.o. female who had been consulted for vancomycin dosing.    Vancomycin has been discontinued.  Pharmacy consult for vancomycin dosing in no longer required.      Thank you for allowing us to participate in this patient's care.     Greg Vogel, PharmD

## 2019-05-15 LAB — BACTERIA UR CULT: NORMAL

## 2019-05-15 NOTE — PHYSICIAN QUERY
"PT Name: Elif Archibald  MR #: [id number]     CDS: Ana María Deleon RN, CCDS         Contact information :ext [phone])  [email or web address]     This form is a permanent document in the medical record.     Query Date: May 14, 2019    Physician Query - Neurological Condition Clarification    By submitting this query, we are merely seeking further clarification of documentation to reflect the severity of illness of your patient. Please utilize your independent clinical judgment when addressing the question(s) below.    The Medical record reflects the following:     Indicators   Supporting Clinical Findings Location in Medical Record   x AMS, Confusion,  LOC, etc.  "presents with    Altered Mental Status"     "encephalopathy" H&P 5/12/19   x Acute / Chronic Illness "sepsis  This patient does have evidence of infective focus-urine/possible pneumonia"    "Organ dysfunction indicated by Encephalopathy"    "Septic Shock only-  Shock with decreased perfusion noted, Fluid challenge  was given at 30cc/kg    "Chronic respiratory failure"  "Hx of subdural hematoma"     H&P 5/12/19        H&P 5/12/19      H&P 5/12/19        H&P 5/12/19   x Radiology Findings "1. Stable postsurgical change from previous left frontoparietal and temporal craniectomy with adjacent stable subdural hygroma.  2. Cortical atrophy with periventricular deep white matter change consistent with chronic small vessel ischemic disease.  3. Chronic vanessa cisterna magna." CT Head 5/12/19   x Electrolyte Imbalance Sodium 133  Chloride 94  CO2 33x Lab 5/12/19   x Medication vancomycin (VANCOCIN) 750 mg in dextrose 5 % 250 mL IVPB   piperacillin-tazobactam 4.5 g in dextrose 5 % 100 mL IVPB    MAR 5/12/19   x Treatment         sodium chloride 0.9% bolus 1,293 mL    MAR 5/12/19   x Other "Per nursing home, the patient appears to be at the baseline, but the daughter wants the patient to be evaluated. Patient has recently had a SDH s/p surgical treatment/scalp " "removal  and has undergone peg tube placement, along with having a tracheostomy placed. Patient was recently seen for a fall that occurred 3 days ago as well. History and ROS is limited secondary to patients condition."   H&P 5/12/19     Encephalopathy- is a general term for any diffuse disease of the brain that alters brain function or structure. Treatment of the cognitive dysfunction varies but is ultimately dependent on the treatment of the underlying condition.  Major Symptoms of Encephalopathy - Decreased level of consciousness, fluctuating alertness/concentration, confusion, agitation, lethargy, somnolence, drowsiness, obtundation, stupor, or coma.         References: National Institutes of Healths (NIH) National Durham of Neurological Disorders and Strokes;  HCPro 2016; Advisory Board   Clinical Guidelines:   These guidelines will set system standards to assist providers in managing, documentation, and coding of encephalopathy. The intent of this document is to serve as a system guideline, not replace the providers clinical judgment:    Doctor, please specify the diagnosis or diagnoses associated with above clinical findings.    [  x ] Metabolic Encephalopathy - Due to electrolye imbalance, metabolic derangements, or infections processes, includes Septic Encephalopathy   [   ] Toxic Encephalopathy - Due to drugs, chemicals, or other toxic substances   [   ] Traumatic Encephalopathy - Due to concussion or other structural brain injury   [   ] Other Encephalopathy - Includes uremic encephalopathy   [   ] Unspecified Encephalopathy      [   ] Other Neurological Condition-  Includes Post-ictal altered mental status. (please specify condition): _________   [   ]  Clinically Undetermined     Please document in your progress notes daily for the duration of treatment until resolved, and include in your discharge summary.  "

## 2019-05-15 NOTE — PHYSICIAN QUERY
"PT Name: Elif Archibald  MR #: [id number]    Physician Query Form - Nutrition Clarification     CDS: Ana María Deleon RN, CCDS         Contact information :ext [phone])  [email or web address]       This form is a permanent document in the medical record.     Query Date: May 14, 2019    By submitting this query, we are merely seeking further clarification of documentation.. Please utilize your independent clinical judgment when addressing the question(s) below.    The Medical record contains the following:   Indicators  Supporting Clinical Findings Location in Medical Record   x % of Estimated Energy Intake over a time frame from p.o., TF, or TPN % Intake of Estimated Energy Needs: 25 - 50 %  % Meal Intake: NPO RD consult 5/12/19   x Weight Status over a time frame "Weight Loss (Malnutrition): greater than 5% in 1 month "    "8.49% weight loss x 1 month, 1 week (5/12/19, 43.1 kg and 4/6/19, 47.1 kg) Also Weight loss of 13.63% x 4 months, (5/12/19, 43.1 kg and 1/17/19, 49.9 kg)"   RD consult 5/12/19   x Subcutaneous Fat and/or Muscle Loss "Orbital Region (Subcutaneous Fat Loss): moderate depletion  Upper Arm Region (Subcutaneous Fat Loss): severe depletion   Spiritism Region (Muscle Loss): mild depletion"   RD consult 5/12/19    Fluid Accumulation or Edema      Reduced  Strength     x Wt / BMI / Usual Body Weight "BMI (Calculated): 17.4 RD consult 5/12/19    Delayed Wound Healing / Failure to Thrive     x Acute or Chronic Illness "Sepsis"  " PMHx of anxiety, seizures, schizophrenia, cirrhosis, and ICH who presents to the ED via EMS from [facility] for reported lethargy per daughter that started this" am     H&P 5/12/19   x Medication multivit-min-ferrous gluconate 9 mg   iron/15 mL Liqd 15 mL   folic acid tablet 1 mg    MAR   x Treatment Recommendations: 1) Increase flow rate to 40 mls/hr of Peptamen Prebio 1.5. Flush with 120 mls water q 4 hrs. (1440 calories (100% EEN), 65 g protein (100% EPN)     RD " consult 5/12/19    Other       AND / ASPEN Clinical Characteristics (October 2011)  A minimum of two characteristics is recommended for diagnosing either moderate or severe malnutrition   Mild Malnutrition Moderate Malnutrition Severe Malnutrition   Energy Intake from p.o., TF or TPN. < 75% intake of estimated energy needs for less than 7 days < 75% intake of estimated energy needs for greater than 7 days < 50% intake of estimated energy needs for > 5 days   Weight Loss 1-2% in 1 month  5% in 3 months  7.5% in 6 months  10% in 1 year 1-2 % in 1 week  5% in 1 month  7.5% in 3 months  10% in 6 months  20% in 1 year > 2% in 1 week  > 5% in 1 month  > 7.5% in 3 months  > 10% in 6 months  > 20% in 1 year   Physical Findings     None *Mild subcutaneous fat and/or muscle loss  *Mild fluid accumulation  *Stage II decubitus  *Surgical wound or non-healing wound *Mod/severe subcutaneous fat and/or muscle loss  *Mod/severe fluid accumulation  *Stage III or IV decubitus  *Non-healing surgical wound     Provider, please specify diagnosis or diagnoses associated with above clinical findings.    [   ] Mild Protein-Calorie Malnutrition   [   ] Moderate Protein-Calorie Malnutrition   [ x  ] Severe Protein-Calorie Malnutrition   [   ] Other Nutritional Diagnosis (please specify):    [   ] Other:    [  ] Clinically Undetermined       Please document in your progress notes daily for the duration of treatment until resolved and include in your discharge summary.

## 2019-05-16 NOTE — PHYSICIAN QUERY
"PT Name: Elif Archibald  MR #: [id number]     Physician Query Form - Documentation Clarification      CDS: Ana María Deleon RN, CCDS         Contact information :ext [phone])  [email or web address]       This form is a permanent document in the medical record.     Query Date: May 16, 2019    By submitting this query, we are merely seeking further clarification of documentation. Please utilize your independent clinical judgment when addressing the question(s) below.    The Medical record reflects the following:    Supporting Clinical Findings Location in Medical Record     "Sepsis  This patient does have evidence of infective focus-urine/possible pneumonia"  "piperacillin-tazobactam 4.5 g in dextrose 5 % 100 mL IVPB"    "UTI"    "Lower pneumonia of the left lung which appears slightly improved over the last x-ray and small pleural effusion with healed rib fractures.  Tracheostomy tube is in place."  piperacillin-tazobactam 4.5 g in dextrose 5 % 100 mL IVPB (ready to mix system)    vancomycin (VANCOCIN) 750 mg in dextrose 5 % 250 mL IVPB       Urine culture   PROTEUS MIRABILIS ESBL   >100,000 cfu/ml        Multi lobar pneumonia of the left lung which appears slightly improved over the interval with a small left pleural effusion.        H&P 5/12/19              ED provider note 5/12/19    Hosp Med progress note 5/13/19                      Lab 5/12/19        CXR 5/12/19                                                                                Doctor, Please specify diagnosis or diagnoses associated with above clinical findings.  Please clarify sepsis source(s).    Provider Use Only      [   ] Sepsis sources-  UTI due to PROTEUS MIRABILIS ESBL and  Pneumonia, bacterial, please specify, ____    [ x  ] Sepsis source-  UTI due to PROTEUS MIRABILIS ESBL     [   ] Sepsis source- Pneumonia, bacterial, please specify , _____    [   ] Other clarification, _________                                                       "                                                           [  ] Clinically Undetermined

## 2019-05-17 LAB
BACTERIA BLD CULT: NORMAL
BACTERIA BLD CULT: NORMAL

## 2019-05-22 NOTE — DISCHARGE SUMMARY
[facility] Medicine  Discharge Summary      Patient Name: Elif Archibald  MRN: [id number]  Admission Date: 5/12/2019  Hospital Length of Stay: 2 days  Discharge Date and Time: 5/14/2019  8:00 PM  Attending Physician: No att. providers found   Discharging Provider: Shirley Schuster MD  Primary Care Provider: Terrie Ireland MD      HPI:   Elif Archibald is a 54 y.o. female with a PMHx of anxiety, seizures, schizophrenia, cirrhosis, and ICH who presents to the ED via EMS from [facility] for reported lethargy per daughter that started this am. The daughter relayed the that the patient looked more lethargic than her baseline. Per nursing home, the patient appears to be at the baseline, but the daughter wants the patient to be evaluated. Patient has recently had a SDH s/p surgical treatment/scalp removal  and has undergone peg tube placement, along with having a tracheostomy placed. Patient was recently seen for a fall that occurred 3 days ago as well. History and ROS is limited secondary to patients condition.     The history is provided by the  daughters and EMR. The history is limited by the condition of the patient.   She is awake/alert but voice weak and trach present.           * No surgery found *      Hospital Course:    Admitted from [facility] with sepsis -and initiated on vanc/zosyn for possible hcap/uti. Hx recent subdural -has trach and peg. Maher placed in ED but removed prior to discharge. Cultures --urine ESBL E coli   Treated with IVF, tube feeding resumed and mentation returned to baseline. Dc to [facility] for SNF on po antibiotics  Alert, tries to verbalize. Lungs clear. Cor RRR abd-peg tube ext -no edema; scalp with well -healed scar.        Consults:     No new Assessment & Plan notes have been filed under this hospital service since the last note was generated.  Service: Hospital Medicine    Final Active Diagnoses:    Diagnosis Date Noted POA    PRINCIPAL  PROBLEM:  Severe sepsis with acute organ dysfunction due to gram-negative bacteria [A41.50, R65.20] 05/12/2019 Yes    Hypokalemia due to inadequate potassium intake [E87.6] 05/14/2019 No    Moderate malnutrition [E44.0] 05/13/2019 Yes    Functional quadriplegia [R53.2] 04/05/2019 Yes    PEG (percutaneous endoscopic gastrostomy) status [Z93.1] 04/05/2019 Not Applicable    Chronic obstructive pulmonary disease with acute lower respiratory infection [J44.0] 04/05/2019 Yes    Hx of subdural hematoma [Z86.79] 04/05/2019 Not Applicable    Recurrent major depressive disorder, in partial remission [F33.41] 04/05/2019 Yes    Alcoholic cirrhosis of liver without ascites [K70.30] 04/05/2019 Yes    Tracheostomy status [Z93.0] 04/05/2019 Not Applicable    Chronic respiratory failure [J96.10] 04/05/2019 Yes    Essential hypertension [I10] 04/05/2019 Yes      Problems Resolved During this Admission:       Discharged Condition: fair    Disposition: Skilled Nursing Facility    Follow Up:  Follow-up Information     Terrie Ireland MD.    Specialty:  Internal Medicine  Contact information:  [address] [place] [address]  [place] [address] [phone]             Danyel Camarena MD In 1 week.    Specialty:  Family Medicine  Why:  at [facility]   Contact information:  [address] [place] [address] [address] Mendoza [place] [id number] [phone]                 Patient Instructions:   No discharge procedures on file.    Significant Diagnostic Studies:   Results for SOFIA VIZCARRA (MRN [id number]) as of 5/22/2019 16:10   Ref. Range 5/12/2019 12:41   WBC Latest Ref Range: 3.90 - 12.70 K/uL 17.70 (H)   RBC Latest Ref Range: 4.00 - 5.40 M/uL 3.90 (L)   Hemoglobin Latest Ref Range: 12.0 - 16.0 g/dL 10.6 (L)   Hematocrit Latest Ref Range: 37.0 - 48.5 % 32.8 (L)   MCV Latest Ref Range: 82 - 98 fL 84   MCH Latest Ref Range: 27.0 - 31.0 pg 27.2   MCHC Latest Ref Range: 32.0 - 36.0 g/dL 32.3   RDW Latest Ref Range: 11.5 - 14.5 % 14.4   Platelets Latest Ref Range: 150 - 350  K/uL 317   Results for SOFIA VIZCARRA (MRN [id number]) as of 5/22/2019 16:10   Ref. Range 5/14/2019 05:37   Sodium Latest Ref Range: 136 - 145 mmol/L 138   Potassium Latest Ref Range: 3.5 - 5.1 mmol/L 3.2 (L)   Chloride Latest Ref Range: 95 - 110 mmol/L 104   CO2 Latest Ref Range: 23 - 29 mmol/L 29   Anion Gap Latest Ref Range: 8 - 16 mmol/L 5 (L)   BUN, Bld Latest Ref Range: 6 - 20 mg/dL 10   Creatinine Latest Ref Range: 0.5 - 1.4 mg/dL 0.6   eGFR if non African American Latest Ref Range: >60 mL/min/1.73 m^2 >60   eGFR if  Latest Ref Range: >60 mL/min/1.73 m^2 >60   Glucose Latest Ref Range: 70 - 110 mg/dL 100   Calcium Latest Ref Range: 8.7 - 10.5 mg/dL 9.0   Phosphorus Latest Ref Range: 2.7 - 4.5 mg/dL 3.6   Magnesium Latest Ref Range: 1.6 - 2.6 mg/dL 1.4 (L)   Results for SOFIA VIZCARRA (MRN [id number]) as of 5/22/2019 16:10   Ref. Range 5/12/2019 13:59   Specimen UA Unknown Urine, Clean Catch   Color, UA Latest Ref Range: Yellow, Straw, Stephanie  Yellow   Appearance, UA Latest Ref Range: Clear  Cloudy (A)   Specific [place], UA Latest Ref Range: 1.005 - 1.030  <=1.005 (A)   pH, UA Latest Ref Range: 5.0 - 8.0  >8.0 (A)   Protein, UA Latest Ref Range: Negative  1+ (A)   Glucose, UA Latest Ref Range: Negative  Negative   Ketones, UA Latest Ref Range: Negative  Negative   Occult Blood UA Latest Ref Range: Negative  1+ (A)   NITRITE UA Latest Ref Range: Negative  Negative   UROBILINOGEN UA Latest Ref Range: <2.0 EU/dL Negative   Bilirubin (UA) Latest Ref Range: Negative  Negative   Leukocytes, UA Latest Ref Range: Negative  3+ (A)   RBC, UA Latest Ref Range: 0 - 4 /hpf 40 (H)   WBC, UA Latest Ref Range: 0 - 5 /hpf >100 (H)   Bacteria, UA Latest Ref Range: None-Occ /hpf Many (A)   Squam Epithel, UA Latest Units: /hpf 2   Hyaline Casts, UA Latest Ref Range: 0-1/lpf /lpf 0       Pending Diagnostic Studies:     None         Medications:  Reconciled Home Medications:      Medication List      CONTINUE taking  these medications    acetaminophen 325 MG tablet  Commonly known as:  TYLENOL  325 mg by Per G Tube route every 12 (twelve) hours as needed for Pain.     acetylcysteine 200 mg/ml (20%) 200 mg/mL (20 %) nebulizer solution  Commonly known as:  MUCOMYST  Take 2 mLs by nebulization 2 (two) times daily.     * albuterol sulfate 2.5 mg/0.5 mL Nebu  Take 2.5 mg by nebulization every 6 (six) hours as needed. Rescue     * albuterol sulfate 2.5 mg/0.5 mL Nebu  Take 2.5 mg by nebulization 2 (two) times daily.     * albuterol 90 mcg/actuation inhaler  Commonly known as:  PROVENTIL/VENTOLIN HFA  Inhale 1 puff into the lungs every 4 (four) hours as needed for Wheezing. Rescue     amitriptyline 25 MG tablet  Commonly known as:  ELAVIL  25 mg by Per G Tube route every evening.     chlordiazepoxide 5 MG capsule  Commonly known as:  LIBRIUM  5 mg by Per G Tube route nightly.     diazePAM 5 MG tablet  Commonly known as:  VALIUM  5 mg by Per G Tube route every 8 (eight) hours as needed for Anxiety.     escitalopram oxalate 20 MG tablet  Commonly known as:  LEXAPRO  20 mg by Per G Tube route once daily.     folic acid 1 MG tablet  Commonly known as:  FOLVITE  1 mg by Per G Tube route once daily.     gabapentin 400 MG capsule  Commonly known as:  NEURONTIN  400 mg by Per G Tube route 3 (three) times daily.     hydrOXYzine pamoate 50 MG Cap  Commonly known as:  VISTARIL  50 mg by Per G Tube route every 6 (six) hours as needed (itching).     PADDY ORAL  1 packet by Per G Tube route 2 (two) times daily.     levetiracetam oral soln 500 mg/5 mL (5 mL) Soln  1,000 mg by Per G Tube route 2 (two) times daily.     metoprolol tartrate 50 MG tablet  Commonly known as:  LOPRESSOR  50 mg by Per G Tube route 4 (four) times daily.     multivitamin liquid Liqd  Commonly known as:  THERAGRAN  1 tablet by Per G Tube route once daily.     oxyCODONE-acetaminophen 7.5-325 mg per tablet  Commonly known as:  PERCOCET  1 tablet by Per G Tube route every 6 (six)  hours as needed for Pain.     polyethylene glycol 17 gram Pwpk  Commonly known as:  GLYCOLAX  17 g by Per G Tube route once daily.     risperiDONE 0.5 MG Tab  Commonly known as:  RISPERDAL  0.5 mg by Per G Tube route every evening.     VITAMIN C 500 MG tablet  Generic drug:  ascorbic acid (vitamin C)  500 mg by Per G Tube route 2 (two) times daily.         * This list has 3 medication(s) that are the same as other medications prescribed for you. Read the directions carefully, and ask your doctor or other care provider to review them with you.            ASK your doctor about these medications    cefUROXime 250 MG tablet  Commonly known as:  CEFTIN  Take 1 tablet (250 mg total) by mouth every 12 (twelve) hours. for 7 days  Ask about: Should I take this medication?            Indwelling Lines/Drains at time of discharge:   Lines/Drains/Airways     Drain                 Gastrostomy/Enterostomy Percutaneous endoscopic gastrostomy (PEG) LUQ -- days         Urethral Catheter 05/12/19 10 days          Airway                 Surgical Airway Shiley -- days         Surgical Airway Shiley Cuffed -- days                Time spent on the discharge of patient: 32 minutes  Patient was seen and examined on the date of discharge and determined to be suitable for discharge.         Shirley Schuster MD  Department of Hospital Medicine  [facility]    Addend -urine with ESBL proteus-changed antibiotic to levaquin 250mg daily -needs repeat urine culture

## 2019-05-22 NOTE — HOSPITAL COURSE
Admitted from [facility] with sepsis -and initiated on vanc/zosyn for possible hcap/uti. Hx recent subdural -has trach and peg. Maher placed in ED but removed prior to discharge. Cultures --urine ESBL E coli   Treated with IVF, tube feeding resumed and mentation returned to baseline. Dc to [facility] for SNF on po antibiotics  Alert, tries to verbalize. Lungs clear. Cor RRR abd-peg tube ext -no edema; scalp with well -healed scar.

## 2019-05-25 ENCOUNTER — HOSPITAL ENCOUNTER (EMERGENCY)
Facility: HOSPITAL | Age: 54
Discharge: ANOTHER HOSPITAL | End: 2019-05-25
Attending: EMERGENCY MEDICINE
Payer: MEDICAID

## 2019-05-25 VITALS
OXYGEN SATURATION: 100 % | WEIGHT: 95 LBS | DIASTOLIC BLOOD PRESSURE: 70 MMHG | BODY MASS INDEX: 17.48 KG/M2 | RESPIRATION RATE: 20 BRPM | SYSTOLIC BLOOD PRESSURE: 105 MMHG | HEART RATE: 84 BPM | HEIGHT: 62 IN | TEMPERATURE: 98 F
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
## 2019-05-25 PROCEDURE — 99284 EMERGENCY DEPT VISIT MOD MDM: CPT

## 2019-05-25 PROCEDURE — 27000221 HC OXYGEN, UP TO 24 HOURS

## 2019-05-25 NOTE — ED NOTES
Patient identifiers for Elif Archibald checked and correct.  LOC:  Patient is awake, alert, and aware of environment with an appropriate affect. Patient is oriented x 3 but non-verbal from previous injury   APPEARANCE:  Patient resting comfortably and in no acute distress. Patient is clean and well groomed, patient's clothing is properly fastened.  SKIN:  The skin is warm and dry. Patient has normal skin turgor and moist mucus membranes. Skin is intact; no bruising or breakdown noted.  MUSCULOSKELETAL:  Patient is moving all extremities well, no obvious deformities noted. Pulses intact.   RESPIRATORY:  Airway is open and patent. Respirations are spontaneous and non-labored with normal effort and rate.  Noted trach.  CARDIAC:  Patient has a normal rate and rhythm. No peripheral edema noted. Capillary refill < 3 seconds.  ABDOMEN:  No distention noted.  Soft and non-tender upon palpation. Noted PEG tube  NEUROLOGICAL:  PERRL. Facial expression is symmetrical. Hand grasps are equal bilaterally. Normal sensation in all extremities when touched with finger.  Allergies reported:  Review of patient's allergies indicates:  No Known Allergies  OTHER NOTES:  Pt comes here form [facility] after sustaining a fall unwitnessed by staff.  Pt endorses hitting her head at site of recent craniotomy from [facility] R/T a fall.  No obvious injuries noted.

## 2019-05-25 NOTE — ED PROVIDER NOTES
Encounter Date: 5/25/2019       History     Chief Complaint   Patient presents with    Fall     fell out of low level bed at [facility]-hit left side of head; no complaints     This patient is a 54-year-old female with a past history of craniectomy status post intracranial bleeding presenting per EMS after being noted to have fallen out of her bed.  The bed is low and she reports falling out of the bed onto a small mattress next to the bed.  She does report hitting the side of her head where the flap has been removed.  She denies any significant ongoing pain.  She denies any difficulty breathing or any new neurologic symptoms. She denies receiving anything for pain prior to arrival.        Review of patient's allergies indicates:  No Known Allergies  Past Medical History:   Diagnosis Date    Anxiety     Asthma     Bipolar 1 disorder     Cirrhosis, alcoholic     Cocaine abuse     COPD (chronic obstructive pulmonary disease)     Depression     Schizophrenia     Seizures      Past Surgical History:   Procedure Laterality Date    APPENDECTOMY      CRANIECTOMY Left 01/31/2019    ESOPHAGOGASTRODUODENOSCOPY      ESOPHAGOSCOPY N/A 12/9/2014    Performed [facility] at [facility] OR 2ND FLR    LARYNGOSCOPY BRONCHOSCOPY-DIRECT N/A 12/9/2014    Performed by [facility] at [facility] OR 2ND FLR    PEG W/TRACHEOSTOMY PLACEMENT  02/16/2019     Family History   Problem Relation Age of Onset    COPD Mother     Cirrhosis Father      Social History     Tobacco Use    Smoking status: Former Smoker     Packs/day: 2.00     Types: Cigarettes    Smokeless tobacco: Never Used   Substance Use Topics    Alcohol use: Not Currently     Alcohol/week: 25.2 oz     Types: 42 Cans of beer per week     Comment: daily    Drug use: No     Review of Systems   HENT: Negative for facial swelling.    Eyes: Negative for visual disturbance.   Respiratory: Negative for cough and shortness of breath.    Cardiovascular:  Negative for chest pain.   Gastrointestinal: Negative for abdominal pain.   Musculoskeletal: Negative for neck pain.   Skin: Negative for wound.   Neurological: Positive for headaches.   Hematological: Does not bruise/bleed easily.   Psychiatric/Behavioral: Negative for confusion.       Physical Exam     Initial Vitals [05/25/19 0352]   BP Pulse Resp Temp SpO2   106/65 96 20 97.9 °F (36.6 °C) 95 %      MAP       --         Physical Exam    Nursing note and vitals reviewed.  Constitutional: No distress.   HENT:   Head: Atraumatic.   Left hemispheric craniectomy   Eyes: Conjunctivae and EOM are normal.   Neck:   Trach in place evidence of breakdown or surrounding fluctuance   Cardiovascular: Normal rate and regular rhythm.   Pulmonary/Chest: No respiratory distress. She has no wheezes.   Abdominal: There is no tenderness.   Musculoskeletal: She exhibits no tenderness.   Neurological: She is alert and oriented to person, place, and time. No cranial nerve deficit or sensory deficit. GCS score is 15. GCS eye subscore is 4. GCS verbal subscore is 5. GCS motor subscore is 6.   Skin: Skin is warm and dry. Capillary refill takes less than 2 seconds.   Psychiatric: Thought content normal.         ED Course   Procedures  Labs Reviewed - No data to display       Imaging Results          CT Head Without Contrast (In process)                  Medical Decision Making:   ED Management:  This patient was emergently received and assessed upon arrival.  Vital signs are stable.  She indicates no significant pain that is ongoing but does report hitting the left side of her head.  Head CT scan is obtained and found to be negative for new intracranial bleeding or additional findings concerning for trauma. Patient had a stable period of ED observation and is appropriate for transport back to the skilled nursing facility.  Additional information regarding bed safety is provided.  She is asked to follow up with her primary care doctor soon  as possible and to return to the ER for any new, concerning, or worsening symptoms, including headache or any neurologic symptoms. Patient discharged per EMS.                      Clinical Impression:       ICD-10-CM ICD-9-CM   1. Fall from bed, initial encounter W06.XXXA E884.4         Disposition:   Disposition: Discharged  Condition: Stable                        ySlvester Rogers MD  05/25/19 0544

## 2019-07-11 DIAGNOSIS — R13.10 DYSPHAGIA: Primary | ICD-10-CM

## 2019-07-15 ENCOUNTER — HOSPITAL ENCOUNTER (OUTPATIENT)
Dept: RADIOLOGY | Facility: HOSPITAL | Age: 54
Discharge: HOME | End: 2019-07-15
Attending: FAMILY MEDICINE
Payer: MEDICAID

## 2019-07-15 DIAGNOSIS — R13.12 OROPHARYNGEAL DYSPHAGIA: ICD-10-CM

## 2019-07-15 PROCEDURE — 92610 EVALUATE SWALLOWING FUNCTION: CPT | Mod: 59

## 2019-07-15 PROCEDURE — 74230 X-RAY XM SWLNG FUNCJ C+: CPT | Mod: TC

## 2019-07-15 PROCEDURE — 74230 FL MODIFIED BARIUM SWALLOW SPEECH STUDY: ICD-10-PCS | Mod: 26,,, | Performed by: RADIOLOGY

## 2019-07-15 PROCEDURE — G8998 SWALLOW D/C STATUS: HCPCS | Mod: CI

## 2019-07-15 PROCEDURE — G8997 SWALLOW GOAL STATUS: HCPCS | Mod: CH

## 2019-07-15 PROCEDURE — 74230 X-RAY XM SWLNG FUNCJ C+: CPT | Mod: 26,,, | Performed by: RADIOLOGY

## 2019-07-15 PROCEDURE — G8996 SWALLOW CURRENT STATUS: HCPCS | Mod: CI

## 2019-07-15 PROCEDURE — 92611 MOTION FLUOROSCOPY/SWALLOW: CPT

## 2019-10-25 ENCOUNTER — HOSPITAL ENCOUNTER (EMERGENCY)
Facility: HOSPITAL | Age: 54
Discharge: HOME | End: 2019-10-25
Attending: EMERGENCY MEDICINE
Payer: MEDICAID

## 2019-10-25 VITALS
WEIGHT: 95 LBS | OXYGEN SATURATION: 100 % | BODY MASS INDEX: 17.48 KG/M2 | SYSTOLIC BLOOD PRESSURE: 118 MMHG | DIASTOLIC BLOOD PRESSURE: 74 MMHG | RESPIRATION RATE: 16 BRPM | TEMPERATURE: 98 F | HEIGHT: 62 IN | HEART RATE: 70 BPM
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
## 2019-10-25 DIAGNOSIS — S00.83XA FACIAL CONTUSION, INITIAL ENCOUNTER: ICD-10-CM

## 2019-10-25 PROCEDURE — 99284 EMERGENCY DEPT VISIT MOD MDM: CPT | Mod: 25

## 2019-10-25 RX ORDER — GABAPENTIN 300 MG/1
300 CAPSULE ORAL 3 TIMES DAILY
Status: ON HOLD | COMMUNITY
Start: 2019-02-22 | End: 2020-03-12 | Stop reason: HOSPADM

## 2019-10-25 RX ORDER — CHLORDIAZEPOXIDE HYDROCHLORIDE 25 MG/1
50 CAPSULE, GELATIN COATED ORAL
COMMUNITY
End: 2019-11-29

## 2019-10-25 RX ORDER — CITALOPRAM 20 MG/1
20 TABLET, FILM COATED ORAL DAILY
Status: ON HOLD | COMMUNITY
End: 2020-03-12 | Stop reason: HOSPADM

## 2019-10-25 RX ORDER — DIAZEPAM 5 MG/1
5 TABLET ORAL
COMMUNITY
End: 2019-10-25

## 2019-10-25 RX ORDER — CETIRIZINE HYDROCHLORIDE 5 MG/1
5 TABLET ORAL DAILY
Status: ON HOLD | COMMUNITY
End: 2020-03-09

## 2019-10-25 RX ORDER — BACITRACIN ZINC 500 UNIT/G
OINTMENT (GRAM) TOPICAL
COMMUNITY
Start: 2019-02-22 | End: 2019-11-29

## 2019-10-25 RX ORDER — FOLIC ACID 1 MG/1
1 TABLET ORAL
COMMUNITY
End: 2019-10-25 | Stop reason: ALTCHOICE

## 2019-10-25 RX ORDER — AMITRIPTYLINE HYDROCHLORIDE 25 MG/1
25 TABLET, FILM COATED ORAL
COMMUNITY
Start: 2019-02-22 | End: 2019-10-25 | Stop reason: SDUPTHER

## 2019-10-25 RX ORDER — ALBUTEROL SULFATE 90 UG/1
2 AEROSOL, METERED RESPIRATORY (INHALATION)
COMMUNITY
Start: 2019-01-22 | End: 2019-10-25

## 2019-10-25 RX ORDER — GUAIFENESIN 600 MG/1
1200 TABLET, EXTENDED RELEASE ORAL 2 TIMES DAILY
Status: ON HOLD | COMMUNITY
End: 2020-03-09

## 2019-10-25 RX ORDER — ESCITALOPRAM OXALATE 20 MG/1
20 TABLET ORAL DAILY
Status: ON HOLD | COMMUNITY
End: 2020-03-12 | Stop reason: SDUPTHER

## 2019-10-25 RX ORDER — NYSTATIN 100000 [USP'U]/ML
5 SUSPENSION ORAL 2 TIMES DAILY
Status: ON HOLD | COMMUNITY
End: 2020-03-09

## 2019-10-25 RX ORDER — MEDICAL SUPPLY, MISCELLANEOUS
MISCELLANEOUS MISCELLANEOUS
COMMUNITY
Start: 2019-01-22 | End: 2021-12-28 | Stop reason: SDUPTHER

## 2019-10-25 NOTE — ED PROVIDER NOTES
Encounter Date: 10/25/2019       History     Chief Complaint   Patient presents with    Fall     fall out of bed when getting up to use restroom at [facility], complaints of jaw pain     This patient is a 54-year-old female recovering from intracranial bleeding, status post craniectomy, cranioplasty presenting to the ER per EMS from [facility] for left facial injury. She reports she fell 2/2 loss of balance while attempting to get out of bed and hit her left cheek bone on the bedside table.  She denies loss of consciousness.  She does not take blood thinners.  She denies a change in strength or sensation in any extremities.  She denies neck pain. She denies loose teeth or jaw pain. She denies a change in sight or speech.        Review of patient's allergies indicates:  No Known Allergies  Past Medical History:   Diagnosis Date    Anxiety     Asthma     Bipolar 1 disorder     Cirrhosis, alcoholic     Cocaine abuse     COPD (chronic obstructive pulmonary disease)     Depression     Schizophrenia     Seizures      Past Surgical History:   Procedure Laterality Date    APPENDECTOMY      CRANIECTOMY Left 01/31/2019    ESOPHAGOGASTRODUODENOSCOPY      PEG W/TRACHEOSTOMY PLACEMENT  02/16/2019     Family History   Problem Relation Age of Onset    COPD Mother     Cirrhosis Father      Social History     Tobacco Use    Smoking status: Former Smoker     Packs/day: 2.00     Types: Cigarettes    Smokeless tobacco: Never Used   Substance Use Topics    Alcohol use: Not Currently     Alcohol/week: 42.0 standard drinks     Types: 42 Cans of beer per week     Comment: daily    Drug use: No     Review of Systems   Constitutional: Negative for activity change.   HENT: Negative for trouble swallowing.    Eyes: Positive for visual disturbance.   Respiratory: Negative for shortness of breath.    Cardiovascular: Negative for chest pain.   Gastrointestinal: Negative for abdominal pain.   Musculoskeletal: Negative for neck pain.    Skin: Negative for wound.   Neurological: Negative for syncope and weakness.   Hematological: Does not bruise/bleed easily.   Psychiatric/Behavioral: Negative for confusion.       Physical Exam     Initial Vitals [10/25/19 0339]   BP Pulse Resp Temp SpO2   128/84 73 16 97.9 °F (36.6 °C) (!) 94 %      MAP       --         Physical Exam    Nursing note and vitals reviewed.  Constitutional: She is not diaphoretic. No distress.   HENT:   Head: Normocephalic and atraumatic.   Eyes: Conjunctivae and EOM are normal.   Neck: Normal range of motion. Neck supple.   Cardiovascular: Normal rate.   Pulmonary/Chest: No respiratory distress. She has no wheezes.   Abdominal: She exhibits no distension. There is no tenderness.   Musculoskeletal: She exhibits tenderness. She exhibits no edema.   Mild tenderness without ecchymosis localized left cheekbone, no open wound, no loose teeth   Neurological: She is alert and oriented to person, place, and time. She has normal strength. No cranial nerve deficit or sensory deficit. GCS score is 15. GCS eye subscore is 4. GCS verbal subscore is 5. GCS motor subscore is 6.   Skin: Skin is warm and dry.   Psychiatric: She has a normal mood and affect. Thought content normal.         ED Course   Procedures  Labs Reviewed - No data to display       Imaging Results          CT Maxillofacial Without Contrast (In process)                CT Head Without Contrast (In process)                  Medical Decision Making:   ED Management:  Patient was interviewed and assessed emergently.  Vital signs are stable.  She has no evidence of gross traumatic injury. CT scan the maxillofacial structures negative for fracture.  CT scan of the head indicates a small area of retained fluid left frontoparietal region.  This collection is present on the CT scan 2 months ago from [place] under care everywhere.  There is no evidence of acute intracranial traumatic injury. The patient is stable for transport back to  [facility].  She is asked to follow up with her neurosurgeons and return to the ER immediately for any new, concerning, or worsening symptoms including headache or neurologic symptoms. Patient is discharged per EMS in stable condition.                      Clinical Impression:       ICD-10-CM ICD-9-CM   1. Accidental fall from bed, initial encounter W06.XXXA E884.4   2. Facial contusion, initial encounter S00.83XA 920         Disposition:   Disposition: Discharged  Condition: Stable                        Sylvester Rogers MD  10/25/19 0735       Sylvester Rogers MD  10/25/19 0736

## 2019-10-25 NOTE — ED NOTES
Report given to [facility] nurse Bell. States that they are arranging transportation back to [place].

## 2019-11-04 ENCOUNTER — APPOINTMENT (OUTPATIENT)
Dept: LAB | Facility: HOSPITAL | Age: 54
End: 2019-11-04
Attending: FAMILY MEDICINE
Payer: MEDICAID

## 2019-11-04 DIAGNOSIS — R56.1 IMPACT SEIZURE: ICD-10-CM

## 2019-11-04 DIAGNOSIS — Z43.0 ATTENTION TO TRACHEOSTOMY: ICD-10-CM

## 2019-11-04 DIAGNOSIS — R30.0 DYSURIA: Primary | ICD-10-CM

## 2019-11-04 DIAGNOSIS — S06.5XAS LATE EFFECT OF SUBDURAL HEMATOMA DUE TO TRAUMA: ICD-10-CM

## 2019-11-04 DIAGNOSIS — Z43.1 ATTENTION TO GASTROSTOMY: ICD-10-CM

## 2019-11-04 LAB
BACTERIA #/AREA URNS HPF: ABNORMAL /HPF
BILIRUB UR QL STRIP: NEGATIVE
CLARITY UR: ABNORMAL
COLOR UR: YELLOW
GLUCOSE UR QL STRIP: NEGATIVE
HGB UR QL STRIP: ABNORMAL
KETONES UR QL STRIP: NEGATIVE
LEUKOCYTE ESTERASE UR QL STRIP: ABNORMAL
MICROSCOPIC COMMENT: ABNORMAL
NITRITE UR QL STRIP: POSITIVE
PH UR STRIP: 7 [PH] (ref 5–8)
PROT UR QL STRIP: NEGATIVE
RBC #/AREA URNS HPF: 8 /HPF (ref 0–4)
SP GR UR STRIP: 1.01 (ref 1–1.03)
SQUAMOUS #/AREA URNS HPF: 14 /HPF
URN SPEC COLLECT METH UR: ABNORMAL
UROBILINOGEN UR STRIP-ACNC: NEGATIVE EU/DL
WBC #/AREA URNS HPF: >100 /HPF (ref 0–5)

## 2019-11-04 PROCEDURE — 87077 CULTURE AEROBIC IDENTIFY: CPT

## 2019-11-04 PROCEDURE — 87186 SC STD MICRODIL/AGAR DIL: CPT

## 2019-11-04 PROCEDURE — 81000 URINALYSIS NONAUTO W/SCOPE: CPT

## 2019-11-04 PROCEDURE — 87088 URINE BACTERIA CULTURE: CPT

## 2019-11-04 PROCEDURE — 87086 URINE CULTURE/COLONY COUNT: CPT

## 2019-11-07 LAB — BACTERIA UR CULT: ABNORMAL

## 2019-11-14 ENCOUNTER — APPOINTMENT (OUTPATIENT)
Dept: LAB | Facility: HOSPITAL | Age: 54
End: 2019-11-14
Attending: FAMILY MEDICINE
Payer: MEDICAID

## 2019-11-14 DIAGNOSIS — R35.0 URINE FREQUENCY: Primary | ICD-10-CM

## 2019-11-14 DIAGNOSIS — J11.1 FLU: ICD-10-CM

## 2019-11-14 LAB
BACTERIA #/AREA URNS HPF: ABNORMAL /HPF
BILIRUB UR QL STRIP: NEGATIVE
CLARITY UR: CLEAR
COLOR UR: YELLOW
GLUCOSE UR QL STRIP: NEGATIVE
HGB UR QL STRIP: NEGATIVE
KETONES UR QL STRIP: NEGATIVE
LEUKOCYTE ESTERASE UR QL STRIP: ABNORMAL
MICROSCOPIC COMMENT: ABNORMAL
NITRITE UR QL STRIP: NEGATIVE
PH UR STRIP: 8 [PH] (ref 5–8)
PROT UR QL STRIP: NEGATIVE
RBC #/AREA URNS HPF: 8 /HPF (ref 0–4)
SP GR UR STRIP: 1.01 (ref 1–1.03)
SQUAMOUS #/AREA URNS HPF: 3 /HPF
URN SPEC COLLECT METH UR: ABNORMAL
UROBILINOGEN UR STRIP-ACNC: NEGATIVE EU/DL
WBC #/AREA URNS HPF: 3 /HPF (ref 0–5)

## 2019-11-14 PROCEDURE — 81000 URINALYSIS NONAUTO W/SCOPE: CPT

## 2019-11-14 PROCEDURE — 87086 URINE CULTURE/COLONY COUNT: CPT

## 2019-11-16 LAB — BACTERIA UR CULT: NORMAL

## 2019-12-12 DIAGNOSIS — R27.9 UNSPECIFIED LACK OF COORDINATION: ICD-10-CM

## 2019-12-12 DIAGNOSIS — R41.841 COGNITIVE COMMUNICATION DISORDER: ICD-10-CM

## 2019-12-12 DIAGNOSIS — G81.90 HEMIPLEGIA, UNSPECIFIED AFFECTING UNSPECIFIED SIDE: ICD-10-CM

## 2019-12-12 DIAGNOSIS — R56.1 POST TRAUMATIC SEIZURES: ICD-10-CM

## 2019-12-12 DIAGNOSIS — S06.5X9S TRAUMATIC SUBDURAL HEMATOMA WITH LOSS OF CONSCIOUSNESS, SEQUELA: Primary | ICD-10-CM

## 2019-12-12 DIAGNOSIS — M62.81 MUSCLE WEAKNESS (GENERALIZED): ICD-10-CM

## 2019-12-12 DIAGNOSIS — R13.12 DYSPHAGIA, OROPHARYNGEAL PHASE: ICD-10-CM

## 2020-01-03 ENCOUNTER — OFFICE VISIT (OUTPATIENT)
Dept: URGENT CARE | Facility: CLINIC | Age: 55
End: 2020-01-03
Payer: MEDICAID

## 2020-01-03 VITALS
SYSTOLIC BLOOD PRESSURE: 126 MMHG | BODY MASS INDEX: 22.82 KG/M2 | HEIGHT: 62 IN | WEIGHT: 124 LBS | DIASTOLIC BLOOD PRESSURE: 84 MMHG | HEART RATE: 89 BPM | TEMPERATURE: 96 F | RESPIRATION RATE: 18 BRPM

## 2020-01-03 DIAGNOSIS — Z76.0 ENCOUNTER FOR MEDICATION REFILL: ICD-10-CM

## 2020-01-03 DIAGNOSIS — R30.0 DYSURIA: ICD-10-CM

## 2020-01-03 DIAGNOSIS — N30.01 ACUTE CYSTITIS WITH HEMATURIA: Primary | ICD-10-CM

## 2020-01-03 LAB
BILIRUB UR QL STRIP: NEGATIVE
COLOR UR: ABNORMAL
GLUCOSE UR QL STRIP: NEGATIVE
KETONES UR QL STRIP: NEGATIVE
LEUKOCYTE ESTERASE UR QL STRIP: POSITIVE
PH, POC UA: 6.5 (ref 5–8)
POC BLOOD, URINE: POSITIVE
POC NITRATES, URINE: NEGATIVE
PROT UR QL STRIP: POSITIVE
SP GR UR STRIP: 1.02 (ref 1–1.03)
UROBILINOGEN UR STRIP-ACNC: NORMAL (ref 0.1–1.1)

## 2020-01-03 PROCEDURE — 99203 PR OFFICE/OUTPT VISIT, NEW, LEVL III, 30-44 MIN: ICD-10-PCS | Mod: S$GLB,,, | Performed by: PHYSICIAN ASSISTANT

## 2020-01-03 PROCEDURE — 81003 POCT URINALYSIS, DIPSTICK, AUTOMATED, W/O SCOPE: ICD-10-PCS | Mod: QW,S$GLB,, | Performed by: PHYSICIAN ASSISTANT

## 2020-01-03 PROCEDURE — 81003 URINALYSIS AUTO W/O SCOPE: CPT | Mod: QW,S$GLB,, | Performed by: PHYSICIAN ASSISTANT

## 2020-01-03 PROCEDURE — 99203 OFFICE O/P NEW LOW 30 MIN: CPT | Mod: S$GLB,,, | Performed by: PHYSICIAN ASSISTANT

## 2020-01-03 RX ORDER — SULFAMETHOXAZOLE AND TRIMETHOPRIM 800; 160 MG/1; MG/1
1 TABLET ORAL 2 TIMES DAILY
Qty: 14 TABLET | Refills: 0 | Status: SHIPPED | OUTPATIENT
Start: 2020-01-03 | End: 2020-01-10

## 2020-01-03 RX ORDER — HYDROXYZINE PAMOATE 25 MG/1
25 CAPSULE ORAL 2 TIMES DAILY
Qty: 30 CAPSULE | Refills: 0 | Status: ON HOLD | OUTPATIENT
Start: 2020-01-03 | End: 2020-03-12 | Stop reason: HOSPADM

## 2020-01-03 NOTE — PATIENT INSTRUCTIONS
You must understand that you've received an Urgent Care treatment only and that you may be released before all your medical problems are known or treated. You, the patient, will arrange for follow up care as instructed.  Follow up with your PCP or specialty clinic as directed if not improved or as needed. You can call [phone] to schedule an appointment with the appropriate provider.  If your condition worsens we recommend that you receive another evaluation at the Emergency Department for any concerns or worsening of condition.  Patient aware and verbalized understanding.    Discussed UA results with patient.  We will call you back with Culture results in the next 3-5 days after we obtain adequate amount of Urine Sample - gave patient/caregiver at-home instructions on how to store and return properly to clinic.  Take antibiotics as prescribed to full completion for UTI.  Vistaril RX medication fill per patient's request.  Advised patient to follow-up with PCP for further evaluation as needed.  Strict ER precautions given to patient.  Patient/Caregover aware and verbalized understanding.    UTI Relief   - Increase water intake.  - Decrease sodas, tea, coffee and energy drinks until symptoms resolve.  - Cranberry products are thought to protect against UTI by inhibiting bacterial growth and adhesion to the bladder.  - Tylenol (acetaminophen) and/or NSAIDS (motrin, ibuprofen) as needed for discomfort.  - Timed voiding every 2-3 hours ( void every 2-3 hours even if you do not feel the urge).  - OTC AZO/pyridium if symptoms are persistent - this will turn your urine red/orange. This will help with symptomatic relief, but will not treat the infection.  - Avoid tight fitting cloths, douching, tampon use.  - Wipe front to back.   - Void prior to and after intercourse.   - Use probiotics especially with any antibiotic therapy.  Patient aware and verbalized understanding.    Understanding Urinary Tract Infections  (UTIs)  Most UTIs are caused by bacteria, although they may also be caused by viruses or fungi. Bacteria from the bowel are the most common source of infection. The infection may start because of any of the following:  · Sexual activity. During sex, bacteria can travel from the penis, vagina, or rectum into the urethra.   · Bacteria on the skin outside the rectum may travel into the urethra. This is more common in women since the rectum and urethra are closer to each other than in men. Wiping from front to back after using the toilet and keeping the area clean can help prevent germs from getting to the urethra.  · Blockage of urine flow through the urinary tract. If urine sits too long, germs may start to grow out of control.      Parts of the urinary tract  The infection can occur in any part of the urinary tract.  · The kidneys collect and store urine.  · The ureters carry urine from the kidneys to the bladder.  · The bladder holds urine until you are ready to let it out.  · The urethra carries urine from the bladder out of the body. It is shorter in women, so bacteria can move through it more easily. The urethra is longer in men, so a UTI is less likely to reach the bladder or kidneys in men.  Date Last Reviewed: 1/1/2017  © 6673-3553 [employer]. [address] [place] [address], [place] [address]. All rights reserved. This information is not intended as a substitute for professional medical care. Always follow your healthcare professional's instructions.

## 2020-03-09 ENCOUNTER — HOSPITAL ENCOUNTER (INPATIENT)
Facility: HOSPITAL | Age: 55
LOS: 3 days | Discharge: HOME | DRG: 871 | End: 2020-03-12
Attending: FAMILY MEDICINE | Admitting: INTERNAL MEDICINE
Payer: MEDICAID

## 2020-03-09 DIAGNOSIS — G93.40 ACUTE ENCEPHALOPATHY: ICD-10-CM

## 2020-03-09 DIAGNOSIS — N30.00 ACUTE CYSTITIS WITHOUT HEMATURIA: ICD-10-CM

## 2020-03-09 DIAGNOSIS — J18.9 PNEUMONIA OF BOTH LUNGS DUE TO INFECTIOUS ORGANISM, UNSPECIFIED PART OF LUNG: Primary | ICD-10-CM

## 2020-03-09 DIAGNOSIS — G45.9 TIA (TRANSIENT ISCHEMIC ATTACK): ICD-10-CM

## 2020-03-09 DIAGNOSIS — J44.9 CHRONIC OBSTRUCTIVE PULMONARY DISEASE, UNSPECIFIED COPD TYPE: ICD-10-CM

## 2020-03-09 DIAGNOSIS — J44.0 CHRONIC OBSTRUCTIVE PULMONARY DISEASE WITH ACUTE LOWER RESPIRATORY INFECTION: ICD-10-CM

## 2020-03-09 DIAGNOSIS — R41.82 ALTERED MENTAL STATUS, UNSPECIFIED ALTERED MENTAL STATUS TYPE: ICD-10-CM

## 2020-03-09 DIAGNOSIS — R41.82 AMS (ALTERED MENTAL STATUS): ICD-10-CM

## 2020-03-09 DIAGNOSIS — N30.01 ACUTE CYSTITIS WITH HEMATURIA: ICD-10-CM

## 2020-03-09 DIAGNOSIS — R53.1 WEAKNESS: ICD-10-CM

## 2020-03-09 DIAGNOSIS — A41.9 SEPSIS, DUE TO UNSPECIFIED ORGANISM, UNSPECIFIED WHETHER ACUTE ORGAN DYSFUNCTION PRESENT: ICD-10-CM

## 2020-03-09 PROBLEM — Z93.0 TRACHEOSTOMY STATUS: Status: RESOLVED | Noted: 2019-04-05 | Resolved: 2020-03-09

## 2020-03-09 PROBLEM — J15.1 PSEUDOMONAL PNEUMONIA: Status: RESOLVED | Noted: 2019-04-05 | Resolved: 2020-03-09

## 2020-03-09 PROBLEM — N39.0 UTI (URINARY TRACT INFECTION): Status: ACTIVE | Noted: 2020-03-09

## 2020-03-09 PROBLEM — Z93.1 PEG (PERCUTANEOUS ENDOSCOPIC GASTROSTOMY) STATUS: Status: RESOLVED | Noted: 2019-04-05 | Resolved: 2020-03-09

## 2020-03-09 LAB
ALBUMIN SERPL BCP-MCNC: 4.3 G/DL (ref 3.5–5.2)
ALP SERPL-CCNC: 68 U/L (ref 38–126)
ALT SERPL W/O P-5'-P-CCNC: 45 U/L (ref 10–44)
ANION GAP SERPL CALC-SCNC: 9 MMOL/L (ref 8–16)
APTT BLDCRRT: 30.7 SEC (ref 21–32)
AST SERPL-CCNC: 44 U/L (ref 15–46)
BACTERIA #/AREA URNS AUTO: ABNORMAL /HPF
BASOPHILS # BLD AUTO: 0.02 K/UL (ref 0–0.2)
BASOPHILS NFR BLD: 0.2 % (ref 0–1.9)
BILIRUB SERPL-MCNC: 0.4 MG/DL (ref 0.1–1)
BILIRUB UR QL STRIP: NEGATIVE
BUN SERPL-MCNC: 5 MG/DL (ref 7–17)
CALCIUM SERPL-MCNC: 9.3 MG/DL (ref 8.7–10.5)
CHLORIDE SERPL-SCNC: 82 MMOL/L (ref 95–110)
CLARITY UR REFRACT.AUTO: ABNORMAL
CO2 SERPL-SCNC: 44 MMOL/L (ref 23–29)
COLOR UR AUTO: YELLOW
CREAT SERPL-MCNC: 0.48 MG/DL (ref 0.5–1.4)
DIFFERENTIAL METHOD: ABNORMAL
EOSINOPHIL # BLD AUTO: 0 K/UL (ref 0–0.5)
EOSINOPHIL NFR BLD: 0.2 % (ref 0–8)
ERYTHROCYTE [DISTWIDTH] IN BLOOD BY AUTOMATED COUNT: 12.5 % (ref 11.5–14.5)
EST. GFR  (AFRICAN AMERICAN): >60 ML/MIN/1.73 M^2
EST. GFR  (NON AFRICAN AMERICAN): >60 ML/MIN/1.73 M^2
GLUCOSE SERPL-MCNC: 126 MG/DL (ref 70–110)
GLUCOSE UR QL STRIP: NEGATIVE
HCT VFR BLD AUTO: 45.1 % (ref 37–48.5)
HGB BLD-MCNC: 14 G/DL (ref 12–16)
HGB UR QL STRIP: NEGATIVE
IMM GRANULOCYTES # BLD AUTO: 0.04 K/UL (ref 0–0.04)
IMM GRANULOCYTES NFR BLD AUTO: 0.4 % (ref 0–0.5)
INFLUENZA A, MOLECULAR: NEGATIVE
INFLUENZA B, MOLECULAR: NEGATIVE
INR PPP: 1.2 (ref 0.8–1.2)
KETONES UR QL STRIP: NEGATIVE
LACTATE SERPL-SCNC: 0.9 MMOL/L (ref 0.5–2.2)
LACTATE SERPL-SCNC: 1.3 MMOL/L (ref 0.5–2.2)
LEUKOCYTE ESTERASE UR QL STRIP: ABNORMAL
LYMPHOCYTES # BLD AUTO: 1.2 K/UL (ref 1–4.8)
LYMPHOCYTES NFR BLD: 10.6 % (ref 18–48)
MCH RBC QN AUTO: 29.8 PG (ref 27–31)
MCHC RBC AUTO-ENTMCNC: 31 G/DL (ref 32–36)
MCV RBC AUTO: 96 FL (ref 82–98)
MICROSCOPIC COMMENT: ABNORMAL
MONOCYTES # BLD AUTO: 1.1 K/UL (ref 0.3–1)
MONOCYTES NFR BLD: 9.9 % (ref 4–15)
NEUTROPHILS # BLD AUTO: 8.6 K/UL (ref 1.8–7.7)
NEUTROPHILS NFR BLD: 78.7 % (ref 38–73)
NITRITE UR QL STRIP: NEGATIVE
NRBC BLD-RTO: 0 /100 WBC
NT-PROBNP: 1320 PG/ML (ref 5–900)
PH UR STRIP: 7 [PH] (ref 5–8)
PLATELET # BLD AUTO: 146 K/UL (ref 150–350)
PMV BLD AUTO: 9.4 FL (ref 9.2–12.9)
POCT GLUCOSE: 102 MG/DL (ref 70–110)
POTASSIUM SERPL-SCNC: 4.6 MMOL/L (ref 3.5–5.1)
PROT SERPL-MCNC: 8.4 G/DL (ref 6–8.4)
PROT UR QL STRIP: NEGATIVE
PROTHROMBIN TIME: 12.8 SEC (ref 9–12.5)
RBC # BLD AUTO: 4.7 M/UL (ref 4–5.4)
SODIUM SERPL-SCNC: 135 MMOL/L (ref 136–145)
SP GR UR STRIP: 1.01 (ref 1–1.03)
SPECIMEN SOURCE: NORMAL
TROPONIN I SERPL DL<=0.01 NG/ML-MCNC: <0.012 NG/ML (ref 0.01–0.03)
URN SPEC COLLECT METH UR: ABNORMAL
UROBILINOGEN UR STRIP-ACNC: NEGATIVE EU/DL
WBC # BLD AUTO: 10.9 K/UL (ref 3.9–12.7)
WBC #/AREA URNS AUTO: 10 /HPF (ref 0–5)

## 2020-03-09 PROCEDURE — 83880 ASSAY OF NATRIURETIC PEPTIDE: CPT | Mod: ER

## 2020-03-09 PROCEDURE — 63600175 PHARM REV CODE 636 W HCPCS: Performed by: STUDENT IN AN ORGANIZED HEALTH CARE EDUCATION/TRAINING PROGRAM

## 2020-03-09 PROCEDURE — 93005 ELECTROCARDIOGRAM TRACING: CPT | Mod: ER

## 2020-03-09 PROCEDURE — 85025 COMPLETE CBC W/AUTO DIFF WBC: CPT | Mod: ER

## 2020-03-09 PROCEDURE — 87502 INFLUENZA DNA AMP PROBE: CPT | Mod: ER

## 2020-03-09 PROCEDURE — 81000 URINALYSIS NONAUTO W/SCOPE: CPT | Mod: ER

## 2020-03-09 PROCEDURE — 93010 EKG 12-LEAD: ICD-10-PCS | Mod: ,,, | Performed by: INTERNAL MEDICINE

## 2020-03-09 PROCEDURE — 85730 THROMBOPLASTIN TIME PARTIAL: CPT | Mod: ER

## 2020-03-09 PROCEDURE — 96365 THER/PROPH/DIAG IV INF INIT: CPT | Mod: ER

## 2020-03-09 PROCEDURE — 94640 AIRWAY INHALATION TREATMENT: CPT | Mod: ER

## 2020-03-09 PROCEDURE — 94760 N-INVAS EAR/PLS OXIMETRY 1: CPT | Mod: ER

## 2020-03-09 PROCEDURE — 83605 ASSAY OF LACTIC ACID: CPT | Mod: 91,ER

## 2020-03-09 PROCEDURE — 93010 ELECTROCARDIOGRAM REPORT: CPT | Mod: ,,, | Performed by: INTERNAL MEDICINE

## 2020-03-09 PROCEDURE — 85610 PROTHROMBIN TIME: CPT | Mod: ER

## 2020-03-09 PROCEDURE — 84484 ASSAY OF TROPONIN QUANT: CPT | Mod: ER

## 2020-03-09 PROCEDURE — 99285 EMERGENCY DEPT VISIT HI MDM: CPT | Mod: 25,ER

## 2020-03-09 PROCEDURE — 87040 BLOOD CULTURE FOR BACTERIA: CPT | Mod: 59,ER

## 2020-03-09 PROCEDURE — 80053 COMPREHEN METABOLIC PANEL: CPT | Mod: ER

## 2020-03-09 PROCEDURE — 27000221 HC OXYGEN, UP TO 24 HOURS: Mod: ER

## 2020-03-09 PROCEDURE — 25000242 PHARM REV CODE 250 ALT 637 W/ HCPCS: Mod: ER | Performed by: FAMILY MEDICINE

## 2020-03-09 PROCEDURE — 25000003 PHARM REV CODE 250: Performed by: STUDENT IN AN ORGANIZED HEALTH CARE EDUCATION/TRAINING PROGRAM

## 2020-03-09 PROCEDURE — 63600175 PHARM REV CODE 636 W HCPCS: Mod: ER | Performed by: FAMILY MEDICINE

## 2020-03-09 PROCEDURE — 11000001 HC ACUTE MED/SURG PRIVATE ROOM

## 2020-03-09 RX ORDER — GABAPENTIN 300 MG/1
300 CAPSULE ORAL 3 TIMES DAILY
Status: DISCONTINUED | OUTPATIENT
Start: 2020-03-10 | End: 2020-03-10

## 2020-03-09 RX ORDER — LEVETIRACETAM 500 MG/1
500 TABLET ORAL 2 TIMES DAILY
Status: DISCONTINUED | OUTPATIENT
Start: 2020-03-09 | End: 2020-03-12 | Stop reason: HOSPADM

## 2020-03-09 RX ORDER — CITALOPRAM 20 MG/1
20 TABLET, FILM COATED ORAL DAILY
Status: DISCONTINUED | OUTPATIENT
Start: 2020-03-10 | End: 2020-03-10

## 2020-03-09 RX ORDER — ENOXAPARIN SODIUM 100 MG/ML
40 INJECTION SUBCUTANEOUS EVERY 24 HOURS
Status: DISCONTINUED | OUTPATIENT
Start: 2020-03-09 | End: 2020-03-11

## 2020-03-09 RX ORDER — IPRATROPIUM BROMIDE AND ALBUTEROL SULFATE 2.5; .5 MG/3ML; MG/3ML
3 SOLUTION RESPIRATORY (INHALATION)
Status: COMPLETED | OUTPATIENT
Start: 2020-03-09 | End: 2020-03-09

## 2020-03-09 RX ORDER — AMITRIPTYLINE HYDROCHLORIDE 25 MG/1
25 TABLET, FILM COATED ORAL NIGHTLY
Status: DISCONTINUED | OUTPATIENT
Start: 2020-03-09 | End: 2020-03-10

## 2020-03-09 RX ORDER — ESCITALOPRAM OXALATE 10 MG/1
20 TABLET ORAL DAILY
Status: DISCONTINUED | OUTPATIENT
Start: 2020-03-10 | End: 2020-03-09

## 2020-03-09 RX ORDER — METOPROLOL TARTRATE 50 MG/1
50 TABLET ORAL 4 TIMES DAILY
Status: DISCONTINUED | OUTPATIENT
Start: 2020-03-10 | End: 2020-03-12 | Stop reason: HOSPADM

## 2020-03-09 RX ORDER — LEVETIRACETAM 500 MG/1
500 TABLET ORAL 2 TIMES DAILY
COMMUNITY
End: 2021-05-10 | Stop reason: SDUPTHER

## 2020-03-09 RX ORDER — RISPERIDONE 0.5 MG/1
0.5 TABLET, ORALLY DISINTEGRATING ORAL 2 TIMES DAILY
Status: DISCONTINUED | OUTPATIENT
Start: 2020-03-09 | End: 2020-03-10

## 2020-03-09 RX ORDER — SODIUM CHLORIDE 0.9 % (FLUSH) 0.9 %
10 SYRINGE (ML) INJECTION
Status: DISCONTINUED | OUTPATIENT
Start: 2020-03-09 | End: 2020-03-12 | Stop reason: HOSPADM

## 2020-03-09 RX ORDER — SODIUM CHLORIDE 9 MG/ML
1000 INJECTION, SOLUTION INTRAVENOUS
Status: COMPLETED | OUTPATIENT
Start: 2020-03-09 | End: 2020-03-09

## 2020-03-09 RX ORDER — ACETAMINOPHEN 325 MG/1
650 TABLET ORAL EVERY 6 HOURS PRN
Status: DISCONTINUED | OUTPATIENT
Start: 2020-03-09 | End: 2020-03-12 | Stop reason: HOSPADM

## 2020-03-09 RX ADMIN — LEVETIRACETAM 500 MG: 500 TABLET, FILM COATED ORAL at 10:03

## 2020-03-09 RX ADMIN — RISPERIDONE 0.5 MG: 0.5 TABLET, ORALLY DISINTEGRATING ORAL at 10:03

## 2020-03-09 RX ADMIN — PIPERACILLIN AND TAZOBACTAM 4.5 G: 4; .5 INJECTION, POWDER, LYOPHILIZED, FOR SOLUTION INTRAVENOUS; PARENTERAL at 03:03

## 2020-03-09 RX ADMIN — IPRATROPIUM BROMIDE AND ALBUTEROL SULFATE 3 ML: .5; 3 SOLUTION RESPIRATORY (INHALATION) at 03:03

## 2020-03-09 RX ADMIN — AMITRIPTYLINE HYDROCHLORIDE 25 MG: 25 TABLET, FILM COATED ORAL at 10:03

## 2020-03-09 RX ADMIN — SODIUM CHLORIDE 1000 ML: 0.9 INJECTION, SOLUTION INTRAVENOUS at 04:03

## 2020-03-09 RX ADMIN — ACETAMINOPHEN 650 MG: 325 TABLET ORAL at 10:03

## 2020-03-09 RX ADMIN — PIPERACILLIN SODIUM AND TAZOBACTAM SODIUM 4.5 G: 4; .5 INJECTION, POWDER, LYOPHILIZED, FOR SOLUTION INTRAVENOUS at 11:03

## 2020-03-09 NOTE — ED NOTES
Family reports that pt is a lot more alert than before with less confusion. Pt still moving all four extremities with weakness. NADN. Pt and family updated on current admit status. Will continue to monitor.

## 2020-03-09 NOTE — ED NOTES
Consulted with Dr. Parrish. Ruled out need for stroke screening. Instead states that we will do a sepsis workup on pt

## 2020-03-09 NOTE — ED NOTES
"Pt presents to the ED with c/o altered mental status. Per son pt started acting abnormally around 1100 today after hearing a "bang" at the door. Son states that pt stopped taking and was not moving. EMS reported that pt was also non-verbal and not moving with them. Pt appears lethargic upon arrival but responds to verbal stimuli. Pt is talking with delayed slow response which son reports is normal. Pt confused at this time. Son states that pt is usually ambulatory and more oriented. Pt was able to move all four extremities though appears to be very week. Son states that pt had a brain aneurysm last year and stayed in rehab up until December with remarkable improvement. Will notify Dr. Parrish of pt status for prompt evaluation.   "

## 2020-03-09 NOTE — ED PROVIDER NOTES
Encounter Date: 3/9/2020       History     Chief Complaint   Patient presents with    Altered Mental Status     Pt presents to the ED with c/o altered mental status. Per son pt has a hx of brain aneurysms. Son states that around 11am pt began to seem confused and had generalized weakness. Son states that pt is usually able to walk and talk. Pt oriented to place but not to person or time.      54-year-old female brought to ER for evaluation of altered mental status since this morning.  She had previous history of cerebral aneurysm status post craniotomy.  Since then it affected her mental status, physical endurance, and speech also.  According to patient's son who brought patient to ER claims he has seen her walking last night.  And she was placed in a recliner.  This morning around 11:00 a.m. she seemed to be shaking and not responding like regular.  She is also not moving her arms the way she used to.  On arrival to ED patient is able to mumble broken words.  But she is awake and trying to pay attention.  She is shaking her left upper arm.  As per family patient is weak on right side since her previous incidence.  She used to have trach and PEG tube now they are removed.    History provided by: Son.     Review of patient's allergies indicates:  No Known Allergies  Past Medical History:   Diagnosis Date    Anxiety     Asthma     Bipolar 1 disorder     Cirrhosis, alcoholic     Cocaine abuse     COPD (chronic obstructive pulmonary disease)     Depression     Schizophrenia     Seizures      Past Surgical History:   Procedure Laterality Date    APPENDECTOMY      CRANIECTOMY Left 01/31/2019    ESOPHAGOGASTRODUODENOSCOPY      PEG W/TRACHEOSTOMY PLACEMENT  02/16/2019     Family History   Problem Relation Age of Onset    COPD Mother     Cirrhosis Father      Social History     Tobacco Use    Smoking status: Former Smoker     Packs/day: 2.00     Types: Cigarettes    Smokeless tobacco: Never Used   Substance  Use Topics    Alcohol use: Not Currently     Alcohol/week: 42.0 standard drinks     Types: 42 Cans of beer per week     Comment: daily    Drug use: No     Review of Systems   Constitutional: Positive for activity change. Negative for chills and fever.   HENT: Positive for congestion. Negative for rhinorrhea, sinus pressure, sinus pain, sore throat, trouble swallowing and voice change.    Eyes: Negative for pain, discharge and itching.   Respiratory: Positive for cough, shortness of breath and wheezing.    Cardiovascular: Negative for chest pain, palpitations and leg swelling.   Gastrointestinal: Negative for abdominal distention, abdominal pain, diarrhea, nausea and vomiting.   Genitourinary: Negative for dysuria and frequency.   Musculoskeletal: Positive for gait problem. Negative for back pain, myalgias, neck pain and neck stiffness.   Skin: Negative for rash.   Neurological: Positive for speech difficulty and weakness. Negative for dizziness, seizures, facial asymmetry, light-headedness, numbness and headaches.   Psychiatric/Behavioral: Positive for confusion and decreased concentration. The patient is not nervous/anxious.        Physical Exam     Initial Vitals [03/09/20 1348]   BP Pulse Resp Temp SpO2   131/83 97 13 97.4 °F (36.3 °C) (!) 94 %      MAP       --         Physical Exam    Nursing note and vitals reviewed.  Constitutional: She is not diaphoretic. No distress.   HENT:   Head: Normocephalic.   Right Ear: External ear normal.   Left Ear: External ear normal.   Nose: Nose normal.   Mouth/Throat: Oropharynx is clear and moist.   Eyes: Conjunctivae and EOM are normal. Pupils are equal, round, and reactive to light.   Neck: Normal range of motion. Neck supple.   Cardiovascular: Normal rate, regular rhythm and normal heart sounds.   Pulmonary/Chest: She has wheezes. She has rhonchi.   Abdominal: Soft. Bowel sounds are normal. She exhibits no distension. There is no tenderness. There is no guarding.    Musculoskeletal:   On arrival patient has been shaking her left upper limb.  And also her left lower limb.  She is not moving her right upper and lower limb.  On re-examination after patient came back from CT scan she is more alert and moving right upper and lower limb.   Neurological: GCS eye subscore is 4. GCS verbal subscore is 4. GCS motor subscore is 6.   Initial examination with - global weakness.  More pronounced on the right side.  As per family she had right-sided weakness.  Re-examination patient is moving all 4 extremities.  But she is not able to keep sustained elevation of legs.  Right side seems to be more weaker than the left.  Which is her baseline.   Skin: Skin is warm. Capillary refill takes less than 2 seconds. No rash noted. No erythema.         ED Course   Procedures  Labs Reviewed   CULTURE, BLOOD   CULTURE, BLOOD   INFLUENZA A & B BY MOLECULAR   CBC W/ AUTO DIFFERENTIAL   COMPREHENSIVE METABOLIC PANEL   LACTIC ACID, PLASMA   URINALYSIS, REFLEX TO URINE CULTURE   APTT   PROTIME-INR   TROPONIN I     EKG Readings: (Independently Interpreted)   Initial Reading: No STEMI. Rhythm: Normal Sinus Rhythm. Heart Rate: 96. Ectopy: No Ectopy. Conduction: Normal. ST Segments: Normal ST Segments. T Waves: Normal. Clinical Impression: Normal Sinus Rhythm       Imaging Results          X-Ray Chest AP Portable (In process)  Result time 03/09/20 14:19:59                 Medical Decision Making:   Initial Assessment:   54-year-old female brought to ER for altered mental status and global weakness found this morning.  Last seen normal was last night.  Around 9:00 p.m..  Differential Diagnosis:   TIA, CVA, encephalopathy, pneumonia, dehydration, UTI.  Clinical Tests:   Lab Tests: Ordered and Reviewed  Radiological Study: Ordered and Reviewed  Medical Tests: Ordered and Reviewed  ED Management:  During her stay in the ER patient mental status has improved.  She is now responding and talking much better than what  she was on arrival to ED. She had global weakness but now has improved and showing more of right-sided weakness which is her baseline.  As per family patient does walk with a walker.  And does transfer with some assistance.  Which is not possible now with her weakness.  She is noted to have bilateral pneumonia.  Which is consistent with her examination of her lungs with wheezing and rhonchi.  She has been treated with DuoNeb nebulizations and requiring 2 L of oxygen which is her baseline at home.  Wheezing has improved but continued to have persistent rhonchi.  White cell count is normal.  Patient is started on maintenance of IV fluids and Zosyn has been started in ER.  Patient is afebrile and stable vitals.  Influenza negative.  Possible mild UTI.  Patient will require inpatient admission and further monitoring and evaluation.  Other:   I discussed test(s) with the performing physician.       <> Summary of the Findings: Have discussed this patient condition with Dr. Alamo who accepted patient on to telemetry.  Patient PCP is in [place] which they do not know the name.                                 Clinical Impression:       ICD-10-CM ICD-9-CM   1. Pneumonia of both lungs due to infectious organism, unspecified part of lung J18.9 483.8   2. AMS (altered mental status) R41.82 780.97   3. Acute cystitis with hematuria N30.01 595.0   4. Weakness R53.1 780.79   5. TIA (transient ischemic attack) G45.9 435.9         Disposition:   Disposition: Admitted  Condition: Serious                        Ted Parrish MD  03/09/20 5058

## 2020-03-10 ENCOUNTER — CLINICAL SUPPORT (OUTPATIENT)
Dept: SMOKING CESSATION | Facility: CLINIC | Age: 55
End: 2020-03-10
Payer: COMMERCIAL

## 2020-03-10 DIAGNOSIS — F17.210 CIGARETTE SMOKER: Primary | ICD-10-CM

## 2020-03-10 PROBLEM — J18.9 PNEUMONIA OF BOTH LUNGS DUE TO INFECTIOUS ORGANISM: Status: ACTIVE | Noted: 2020-03-10

## 2020-03-10 LAB
ALBUMIN SERPL BCP-MCNC: 3.2 G/DL (ref 3.5–5.2)
ALLENS TEST: ABNORMAL
ALLENS TEST: ABNORMAL
ALP SERPL-CCNC: 52 U/L (ref 55–135)
ALT SERPL W/O P-5'-P-CCNC: 25 U/L (ref 10–44)
ANION GAP SERPL CALC-SCNC: 5 MMOL/L (ref 8–16)
ANION GAP SERPL CALC-SCNC: 7 MMOL/L (ref 8–16)
AST SERPL-CCNC: 27 U/L (ref 10–40)
BASOPHILS # BLD AUTO: 0.01 K/UL (ref 0–0.2)
BASOPHILS NFR BLD: 0.1 % (ref 0–1.9)
BILIRUB SERPL-MCNC: 0.7 MG/DL (ref 0.1–1)
BUN SERPL-MCNC: 4 MG/DL (ref 6–20)
BUN SERPL-MCNC: 4 MG/DL (ref 6–20)
CALCIUM SERPL-MCNC: 9.2 MG/DL (ref 8.7–10.5)
CALCIUM SERPL-MCNC: 9.2 MG/DL (ref 8.7–10.5)
CHLORIDE SERPL-SCNC: 85 MMOL/L (ref 95–110)
CHLORIDE SERPL-SCNC: 86 MMOL/L (ref 95–110)
CO2 SERPL-SCNC: 41 MMOL/L (ref 23–29)
CO2 SERPL-SCNC: 44 MMOL/L (ref 23–29)
CREAT SERPL-MCNC: 0.6 MG/DL (ref 0.5–1.4)
CREAT SERPL-MCNC: 0.7 MG/DL (ref 0.5–1.4)
DELSYS: ABNORMAL
DELSYS: ABNORMAL
DIFFERENTIAL METHOD: ABNORMAL
EOSINOPHIL # BLD AUTO: 0.2 K/UL (ref 0–0.5)
EOSINOPHIL NFR BLD: 2.2 % (ref 0–8)
ERYTHROCYTE [DISTWIDTH] IN BLOOD BY AUTOMATED COUNT: 12.2 % (ref 11.5–14.5)
EST. GFR  (AFRICAN AMERICAN): >60 ML/MIN/1.73 M^2
EST. GFR  (AFRICAN AMERICAN): >60 ML/MIN/1.73 M^2
EST. GFR  (NON AFRICAN AMERICAN): >60 ML/MIN/1.73 M^2
EST. GFR  (NON AFRICAN AMERICAN): >60 ML/MIN/1.73 M^2
ETHANOL SERPL-MCNC: <10 MG/DL
FLOW: 3
GLUCOSE SERPL-MCNC: 101 MG/DL (ref 70–110)
GLUCOSE SERPL-MCNC: 104 MG/DL (ref 70–110)
HCO3 UR-SCNC: 43.1 MMOL/L (ref 24–28)
HCO3 UR-SCNC: 43.1 MMOL/L (ref 24–28)
HCT VFR BLD AUTO: 37 % (ref 37–48.5)
HGB BLD-MCNC: 11.9 G/DL (ref 12–16)
IMM GRANULOCYTES # BLD AUTO: 0.03 K/UL (ref 0–0.04)
IMM GRANULOCYTES NFR BLD AUTO: 0.4 % (ref 0–0.5)
LYMPHOCYTES # BLD AUTO: 1.4 K/UL (ref 1–4.8)
LYMPHOCYTES NFR BLD: 17.8 % (ref 18–48)
MCH RBC QN AUTO: 30.2 PG (ref 27–31)
MCHC RBC AUTO-ENTMCNC: 32.2 G/DL (ref 32–36)
MCV RBC AUTO: 94 FL (ref 82–98)
MODE: ABNORMAL
MONOCYTES # BLD AUTO: 0.6 K/UL (ref 0.3–1)
MONOCYTES NFR BLD: 7.3 % (ref 4–15)
NEUTROPHILS # BLD AUTO: 5.5 K/UL (ref 1.8–7.7)
NEUTROPHILS NFR BLD: 72.2 % (ref 38–73)
NRBC BLD-RTO: 0 /100 WBC
PCO2 BLDA: 66.5 MMHG (ref 35–45)
PCO2 BLDA: 73.9 MMHG (ref 35–45)
PH SMN: 7.37 [PH] (ref 7.35–7.45)
PH SMN: 7.42 [PH] (ref 7.35–7.45)
PLATELET # BLD AUTO: 130 K/UL (ref 150–350)
PMV BLD AUTO: 8.8 FL (ref 9.2–12.9)
PO2 BLDA: 72 MMHG (ref 80–100)
PO2 BLDA: 80 MMHG (ref 80–100)
POC BE: 18 MMOL/L
POC BE: 19 MMOL/L
POC SATURATED O2: 94 % (ref 95–100)
POC SATURATED O2: 95 % (ref 95–100)
POC TCO2: 45 MMOL/L (ref 23–27)
POC TCO2: 45 MMOL/L (ref 23–27)
POCT GLUCOSE: 102 MG/DL (ref 70–110)
POCT GLUCOSE: 110 MG/DL (ref 70–110)
POTASSIUM SERPL-SCNC: 4 MMOL/L (ref 3.5–5.1)
POTASSIUM SERPL-SCNC: 4.5 MMOL/L (ref 3.5–5.1)
PROCALCITONIN SERPL IA-MCNC: 0.07 NG/ML
PROT SERPL-MCNC: 6.8 G/DL (ref 6–8.4)
RBC # BLD AUTO: 3.94 M/UL (ref 4–5.4)
SAMPLE: ABNORMAL
SAMPLE: ABNORMAL
SITE: ABNORMAL
SITE: ABNORMAL
SODIUM SERPL-SCNC: 134 MMOL/L (ref 136–145)
SODIUM SERPL-SCNC: 134 MMOL/L (ref 136–145)
TSH SERPL DL<=0.005 MIU/L-ACNC: 1.3 UIU/ML (ref 0.4–4)
WBC # BLD AUTO: 7.66 K/UL (ref 3.9–12.7)

## 2020-03-10 PROCEDURE — 80320 DRUG SCREEN QUANTALCOHOLS: CPT

## 2020-03-10 PROCEDURE — 99407 PR TOBACCO USE CESSATION INTENSIVE >10 MINUTES: ICD-10-PCS | Mod: S$GLB,,,

## 2020-03-10 PROCEDURE — 25000003 PHARM REV CODE 250: Performed by: STUDENT IN AN ORGANIZED HEALTH CARE EDUCATION/TRAINING PROGRAM

## 2020-03-10 PROCEDURE — 94640 AIRWAY INHALATION TREATMENT: CPT

## 2020-03-10 PROCEDURE — 85025 COMPLETE CBC W/AUTO DIFF WBC: CPT

## 2020-03-10 PROCEDURE — 36600 WITHDRAWAL OF ARTERIAL BLOOD: CPT

## 2020-03-10 PROCEDURE — 82803 BLOOD GASES ANY COMBINATION: CPT

## 2020-03-10 PROCEDURE — 27000221 HC OXYGEN, UP TO 24 HOURS

## 2020-03-10 PROCEDURE — 25000242 PHARM REV CODE 250 ALT 637 W/ HCPCS: Performed by: STUDENT IN AN ORGANIZED HEALTH CARE EDUCATION/TRAINING PROGRAM

## 2020-03-10 PROCEDURE — 80048 BASIC METABOLIC PNL TOTAL CA: CPT

## 2020-03-10 PROCEDURE — 27000190 HC CPAP FULL FACE MASK W/VALVE

## 2020-03-10 PROCEDURE — 94761 N-INVAS EAR/PLS OXIMETRY MLT: CPT

## 2020-03-10 PROCEDURE — 99900035 HC TECH TIME PER 15 MIN (STAT)

## 2020-03-10 PROCEDURE — 20000000 HC ICU ROOM

## 2020-03-10 PROCEDURE — 99999 PR PBB SHADOW E&M-EST. PATIENT-LVL I: CPT | Mod: PBBFAC,,,

## 2020-03-10 PROCEDURE — 94660 CPAP INITIATION&MGMT: CPT

## 2020-03-10 PROCEDURE — 63600175 PHARM REV CODE 636 W HCPCS: Performed by: STUDENT IN AN ORGANIZED HEALTH CARE EDUCATION/TRAINING PROGRAM

## 2020-03-10 PROCEDURE — 80053 COMPREHEN METABOLIC PANEL: CPT

## 2020-03-10 PROCEDURE — 99407 BEHAV CHNG SMOKING > 10 MIN: CPT | Mod: S$GLB,,,

## 2020-03-10 PROCEDURE — 99999 PR PBB SHADOW E&M-EST. PATIENT-LVL I: ICD-10-PCS | Mod: PBBFAC,,,

## 2020-03-10 PROCEDURE — 84145 PROCALCITONIN (PCT): CPT

## 2020-03-10 PROCEDURE — 84443 ASSAY THYROID STIM HORMONE: CPT

## 2020-03-10 PROCEDURE — 36415 COLL VENOUS BLD VENIPUNCTURE: CPT

## 2020-03-10 PROCEDURE — 80307 DRUG TEST PRSMV CHEM ANLYZR: CPT

## 2020-03-10 RX ORDER — IPRATROPIUM BROMIDE AND ALBUTEROL SULFATE 2.5; .5 MG/3ML; MG/3ML
3 SOLUTION RESPIRATORY (INHALATION) EVERY 4 HOURS PRN
Status: DISCONTINUED | OUTPATIENT
Start: 2020-03-10 | End: 2020-03-12 | Stop reason: HOSPADM

## 2020-03-10 RX ORDER — OSELTAMIVIR PHOSPHATE 75 MG/1
75 CAPSULE ORAL 2 TIMES DAILY
Status: DISCONTINUED | OUTPATIENT
Start: 2020-03-10 | End: 2020-03-12 | Stop reason: HOSPADM

## 2020-03-10 RX ADMIN — OSELTAMIVIR PHOSPHATE 75 MG: 75 CAPSULE ORAL at 08:03

## 2020-03-10 RX ADMIN — METOPROLOL TARTRATE 50 MG: 50 TABLET, FILM COATED ORAL at 05:03

## 2020-03-10 RX ADMIN — PIPERACILLIN SODIUM AND TAZOBACTAM SODIUM 4.5 G: 4; .5 INJECTION, POWDER, LYOPHILIZED, FOR SOLUTION INTRAVENOUS at 08:03

## 2020-03-10 RX ADMIN — LEVETIRACETAM 500 MG: 500 TABLET, FILM COATED ORAL at 08:03

## 2020-03-10 RX ADMIN — RISPERIDONE 0.5 MG: 0.5 TABLET, ORALLY DISINTEGRATING ORAL at 09:03

## 2020-03-10 RX ADMIN — PIPERACILLIN SODIUM AND TAZOBACTAM SODIUM 4.5 G: 4; .5 INJECTION, POWDER, LYOPHILIZED, FOR SOLUTION INTRAVENOUS at 10:03

## 2020-03-10 RX ADMIN — GABAPENTIN 300 MG: 300 CAPSULE ORAL at 09:03

## 2020-03-10 RX ADMIN — OSELTAMIVIR PHOSPHATE 75 MG: 75 CAPSULE ORAL at 11:03

## 2020-03-10 RX ADMIN — IPRATROPIUM BROMIDE AND ALBUTEROL SULFATE 3 ML: .5; 3 SOLUTION RESPIRATORY (INHALATION) at 10:03

## 2020-03-10 RX ADMIN — METOPROLOL TARTRATE 50 MG: 50 TABLET, FILM COATED ORAL at 08:03

## 2020-03-10 RX ADMIN — METOPROLOL TARTRATE 50 MG: 50 TABLET, FILM COATED ORAL at 09:03

## 2020-03-10 RX ADMIN — LEVETIRACETAM 500 MG: 500 TABLET, FILM COATED ORAL at 09:03

## 2020-03-10 RX ADMIN — CITALOPRAM HYDROBROMIDE 20 MG: 20 TABLET ORAL at 09:03

## 2020-03-10 RX ADMIN — ACETAMINOPHEN 650 MG: 325 TABLET ORAL at 05:03

## 2020-03-10 RX ADMIN — PIPERACILLIN SODIUM AND TAZOBACTAM SODIUM 4.5 G: 4; .5 INJECTION, POWDER, LYOPHILIZED, FOR SOLUTION INTRAVENOUS at 03:03

## 2020-03-10 NOTE — PLAN OF CARE
Vss, nadn, aaox4, borges's, on 3 l o2 per nc, o2 sats wnls, to be on bipap @ night. BBS course, dim'ed to the bases denita, no c/o sob, act BR, no c/o pain. Daughter @ bs. Afebrile.

## 2020-03-10 NOTE — H&P
[facility] Medicine H&P Note     Admitting Team: [facility] Hospitalist Team A  Attending Physician: Juvencio Alamo MD  Resident: Alonzo  Intern: Amee     Date of Admit: 3/9/2020    Chief Complaint     Altered Mental Status (Pt presents to the ED with c/o altered mental status. Per son pt has a hx of brain aneurysms. Son states that around 11am pt began to seem confused and had generalized weakness. Son states that pt is usually able to walk and talk. Pt oriented to place but not to person or time. )   for 1 day     Subjective:      History of Present Illness:  Elif Archibald is a 54 y.o. female who  has a past medical history of Anxiety, Asthma, Bipolar 1 disorder, Cirrhosis, alcoholic, Cocaine abuse, COPD (chronic obstructive pulmonary disease), Depression, Encounter for blood transfusion, Schizophrenia, Seizures, Subdural hematoma, and Tachycardia.. The patient presented to [facility] on 3/9/2020 with a primary complaint of Altered Mental Status (Pt presents to the ED with c/o altered mental status. Per son pt has a hx of brain aneurysms. Son states that around 11am pt began to seem confused and had generalized weakness. Son states that pt is usually able to walk and talk. Pt oriented to place but not to person or time. )    The patient was in their usual state of health(baseline weakness R>L, 3L NC home oxygen) until today around 11 AM when her son noticed that she became confused, was weaker than normal, and was not speaking. When she arrived to  ED she was oriented to place but not time or person. She received 1L bolus NS, zosyn, and duonebs. When she arrived to [facility], per the son, she was back to her baseline. She is AAOx4. She is answering in complete sentences and states her weakness is how it usually is. She denies cough, SOB, abd pain, n/v, fever, CP, dysuria, urinary frequency.     Past Medical History:  Past Medical History:   Diagnosis Date    Anxiety     Asthma      Bipolar 1 disorder     Cirrhosis, alcoholic     Cocaine abuse     COPD (chronic obstructive pulmonary disease)     Depression     Encounter for blood transfusion     Schizophrenia     Seizures     Subdural hematoma     Tachycardia      Past Surgical History:  Past Surgical History:   Procedure Laterality Date    APPENDECTOMY      CRANIECTOMY Left 01/31/2019    ESOPHAGOGASTRODUODENOSCOPY      PEG W/TRACHEOSTOMY PLACEMENT  02/16/2019       Allergies:  Review of patient's allergies indicates:  No Known Allergies    Home Medications:  Prior to Admission medications    Medication Sig Start Date End Date Taking? Authorizing Provider   albuterol (PROVENTIL/VENTOLIN HFA) 90 mcg/actuation inhaler Inhale 1 puff into the lungs every 6 (six) hours as needed for Wheezing. Rescue    Yes Historical Provider, MD   amitriptyline (ELAVIL) 25 MG tablet Take 25 mg by mouth every evening.    Yes Historical Provider, MD   citalopram (CELEXA) 20 MG tablet Take 20 mg by mouth once daily.    Yes Historical Provider, MD   escitalopram oxalate (LEXAPRO) 20 MG tablet Take 20 mg by mouth once daily.   Yes Historical Provider, MD   gabapentin (NEURONTIN) 300 MG capsule Take 300 mg by mouth 3 (three) times daily.  2/22/19  Yes Historical Provider, MD   hydrOXYzine pamoate (VISTARIL) 25 MG Cap Take 1 capsule (25 mg total) by mouth 2 (two) times daily. 1/3/20  Yes Kelly Leal PA-C   levETIRAcetam (KEPPRA) 500 MG Tab Take 500 mg by mouth 2 (two) times daily.   Yes Historical Provider, MD   metoprolol tartrate (LOPRESSOR) 50 MG tablet Take 50 mg by mouth 4 (four) times daily.    Yes Historical Provider, MD   multivitamin Chew Take by mouth once daily.   Yes Historical Provider, MD   risperidone (RISPERDAL) 0.5 MG Tab Take 0.5 mg by mouth 2 (two) times daily.    Yes Historical Provider, MD   diazePAM (VALIUM) 5 MG tablet Take 1 tablet (5 mg total) by mouth every 12 (twelve) hours as needed for Anxiety. 12/26/19 1/25/20  Clayton  DO James   miscellaneous medical supply (C-TUB) Misc Rolling walker 1/22/19   Historical Provider, MD   polyethylene glycol (GLYCOLAX) 17 gram PwPk Take 17 g by mouth daily as needed.     Historical Provider, MD   acetaminophen (TYLENOL) 325 MG tablet 650 mg by Per G Tube route every 6 (six) hours as needed for Pain.   3/9/20  Historical Provider, MD   cetirizine (ZYRTEC) 5 MG tablet Take 5 mg by mouth once daily.  3/9/20  Historical Provider, MD   guaiFENesin (MUCINEX) 600 mg 12 hr tablet Take 1,200 mg by mouth 2 (two) times daily.  3/9/20  Historical Provider, MD   hydrOXYzine HCl (ATARAX) 25 MG tablet Take 1 tablet (25 mg total) by mouth 2 (two) times daily as needed for Itching. 12/26/19 3/9/20  Clayton Ramirez DO   hydrOXYzine pamoate (VISTARIL) 50 MG Cap 50 mg by Per G Tube route every 6 (six) hours as needed (itching).  3/9/20  Historical Provider, MD   levetiracetam oral soln 500 mg/5 mL (5 mL) Soln Take 5 mLs by mouth 2 (two) times daily.   3/9/20  Historical Provider, MD   multivitamin liquid (THERAGRAN) Liqd Take 15 mLs by mouth once daily.   3/9/20  Historical Provider, MD   mupirocin (BACTROBAN) 2 % ointment Apply topically once daily.  3/9/20  Historical Provider, MD   nystatin (MYCOSTATIN) 100,000 unit/mL suspension Take 5 mLs by mouth 2 (two) times daily.   3/9/20  Historical Provider, MD   ondansetron (ZOFRAN-ODT) 4 MG TbDL Take 1 tablet (4 mg total) by mouth every 6 (six) hours as needed.  Patient not taking: Reported on 1/3/2020 11/29/19 3/9/20  RISHABH Srinivasan   oxyCODONE-acetaminophen (PERCOCET) 7.5-325 mg per tablet Take 1 tablet by mouth 2 (two) times daily as needed for Pain.   3/9/20  Historical Provider, MD       Family History:  Family History   Problem Relation Age of Onset    COPD Mother     Cirrhosis Father      Social History:  Social History     Tobacco Use    Smoking status: Current Every Day Smoker     Packs/day: 0.25     Types: Cigarettes    Smokeless tobacco:  "Never Used    Tobacco comment: couple cigaretes a day   Substance Use Topics    Alcohol use: Not Currently    Drug use: Not Currently   use to smoke 2 ppd     Review of Systems:  Pertinent items are noted in HPI. All other systems are reviewed and are negative.    Health Maintaince :   Primary Care Physician: Terrie Ireland MD    Immunizations:   TDap unsure   Flu utd  Pna unsure   Immunization History   Administered Date(s) Administered    Tdap 2016, 2017, 2018     Cancer Screening:  PAP: unsure   MMG: unsure  Colonoscopy: unsure     Objective:   Last 24 Hour Vital Signs:  BP  Min: 107/70  Max: 162/97  Temp  Av.1 °F (36.7 °C)  Min: 97.4 °F (36.3 °C)  Max: 98.5 °F (36.9 °C)  Pulse  Av.6  Min: 96  Max: 107  Resp  Av.1  Min: 13  Max: 36  SpO2  Av.3 %  Min: 91 %  Max: 100 %  Height  Av' 2" (157.5 cm)  Min: 5' 2" (157.5 cm)  Max: 5' 2" (157.5 cm)  Weight  Av.7 kg (131 lb 11.2 oz)  Min: 59.7 kg (131 lb 11.2 oz)  Max: 59.7 kg (131 lb 11.2 oz)  Body mass index is 24.09 kg/m².  I/O last 3 completed shifts:  In: 100 [IV Piggyback:100]  Out: -     Physical Examination:  Physical Exam   Constitutional: She is oriented to person, place, and time. She appears well-developed. No distress.   HENT:   Head: Normocephalic and atraumatic.   Eyes: Pupils are equal, round, and reactive to light. EOM are normal.   Neck: Normal range of motion. Neck supple.   Cardiovascular: Normal rate and regular rhythm.   Pulmonary/Chest: Effort normal. She has wheezes (mild end-expiratory wheezes throughout).   Abdominal: Soft. There is no tenderness.   Musculoskeletal: Normal range of motion. She exhibits no edema.   Neurological: She is alert and oriented to person, place, and time. No cranial nerve deficit or sensory deficit.   4/5 RUE and LE   5/5 LUE 4/5 LLE    Skin: Skin is warm and dry.   Psychiatric: She has a normal mood and affect. Her behavior is normal.     Laboratory:  Most Recent " Data:  CBC:   Lab Results   Component Value Date    WBC 10.90 03/09/2020    HGB 14.0 03/09/2020    HCT 45.1 03/09/2020     (L) 03/09/2020    MCV 96 03/09/2020    RDW 12.5 03/09/2020     BMP:   Lab Results   Component Value Date     (L) 03/09/2020    K 4.6 03/09/2020    CL 82 (L) 03/09/2020    CO2 44 (HH) 03/09/2020    BUN 5 (L) 03/09/2020    CREATININE 0.48 (L) 03/09/2020     (H) 03/09/2020    CALCIUM 9.3 03/09/2020    MG 1.4 (L) 05/14/2019    PHOS 3.6 05/14/2019     LFTs:   Lab Results   Component Value Date    PROT 8.4 03/09/2020    ALBUMIN 4.3 03/09/2020    BILITOT 0.4 03/09/2020    AST 44 03/09/2020    ALKPHOS 68 03/09/2020    ALT 45 (H) 03/09/2020     Coags:   Lab Results   Component Value Date    INR 1.2 03/09/2020     DM:   Lab Results   Component Value Date    CREATININE 0.48 (L) 03/09/2020     Cardiac:   Lab Results   Component Value Date    TROPONINI <0.012 03/09/2020    BNP 38 04/04/2019     Urinalysis:   Lab Results   Component Value Date    LABURIN PROTEUS MIRABILIS  > 100,000 cfu/ml   (A) 11/29/2019    COLORU Yellow 03/09/2020    SPECGRAV 1.010 03/09/2020    NITRITE Negative 03/09/2020    KETONESU Negative 03/09/2020    UROBILINOGEN Negative 03/09/2020    WBCUA 10 (H) 03/09/2020       Trended Lab Data:  Recent Labs   Lab 03/09/20  1405   WBC 10.90   HGB 14.0   HCT 45.1   *   MCV 96   RDW 12.5   *   K 4.6   CL 82*   CO2 44*   BUN 5*   CREATININE 0.48*   *   PROT 8.4   ALBUMIN 4.3   BILITOT 0.4   AST 44   ALKPHOS 68   ALT 45*       Trended Cardiac Data:  Recent Labs   Lab 03/09/20  1405   TROPONINI <0.012       Microbiology Data:  Blood cx pending     Other Results:  EKG (my interpretation): NSR, rate 96, normal axis, no RITCHIE or TWI     Radiology:  Imaging Results          CT Head Without Contrast (Final result)  Result time 03/09/20 15:27:34    Final result by VIANNEY Rosario Sr., MD (03/09/20 15:27:34)                 Impression:      1. There are chronic  appearing ischemic changes in the deep white matter of both cerebral hemispheres. There is no evidence of an acute ischemic event.  2. There is no intracranial hemorrhage.  3. There are craniotomy changes on the left side of the skull. There is a healed fracture in the medial wall of the right orbit.  4. There is a 4 mm polyp versus mucous retention cyst in the right maxillary sinus.  All CT scans at this facility use dose modulation, iterative reconstruction, and/or weight base dosing when appropriate to reduce radiation dose when appropriate to reduce radiation dose to as low as reasonably achievable.      Electronically signed by: Danyel Rosario MD  Date:    03/09/2020  Time:    15:27             Narrative:    EXAMINATION:  CT HEAD WITHOUT CONTRAST    CLINICAL HISTORY:  Confusion/delirium, altered LOC, unexplained;    TECHNIQUE:  Standard brain CT protocol without IV contrast was performed.    COMPARISON:  A CT examination of the head and facial bones performed on 10/25/2019.    FINDINGS:  There are chronic appearing ischemic changes in the deep white matter of both cerebral hemispheres.  There is no evidence of an acute ischemic event.  There is no intracranial hemorrhage.  There are craniotomy changes on the left side of the skull.  There is no acute fracture visualized.  There is a healed fracture in the medial wall of the right orbit.  There is a 4 mm polyp versus mucous retention cyst in the right maxillary sinus.                               X-Ray Chest AP Portable (Final result)  Result time 03/09/20 14:38:52    Final result by VIANNEY Rosario Sr., MD (03/09/20 14:38:52)                 Impression:      There has been interval worsening of the appearance of the lungs. There are patchy areas of haziness scattered throughout both lungs.  This is consistent with the patient's history and characteristic of pneumonia.  If additional imaging evaluation is clinically indicated, I recommend consideration of a  CT examination of the thorax with IV contrast..      Electronically signed by: Danyel Rosario MD  Date:    03/09/2020  Time:    14:38             Narrative:    EXAMINATION:  XR CHEST AP PORTABLE    CLINICAL HISTORY:  Sepsis;    COMPARISON:  12/26/2019    FINDINGS:  The size of the heart is normal.  There has been interval worsening of the appearance of the lungs.  There are patchy areas of haziness scattered throughout both lungs.  There is no pneumothorax.  The costophrenic angles are sharp.  There are healed fractures on the left side of the thoracic cage.                              Assessment:     Elif Archibald is a 54 y.o. female with:  Patient Active Problem List    Diagnosis Date Noted    UTI (urinary tract infection) 03/09/2020    Acute encephalopathy 03/09/2020    Pneumonia 03/09/2020    Hypokalemia due to inadequate potassium intake 05/14/2019    Moderate malnutrition 05/13/2019    Severe sepsis with acute organ dysfunction due to gram-negative bacteria 05/12/2019    Chronic respiratory failure 04/05/2019    Chronic obstructive pulmonary disease with acute lower respiratory infection 04/05/2019    Hx of subdural hematoma 04/05/2019    Recurrent major depressive disorder, in partial remission 04/05/2019    Essential hypertension 04/05/2019    Alcoholic cirrhosis of liver without ascites 04/05/2019    Sepsis 04/05/2019    Hypoalbuminemia due to protein-calorie malnutrition 04/05/2019    Normocytic anemia 04/05/2019    Debility 04/05/2019    Functional quadriplegia 04/05/2019    Lung nodules 04/05/2019        Plan:     Sepsis 2/2 PNA vs UTI   - afebrile, tachy to 107, RR mid 20s, BP normotensive  - CXR with questionable consolidation of the R lower lobe   - lactate 0.9, flu negative   - UA: - nitrite, 2+ LE, 10 WBCs, many bacteria   - received 1 dose of zosyn in the ED, will continue     Acute Encephalopathy   - pt back to baseline when arriving to [place]    - CT head unremarkable   -  electrolytes WNL   - UDS,TSH, EtOH pending     COPD  - on 3 L home O2  - not on any controller inhalers, only has PRN albuterol     ? Anxiety/MDD/Bipolar/Schizophrenia   - no active issues  - cont home elavil, Celexa, Risperdal     SDH s/p craniotomy and has undergone  - occurred in 1/2019, PEG and trach have been removed  - pt has baseline L hand tremor since this   - cont home gabapentin     Seizure disorder  - cont home keppa     Tachycardia   - cont home lopressor     Code Status:     Full     Kelly Lubin MD  [facility] HO-I    [facility] Medicine Hospitalist Pager numbers:   [facility] Hospitalist Medicine Team A (Cally/Wai): [phone]  [facility] Hospitalist Medicine Team B (Milly/Louis):  [phone]

## 2020-03-10 NOTE — PLAN OF CARE
VN cued into pt's room for introduction. VN attempted to inform pt that VN would be working along side bedside nurse and PCT throughout shift. Level of present pain assessed. At present no distress noted. Patient appears to be asleep in bed resting quietly. No family at bedside to review plan of care at this time. Bed alarm presently activated and in use. Will cont to be available to patient and intervene prn.

## 2020-03-10 NOTE — PLAN OF CARE
Patient was somnolent when seen on rounds this morning. ABG 7.37/74/80/43. Patient stepped up to the ICU and placed on continuous BiPAP. Will consult Pulmonology.     Kelly Lubin MD  [facility] HO-1

## 2020-03-10 NOTE — PLAN OF CARE
"Patient seen on floor for significant encephalopathy. ABG at 12:24 pm was 7.37/73.9/80. Talked to patient's son and daughter-in-law who both state that patient has been having chronic trouble with severe sedation at home after taking all of her psych meds in the morning. "She usually wakes up in the evening once they're out of her system."    Spoke with primary team at 1:30 pm regarding our concerns. We did not feel patient had CO2 narcosis, but did need ICU for severe encephalopathy at the time.    Patient was transferred to ICU around 4:00 pm. It seems patient was awake, alert, and oriented prior to transfer. Floor nursing called primary team who felt transfer was still needed for continuous BIPAP.    Upon arrival to the ICU, BIPAP was taken off as patient was AAOx3. ABG repeated which showed: 7.41/66/72. Patient's pCO2 is at her baseline and likely ranges in the high 60's - low 70's. Patient continues to be awake. I have touched base with covering team regarding stopping BIPAP (but can keep on at night while she sleeps). Discussed need for psychiatry consult to get her psych meds in order.    January Flood MD  [facility] PCCM Fellow  "

## 2020-03-10 NOTE — PROGRESS NOTES
"[facility] Resident HO-I Progress Note    Subjective:      Daughter at bedside, states she is getting back to her baseline.      Objective:   Last 24 Hour Vital Signs:  BP  Min: 107/70  Max: 162/97  Temp  Av.5 °F (36.4 °C)  Min: 96.3 °F (35.7 °C)  Max: 98.5 °F (36.9 °C)  Pulse  Av.6  Min: 96  Max: 107  Resp  Av.4  Min: 13  Max: 36  SpO2  Av.9 %  Min: 91 %  Max: 100 %  Height  Av' 2" (157.5 cm)  Min: 5' 2" (157.5 cm)  Max: 5' 2" (157.5 cm)  Weight  Av.7 kg (131 lb 11.2 oz)  Min: 59.7 kg (131 lb 11.2 oz)  Max: 59.7 kg (131 lb 11.2 oz)  I/O last 3 completed shifts:  In: 600 [P.O.:400; IV Piggyback:200]  Out: 450 [Urine:450]    Physical Examination:  Physical Exam   Constitutional: She is oriented to person, place, and time. She appears well-developed. No distress.   HENT:   Head: Normocephalic and atraumatic.   Eyes: Pupils are equal, round, and reactive to light. EOM are normal.   Neck: Normal range of motion. Neck supple.   Cardiovascular: Normal rate and regular rhythm.   Pulmonary/Chest: Effort normal. She has wheezes (mild end-expiratory wheezes throughout).   Abdominal: Soft. There is no tenderness.   Musculoskeletal: Normal range of motion. She exhibits no edema.   Neurological: She is alert and oriented to person, place, and time. No cranial nerve deficit or sensory deficit.   Skin: Skin is warm and dry.   Psychiatric: She has a normal mood and affect. Her behavior is normal.     Laboratory:  Laboratory Data Reviewed: yes  Pertinent Findings:  AM labs pending     Microbiology Data Reviewed: yes  Pertinent Findings:  Flu neg  Blood cx NGTD  Urine cx pending     Other Results:  Radiology Data Reviewed: yes  Pertinent Findings:  CT head:   1. There are chronic appearing ischemic changes in the deep white matter of both cerebral hemispheres. There is no evidence of an acute ischemic event.  2. There is no intracranial hemorrhage.  3. There are craniotomy changes on the left side of the " skull. There is a healed fracture in the medial wall of the right orbit.  4. There is a 4 mm polyp versus mucous retention cyst in the right maxillary sinus.    CXR: There has been interval worsening of the appearance of the lungs. There are patchy areas of haziness scattered throughout both lungs.  This is consistent with the patient's history and characteristic of pneumonia.  If additional imaging evaluation is clinically indicated, I recommend consideration of a CT examination of the thorax with IV contrast..    Current Medications:     Infusions:       Scheduled:   amitriptyline  25 mg Oral QHS    citalopram  20 mg Oral Daily    enoxaparin  40 mg Subcutaneous Daily    gabapentin  300 mg Oral TID    levETIRAcetam  500 mg Oral BID    metoprolol tartrate  50 mg Oral QID    piperacillin-tazobactam (ZOSYN) IVPB  4.5 g Intravenous Q8H    risperiDONE  0.5 mg Oral BID        PRN:  acetaminophen, pneumoc 13-thom conj-dip cr(PF), sodium chloride 0.9%, sodium chloride 0.9%    Antibiotics and Day Number of Therapy:  zosyn    Lines and Day Number of Therapy:  piv x2    Assessment:     Elif Archibald is a 54 y.o.female with  Patient Active Problem List    Diagnosis Date Noted    UTI (urinary tract infection) 03/09/2020    Acute encephalopathy 03/09/2020    Pneumonia 03/09/2020    COPD (chronic obstructive pulmonary disease) 03/09/2020    Hypokalemia due to inadequate potassium intake 05/14/2019    Moderate malnutrition 05/13/2019    Severe sepsis with acute organ dysfunction due to gram-negative bacteria 05/12/2019    Chronic respiratory failure 04/05/2019    Chronic obstructive pulmonary disease with acute lower respiratory infection 04/05/2019    Hx of subdural hematoma 04/05/2019    Recurrent major depressive disorder, in partial remission 04/05/2019    Essential hypertension 04/05/2019    Alcoholic cirrhosis of liver without ascites 04/05/2019    Sepsis 04/05/2019    Hypoalbuminemia due to  protein-calorie malnutrition 04/05/2019    Normocytic anemia 04/05/2019    Debility 04/05/2019    Functional quadriplegia 04/05/2019    Lung nodules 04/05/2019        Plan:     Sepsis 2/2 PNA vs UTI   - afebrile, tachy to 107, RR mid 20s, BP normotensive  - CXR with questionable consolidation of the R lower lobe   - lactate 0.9, flu negative   - UA: - nitrite, 2+ LE, 10 WBCs, many bacteria   - received 1 dose of zosyn in the ED, will continue    - blood cultures NGTD  - FU urine cx     Acute Encephalopathy   - pt back to baseline when arriving to [place]    - CT head unremarkable   - electrolytes WNL   - UDS pending   - TSH, EtOH WNL      COPD  - on 3 L home O2  - not on any controller inhalers, only has PRN albuterol      ? Anxiety/MDD/Bipolar/Schizophrenia   - no active issues  - cont home elavil, Celexa, Risperdal      SDH s/p craniotomy and has undergone  - occurred in 1/2019, PEG and trach have been removed  - pt has baseline L hand tremor since this   - cont home gabapentin      Seizure disorder  - cont home keppa      Tachycardia   - cont home lopressor        Kelly Lubin  [facility] Internal Medicine HO-I    [facility] Medicine Hospitalist Pager numbers:   [facility] Hospitalist Medicine Team A (Cally/Wai): [phone]  [facility] Hospitalist Medicine Team B (Milly/Louis):  [phone]

## 2020-03-10 NOTE — PROGRESS NOTES
Individual Follow-Up Form    3/10/2020    Quit Date: To be determined    Clinical Status of Patient: Inpatient    Length of Service: 30 minutes    Comments: Smoking cessation education provided  Pt denies nicotine withdrawal symptoms, stating that she has cut down to a few cigarettes per day.(formerly 1 pack per day).  She is currently enrolled in the [employer].      Diagnosis: F17.210    Next Visit:  Ambulatory referral to Smoking Cessation program following hospital discharge.

## 2020-03-10 NOTE — PROGRESS NOTES
Pulmonology fellow calls to alert covering night intern that pt has been received on ICU. Taken off BiPap as pt's co2 retention is at baseline as she is not significantly acidotic. Therefore does not need BiPap. Her altered mental status is not due to CO2 narcosis.      AMS thought 2/2 sedating medications that she is receiving for her psychiatric history. Pulm fellow is discontinuing the offending medications, and suggests a psychiatry consult to have the pt on a regimen that is less sedating.

## 2020-03-10 NOTE — PROGRESS NOTES
Pharmacist Renal Dose Adjustment Note    Elif Archibald is a 54 y.o. female being treated with the medication Zosyn.    Patient Data:    Vital Signs (Most Recent):  Temp: 98.4 °F (36.9 °C) (03/09/20 2016)  Pulse: 107 (03/09/20 2016)  Resp: 20 (03/09/20 2016)  BP: (!) 162/97 (03/09/20 2016)  SpO2: (!) 91 % (03/09/20 2016)   Vital Signs (72h Range):  Temp:  [97.4 °F (36.3 °C)-98.5 °F (36.9 °C)]   Pulse:  []   Resp:  [13-36]   BP: (107-162)/(65-97)   SpO2:  [91 %-100 %]      Recent Labs   Lab 03/09/20  1405   CREATININE 0.48*     Serum creatinine: 0.48 mg/dL (L) 03/09/20 1405  Estimated creatinine clearance: 106 mL/min (A)    Medication: Zosyn 4.5 gm IV q6h will be changed to Zosyn 4.5 gm IV q8h.    Pharmacist's Name: Roel Payton  Pharmacist's Extension: [phone]

## 2020-03-10 NOTE — NURSING
Pt arrived to unit via [facility] ambulance service @ 1954. IV fluids infusing. Pt face appears flush. Pt requesting a bed pan. No acute distress noted. [facility]-care provided and linens changed. Pt oriented to room. Family arrived to bedside. Team paged and notified [phone] of pt arrival and complaint of a headache. Provider stated will order tylenol. Will continue to monitor closely.

## 2020-03-10 NOTE — PLAN OF CARE
The proper method of use, as well as anticipated side effects, of this aerosol treatment are discussed and demonstrated to the patient. Will continue to monitor.

## 2020-03-10 NOTE — CONSULTS
"[facility] Pulmonary/Critical Care Consult - Resident Note    Primary Attending Physician: Cally  Primary Team: [facility] Internal Medicine Team A  Consultant Attending: Santiago  Consultant Fellow: Remigio  Consultant Resident: Leighann    Reason for Consult:     COPD, Somnolent with acute change in CO2 from 40s to 74    Subjective:      History of Present Illness:  Elif Archibald is a 54 y.o. female who has a PMHx of JENNIE/MDD/Schizophrenia/Bipolar, SDH s/p craniotomy, COPD on 3LNC at home, and reported cirrhosis?? Who presented with worsening altered mental status for a week and brought to [facility], now transferred to [place].    Presented to [facility] last night due to encephalopathy, was transferred to [place] with improvement of her encephalopathy. She communicated well, had reportedly no issues at that time. This morning, she continued to have no acute issues, but on rounds primary team noted she was significantly more somnolent and not as responsive. Stat ABG was obtained and placed on continuous bipap. Plan to move to ICU    When evaluating the patient, she remains somnolent, she has episodes of waking up and interacting and then would become somnolent and less responsive. Withdraws to pain and is speaking and verbalizing. Family (son) at bedside was present and discussed with us; patient had been reported "in this mental state" every afternoon, would wake up and become more coherent and alert, but after taking her night time medications would become sedated and not as responsive again. No new changes to her medication regimen recently and the son feels like this has gone on longer than a week. He denies her taking her medications inappropriately (more than she is prescribed) and denies that she takes any opiates/narcotics, sedating medications such as benzodiazepines, and that he feels she is on too many psych medications -- but does endorse she has a history of psychiatric disorders.    Denied any sick contacts, fevers, chills, nausea, " vomiting, chest pain, SOB, abdominal pain, dysuria, constipation, diarrhea.    Past Medical History:  Past Medical History:   Diagnosis Date    Anxiety     Asthma     Bipolar 1 disorder     Cirrhosis, alcoholic     Cocaine abuse     COPD (chronic obstructive pulmonary disease)     Depression     Encounter for blood transfusion     Schizophrenia     Seizures     Subdural hematoma     Tachycardia        Past Surgical History:  Past Surgical History:   Procedure Laterality Date    APPENDECTOMY      CRANIECTOMY Left 01/31/2019    ESOPHAGOGASTRODUODENOSCOPY      PEG W/TRACHEOSTOMY PLACEMENT  02/16/2019       Allergies:  Review of patient's allergies indicates:  No Known Allergies    Medications:   In-Hospital Scheduled Medications:   enoxaparin  40 mg Subcutaneous Daily    levETIRAcetam  500 mg Oral BID    metoprolol tartrate  50 mg Oral QID    oseltamivir  75 mg Oral BID    piperacillin-tazobactam (ZOSYN) IVPB  4.5 g Intravenous Q8H      In-Hospital PRN Medications:  acetaminophen, albuterol-ipratropium, pneumoc 13-thom conj-dip cr(PF), sodium chloride 0.9%, sodium chloride 0.9%   In-Hospital IV Infusion Medications:     Home Medications:  Prior to Admission medications    Medication Sig Start Date End Date Taking? Authorizing Provider   albuterol (PROVENTIL/VENTOLIN HFA) 90 mcg/actuation inhaler Inhale 1 puff into the lungs every 6 (six) hours as needed for Wheezing. Rescue    Yes Historical Provider, MD   amitriptyline (ELAVIL) 25 MG tablet Take 25 mg by mouth every evening.    Yes Historical Provider, MD   citalopram (CELEXA) 20 MG tablet Take 20 mg by mouth once daily.    Yes Historical Provider, MD   escitalopram oxalate (LEXAPRO) 20 MG tablet Take 20 mg by mouth once daily.   Yes Historical Provider, MD   gabapentin (NEURONTIN) 300 MG capsule Take 300 mg by mouth 3 (three) times daily.  2/22/19  Yes Historical Provider, MD   hydrOXYzine pamoate (VISTARIL) 25 MG Cap Take 1 capsule (25 mg total)  by mouth 2 (two) times daily. 1/3/20  Yes Kelly Leal PA-C   levETIRAcetam (KEPPRA) 500 MG Tab Take 500 mg by mouth 2 (two) times daily.   Yes Historical Provider, MD   metoprolol tartrate (LOPRESSOR) 50 MG tablet Take 50 mg by mouth 4 (four) times daily.    Yes Historical Provider, MD   multivitamin Chew Take by mouth once daily.   Yes Historical Provider, MD   risperidone (RISPERDAL) 0.5 MG Tab Take 0.5 mg by mouth 2 (two) times daily.    Yes Historical Provider, MD   diazePAM (VALIUM) 5 MG tablet Take 1 tablet (5 mg total) by mouth every 12 (twelve) hours as needed for Anxiety. 12/26/19 1/25/20  Clayton Ramirez,    miscellaneous medical supply (C-TUB) Misc Rolling walker 1/22/19   Historical Provider, MD   polyethylene glycol (GLYCOLAX) 17 gram PwPk Take 17 g by mouth daily as needed.     Historical Provider, MD       Family History:  Family History   Problem Relation Age of Onset    COPD Mother     Cirrhosis Father        Social History:  Social History     Tobacco Use    Smoking status: Current Every Day Smoker     Packs/day: 1.00     Years: 40.00     Pack years: 40.00     Types: Cigarettes     Start date: 1980    Smokeless tobacco: Never Used    Tobacco comment: Previously 1 pack per day smoker- now down to a few cigarettes per day.  Pt is enrolled in the [employer]. Ambulatory referral to Smoking Cessation program.   Substance Use Topics    Alcohol use: Not Currently    Drug use: Not Currently       Review of Systems:  Constitutional: negative for chills and fevers  Eyes: negative for irritation, redness and visual disturbance  Ears, nose, mouth, throat, and face: negative for sore mouth, sore throat, tinnitus and voice change  Respiratory: positive for cough and dyspnea on exertion  Cardiovascular: negative for fatigue and palpitations  Gastrointestinal: negative for constipation, diarrhea, nausea and vomiting  Genitourinary:negative for hematuria, hesitancy, nocturia and urinary  "incontinence  Integument/breast: negative for pruritus, rash, skin color change and skin lesion(s)  Hematologic/lymphatic: negative for bleeding and petechiae  Musculoskeletal:negative for muscle weakness, myalgias, neck pain and stiff joints  Neurological: positive for change in mental status, negative for speech problems, tremors, vertigo and weakness  Behavioral/Psych: negative for irritability, mood swings, separation anxiety and sleep disturbance  Endocrine: negative for diabetic symptoms including polydipsia, polyphagia and polyuria and temperature intolerance  Allergic/Immunologic: negative for anaphylaxis and urticaria All other systems are reviewed and are negative.    Health Maintenance:   Patient's PCP is: Shu  Immunizations:   Currently on File with [facility]:   Most Recent Immunizations   Administered Date(s) Administered    Tdap 2018      Objective:   Last 24 Hour Vital Signs:  BP  Min: 107/70  Max: 162/97  Temp  Av.5 °F (36.4 °C)  Min: 96.3 °F (35.7 °C)  Max: 98.5 °F (36.9 °C)  Pulse  Av.3  Min: 86  Max: 107  Resp  Av.4  Min: 14  Max: 36  SpO2  Av.8 %  Min: 88 %  Max: 100 %  Height  Av' 2" (157.5 cm)  Min: 5' 2" (157.5 cm)  Max: 5' 2" (157.5 cm)  I/O last 3 completed shifts:  In: 600 [P.O.:400; IV Piggyback:200]  Out: 450 [Urine:450]    Physical Examination:  Gen: somnolent, but responsive to sternal rub  HEENT: NCAT, L pupil mildly more dilated than R but equally reactive and responsive to light, anicteric sclerae, MMM, JVP ~5cm, no thyromegaly, lymphadenopathy appreciated, neck supple  CV: Tachycardic, Regular Rhythm, S1/S2 appreciated, no murmur gallop or rubs  Resp: Decreased breath sounds bilaterally, but appears clear, no wheezing appreciated, no increased work of breathing appreciated  Abdomen: Soft, NT, ND, +BS  Ext: 2+ distal pulses, no edema appreciated  Skin: Warm, dry, no rashes appreciated  Psych/Neuro: Somnolent, but responsive; able to follow " simple commands when she is awake; but would fall back asleep. Moving all extremities when asked and endorses equal sensation to touch bilaterally in all extremities.    Laboratory:  Trended Lab Data:  Recent Labs   Lab 03/09/20  1405 03/10/20  0624   WBC 10.90 7.66   HGB 14.0 11.9*   HCT 45.1 37.0   * 130*       Recent Labs   Lab 03/09/20  1405 03/10/20  0624 03/10/20  1312   * 134* 134*   K 4.6 4.0 4.5   CL 82* 86* 85*   CO2 44* 41* 44*   BUN 5* 4* 4*   CREATININE 0.48* 0.6 0.7   * 104 101   CALCIUM 9.3 9.2 9.2       Recent Labs   Lab 03/09/20  1405 03/10/20  0624   PROT 8.4 6.8   ALBUMIN 4.3 3.2*   BILITOT 0.4 0.7   AST 44 27   ALT 45* 25   ALKPHOS 68 52*       Recent Labs   Lab 03/09/20  1405   INR 1.2       Cardiac:   Recent Labs   Lab 03/09/20  1405   TROPONINI <0.012       FLP: No results found for: CHOL, HDL, LDLCALC, TRIG, CHOLHDL  DM:   Lab Results   Component Value Date    CREATININE 0.7 03/10/2020     Thyroid:   Lab Results   Component Value Date    TSH 1.304 03/10/2020    FREET4 0.9 01/26/2016     Anemia:   Lab Results   Component Value Date    IRON 21 (L) 04/05/2019    TIBC 308 04/05/2019    FERRITIN 413 (H) 04/05/2019    QDAJUCGZ42 683 04/05/2019    FOLATE 19.4 04/05/2019     Urinalysis:   Lab Results   Component Value Date    LABURIN PROTEUS MIRABILIS  > 100,000 cfu/ml   (A) 11/29/2019    COLORU Yellow 03/09/2020    SPECGRAV 1.010 03/09/2020    NITRITE Negative 03/09/2020    KETONESU Negative 03/09/2020    UROBILINOGEN Negative 03/09/2020       Arterial Blood Gases:  Recent Labs   Lab 03/10/20  1224   PH 7.374   PCO2 73.9*   PO2 80   HCO3 43.1*   POCSATURATED 95   BE 18     Microbiology:  Microbiology Results (last 7 days)     Procedure Component Value Units Date/Time    Blood culture x two cultures. Draw prior to antibiotics. [[id number]] Collected:  03/09/20 1405    Order Status:  Completed Specimen:  Blood from Peripheral, Forearm, Right Updated:  03/10/20 0515     Blood  Culture, Routine No Growth to date    Narrative:       Aerobic and anaerobic    Blood culture x two cultures. Draw prior to antibiotics. [[id number]] Collected:  03/09/20 1416    Order Status:  Completed Specimen:  Blood from Peripheral, Forearm, Left Updated:  03/10/20 0515     Blood Culture, Routine No Growth to date    Narrative:       Aerobic and anaerobic    Influenza A & B by Molecular [[id number]] Collected:  03/09/20 1420    Order Status:  Completed Specimen:  Nasopharyngeal Swab Updated:  03/09/20 1505     Influenza A, Molecular Negative     Influenza B, Molecular Negative     Flu A & B Source Nasal swab        EKG:  Rate 90s  Sinus Rhythm  Normal Axis  No significant St/T wave abnormalities    Radiology:  CT Head Without Contrast   Impression       1. There are chronic appearing ischemic changes in the deep white matter of both cerebral hemispheres. There is no evidence of an acute ischemic event.  2. There is no intracranial hemorrhage.  3. There are craniotomy changes on the left side of the skull. There is a healed fracture in the medial wall of the right orbit.  4. There is a 4 mm polyp versus mucous retention cyst in the right maxillary sinus.  All CT scans at this facility use dose modulation, iterative reconstruction, and/or weight base dosing when appropriate to reduce radiation dose when appropriate to reduce radiation dose to as low as reasonably achievable.      Current Medications:     Infusions:       Scheduled:   enoxaparin  40 mg Subcutaneous Daily    levETIRAcetam  500 mg Oral BID    metoprolol tartrate  50 mg Oral QID    oseltamivir  75 mg Oral BID    piperacillin-tazobactam (ZOSYN) IVPB  4.5 g Intravenous Q8H        PRN:  acetaminophen, albuterol-ipratropium, pneumoc 13-thom conj-dip cr(PF), sodium chloride 0.9%, sodium chloride 0.9%    Antibiotics:  Zosyn  Tamiflu     Assessment:     Elif Archibald is a 54 y.o. female with a PMHx of JENNIE/MDD/Schizophrenia/Bipolar, SDH s/p craniotomy,  COPD on 3LNC at home, and reported cirrhosis?? Who presented with acute encephalopathy for 1 week, who initially cleared and now worsening     Plan:     Neuro/Psych:  #Acute Encephalopathy  #SDH s/p craniotomy in the past  - DDx could be wide. She has a hx of seizure, could be CO2 narcosis vs TIA/CVA vs polysubstance vs infectious/sepsis vs hepatic encephalopathy vs arrhythmia vs PE  - recent CT head reviewed, does not appear anything acute, no history of arrhythmias we're aware of, she is a reported cirrhotic in chart but her liver size is normal and does not appear synthetic dysfunction is evident, PE low likelihood and only abnormal VS is her tachycardia right now.  - Her bicarb on her CMP is improving, her PCO2 on ABG is high but she appears to be a chronic retainer and her pH is normal.  - Discussed with family and they state that these somnolent episodes occur everyday; appears that shortly after she takes her prescribed medications she becomes sedated and somnolent, less responsive and this has been going on for quite some time.   - Repeat ABG at 4PM to assess any change as she was given bipap on the floor  - Recommend holding her psychiatric/sedating medications to see if this improves her symptoms (citalopram, escitalopram, adalid, elavil, and risperidone). If it does, may need to re-assess what she needs to be on at home/here as this could all be from her medications as well.  #Seizure Disorder  - Continue kera for her seizure history  - No recent seizures per son at bedside    #MDD/JENNIE/Bipolar/Schizophrenia  - reported in her history  - on citalopram 20mg daily, escitalopram 20mg daily, amitriptyline 25mg nightly, and risperidone 0.5mg PO BID at home  - recommend holding as above    Cardiovascular/Hemodynamics:  #Sepsis 2/2 PNA vs UTI  - afebrile, tachycardic>100, Tachypneic, Normal BP, Normal WBC initially  - CXR with ? Consolidation, recent flu so could be residual vs a pneumonia  - UA with concerns for  urine source  - Lactate negative, flu negative  - Blood Culture NGTD, Urine Culture Pending  - Zosyn per primary    Respiratory:   #Acute on Chronic Hypoxic, Hypercapnic Respiratory Failure  #Tobacco Abuse  - Longstanding smoking hx, still current smoker  - On 3L NC continuous at home  No PFTs in our records, care everywhere  - Continue duonebs PRN, does not have any wheezing on exam and she is moving decent air  - Can consider treating for a COPD exacerbation if no improvement  - needs outpatient PFTs, tobacco cessation    #Recently Influenza Positive  - per family she was flu positive recently?  - influenza negative while here  - tamiflu per primary    GI/FEN:  F: +275cc while here  E: None  N: Regular, NPO while she's somnolent for concerns of aspiration    #Reported history of Cirrhosis??  - normal liver size on most recent US  - INR 1.2  - unsure if where that is from, would clarify    ID:    #Concerns for UTI  - UA with bacteria, WBC, LE  - Zosyn per primary    Renal:   #Baseline Creatinine  - Cr 0.6-0.7  - monitor    Heme/Onc:   #Normocytic Anemia  - Hgb 11.9, appears stable  - monitor no signs of bleeding    Endocrine:  #TSH 1.304    Prophylaxis:   VTE: Lovenox  Stress Ulcer: None    Lines/Drains:  PIV x2  Maher Cath    Case discussed with Daniel Henderson and Remigio. Discussed the above plan with on call team as well.    Thank you for allowing us to participate in the care of this patient. Please contact me at [phone] if you have any questions regarding this consult.    Jorje Lawton MD  [facility]  [facility]

## 2020-03-10 NOTE — PLAN OF CARE
VN cued into room to complete admit assessment, VIP model introduced, VN working alongside bedside treatment team.  Pt's daughter and son at bedside and reports cognitive deficits since previous subdural hematoma. Pt requests family to help with admission questions. Plan of care reviewed with patient and family. Patient and family verbalized understanding. Patient and family informed of fall risk and fall precautions, call light within reach, 2x bed rails. Patient notified to ask staff for assistance and pt verbalized complete understanding. Pt c/o headache 10/10. Bedside nurseana, at bedside and made aware. States she will contact md and notify them of pt's arrival to unit and c/o headache. I pad offered to family. Family accepted. Bedside nurse, Ana, notified of family's request for I pad. Time allowed for questions. Will continue to monitor and intervene as needed.

## 2020-03-10 NOTE — PLAN OF CARE
CM visited with pt currently on BIPAP, daughter in law Georgie [phone] at bedside and eager to speak with CM regarding rehab placement.  Georgie jamison pt was admitted to [facility] after an extended hospital stay for a brain injury.  \[facility]\"" after d/c in September, pt was able to ambulate using a RW and O2 2 LNC.  \[facility]\"" pt has significantly declined since going home with her daughter and feels this is likely due to deconditioning.  \[facility]\"" pt did not continue with exercises taught, poor dietary consumption, sedentary lifestyle and this has affected her mobility status.  Also [facility] pt now requires O2 at all times and cannot ambulate without getting SOB.   Explained that this decline could be related to a number of things but would have PT/OT eval and along with primary team w/u will assist with d/c dispo.  CM will await eval.  Pt is currently on BIPAP with possible transfer to ICU if sx's fail to improve or worsen.    Pt currently lives with daughter Roseline [phone] who is with her daily and assist as needed.  If plan is for d/c she would be returning to Roseline's home.    Discharge planning brochure provided. White board updated with CM name & contact info.  Pt encouraged to call with any questions or needs. CM will continue to follow patient throughout the transitions of care, and assist with any discharge needs.    Pt has multiple DME equipment at home as listed in Discharge planning assessment.       03/10/20 7588   Discharge Assessment   Assessment Type Discharge Planning Assessment   Confirmed/corrected address and phone number on facesheet? Yes   Assessment information obtained from? Caregiver   Expected Length of Stay (days) 3   Communicated expected length of stay with patient/caregiver yes   Prior to hospitilization cognitive status: Alert/Oriented   Prior to hospitalization functional status: Assistive Equipment   Current cognitive status: Alert/Oriented   Current Functional  Status: Assistive Equipment;Needs Assistance   Lives With other relative(s)   Able to Return to Prior Arrangements   (TBD)   Is patient able to care for self after discharge? Yes   Who are your caregiver(s) and their phone number(s)?   ([phone] reynaldo Dukes in law Georgie [phone], daughter Nayely [phone])   Readmission Within the Last 30 Days no previous admission in last 30 days   Patient currently being followed by outpatient case management? No   Patient currently receives any other outside agency services? No   Equipment Currently Used at Home walker, rolling;wheelchair;hospital bed;oxygen;nebulizer;rollator   Do you have any problems affording any of your prescribed medications? No   Is the patient taking medications as prescribed? yes   Does the patient have transportation home? Yes   Transportation Anticipated family or friend will provide   Does the patient receive services at the [facility]? No   Discharge Plan A Rehab   Discharge Plan B Skilled Nursing Facility   DME Needed Upon Discharge  none   Patient/Family in Agreement with Plan yes       Torie White RN    [phone]

## 2020-03-10 NOTE — NURSING
Report called to Nicky BANDA in ICU. Waiting for respiratory so we can move patient with bipap. Will continue to monitor.

## 2020-03-10 NOTE — PLAN OF CARE
VN note: VN spoke with Dr Guajardo about plans of moving patient to ICU for close monitoring on continuous BiPap. Physician states will call patient's family to update them on transfer. Will cont to be available and intervene prn.

## 2020-03-11 LAB
ALBUMIN SERPL BCP-MCNC: 3.4 G/DL (ref 3.5–5.2)
ALP SERPL-CCNC: 54 U/L (ref 55–135)
ALT SERPL W/O P-5'-P-CCNC: 26 U/L (ref 10–44)
AMPHET+METHAMPHET UR QL: NEGATIVE
AMPHET+METHAMPHET UR QL: NEGATIVE
ANION GAP SERPL CALC-SCNC: 6 MMOL/L (ref 8–16)
AST SERPL-CCNC: 25 U/L (ref 10–40)
BARBITURATES UR QL SCN>200 NG/ML: NEGATIVE
BARBITURATES UR QL SCN>200 NG/ML: NEGATIVE
BASOPHILS # BLD AUTO: 0.03 K/UL (ref 0–0.2)
BASOPHILS NFR BLD: 0.5 % (ref 0–1.9)
BENZODIAZ UR QL SCN>200 NG/ML: NEGATIVE
BENZODIAZ UR QL SCN>200 NG/ML: NEGATIVE
BILIRUB SERPL-MCNC: 0.5 MG/DL (ref 0.1–1)
BUN SERPL-MCNC: 9 MG/DL (ref 6–20)
BZE UR QL SCN: NEGATIVE
BZE UR QL SCN: NEGATIVE
CALCIUM SERPL-MCNC: 9.6 MG/DL (ref 8.7–10.5)
CANNABINOIDS UR QL SCN: NEGATIVE
CANNABINOIDS UR QL SCN: NEGATIVE
CHLORIDE SERPL-SCNC: 86 MMOL/L (ref 95–110)
CO2 SERPL-SCNC: 41 MMOL/L (ref 23–29)
CREAT SERPL-MCNC: 0.7 MG/DL (ref 0.5–1.4)
CREAT UR-MCNC: 31 MG/DL (ref 15–325)
CREAT UR-MCNC: 31 MG/DL (ref 15–325)
DIFFERENTIAL METHOD: ABNORMAL
EOSINOPHIL # BLD AUTO: 0.3 K/UL (ref 0–0.5)
EOSINOPHIL NFR BLD: 4.5 % (ref 0–8)
ERYTHROCYTE [DISTWIDTH] IN BLOOD BY AUTOMATED COUNT: 12.3 % (ref 11.5–14.5)
EST. GFR  (AFRICAN AMERICAN): >60 ML/MIN/1.73 M^2
EST. GFR  (NON AFRICAN AMERICAN): >60 ML/MIN/1.73 M^2
ETHANOL UR-MCNC: <10 MG/DL
GLUCOSE SERPL-MCNC: 123 MG/DL (ref 70–110)
HCT VFR BLD AUTO: 39.2 % (ref 37–48.5)
HGB BLD-MCNC: 12.6 G/DL (ref 12–16)
IMM GRANULOCYTES # BLD AUTO: 0.02 K/UL (ref 0–0.04)
IMM GRANULOCYTES NFR BLD AUTO: 0.3 % (ref 0–0.5)
LYMPHOCYTES # BLD AUTO: 1.3 K/UL (ref 1–4.8)
LYMPHOCYTES NFR BLD: 20.6 % (ref 18–48)
MAGNESIUM SERPL-MCNC: 1.8 MG/DL (ref 1.6–2.6)
MCH RBC QN AUTO: 30 PG (ref 27–31)
MCHC RBC AUTO-ENTMCNC: 32.1 G/DL (ref 32–36)
MCV RBC AUTO: 93 FL (ref 82–98)
METHADONE UR QL SCN>300 NG/ML: NEGATIVE
METHADONE UR QL SCN>300 NG/ML: NEGATIVE
MONOCYTES # BLD AUTO: 0.3 K/UL (ref 0.3–1)
MONOCYTES NFR BLD: 5.4 % (ref 4–15)
NEUTROPHILS # BLD AUTO: 4.3 K/UL (ref 1.8–7.7)
NEUTROPHILS NFR BLD: 68.7 % (ref 38–73)
NRBC BLD-RTO: 0 /100 WBC
OPIATES UR QL SCN: NEGATIVE
OPIATES UR QL SCN: NEGATIVE
PCP UR QL SCN>25 NG/ML: NEGATIVE
PCP UR QL SCN>25 NG/ML: NEGATIVE
PHOSPHATE SERPL-MCNC: 3.3 MG/DL (ref 2.7–4.5)
PLATELET # BLD AUTO: 141 K/UL (ref 150–350)
PMV BLD AUTO: 8.2 FL (ref 9.2–12.9)
POTASSIUM SERPL-SCNC: 4.1 MMOL/L (ref 3.5–5.1)
PROT SERPL-MCNC: 7.6 G/DL (ref 6–8.4)
RBC # BLD AUTO: 4.2 M/UL (ref 4–5.4)
SODIUM SERPL-SCNC: 133 MMOL/L (ref 136–145)
TOXICOLOGY INFORMATION: NORMAL
TOXICOLOGY INFORMATION: NORMAL
WBC # BLD AUTO: 6.27 K/UL (ref 3.9–12.7)

## 2020-03-11 PROCEDURE — 94761 N-INVAS EAR/PLS OXIMETRY MLT: CPT

## 2020-03-11 PROCEDURE — 25000003 PHARM REV CODE 250: Performed by: STUDENT IN AN ORGANIZED HEALTH CARE EDUCATION/TRAINING PROGRAM

## 2020-03-11 PROCEDURE — 27000221 HC OXYGEN, UP TO 24 HOURS

## 2020-03-11 PROCEDURE — 84100 ASSAY OF PHOSPHORUS: CPT

## 2020-03-11 PROCEDURE — 63600175 PHARM REV CODE 636 W HCPCS: Performed by: STUDENT IN AN ORGANIZED HEALTH CARE EDUCATION/TRAINING PROGRAM

## 2020-03-11 PROCEDURE — 94660 CPAP INITIATION&MGMT: CPT

## 2020-03-11 PROCEDURE — 25000003 PHARM REV CODE 250: Performed by: PSYCHIATRY & NEUROLOGY

## 2020-03-11 PROCEDURE — 36415 COLL VENOUS BLD VENIPUNCTURE: CPT

## 2020-03-11 PROCEDURE — 80053 COMPREHEN METABOLIC PANEL: CPT

## 2020-03-11 PROCEDURE — 85025 COMPLETE CBC W/AUTO DIFF WBC: CPT

## 2020-03-11 PROCEDURE — 83735 ASSAY OF MAGNESIUM: CPT

## 2020-03-11 PROCEDURE — 11000001 HC ACUTE MED/SURG PRIVATE ROOM

## 2020-03-11 RX ORDER — ESCITALOPRAM OXALATE 10 MG/1
10 TABLET ORAL DAILY
Status: DISCONTINUED | OUTPATIENT
Start: 2020-03-11 | End: 2020-03-12 | Stop reason: HOSPADM

## 2020-03-11 RX ORDER — ALPRAZOLAM 0.25 MG/1
0.5 TABLET ORAL 4 TIMES DAILY PRN
Status: DISCONTINUED | OUTPATIENT
Start: 2020-03-11 | End: 2020-03-12 | Stop reason: HOSPADM

## 2020-03-11 RX ORDER — RISPERIDONE 0.25 MG/1
0.25 TABLET ORAL 2 TIMES DAILY
Status: DISCONTINUED | OUTPATIENT
Start: 2020-03-11 | End: 2020-03-12 | Stop reason: HOSPADM

## 2020-03-11 RX ADMIN — ESCITALOPRAM OXALATE 10 MG: 10 TABLET ORAL at 02:03

## 2020-03-11 RX ADMIN — OSELTAMIVIR PHOSPHATE 75 MG: 75 CAPSULE ORAL at 09:03

## 2020-03-11 RX ADMIN — LEVETIRACETAM 500 MG: 500 TABLET, FILM COATED ORAL at 09:03

## 2020-03-11 RX ADMIN — RISPERIDONE 0.25 MG: 0.25 TABLET ORAL at 08:03

## 2020-03-11 RX ADMIN — PIPERACILLIN SODIUM AND TAZOBACTAM SODIUM 4.5 G: 4; .5 INJECTION, POWDER, LYOPHILIZED, FOR SOLUTION INTRAVENOUS at 08:03

## 2020-03-11 RX ADMIN — OSELTAMIVIR PHOSPHATE 75 MG: 75 CAPSULE ORAL at 08:03

## 2020-03-11 RX ADMIN — PIPERACILLIN SODIUM AND TAZOBACTAM SODIUM 4.5 G: 4; .5 INJECTION, POWDER, LYOPHILIZED, FOR SOLUTION INTRAVENOUS at 11:03

## 2020-03-11 RX ADMIN — ALPRAZOLAM 0.5 MG: 0.25 TABLET ORAL at 12:03

## 2020-03-11 RX ADMIN — METOPROLOL TARTRATE 50 MG: 50 TABLET, FILM COATED ORAL at 12:03

## 2020-03-11 RX ADMIN — METOPROLOL TARTRATE 50 MG: 50 TABLET, FILM COATED ORAL at 09:03

## 2020-03-11 RX ADMIN — LEVETIRACETAM 500 MG: 500 TABLET, FILM COATED ORAL at 08:03

## 2020-03-11 RX ADMIN — METOPROLOL TARTRATE 50 MG: 50 TABLET, FILM COATED ORAL at 08:03

## 2020-03-11 RX ADMIN — METOPROLOL TARTRATE 50 MG: 50 TABLET, FILM COATED ORAL at 04:03

## 2020-03-11 RX ADMIN — PIPERACILLIN SODIUM AND TAZOBACTAM SODIUM 4.5 G: 4; .5 INJECTION, POWDER, LYOPHILIZED, FOR SOLUTION INTRAVENOUS at 02:03

## 2020-03-11 NOTE — NURSING TRANSFER
Nursing Transfer Note      3/11/2020     Transfer To: 515 From 262    Transfer via bed    Transfer with  to O2, cardiac monitoring    Transported by SOLO Kamara    Medicines sent: N/A    Chart send with patient: Yes    Notified: No answer. Left Message    Patient reassessed at: 3/11/2020, 1100    Upon arrival to floor: cardiac monitor applied, patient oriented to room, call bell in reach and bed in lowest position. Rn at bedside. All questions answered.
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
## 2020-03-11 NOTE — NURSING
Contacted [facility] about lack of morning labs. States he will wait for day shift. I made him aware that it could result in a delay of care. States he will look through chart to see.

## 2020-03-11 NOTE — NURSING
Pt. Arrived up to floor.  VSS. Pt updated on plan of care and oriented to floor.  Nurse with give Xanax  Upon pt request.  Pt on 3L of O2.

## 2020-03-11 NOTE — NURSING
Contacted [facility] about son's concern of patient receiving anticoagulants. OK to hold lovenox tonight.

## 2020-03-11 NOTE — PROGRESS NOTES
U Pulmonary/Critical Care Resident Progress Note    Primary Team: [facility] Internal Medicine Team A  Attending Physician: Juvencio Alamo MD    Subjective:      This morning awake and alert, interactive without any lapses, and she stated she is feeling fine. Discussed with me this morning she has been on these medications for a few months, not entirely sure if any of them are new to her or not but stated that over the past few weeks shes noted that she has become more somnolent at home. She was still smoking prior to arrival, wanting to quit now. She endorsed wearing oxygen continuously at all times at home as well. Discussed w/ her the concerns regarding her smoking + oxygen, she stated she is aware, she wants to quit.    Denied any fevers, chills, nausea, vomiting, chest pain, SOB, abdominal pain, dysuria, constipation, diarrhea.     Objective:     Last 24 Hour Vital Signs:  BP  Min: 104/73  Max: 145/94  Temp  Av.9 °F (36.6 °C)  Min: 97.6 °F (36.4 °C)  Max: 98.3 °F (36.8 °C)  Pulse  Av.5  Min: 66  Max: 105  Resp  Av  Min: 20  Max: 61  SpO2  Av.2 %  Min: 88 %  Max: 100 %  Weight  Av.4 kg (126 lb 8.7 oz)  Min: 57.4 kg (126 lb 8.7 oz)  Max: 57.4 kg (126 lb 8.7 oz)  I/O last 3 completed shifts:  In: 1460 [P.O.:1050; I.V.:10; IV Piggyback:400]  Out: 2000 [Urine:2000]    Physical Examination:  Gen: AOx3, NAD, comfortable appearing  HEENT: NCAT, PERRL, EOMI, MMM, JVP ~5cm, no thyromegaly, lymphadenopathy appreciated  CV: RRR, S1/S2 appreciated, no murmur gallop or rubs  Resp: CTA B/L, decreased breath sounds bilaterally, no increased WOB, no crackles, rhonchi appreciated  Abdomen: Soft, NT, ND, +BS  Ext: 2+ distal pulses, no edema appreciated  Skin: Warm, dry, no rashes appreciated  Psych: Good mood, Affect  Neuro: AAOx4, moving all extremities spontaneously, sensation to touch equal bilaterally, following multiple commands without difficulty        Laboratory:  Trended Lab Data:  Recent Labs   Lab  03/09/20  1405 03/10/20  0624   WBC 10.90 7.66   HGB 14.0 11.9*   HCT 45.1 37.0   * 130*       Recent Labs   Lab 03/09/20  1405 03/10/20  0624 03/10/20  1312   * 134* 134*   K 4.6 4.0 4.5   CL 82* 86* 85*   CO2 44* 41* 44*   BUN 5* 4* 4*   CREATININE 0.48* 0.6 0.7   * 104 101   CALCIUM 9.3 9.2 9.2       Recent Labs   Lab 03/09/20  1405 03/10/20  0624   PROT 8.4 6.8   ALBUMIN 4.3 3.2*   BILITOT 0.4 0.7   AST 44 27   ALT 45* 25   ALKPHOS 68 52*       Recent Labs   Lab 03/09/20  1405   INR 1.2       Cardiac:   Recent Labs   Lab 03/09/20  1405   TROPONINI <0.012       FLP: No results found for: CHOL, HDL, LDLCALC, TRIG, CHOLHDL  DM:   Lab Results   Component Value Date    CREATININE 0.7 03/10/2020     Thyroid:   Lab Results   Component Value Date    TSH 1.304 03/10/2020    FREET4 0.9 01/26/2016     Anemia:   Lab Results   Component Value Date    IRON 21 (L) 04/05/2019    TIBC 308 04/05/2019    FERRITIN 413 (H) 04/05/2019    RILVUYZS04 683 04/05/2019    FOLATE 19.4 04/05/2019     Urinalysis:   Lab Results   Component Value Date    LABURIN PROTEUS MIRABILIS  > 100,000 cfu/ml   (A) 11/29/2019    COLORU Yellow 03/09/2020    SPECGRAV 1.010 03/09/2020    NITRITE Negative 03/09/2020    KETONESU Negative 03/09/2020    UROBILINOGEN Negative 03/09/2020       Arterial Blood Gases:  Recent Labs   Lab 03/10/20  1612   PH 7.419   PCO2 66.5*   PO2 72*   HCO3 43.1*   POCSATURATED 94*   BE 19     Microbiology:  Microbiology Results (last 7 days)     Procedure Component Value Units Date/Time    Blood culture x two cultures. Draw prior to antibiotics. [[id number]] Collected:  03/09/20 1405    Order Status:  Completed Specimen:  Blood from Peripheral, Forearm, Right Updated:  03/10/20 231     Blood Culture, Routine No Growth to date      No Growth to date    Narrative:       Aerobic and anaerobic    Blood culture x two cultures. Draw prior to antibiotics. [[id number]] Collected:  03/09/20 1416    Order Status:  Completed  Specimen:  Blood from Peripheral, Forearm, Left Updated:  03/10/20 2312     Blood Culture, Routine No Growth to date      No Growth to date    Narrative:       Aerobic and anaerobic    Influenza A & B by Molecular [[id number]] Collected:  03/09/20 1420    Order Status:  Completed Specimen:  Nasopharyngeal Swab Updated:  03/09/20 1505     Influenza A, Molecular Negative     Influenza B, Molecular Negative     Flu A & B Source Nasal swab        EKG:  Rate 90s  Sinus Rhythm  Normal Axis  No significant St/T wave abnormalities    Radiology:  CT Head Without Contrast   Impression         1. There are chronic appearing ischemic changes in the deep white matter of both cerebral hemispheres. There is no evidence of an acute ischemic event.  2. There is no intracranial hemorrhage.  3. There are craniotomy changes on the left side of the skull. There is a healed fracture in the medial wall of the right orbit.  4. There is a 4 mm polyp versus mucous retention cyst in the right maxillary sinus.  All CT scans at this facility use dose modulation, iterative reconstruction, and/or weight base dosing when appropriate to reduce radiation dose when appropriate to reduce radiation dose to as low as reasonably achievable.         Current Medications:     Infusions:       Scheduled:   levETIRAcetam  500 mg Oral BID    metoprolol tartrate  50 mg Oral QID    oseltamivir  75 mg Oral BID    piperacillin-tazobactam (ZOSYN) IVPB  4.5 g Intravenous Q8H        PRN:  acetaminophen, albuterol-ipratropium, pneumoc 13-thom conj-dip cr(PF), sodium chloride 0.9%, sodium chloride 0.9%    Antibiotics:  Zosyn  Tamiflu    Assessment:     Elif Archibald is a 54 y.o. female with a PMHx of JENNIE/MDD/Schizophrenia/Bipolar, SDH s/p craniotomy, COPD on 3LNC at home, and reported cirrhosis?? Who presented with acute encephalopathy for 1 week, concerning for CO2 narcosis --- held her antipsychotics and given bipap with improvement of her mentation.     Plan:      Neuro/Psych:  #Acute Encephalopathy  #SDH s/p craniotomy in the past  - improved after stopping her sedating medications  - concerning for polypharmacy vs CO2 narcosis, but she is compensated well telling us this is more her chronic CO2 retention  - Not on bipap at night at home, would discontinue for now; monitor -- if she shows signs of needing it again, could add it back on.  - continue oxygen supplementation  - psych consulted by primary team for medication adjustment    #Seizure Disorder  - Continuing keppra  - No recent seizures per son at bedside     #MDD/JENNIE/Bipolar/Schizophrenia  - reported in her history  - on citalopram 20mg daily, escitalopram 20mg daily, amitriptyline 25mg nightly, and risperidone 0.5mg PO BID at home  Held medications yesterday afternoon/evening with improvement of her mentation  - Psych consulted for adjustment in medication due to sedation    Cardiovascular/Hemodynamics:  #Sepsis 2/2 PNA vs UTI  - afebrile, tachycardic>100, Tachypneic, Normal BP, Normal WBC initially  - CXR with ? Consolidation, recent flu so could be residual vs a pneumonia  - UA with concerns for urine source  - Lactate negative, flu negative  - Blood Culture NGTD  - Zosyn per primary    Respiratory:   #Acute on Chronic Hypoxic, Hypercapnic Respiratory Failure  #Tobacco Abuse  - Longstanding smoking hx, still current smoker, but patient wanting to quit  - On 3L NC continuous at home  No PFTs in our records, care everywhere; patient denies ever having a PFT performed  - Continue duonebs PRN, no wheezing on exam this morning  - Needs outpatient PFT, tobacco cessation; encouraged her to abstain and educated today     #Recently Influenza Positive  - per family she was flu positive recently?  - influenza negative while here  - tamiflu per primary    GI/FEN:  #Reported history of Cirrhosis??  - normal liver size on most recent US  - INR 1.2  - unsure if where that is from, would clarify    F: -440cc  E: None  N:  Regular    ID:   #Concerns for UTI  - UA with bacteria, WBC, LE  - Zosyn per primary    Renal:   #Baseline Creatinine  - Cr 0.6-0.7  - monitor    Heme/Onc:   #Normocytic Anemia  - Hgb 11.9, pending AM labs  - monitor no signs of bleeding    Endocrine:  #TSH 1.304    Prophylaxis:   VTE: Lovenox  Stress Ulcer: None     Lines/Drains:  PIV x2  Maher Cath    Thank you for allowing us to participate in the care of this patient, we will sign off at this time. Please feel free to contact us if you have any questions regarding this consult.    Jorje Lawton MD  [facility] -II  [facility] Pulmonary/Critical Care Service

## 2020-03-11 NOTE — PROGRESS NOTES
[facility] IM Resident HO-II Progress Note    Subjective:      Pt awake and alert this am and doing well. Pt states she has been feeling fine. She is hungry this morning and ready for breakfast. She does not remember a lot of what happened yesterday.      Objective:   Last 24 Hour Vital Signs:  BP  Min: 104/73  Max: 144/85  Temp  Av.7 °F (36.5 °C)  Min: 96.5 °F (35.8 °C)  Max: 98.3 °F (36.8 °C)  Pulse  Av  Min: 66  Max: 107  Resp  Av.4  Min: 20  Max: 59  SpO2  Av.1 %  Min: 88 %  Max: 100 %  Weight  Av.4 kg (126 lb 8.7 oz)  Min: 57.4 kg (126 lb 8.7 oz)  Max: 57.4 kg (126 lb 8.7 oz)  I/O last 3 completed shifts:  In: 1460 [P.O.:1050; I.V.:10; IV Piggyback:400]  Out: 2000 [Urine:2000]    Physical Examination:  Physical Exam   Constitutional: She is oriented to person, place, and time. She appears well-developed. No distress.   HENT:   Head: Normocephalic and atraumatic.   Eyes: Pupils are equal, round, and reactive to light. EOM are normal.   Neck: Normal range of motion. Neck supple.   Cardiovascular: Normal rate and regular rhythm.   Pulmonary/Chest: Effort normal. She has wheezes (mild end-expiratory wheezes throughout).   Abdominal: Soft. There is no tenderness.   Musculoskeletal: Normal range of motion. She exhibits no edema.   Neurological: She is alert and oriented to person, place, and time. No cranial nerve deficit or sensory deficit.   Skin: Skin is warm and dry.   Psychiatric: She has a normal mood and affect. Her behavior is normal.     Laboratory:  Laboratory Data Reviewed: yes  Pertinent Findings:  Recent Labs   Lab 20  1405 03/10/20  0624 03/10/20  1312   WBC 10.90 7.66  --    HGB 14.0 11.9*  --    HCT 45.1 37.0  --    * 130*  --    MCV 96 94  --    RDW 12.5 12.2  --    * 134* 134*   K 4.6 4.0 4.5   CL 82* 86* 85*   CO2 44* 41* 44*   BUN 5* 4* 4*   CREATININE 0.48* 0.6 0.7   * 104 101   PROT 8.4 6.8  --    ALBUMIN 4.3 3.2*  --    BILITOT 0.4 0.7  --    AST 44  27  --    ALKPHOS 68 52*  --    ALT 45* 25  --        Microbiology Data Reviewed: yes  Pertinent Findings:  Flu neg  Blood cx NGTD    Other Results:  Radiology Data Reviewed: yes  Pertinent Findings:  CT head:   1. There are chronic appearing ischemic changes in the deep white matter of both cerebral hemispheres. There is no evidence of an acute ischemic event.  2. There is no intracranial hemorrhage.  3. There are craniotomy changes on the left side of the skull. There is a healed fracture in the medial wall of the right orbit.  4. There is a 4 mm polyp versus mucous retention cyst in the right maxillary sinus.    CXR: There has been interval worsening of the appearance of the lungs. There are patchy areas of haziness scattered throughout both lungs.  This is consistent with the patient's history and characteristic of pneumonia.  If additional imaging evaluation is clinically indicated, I recommend consideration of a CT examination of the thorax with IV contrast..    Current Medications:     Infusions:       Scheduled:   enoxaparin  40 mg Subcutaneous Daily    levETIRAcetam  500 mg Oral BID    metoprolol tartrate  50 mg Oral QID    oseltamivir  75 mg Oral BID    piperacillin-tazobactam (ZOSYN) IVPB  4.5 g Intravenous Q8H        PRN:  acetaminophen, albuterol-ipratropium, pneumoc 13-thom conj-dip cr(PF), sodium chloride 0.9%, sodium chloride 0.9%    Antibiotics and Day Number of Therapy:  zosyn    Lines and Day Number of Therapy:  piv x2    Assessment:     Elif Archibald is a 54 y.o.female with  Patient Active Problem List    Diagnosis Date Noted    Pneumonia of both lungs due to infectious organism 03/10/2020    AMS (altered mental status)     UTI (urinary tract infection) 03/09/2020    Acute encephalopathy 03/09/2020    Pneumonia 03/09/2020    COPD (chronic obstructive pulmonary disease) 03/09/2020    Hypokalemia due to inadequate potassium intake 05/14/2019    Moderate malnutrition 05/13/2019     Severe sepsis with acute organ dysfunction due to gram-negative bacteria 05/12/2019    Chronic respiratory failure 04/05/2019    Chronic obstructive pulmonary disease with acute lower respiratory infection 04/05/2019    Hx of subdural hematoma 04/05/2019    Recurrent major depressive disorder, in partial remission 04/05/2019    Essential hypertension 04/05/2019    Alcoholic cirrhosis of liver without ascites 04/05/2019    Sepsis 04/05/2019    Hypoalbuminemia due to protein-calorie malnutrition 04/05/2019    Normocytic anemia 04/05/2019    Debility 04/05/2019    Functional quadriplegia 04/05/2019    Lung nodules 04/05/2019        Plan:     Sepsis 2/2 PNA vs UTI   - afebrile, tachy to 107, RR mid 20s, BP normotensive  - CXR with questionable consolidation of the R lower lobe, not seen on repeat CXR, no definitive consolidation noted   - lactate 0.9, flu negative   - UA: - nitrite, 2+ LE, 10 WBCs, many bacteria; urine culture never sent  - received 1 dose of zosyn in the ED,  currently day 3    - blood cultures NGTD    Acute Encephalopathy   - pt back to baseline when arriving to [facility]    - CT head unremarkable   - electrolytes WNL   - UDS negative   - TSH, EtOH WNL   - pt with waxing and waning mentation, likely secondary to psych medications, will hold meds and consult psychiatry      COPD  - on 3 L home O2  - not on any controller inhalers, only has PRN albuterol   - pt thought to be in CO2 narcosis yesterday, but pt did not require continuous bipap and was alert and awake throughout ICU stay      ? Anxiety/MDD/Bipolar/Schizophrenia   - no active issues  - pt with prescribed elavil, Celexa, Risperdal, and lexapro (held initially), after given yesterday pt heavily sedated  - will hold all medications this am and consult psych for medication recommendations      SDH s/p craniotomy   - occurred in 1/2019, PEG and trach have been removed  - pt has baseline L hand tremor since this   - on home gabapentin,  will hold at this time due to concern for sedation     Seizure disorder  - cont home keppa      Tachycardia   - cont home lopressor     DVT ppx: SCDs, no meds due to history of brain bleed   Diet: regular     Dispo: step down to floor, pending psych consult        Ramon Rosado MD   [facility]/Pediatrics HO-II    [facility] Medicine Hospitalist Pager numbers:   [facility] Hospitalist Medicine Team A (Cally/Wai): [phone]

## 2020-03-11 NOTE — PLAN OF CARE
Patient remained in bed for the shift, bed in lowest position, bed alarm on, call bell within reach and wheels locked. Plan of care reviewed with patient and family, verbalized understanding. VS remained stable for the shift  Patient remained on NC with Bipap at night. NST on telemetry. No complaints of pain. Neurochecks remained consistent.  Will report to oncoming shift

## 2020-03-11 NOTE — PROGRESS NOTES
"Team notified pt feeling SOB. All VSS. Pt on 3L NC. Pt states " im scared I wont be able to breathe again" felling anxious. Team aware. Will place new orders.   "

## 2020-03-12 ENCOUNTER — TELEPHONE (OUTPATIENT)
Dept: PHARMACY | Facility: CLINIC | Age: 55
End: 2020-03-12

## 2020-03-12 VITALS
HEART RATE: 81 BPM | HEIGHT: 62 IN | OXYGEN SATURATION: 98 % | DIASTOLIC BLOOD PRESSURE: 88 MMHG | SYSTOLIC BLOOD PRESSURE: 141 MMHG | BODY MASS INDEX: 24.18 KG/M2 | RESPIRATION RATE: 18 BRPM | TEMPERATURE: 98 F | WEIGHT: 131.38 LBS

## 2020-03-12 PROBLEM — A41.9 SEPSIS: Status: RESOLVED | Noted: 2019-04-05 | Resolved: 2020-03-12

## 2020-03-12 PROBLEM — J18.9 PNEUMONIA OF BOTH LUNGS DUE TO INFECTIOUS ORGANISM: Status: RESOLVED | Noted: 2020-03-10 | Resolved: 2020-03-12

## 2020-03-12 PROBLEM — J18.9 PNEUMONIA: Status: RESOLVED | Noted: 2020-03-09 | Resolved: 2020-03-12

## 2020-03-12 PROBLEM — G93.40 ACUTE ENCEPHALOPATHY: Status: RESOLVED | Noted: 2020-03-09 | Resolved: 2020-03-12

## 2020-03-12 PROBLEM — N39.0 UTI (URINARY TRACT INFECTION): Status: RESOLVED | Noted: 2020-03-09 | Resolved: 2020-03-12

## 2020-03-12 LAB
ALBUMIN SERPL BCP-MCNC: 3.4 G/DL (ref 3.5–5.2)
ALP SERPL-CCNC: 51 U/L (ref 55–135)
ALT SERPL W/O P-5'-P-CCNC: 28 U/L (ref 10–44)
ANION GAP SERPL CALC-SCNC: 7 MMOL/L (ref 8–16)
AST SERPL-CCNC: 31 U/L (ref 10–40)
BASOPHILS # BLD AUTO: 0.03 K/UL (ref 0–0.2)
BASOPHILS NFR BLD: 0.5 % (ref 0–1.9)
BILIRUB SERPL-MCNC: 0.4 MG/DL (ref 0.1–1)
BUN SERPL-MCNC: 6 MG/DL (ref 6–20)
CALCIUM SERPL-MCNC: 9.8 MG/DL (ref 8.7–10.5)
CHLORIDE SERPL-SCNC: 86 MMOL/L (ref 95–110)
CO2 SERPL-SCNC: 39 MMOL/L (ref 23–29)
CREAT SERPL-MCNC: 0.6 MG/DL (ref 0.5–1.4)
DIFFERENTIAL METHOD: ABNORMAL
EOSINOPHIL # BLD AUTO: 0.3 K/UL (ref 0–0.5)
EOSINOPHIL NFR BLD: 4.5 % (ref 0–8)
ERYTHROCYTE [DISTWIDTH] IN BLOOD BY AUTOMATED COUNT: 12.1 % (ref 11.5–14.5)
EST. GFR  (AFRICAN AMERICAN): >60 ML/MIN/1.73 M^2
EST. GFR  (NON AFRICAN AMERICAN): >60 ML/MIN/1.73 M^2
GLUCOSE SERPL-MCNC: 88 MG/DL (ref 70–110)
HCT VFR BLD AUTO: 38.7 % (ref 37–48.5)
HGB BLD-MCNC: 12.7 G/DL (ref 12–16)
IMM GRANULOCYTES # BLD AUTO: 0.05 K/UL (ref 0–0.04)
IMM GRANULOCYTES NFR BLD AUTO: 0.8 % (ref 0–0.5)
LYMPHOCYTES # BLD AUTO: 1.5 K/UL (ref 1–4.8)
LYMPHOCYTES NFR BLD: 24.5 % (ref 18–48)
MAGNESIUM SERPL-MCNC: 1.6 MG/DL (ref 1.6–2.6)
MCH RBC QN AUTO: 29.6 PG (ref 27–31)
MCHC RBC AUTO-ENTMCNC: 32.8 G/DL (ref 32–36)
MCV RBC AUTO: 90 FL (ref 82–98)
MONOCYTES # BLD AUTO: 0.6 K/UL (ref 0.3–1)
MONOCYTES NFR BLD: 8.7 % (ref 4–15)
NEUTROPHILS # BLD AUTO: 3.8 K/UL (ref 1.8–7.7)
NEUTROPHILS NFR BLD: 61 % (ref 38–73)
NRBC BLD-RTO: 0 /100 WBC
PHOSPHATE SERPL-MCNC: 3.7 MG/DL (ref 2.7–4.5)
PLATELET # BLD AUTO: 178 K/UL (ref 150–350)
PMV BLD AUTO: 8.5 FL (ref 9.2–12.9)
POTASSIUM SERPL-SCNC: 4.3 MMOL/L (ref 3.5–5.1)
PROT SERPL-MCNC: 7.8 G/DL (ref 6–8.4)
RBC # BLD AUTO: 4.29 M/UL (ref 4–5.4)
SODIUM SERPL-SCNC: 132 MMOL/L (ref 136–145)
WBC # BLD AUTO: 6.29 K/UL (ref 3.9–12.7)

## 2020-03-12 PROCEDURE — 83735 ASSAY OF MAGNESIUM: CPT

## 2020-03-12 PROCEDURE — 25000003 PHARM REV CODE 250: Performed by: STUDENT IN AN ORGANIZED HEALTH CARE EDUCATION/TRAINING PROGRAM

## 2020-03-12 PROCEDURE — 25000242 PHARM REV CODE 250 ALT 637 W/ HCPCS: Performed by: STUDENT IN AN ORGANIZED HEALTH CARE EDUCATION/TRAINING PROGRAM

## 2020-03-12 PROCEDURE — 84100 ASSAY OF PHOSPHORUS: CPT

## 2020-03-12 PROCEDURE — 36415 COLL VENOUS BLD VENIPUNCTURE: CPT

## 2020-03-12 PROCEDURE — 63600175 PHARM REV CODE 636 W HCPCS: Performed by: STUDENT IN AN ORGANIZED HEALTH CARE EDUCATION/TRAINING PROGRAM

## 2020-03-12 PROCEDURE — 94640 AIRWAY INHALATION TREATMENT: CPT

## 2020-03-12 PROCEDURE — 99900035 HC TECH TIME PER 15 MIN (STAT)

## 2020-03-12 PROCEDURE — 85025 COMPLETE CBC W/AUTO DIFF WBC: CPT

## 2020-03-12 PROCEDURE — 25000003 PHARM REV CODE 250: Performed by: PSYCHIATRY & NEUROLOGY

## 2020-03-12 PROCEDURE — 90471 IMMUNIZATION ADMIN: CPT | Performed by: STUDENT IN AN ORGANIZED HEALTH CARE EDUCATION/TRAINING PROGRAM

## 2020-03-12 PROCEDURE — 80053 COMPREHEN METABOLIC PANEL: CPT

## 2020-03-12 PROCEDURE — 27000221 HC OXYGEN, UP TO 24 HOURS

## 2020-03-12 PROCEDURE — 94761 N-INVAS EAR/PLS OXIMETRY MLT: CPT

## 2020-03-12 PROCEDURE — 90670 PCV13 VACCINE IM: CPT | Performed by: STUDENT IN AN ORGANIZED HEALTH CARE EDUCATION/TRAINING PROGRAM

## 2020-03-12 RX ORDER — RISPERIDONE 0.25 MG/1
0.25 TABLET ORAL 2 TIMES DAILY
Qty: 60 TABLET | Refills: 11 | Status: SHIPPED | OUTPATIENT
Start: 2020-03-12 | End: 2021-12-06 | Stop reason: SDUPTHER

## 2020-03-12 RX ORDER — ESCITALOPRAM OXALATE 10 MG/1
10 TABLET ORAL DAILY
Qty: 30 TABLET | Refills: 11 | Status: SHIPPED | OUTPATIENT
Start: 2020-03-12 | End: 2021-04-21

## 2020-03-12 RX ORDER — LEVOFLOXACIN 500 MG/1
500 TABLET, FILM COATED ORAL DAILY
Qty: 4 TABLET | Refills: 0 | Status: SHIPPED | OUTPATIENT
Start: 2020-03-12 | End: 2020-03-12 | Stop reason: SDUPTHER

## 2020-03-12 RX ORDER — FLUTICASONE FUROATE AND VILANTEROL 200; 25 UG/1; UG/1
1 POWDER RESPIRATORY (INHALATION) DAILY
Qty: 60 EACH | Refills: 2 | Status: SHIPPED | OUTPATIENT
Start: 2020-03-12 | End: 2020-12-11 | Stop reason: ALTCHOICE

## 2020-03-12 RX ORDER — OSELTAMIVIR PHOSPHATE 75 MG/1
75 CAPSULE ORAL 2 TIMES DAILY
Qty: 6 CAPSULE | Refills: 0 | Status: SHIPPED | OUTPATIENT
Start: 2020-03-12 | End: 2020-03-15

## 2020-03-12 RX ORDER — AMITRIPTYLINE HYDROCHLORIDE 25 MG/1
25 TABLET, FILM COATED ORAL NIGHTLY
Qty: 30 TABLET | Refills: 11 | Status: SHIPPED | OUTPATIENT
Start: 2020-03-12 | End: 2020-03-12 | Stop reason: HOSPADM

## 2020-03-12 RX ORDER — LEVOFLOXACIN 750 MG/1
750 TABLET ORAL DAILY
Qty: 4 TABLET | Refills: 0 | Status: SHIPPED | OUTPATIENT
Start: 2020-03-12 | End: 2020-03-16

## 2020-03-12 RX ADMIN — PNEUMOCOCCAL 13-VALENT CONJUGATE VACCINE 0.5 ML: 2.2; 2.2; 2.2; 2.2; 2.2; 4.4; 2.2; 2.2; 2.2; 2.2; 2.2; 2.2; 2.2 INJECTION, SUSPENSION INTRAMUSCULAR at 02:03

## 2020-03-12 RX ADMIN — OSELTAMIVIR PHOSPHATE 75 MG: 75 CAPSULE ORAL at 08:03

## 2020-03-12 RX ADMIN — LEVETIRACETAM 500 MG: 500 TABLET, FILM COATED ORAL at 08:03

## 2020-03-12 RX ADMIN — ESCITALOPRAM OXALATE 10 MG: 10 TABLET ORAL at 08:03

## 2020-03-12 RX ADMIN — RISPERIDONE 0.25 MG: 0.25 TABLET ORAL at 08:03

## 2020-03-12 RX ADMIN — IPRATROPIUM BROMIDE AND ALBUTEROL SULFATE 3 ML: .5; 3 SOLUTION RESPIRATORY (INHALATION) at 03:03

## 2020-03-12 RX ADMIN — METOPROLOL TARTRATE 50 MG: 50 TABLET, FILM COATED ORAL at 08:03

## 2020-03-12 RX ADMIN — PIPERACILLIN SODIUM AND TAZOBACTAM SODIUM 4.5 G: 4; .5 INJECTION, POWDER, LYOPHILIZED, FOR SOLUTION INTRAVENOUS at 08:03

## 2020-03-12 NOTE — PROGRESS NOTES
Patient AAOx4, VSS, Patient c/o of SOB, PRN breathing treatment given by respiratory. Patient remains on 2L NC. Son at bedside with personal O2 tank. Prescriptions delivered at bedside. Preparing for discharge

## 2020-03-12 NOTE — PLAN OF CARE
Pt AAO x4.  No complaints of any pain.  Safety maintained. Droplet precautions maintained.  IV antibiotics infusing per order.

## 2020-03-12 NOTE — PHYSICIAN QUERY
PT Name: Elif Archibald  MR #: [id number]     CDS: Ana María Deleon RN, CCDS         Contact information :ext [phone]  [email or web address]     This form is a permanent document in the medical record.     Query Date: March 12, 2020    Physician Query - Neurological Condition Clarification    By submitting this query, we are merely seeking further clarification of documentation to reflect the severity of illness of your patient. Please utilize your independent clinical judgment when addressing the question(s) below.    The Medical record reflects the following:     Indicators   Supporting Clinical Findings Location in Medical Record   x AMS, Confusion,  LOC, etc.  altered mental status since this morning  Positive for confusion and decreased concentration    around 11 AM when her son noticed that she became confused, was weaker than normal, and was not speaking. When she arrived to  ED she was oriented to place but not time or person. She received 1L bolus NS, zosyn, and duonebs. When she arrived to [facility], per the son, she was back to her baseline     Patient is still confused, forgetful and does not know the reason for admission.      ED provider note 3/9/20        H&P 3/9/20                        Psych consult 3/11/20         x Acute / Chronic Illness Sepsis 2/2 PNA vs UTI   Acute Encephalopathy   COPD  ? Anxiety/MDD/Bipolar/Schizophrenia  H&P 3/9/20   x Radiology Findings CT head unremarkable  H&P 3/9/20    Electrolyte Imbalance      Medication     x Treatment         She received 1L bolus NS, zosyn, and duonebs  on 3 L home O2  4L/NC  Bipap H&P 3/9/20      VS 3/10/20  VS 3/10/20     x   Other   Acute encephalopathic likely due to sepsis , chronic CO2 retention, psyc meds - now resolved with MS at baseline. Psyc med dosing adjusted as per psyc.       Internal Medicine Progress note 3/12/20     Encephalopathy- is a general term for any diffuse disease of the brain that alters brain function or  structure. Treatment of the cognitive dysfunction varies but is ultimately dependent on the treatment of the underlying condition.  Major Symptoms of Encephalopathy - Decreased level of consciousness, fluctuating alertness/concentration, confusion, agitation, lethargy, somnolence, drowsiness, obtundation, stupor, or coma.         References: National Institutes of Healths (NIH) National Diamond of Neurological Disorders and Strokes;  HCPro 2016; Advisory Board   Clinical Guidelines:   These guidelines will set system standards to assist providers in managing, documentation, and coding of encephalopathy. The intent of this document is to serve as a system guideline, not replace the providers clinical judgment:    Provider, please specify the diagnosis or diagnoses associated with above clinical findings.  Please clarify Acute Encephalopathy   [   ] Metabolic Encephalopathy   [   ] Toxic Encephalopathy - please specify drug/substnace, _________   [   ] Anoxic/Hypoxic Encephalopathy   [ X  ] Other Encephalopathy - please specify, _likely sepsis induced from UTI______   [   ] Unspecified Encephalopathy      [   ] Encephalopathy ruled out    [   ] Other Neurological Condition- (please specify condition): _________   [   ]  Clinically Undetermined     Please document in your progress notes daily for the duration of treatment until resolved, and include in your discharge summary.

## 2020-03-12 NOTE — PLAN OF CARE
Pt AAOx3. Droplet precautions maintained. Speech delayed. Medications administered per order. Pt denies any pain, discomfort, or nausea. NC and cardiac monitor maintained. Purewick maintained. Encouraged to call with questions, concerns, assistance. Will continue to monitor. Safety maintained.

## 2020-03-12 NOTE — PROGRESS NOTES
[facility] IM Resident HO-II Progress Note    Subjective:      Pt awake and alert this am and doing well. She states she is having some lower back pain from laying in her bed. She states that she might have some burning with urination but she can't really tell. No fevers, chills, night sweats.      Objective:   Last 24 Hour Vital Signs:  BP  Min: 124/85  Max: 162/85  Temp  Av.3 °F (36.8 °C)  Min: 97.7 °F (36.5 °C)  Max: 98.7 °F (37.1 °C)  Pulse  Av.4  Min: 67  Max: 91  Resp  Av.8  Min: 17  Max: 22  SpO2  Av.6 %  Min: 95 %  Max: 97 %  Weight  Av.6 kg (131 lb 6.3 oz)  Min: 59.6 kg (131 lb 6.3 oz)  Max: 59.6 kg (131 lb 6.3 oz)  I/O last 3 completed shifts:  In: 1135 [P.O.:735; IV Piggyback:400]  Out: 2650 [Urine:2650]    Physical Examination:  Physical Exam   Constitutional: She is oriented to person, place, and time. She appears well-developed. No distress.   HENT:   Head: Normocephalic and atraumatic.   Eyes: Pupils are equal, round, and reactive to light. EOM are normal.   Neck: Normal range of motion. Neck supple.   Cardiovascular: Normal rate and regular rhythm.   Pulmonary/Chest: Effort normal.   Abdominal: Soft. There is no tenderness.   Musculoskeletal: Normal range of motion. She exhibits no edema. Tender to palpation across lower back, both sides below flank.    Neurological: She is alert and oriented to person, place, and time. No cranial nerve deficit or sensory deficit.   Skin: Skin is warm and dry.   Psychiatric: She has a normal mood and affect. Her behavior is normal.     Laboratory:  Laboratory Data Reviewed: yes  Pertinent Findings:  Recent Labs   Lab 20  1405 03/10/20  0624 03/10/20  1312 20  0914   WBC 10.90 7.66  --  6.27   HGB 14.0 11.9*  --  12.6   HCT 45.1 37.0  --  39.2   * 130*  --  141*   MCV 96 94  --  93   RDW 12.5 12.2  --  12.3   * 134* 134* 133*   K 4.6 4.0 4.5 4.1   CL 82* 86* 85* 86*   CO2 44* 41* 44* 41*   BUN 5* 4* 4* 9   CREATININE 0.48*  0.6 0.7 0.7   * 104 101 123*   PROT 8.4 6.8  --  7.6   ALBUMIN 4.3 3.2*  --  3.4*   BILITOT 0.4 0.7  --  0.5   AST 44 27  --  25   ALKPHOS 68 52*  --  54*   ALT 45* 25  --  26       Microbiology Data Reviewed: yes  Pertinent Findings:  Flu neg  Blood cx NGTD    Other Results:  Radiology Data Reviewed: yes  Pertinent Findings:  CT head:   1. There are chronic appearing ischemic changes in the deep white matter of both cerebral hemispheres. There is no evidence of an acute ischemic event.  2. There is no intracranial hemorrhage.  3. There are craniotomy changes on the left side of the skull. There is a healed fracture in the medial wall of the right orbit.  4. There is a 4 mm polyp versus mucous retention cyst in the right maxillary sinus.    CXR: There has been interval worsening of the appearance of the lungs. There are patchy areas of haziness scattered throughout both lungs.  This is consistent with the patient's history and characteristic of pneumonia.  If additional imaging evaluation is clinically indicated, I recommend consideration of a CT examination of the thorax with IV contrast..    Current Medications:     Infusions:       Scheduled:   escitalopram oxalate  10 mg Oral Daily    levETIRAcetam  500 mg Oral BID    metoprolol tartrate  50 mg Oral QID    oseltamivir  75 mg Oral BID    piperacillin-tazobactam (ZOSYN) IVPB  4.5 g Intravenous Q8H    risperiDONE  0.25 mg Oral BID        PRN:  acetaminophen, albuterol-ipratropium, ALPRAZolam, pneumoc 13-thom conj-dip cr(PF), sodium chloride 0.9%, sodium chloride 0.9%    Antibiotics and Day Number of Therapy:  zosyn    Lines and Day Number of Therapy:  piv x2    Assessment:     Elif Archibald is a 54 y.o.female with  Patient Active Problem List    Diagnosis Date Noted    Pneumonia of both lungs due to infectious organism 03/10/2020    AMS (altered mental status)     UTI (urinary tract infection) 03/09/2020    Acute encephalopathy 03/09/2020     Pneumonia 03/09/2020    COPD (chronic obstructive pulmonary disease) 03/09/2020    Hypokalemia due to inadequate potassium intake 05/14/2019    Moderate malnutrition 05/13/2019    Severe sepsis with acute organ dysfunction due to gram-negative bacteria 05/12/2019    Chronic respiratory failure 04/05/2019    Chronic obstructive pulmonary disease with acute lower respiratory infection 04/05/2019    Hx of subdural hematoma 04/05/2019    Recurrent major depressive disorder, in partial remission 04/05/2019    Essential hypertension 04/05/2019    Alcoholic cirrhosis of liver without ascites 04/05/2019    Sepsis 04/05/2019    Hypoalbuminemia due to protein-calorie malnutrition 04/05/2019    Normocytic anemia 04/05/2019    Debility 04/05/2019    Functional quadriplegia 04/05/2019    Lung nodules 04/05/2019        Plan:     Sepsis 2/2 PNA vs UTI   - afebrile, tachy to 107, RR mid 20s, BP normotensive  - CXR with questionable consolidation of the R lower lobe, not seen on repeat CXR, no definitive consolidation noted   - lactate 0.9, flu negative   - UA: - nitrite, 2+ LE, 10 WBCs, many bacteria; urine culture never sent  - received 1 dose of zosyn in the ED,  currently day 4    - blood cultures NGTD    Acute Encephalopathy   - pt back to baseline when arriving to [place]    - CT head unremarkable   - electrolytes WNL   - UDS negative   - TSH, EtOH WNL   - pt with waxing and waning mentation, likely secondary to psych medications  - psych consulted and meds adjusted to: escitalopram 10mg, risperdal .25mg      COPD  - on 3 L home O2  - not on any controller inhalers, only has PRN albuterol   - pt thought to be in CO2 narcosis, but pt did not require continuous bipap and was alert and awake throughout ICU stay, stepped down 3/11     ? Anxiety/MDD/Bipolar/Schizophrenia   - no active issues  - pt with prescribed elavil, Celexa, Risperdal, and lexapro (held initially), after given pt heavily sedated  - consulted  psych for medication management   - meds changed to lexapro 10mg daily and risperdal .25mg BID      SDH s/p craniotomy   - occurred in 1/2019, PEG and trach have been removed  - pt has baseline L hand tremor since this   - on home gabapentin, will hold at this time due to concern for sedation     Seizure disorder  - cont home keppa       Tachycardia   - cont home lopressor     DVT ppx: SCDs, no meds due to history of brain bleed   Diet: regular     Dispo: pt doing better, continuing to monitor for mental status improvement        Ramon Rosado MD   [facility] Internal Medicine/Pediatrics HO-II    [facility] Medicine Hospitalist Pager numbers:   [facility] Hospitalist Medicine Team A (Cally/Wai): [phone]

## 2020-03-12 NOTE — PLAN OF CARE
TN met with pt prior to d/c   TN called and spoke with pt's caregiver/daughter Roseline   [phone]   pt's son Vito will pick pt up -- he has pt's O2 travel tanks with him --- Ms. Dukes advised that pt must have O2 for transport to home.       f/u apts made for pt:      TN spoke with MD -- he will order OP PT for this pt.        Follow-up With  Details  Why  Contact Info   Zeeshan Bose MD  On 3/27/2020  8:45 am you are on a waitlist for a sooner apt -- you will see Ms. Bobbi NP fax    [address] [place] [id number]  [phone]     On 4/21/2020  9:00 am - you are on a waitlist for a sooner apt --- [facility] --- KAREN Gotti - Psychiatry  fax # [phone]        d/c summ to be faxed to  and           03/12/20 1404   Final Note   Assessment Type Final Discharge Note   Anticipated Discharge Disposition Home   What phone number can be called within the next 1-3 days to see how you are doing after discharge? [phone]   Hospital Follow Up  Appt(s) scheduled? Yes   Discharge plans and expectations educations in teach back method with documentation complete? Yes   Right Care Referral Info   Post Acute Recommendation IRF   Referral Type   (no care )

## 2020-03-12 NOTE — PHYSICIAN QUERY
PT Name: Elif Archibald  MR #: [id number]     Physician Query Form - Documentation Clarification      CDS: Ana María Deleon RN, CCDS         Contact information :ext [phone])  [email or web address]       This form is a permanent document in the medical record.     Query Date: March 12, 2020    By submitting this query, we are merely seeking further clarification of documentation. Please utilize your independent clinical judgment when addressing the question(s) below.    The Medical Record reflects the following:    Clinical Findings Location in Medical Record     Acute on Chronic Hypoxic, Hypercapnic  Respiratory Failure    On 3L NC continuous at home  No PFTs in our records, care everywhere  - Continue duonebs PRN, does not have any wheezing on exam and she is moving decent air  - Can consider treating for a COPD exacerbation if no improvement  - needs outpatient PFTs, tobacco cessation     Chronic respiratory failure 4/5/2019     She received 1L bolus NS, zosyn, and duonebs  on 3 L home O2    4L/NC  Bipap    PCO2 73.9  PO2 80  POC Sat O2 95%    PCO2 66.5  PO2 72  POC Sat O2 94%    COPD - Continue home inhaler regimen (add controllers) and oxygen. S/P BIPAP yesterday. Chronic CO2 retention.   pt thought to be in CO2 narcosis, but pt did not require continuous bipap and was alert and awake throughout ICU stay, stepped down 3/11   Pulmonology Consult 3/10/20      Pulmonology Consult 3/10/20                H&P 3/9/20    H&P 3/9/20      VS record 3/10/20      ABG report 3/10/20        ABG report 3/10/20        Internal Medicine PN 3/12/20                                                                                Please clarify the diagnosis of  Acute on Chronic Hypoxic, Hypercapnic Respiratory Failure      Provider Use Only      [   ] Acute on Chronic Hypoxic, Hypercapnic Respiratory Failure  ruled in and additional clinical support/ decision making indicators for the diagnosis include  (specify):_________________________________    [  X ] Acute on Chronic Hypoxic, Hypercapnic Respiratory Failure   has been ruled out, Chronic Hypoxic, Hypercapnic respiratory failure ruled in     [   ] Acute on Chronic Hypoxic, Hypercapnic Respiratory Failure has been ruled out, other diagnosis ruled in (specify):___________    [   ] Other clarification (specify): ______________    [  ] Clinically undetermined                                                                                                                         Present on admission (POA) status:   [ X  ] Yes (Y)                          [  ] Clinically Undetermined (W)  [   ] No (N)                            [   ] Documentation insufficient to determine if condition is POA (U)

## 2020-03-13 NOTE — DISCHARGE SUMMARY
[facility] Hospital Medicine Discharge Summary    Primary Team: [facility] Hospitalist Team A  Attending Physician: Juvencio Alamo MD   Resident: Dr. Rosado  Intern: Dr. Guajardo    Date of Admit: 3/9/2020  Date of Discharge: 3/12/2020    Discharge to: Home   Condition: Stable     Discharge Diagnoses     Patient Active Problem List   Diagnosis    Chronic respiratory failure    Chronic obstructive pulmonary disease with acute lower respiratory infection    Hx of subdural hematoma    Recurrent major depressive disorder, in partial remission    Essential hypertension    Alcoholic cirrhosis of liver without ascites    Hypoalbuminemia due to protein-calorie malnutrition    Normocytic anemia    Debility    Functional quadriplegia    Lung nodules    Severe sepsis with acute organ dysfunction due to gram-negative bacteria    Moderate malnutrition    Hypokalemia due to inadequate potassium intake    COPD (chronic obstructive pulmonary disease)       Consultants and Procedures     Consultants:  Pulmonology   Psychiatry     Procedures:   None     Imaging:  CXR 3/10/20  Prominent interstitial attenuation with similar bibasilar density.  No large pleural effusion or pneumothorax.  Cardiomediastinal silhouette is within normal limits.  Remote left-sided rib fractures.    CT head 3/9/20  1. There are chronic appearing ischemic changes in the deep white matter of both cerebral hemispheres. There is no evidence of an acute ischemic event.  2. There is no intracranial hemorrhage.  3. There are craniotomy changes on the left side of the skull. There is a healed fracture in the medial wall of the right orbit.  4. There is a 4 mm polyp versus mucous retention cyst in the right maxillary sinus.  All CT scans at this facility use dose modulation, iterative reconstruction, and/or weight base dosing when appropriate to reduce radiation dose when appropriate to reduce radiation dose to as low as reasonably achievable.    Brief History of  Present Illness      Elif Archibald is a 54 y.o. female who  has a past medical history of Anxiety, Bipolar 1 disorder, Schizophrenia, COPD (chronic obstructive pulmonary disease), Seizures, Subdural hematoma. The patient presented to [facility] on 3/9/2020 with a primary complaint of Altered Mental Status (Pt presents to the ED with c/o altered mental status. Per son pt has a hx of brain aneurysms. Son states that around 11am pt began to seem confused and had generalized weakness. Son states that pt is usually able to walk and talk. Pt oriented to place but not to person or time. )     The patient was in their usual state of health(baseline weakness R>L, 3L NC home oxygen) until around 11 AM day of admission when her son noticed that she became confused, was weaker than normal, and was not speaking. When she arrived to  ED she was oriented to place but not time or person. She received 1L bolus NS, zosyn, and duonebs. When she arrived to [facility], per the son, she was back to her baseline. She is AAOx4. She is answering in complete sentences and states her weakness is how it usually is. She denies cough, SOB, abd pain, n/v, fever, CP, dysuria, urinary frequency.     For the full HPI please refer to the History & Physical from this admission.    Hospital Course By Problem with Pertinent Findings     Sepsis 2/2 PNA vs UTI   - afebrile, tachy to 107, RR mid 20s, BP normotensive  - CXR with questionable consolidation of the R lower lobe, not seen on repeat CXR, no definitive consolidation noted   - lactate 0.9, flu negative   - UA: - nitrite, 2+ LE, 10 WBCs, many bacteria; urine culture never sent  - received 1 dose of zosyn in the ED,  currently day 4    - blood cultures NGTD   - will continue antibiotic treatment with 4 more days of levaquin at home      Acute Encephalopathy secondary to likely sepsis from UTI vs oversedation from medications  - pt back to baseline when arriving to [place]     - CT head unremarkable   - electrolytes WNL   - UDS negative   - TSH, EtOH WNL   - pt with waxing and waning mentation, likely secondary to psych medications  - psych consulted and meds adjusted to: escitalopram 10mg, risperdal .25mg      COPD with chronic hypercarbnic, hypoxic resp failure on home O2   - on 3 L home O2  - not on any controller inhalers, only has PRN albuterol   - pt thought to be in CO2 narcosis, but pt did not require continuous bipap and was alert and awake throughout ICU stay, stepped down 3/11   - start symbicort and breo controller inhalers at discharge  - continue albuterol nebs as needed for SOB      Anxiety/MDD/Bipolar/Schizophrenia   - no active issues  - pt with prescribed elavil, Celexa, Risperdal, and lexapro (held initially), after given pt heavily sedated  - consulted psych for medication management   - meds changed to lexapro 10mg daily and risperdal .25mg BID      SDH s/p craniotomy   - occurred in 1/2019, PEG and trach have been removed  - pt has baseline L hand tremor since this   - on home gabapentin, will hold at this time due to concern for sedation  - discontinue gabapentin at discharge as likely contributing to patients sedation and AMS     Seizure disorder  - cont home keppra       Tachycardia   - cont home lopressor     Discharge Medications      Elif Archibald   Home Medication Instructions LEISA:[id number]    Printed on:03/12/20 2009   Medication Information                      albuterol (PROVENTIL/VENTOLIN HFA) 90 mcg/actuation inhaler  Inhale 1 puff into the lungs every 6 (six) hours as needed for Wheezing. Rescue              budesonide-formoterol 160-4.5 mcg (SYMBICORT) 160-4.5 mcg/actuation HFAA  Inhale into the lungs 1 puff once daily             escitalopram oxalate (LEXAPRO) 10 MG tablet  Take 1 tablet (10 mg total) by mouth once daily.             fluticasone furoate-vilanteroL (BREO) 200-25 mcg/dose DsDv diskus inhaler  Inhale 1 puff into the lungs once  daily. Controller             levETIRAcetam (KEPPRA) 500 MG Tab  Take 500 mg by mouth 2 (two) times daily.             levoFLOXacin (LEVAQUIN) 750 MG tablet  Take 1 tablet (750 mg total) by mouth once daily. for 4 days             metoprolol tartrate (LOPRESSOR) 50 MG tablet  Take 50 mg by mouth 4 (four) times daily.              miscellaneous medical supply (C-TUB) Misc  Rolling walker             multivitamin Chew  Take by mouth once daily.             oseltamivir (TAMIFLU) 75 MG capsule  Take 1 capsule (75 mg total) by mouth 2 (two) times daily. for 3 days             polyethylene glycol (GLYCOLAX) 17 gram PwPk  Take 17 g by mouth daily as needed.              risperiDONE (RISPERDAL) 0.25 MG Tab  Take 1 tablet (0.25 mg total) by mouth 2 (two) times daily.                 Discharge Information:   Diet:  Regular     Physical Activity:  As tolerated              Instructions:  1. Take all medications as prescribed  2. Keep all follow-up appointments  3. Return to the hospital or call your primary care physicians if any worsening symptoms such as fever, chest pain, shortness of breath, return of symptoms, or any other concerns.    Follow-Up Appointments:  Follow up with PCP on 3/27/20.   Follow up with Psychiatry on 4/21/20   Outpatient physical therapy will call with appointment     Ramon Rosado MD  \[facility]\"" Internal Medicine/Pediatrics, [facility]

## 2020-03-14 LAB
BACTERIA BLD CULT: NORMAL
BACTERIA BLD CULT: NORMAL

## 2020-06-18 ENCOUNTER — APPOINTMENT (OUTPATIENT)
Dept: LAB | Facility: HOSPITAL | Age: 55
End: 2020-06-18
Attending: NURSE PRACTITIONER
Payer: MEDICAID

## 2020-06-18 DIAGNOSIS — N39.0 UTI (URINARY TRACT INFECTION): Primary | ICD-10-CM

## 2020-06-18 LAB
BACTERIA #/AREA URNS AUTO: ABNORMAL /HPF
BILIRUB UR QL STRIP: NEGATIVE
CLARITY UR REFRACT.AUTO: ABNORMAL
COLOR UR AUTO: YELLOW
GLUCOSE UR QL STRIP: NEGATIVE
HGB UR QL STRIP: NEGATIVE
KETONES UR QL STRIP: NEGATIVE
LEUKOCYTE ESTERASE UR QL STRIP: ABNORMAL
MICROSCOPIC COMMENT: ABNORMAL
NITRITE UR QL STRIP: NEGATIVE
PH UR STRIP: 7 [PH] (ref 5–8)
PROT UR QL STRIP: NEGATIVE
SP GR UR STRIP: 1 (ref 1–1.03)
URN SPEC COLLECT METH UR: ABNORMAL
UROBILINOGEN UR STRIP-ACNC: NEGATIVE EU/DL
WBC #/AREA URNS AUTO: 5 /HPF (ref 0–5)

## 2020-06-18 PROCEDURE — 87077 CULTURE AEROBIC IDENTIFY: CPT | Mod: PO

## 2020-06-18 PROCEDURE — 87088 URINE BACTERIA CULTURE: CPT | Mod: PO

## 2020-06-18 PROCEDURE — 81000 URINALYSIS NONAUTO W/SCOPE: CPT | Mod: PO

## 2020-06-18 PROCEDURE — 87086 URINE CULTURE/COLONY COUNT: CPT | Mod: PO

## 2020-06-18 PROCEDURE — 87186 SC STD MICRODIL/AGAR DIL: CPT | Mod: PO

## 2020-06-21 LAB — BACTERIA UR CULT: ABNORMAL

## 2020-07-29 ENCOUNTER — TELEPHONE (OUTPATIENT)
Dept: PULMONOLOGY | Facility: CLINIC | Age: 55
End: 2020-07-29

## 2020-08-21 ENCOUNTER — TELEPHONE (OUTPATIENT)
Dept: PULMONOLOGY | Facility: CLINIC | Age: 55
End: 2020-08-21

## 2020-08-21 DIAGNOSIS — J44.9 CHRONIC OBSTRUCTIVE PULMONARY DISEASE, UNSPECIFIED COPD TYPE: ICD-10-CM

## 2020-08-21 DIAGNOSIS — J45.909 ASTHMA, UNSPECIFIED ASTHMA SEVERITY, UNSPECIFIED WHETHER COMPLICATED, UNSPECIFIED WHETHER PERSISTENT: Primary | ICD-10-CM

## 2020-08-21 NOTE — TELEPHONE ENCOUNTER
I spoke to patient's daughter due to patient being on our wait list. Patient scheduled for an appointment with Dr Ramirez on 9/4/20 at 1pm with pft's at 12pm. Patient will go for covid test on 9/1/20 72 hrs prior to pft's to [facility]. Appointment mailed. She verbalized understanding.

## 2020-08-26 ENCOUNTER — TELEPHONE (OUTPATIENT)
Dept: PULMONOLOGY | Facility: CLINIC | Age: 55
End: 2020-08-26

## 2020-08-26 NOTE — TELEPHONE ENCOUNTER
Left message on patient voicemail, informing her that I;m contacting her in regards to scheduling her a Covid Screening Test. I advised patient to contact the office. Office number has been provided.

## 2020-09-03 ENCOUNTER — HOSPITAL ENCOUNTER (EMERGENCY)
Facility: HOSPITAL | Age: 55
Discharge: HOME | End: 2020-09-03
Attending: EMERGENCY MEDICINE
Payer: MEDICAID

## 2020-09-03 VITALS
DIASTOLIC BLOOD PRESSURE: 76 MMHG | RESPIRATION RATE: 20 BRPM | BODY MASS INDEX: 24.18 KG/M2 | HEART RATE: 79 BPM | OXYGEN SATURATION: 100 % | WEIGHT: 131.38 LBS | SYSTOLIC BLOOD PRESSURE: 159 MMHG | HEIGHT: 62 IN | TEMPERATURE: 98 F
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
## 2020-09-03 DIAGNOSIS — J44.1 COPD EXACERBATION: Primary | ICD-10-CM

## 2020-09-03 DIAGNOSIS — N39.0 URINARY TRACT INFECTION WITHOUT HEMATURIA, SITE UNSPECIFIED: ICD-10-CM

## 2020-09-03 DIAGNOSIS — R06.02 SHORTNESS OF BREATH: ICD-10-CM

## 2020-09-03 LAB
ALBUMIN SERPL BCP-MCNC: 3.8 G/DL (ref 3.5–5.2)
ALP SERPL-CCNC: 53 U/L (ref 55–135)
ALT SERPL W/O P-5'-P-CCNC: 32 U/L (ref 10–44)
ANION GAP SERPL CALC-SCNC: 9 MMOL/L (ref 8–16)
AST SERPL-CCNC: 44 U/L (ref 10–40)
BACTERIA #/AREA URNS HPF: ABNORMAL /HPF
BASOPHILS # BLD AUTO: 0.02 K/UL (ref 0–0.2)
BASOPHILS NFR BLD: 0.3 % (ref 0–1.9)
BILIRUB SERPL-MCNC: 0.2 MG/DL (ref 0.1–1)
BILIRUB UR QL STRIP: NEGATIVE
BNP SERPL-MCNC: 25 PG/ML (ref 0–99)
BUN SERPL-MCNC: 5 MG/DL (ref 6–20)
CALCIUM SERPL-MCNC: 10 MG/DL (ref 8.7–10.5)
CHLORIDE SERPL-SCNC: 95 MMOL/L (ref 95–110)
CK SERPL-CCNC: 51 U/L (ref 20–180)
CLARITY UR: ABNORMAL
CO2 SERPL-SCNC: 36 MMOL/L (ref 23–29)
COLOR UR: YELLOW
CREAT SERPL-MCNC: 0.8 MG/DL (ref 0.5–1.4)
DIFFERENTIAL METHOD: ABNORMAL
EOSINOPHIL # BLD AUTO: 0.1 K/UL (ref 0–0.5)
EOSINOPHIL NFR BLD: 1.7 % (ref 0–8)
ERYTHROCYTE [DISTWIDTH] IN BLOOD BY AUTOMATED COUNT: 12.4 % (ref 11.5–14.5)
EST. GFR  (AFRICAN AMERICAN): >60 ML/MIN/1.73 M^2
EST. GFR  (NON AFRICAN AMERICAN): >60 ML/MIN/1.73 M^2
GLUCOSE SERPL-MCNC: 72 MG/DL (ref 70–110)
GLUCOSE UR QL STRIP: NEGATIVE
HCT VFR BLD AUTO: 42.8 % (ref 37–48.5)
HGB BLD-MCNC: 13.4 G/DL (ref 12–16)
HGB UR QL STRIP: NEGATIVE
IMM GRANULOCYTES # BLD AUTO: 0.01 K/UL (ref 0–0.04)
IMM GRANULOCYTES NFR BLD AUTO: 0.2 % (ref 0–0.5)
KETONES UR QL STRIP: NEGATIVE
LEUKOCYTE ESTERASE UR QL STRIP: ABNORMAL
LYMPHOCYTES # BLD AUTO: 1.5 K/UL (ref 1–4.8)
LYMPHOCYTES NFR BLD: 25.3 % (ref 18–48)
MAGNESIUM SERPL-MCNC: 1.7 MG/DL (ref 1.6–2.6)
MCH RBC QN AUTO: 29.8 PG (ref 27–31)
MCHC RBC AUTO-ENTMCNC: 31.3 G/DL (ref 32–36)
MCV RBC AUTO: 95 FL (ref 82–98)
MICROSCOPIC COMMENT: ABNORMAL
MONOCYTES # BLD AUTO: 0.6 K/UL (ref 0.3–1)
MONOCYTES NFR BLD: 10.3 % (ref 4–15)
NEUTROPHILS # BLD AUTO: 3.8 K/UL (ref 1.8–7.7)
NEUTROPHILS NFR BLD: 62.2 % (ref 38–73)
NITRITE UR QL STRIP: POSITIVE
NRBC BLD-RTO: 0 /100 WBC
PH UR STRIP: 7 [PH] (ref 5–8)
PLATELET # BLD AUTO: 141 K/UL (ref 150–350)
PMV BLD AUTO: 9.3 FL (ref 9.2–12.9)
POTASSIUM SERPL-SCNC: 3.4 MMOL/L (ref 3.5–5.1)
PROT SERPL-MCNC: 8.1 G/DL (ref 6–8.4)
PROT UR QL STRIP: NEGATIVE
RBC # BLD AUTO: 4.5 M/UL (ref 4–5.4)
RBC #/AREA URNS HPF: 2 /HPF (ref 0–4)
SARS-COV-2 RDRP RESP QL NAA+PROBE: NEGATIVE
SODIUM SERPL-SCNC: 140 MMOL/L (ref 136–145)
SP GR UR STRIP: <=1.005 (ref 1–1.03)
SQUAMOUS #/AREA URNS HPF: 2 /HPF
TROPONIN I SERPL DL<=0.01 NG/ML-MCNC: <0.006 NG/ML (ref 0–0.03)
URN SPEC COLLECT METH UR: ABNORMAL
UROBILINOGEN UR STRIP-ACNC: NEGATIVE EU/DL
WBC # BLD AUTO: 6.04 K/UL (ref 3.9–12.7)
WBC #/AREA URNS HPF: 15 /HPF (ref 0–5)

## 2020-09-03 PROCEDURE — 27000221 HC OXYGEN, UP TO 24 HOURS

## 2020-09-03 PROCEDURE — 87088 URINE BACTERIA CULTURE: CPT

## 2020-09-03 PROCEDURE — 84484 ASSAY OF TROPONIN QUANT: CPT

## 2020-09-03 PROCEDURE — 93005 ELECTROCARDIOGRAM TRACING: CPT

## 2020-09-03 PROCEDURE — 87186 SC STD MICRODIL/AGAR DIL: CPT

## 2020-09-03 PROCEDURE — 83735 ASSAY OF MAGNESIUM: CPT

## 2020-09-03 PROCEDURE — 25000003 PHARM REV CODE 250: Performed by: EMERGENCY MEDICINE

## 2020-09-03 PROCEDURE — 82550 ASSAY OF CK (CPK): CPT

## 2020-09-03 PROCEDURE — 93010 EKG 12-LEAD: ICD-10-PCS | Mod: ,,, | Performed by: INTERNAL MEDICINE

## 2020-09-03 PROCEDURE — 25000242 PHARM REV CODE 250 ALT 637 W/ HCPCS: Performed by: EMERGENCY MEDICINE

## 2020-09-03 PROCEDURE — 99292 CRITICAL CARE ADDL 30 MIN: CPT

## 2020-09-03 PROCEDURE — 87086 URINE CULTURE/COLONY COUNT: CPT

## 2020-09-03 PROCEDURE — 87077 CULTURE AEROBIC IDENTIFY: CPT

## 2020-09-03 PROCEDURE — 85025 COMPLETE CBC W/AUTO DIFF WBC: CPT

## 2020-09-03 PROCEDURE — 93010 ELECTROCARDIOGRAM REPORT: CPT | Mod: ,,, | Performed by: INTERNAL MEDICINE

## 2020-09-03 PROCEDURE — 94761 N-INVAS EAR/PLS OXIMETRY MLT: CPT

## 2020-09-03 PROCEDURE — 80053 COMPREHEN METABOLIC PANEL: CPT

## 2020-09-03 PROCEDURE — U0002 COVID-19 LAB TEST NON-CDC: HCPCS

## 2020-09-03 PROCEDURE — 99291 CRITICAL CARE FIRST HOUR: CPT | Mod: 25

## 2020-09-03 PROCEDURE — 81000 URINALYSIS NONAUTO W/SCOPE: CPT

## 2020-09-03 PROCEDURE — 94644 CONT INHLJ TX 1ST HOUR: CPT

## 2020-09-03 PROCEDURE — 83880 ASSAY OF NATRIURETIC PEPTIDE: CPT

## 2020-09-03 PROCEDURE — 94640 AIRWAY INHALATION TREATMENT: CPT

## 2020-09-03 RX ORDER — VENLAFAXINE 25 MG/1
TABLET ORAL 2 TIMES DAILY
COMMUNITY
End: 2021-04-21

## 2020-09-03 RX ORDER — IPRATROPIUM BROMIDE AND ALBUTEROL SULFATE 2.5; .5 MG/3ML; MG/3ML
3 SOLUTION RESPIRATORY (INHALATION)
Status: COMPLETED | OUTPATIENT
Start: 2020-09-03 | End: 2020-09-03

## 2020-09-03 RX ORDER — OXYCODONE AND ACETAMINOPHEN 10; 325 MG/1; MG/1
1 TABLET ORAL EVERY 4 HOURS PRN
COMMUNITY
End: 2021-04-21

## 2020-09-03 RX ORDER — ALBUTEROL SULFATE 2.5 MG/.5ML
15 SOLUTION RESPIRATORY (INHALATION)
Status: COMPLETED | OUTPATIENT
Start: 2020-09-03 | End: 2020-09-03

## 2020-09-03 RX ORDER — POTASSIUM CHLORIDE 20 MEQ/1
20 TABLET, EXTENDED RELEASE ORAL
Status: COMPLETED | OUTPATIENT
Start: 2020-09-03 | End: 2020-09-03

## 2020-09-03 RX ORDER — GABAPENTIN 100 MG/1
100 CAPSULE ORAL 3 TIMES DAILY
COMMUNITY
End: 2021-04-21

## 2020-09-03 RX ORDER — ALBUTEROL SULFATE 0.83 MG/ML
2.5 SOLUTION RESPIRATORY (INHALATION) EVERY 6 HOURS PRN
Qty: 1 BOX | Refills: 6 | Status: SHIPPED | OUTPATIENT
Start: 2020-09-03 | End: 2021-11-11 | Stop reason: SDUPTHER

## 2020-09-03 RX ORDER — DIAZEPAM 2 MG/1
2 TABLET ORAL EVERY 12 HOURS PRN
COMMUNITY
End: 2021-04-21

## 2020-09-03 RX ORDER — CEPHALEXIN 500 MG/1
500 CAPSULE ORAL 2 TIMES DAILY
Qty: 14 CAPSULE | Refills: 0 | Status: SHIPPED | OUTPATIENT
Start: 2020-09-03 | End: 2020-09-10

## 2020-09-03 RX ADMIN — IPRATROPIUM BROMIDE AND ALBUTEROL SULFATE 3 ML: .5; 3 SOLUTION RESPIRATORY (INHALATION) at 10:09

## 2020-09-03 RX ADMIN — ALBUTEROL SULFATE 15 MG: 2.5 SOLUTION RESPIRATORY (INHALATION) at 11:09

## 2020-09-03 RX ADMIN — POTASSIUM CHLORIDE 20 MEQ: 1500 TABLET, EXTENDED RELEASE ORAL at 11:09

## 2020-09-03 NOTE — ED NOTES
Pt presents to the ED c/o Lt parietal tenderness s/p trip and fall. Denies LOC, and also c/o SOB. pts on Home O2

## 2020-09-03 NOTE — ED PROVIDER NOTES
Encounter Date: 9/3/2020    SCRIBE #1 NOTE: I, Erika Claire, am scribing for, and in the presence of,  Dr. Casas. I have scribed the entire note.       History     Chief Complaint   Patient presents with    Fall     fell two days ago after tripping and hit head, no LOC, has history of fall 2 years ago that required surgery, has plastic plate in skull    Shortness of Breath     on home O2, has been required more O2 on extertion x 3 weeks, no fever or cough     Elif Archibald is a 55 y.o. female with a past medical history of asthma, cirrhosis, COPD, schizophrenia, and subdural hematoma who presents to the ED due to headache with associated dizziness secondary to a fall that occurred occurred 2 days ago. Per patient's daughter-in-law, she fell out of bed and tripped over a nearby trash can. Patient hit her head on hardwood floor, but denies LOC. Daughter-in-law is concerned as patient had a similar fall 2 years ago that required surgery. Patient also reports worsening shortness of breath. At baseline, she uses home O2 as needed. However, over the past 3 weeks, daughter-in-law states patient requires home O2 at all times. Patient denies fever or cough.     The history is provided by the patient and a relative.     Review of patient's allergies indicates:  No Known Allergies  Past Medical History:   Diagnosis Date    Anxiety     Asthma     Bipolar 1 disorder     Cirrhosis, alcoholic     Cocaine abuse     COPD (chronic obstructive pulmonary disease)     Depression     Encounter for blood transfusion     Schizophrenia     Seizures     Subdural hematoma     Tachycardia      Past Surgical History:   Procedure Laterality Date    APPENDECTOMY      CRANIECTOMY Left 01/31/2019    ESOPHAGOGASTRODUODENOSCOPY      PEG W/TRACHEOSTOMY PLACEMENT  02/16/2019     Family History   Problem Relation Age of Onset    COPD Mother     Cirrhosis Father      Social History     Tobacco Use    Smoking status: Current  Every Day Smoker     Packs/day: 1.00     Years: 40.00     Pack years: 40.00     Types: Cigarettes     Start date: 1980    Smokeless tobacco: Never Used    Tobacco comment: Previously 1 pack per day smoker- now down to a few cigarettes per day.  Pt is enrolled in the [employer]. Ambulatory referral to Smoking Cessation program.   Substance Use Topics    Alcohol use: Not Currently    Drug use: Not Currently     Review of Systems   Constitutional: Negative for fever.   HENT: Negative for sore throat.    Respiratory: Positive for shortness of breath (Worsening). Negative for cough.    Cardiovascular: Negative for chest pain.   Gastrointestinal: Negative for nausea.   Genitourinary: Negative for dysuria.   Musculoskeletal: Negative for back pain.   Skin: Negative for rash.   Neurological: Positive for dizziness and headaches. Negative for weakness.   Hematological: Does not bruise/bleed easily.   All other systems reviewed and are negative.      Physical Exam     Initial Vitals [09/03/20 0903]   BP Pulse Resp Temp SpO2   124/80 74 (!) 22 98.4 °F (36.9 °C) (!) 91 %      MAP       --         Physical Exam    Nursing note and vitals reviewed.  Constitutional: She appears well-developed and well-nourished. She is not diaphoretic. No distress.   HENT:   Head: Normocephalic and atraumatic.   Mouth/Throat: Oropharynx is clear and moist.   Eyes: EOM are normal. Right conjunctiva has no hemorrhage. Left conjunctiva has a hemorrhage (Subconjunctival).   Pupils 2mm bilaterally.    Neck: Normal range of motion. Neck supple.   Cardiovascular: Normal rate, regular rhythm, normal heart sounds and intact distal pulses.   Pulmonary/Chest: No respiratory distress.   Greatly diminished breath sounds bilaterally.    Abdominal: Soft. Bowel sounds are normal. She exhibits no distension. There is no abdominal tenderness.   Musculoskeletal: Normal range of motion. No tenderness or edema.      Comments: No BLE edema.    Neurological: She  is alert and oriented to person, place, and time. She has normal strength. GCS score is 15. GCS eye subscore is 4. GCS verbal subscore is 5. GCS motor subscore is 6.   Skin: Skin is warm and dry.   Psychiatric: She has a normal mood and affect. Her behavior is normal. Thought content normal.         ED Course   Critical Care    Date/Time: 9/3/2020 4:00 PM  Performed by: Lonnie Casas MD  Authorized by: Lonnie Casas MD   Direct patient critical care time: 45 minutes  Additional history critical care time: 5 minutes  Ordering / reviewing critical care time: 15 minutes  Documentation critical care time: 15 minutes  Total critical care time (exclusive of procedural time) : 80 minutes  Critical care was time spent personally by me on the following activities: examination of patient, ordering and performing treatments and interventions, ordering and review of radiographic studies, re-evaluation of patient's condition, review of old charts, pulse oximetry, ordering and review of laboratory studies, obtaining history from patient or surrogate and evaluation of patient's response to treatment.        Labs Reviewed   CBC W/ AUTO DIFFERENTIAL - Abnormal; Notable for the following components:       Result Value    Mean Corpuscular Hemoglobin Conc 31.3 (*)     Platelets 141 (*)     All other components within normal limits   COMPREHENSIVE METABOLIC PANEL - Abnormal; Notable for the following components:    Potassium 3.4 (*)     CO2 36 (*)     BUN, Bld 5 (*)     Alkaline Phosphatase 53 (*)     AST 44 (*)     All other components within normal limits   URINALYSIS - Abnormal; Notable for the following components:    Appearance, UA Hazy (*)     Specific Gravity, UA <=1.005 (*)     Nitrite, UA Positive (*)     Leukocytes, UA 1+ (*)     All other components within normal limits   URINALYSIS MICROSCOPIC - Abnormal; Notable for the following components:    WBC, UA 15 (*)     Bacteria Many (*)     All other  components within normal limits   CULTURE, URINE   CULTURE, URINE   CK   TROPONIN I   B-TYPE NATRIURETIC PEPTIDE   MAGNESIUM   SARS-COV-2 RNA AMPLIFICATION, QUAL     EKG Readings: (Independently Interpreted)   Rate of 79 bpm. Normal sinus rhythm. No ST elevations. Q waves in II, III, AVF, V4, V5, and V6.        Imaging Results          CT Head Without Contrast (Final result)  Result time 09/03/20 11:05:36    Final result by Prosper Coon MD (09/03/20 11:05:36)                 Impression:      Ventriculomegaly out of proportion of degree of sulcal enlargement as above, suggesting a component of a communicating hydrocephalus.  Clinical correlation required.    Confluent hypoattenuation in the periventricular supratentorial white matter.  Finding likely at least partially related to chronic microvascular ischemic disease, but superimposed periventricular edema/transependymal CSF flow to be considered.    Stable small focus of encephalomalacia in the right frontal lobe.    No new hemorrhage or infarct.    Remote left cranioplasty.    Remote right medial orbital wall fracture.      Electronically signed by: Prosper Coon MD  Date:    09/03/2020  Time:    11:05             Narrative:    EXAMINATION:  CT HEAD WITHOUT CONTRAST    CLINICAL HISTORY:  Ataxia, post head trauma;    TECHNIQUE:  Low dose axial CT images obtained throughout the head without the use of intravenous contrast.  Axial, sagittal and coronal reconstructions were performed.    COMPARISON:  Multiple priors, the most recent dated 03/09/2020    FINDINGS:  Intracranial compartment:    Prominence of the ventricles.  Third ventricle diameter measuring up to 1.0 cm.  Ventricular enlargement out of proportion to the degree of sulcal enlargement, with some crowding of the sulci near the vertex.  Ventricles are bigger in the sulci are smaller than the prior CT examination of 01/17/2019    Confluent hypoattenuation in the periventricular supratentorial white  matter, nonspecific but most likely reflecting chronic microvascular ischemic changes.    No new parenchymal mass, hemorrhage, edema or major vascular distribution infarct.    No extra-axial blood or fluid collections.    Skull/extracranial contents (limited evaluation):    Prior left-sided cranioplasty, stable.  Remote right medial orbital wall fracture.    Right mastoids are underpneumatized.  Left mastoids unremarkable.    Mild mucoperiosteal thickening in the right maxillary sinus.                               X-Ray Chest 1 View (Final result)  Result time 09/03/20 10:40:39    Final result by Lina Gastelum MD (09/03/20 10:40:39)                 Impression:      No acute disease.      Electronically signed by: Lina Gastelum MD  Date:    09/03/2020  Time:    10:40             Narrative:    EXAMINATION:  XR CHEST 1 VIEW    CLINICAL HISTORY:  shortness of breath;    TECHNIQUE:  Single frontal view of the chest was performed.    COMPARISON:  03/10/2020.  12/26/2019.    FINDINGS:  Old healing left rib fractures and chronic blunting of the left lateral costophrenic angle are again noted.    Mediastinal structures are midline. Cardiac silhouette and pulmonary vascular distribution are normal.    Lung volumes are normal and symmetric. I detect no pulmonary disease, pleural fluid, lymph node enlargement, cardiac decompensation, pneumothorax, pneumomediastinum, pneumoperitoneum or significant osseous abnormality.                                 Medical Decision Making:   History:   Old Medical Records: I decided to obtain old medical records.  Clinical Tests:   Lab Tests: Ordered and Reviewed  Radiological Study: Ordered and Reviewed  Medical Tests: Ordered and Reviewed  ED Management:  55-year-old female with COPD who fell striking her head 2 days ago.  Patient is concerned because she had a similar fall 2 years ago requiring surgery.  Head CT done today shows no acute abnormalities.  Patient is also been short of  breath using oxygen at home more than normal.  She was given an hour long breathing treatment after which she felt back to baseline breathing.  Chest x-ray shows no infiltrates.  Urinalysis has positive nitrates, many bacteria and 15 white blood cells per high-power field.  She was given 1 g of Rocephin here in the ED. I will discharge her with a prescription for Keflex to treat this UTI.  She also needs a refill of albuterol nebulizer solution.  I have urged close follow-up with the primary physician when able, but that she may also return to the ED for any possible worsening.            Scribe Attestation:   Scribe #1: I performed the above scribed service and the documentation accurately describes the services I performed. I attest to the accuracy of the note.                          Clinical Impression:       ICD-10-CM ICD-9-CM   1. COPD exacerbation  J44.1 491.21   2. Shortness of breath  R06.02 786.05   3. Fall, initial encounter  W19.XXXA E888.9   4. Urinary tract infection without hematuria, site unspecified  N39.0 599.0                   I, Dr. Lonnie Casas, personally performed the services described in this documentation. All medical record entries made by the scribe were at my direction and in my presence. I have reviewed the chart and agree that the record reflects my personal performance and is accurate and complete. Lonnie Casas MD.  7:29 PM 09/03/2020               Lonnie Casas MD  09/03/20 1931

## 2020-09-05 ENCOUNTER — OUTSIDE PLACE OF SERVICE (OUTPATIENT)
Dept: CARDIOLOGY | Facility: CLINIC | Age: 55
End: 2020-09-05
Payer: MEDICAID

## 2020-09-05 ENCOUNTER — NURSE TRIAGE (OUTPATIENT)
Dept: ADMINISTRATIVE | Facility: CLINIC | Age: 55
End: 2020-09-05

## 2020-09-05 LAB — BACTERIA UR CULT: ABNORMAL

## 2020-09-05 PROCEDURE — 93010 PR ELECTROCARDIOGRAM REPORT: ICD-10-PCS | Mod: ,,, | Performed by: INTERNAL MEDICINE

## 2020-09-05 PROCEDURE — 93010 ELECTROCARDIOGRAM REPORT: CPT | Mod: ,,, | Performed by: INTERNAL MEDICINE

## 2020-09-05 NOTE — TELEPHONE ENCOUNTER
"Pt seen in ED 9/3/20. Pt prescribed Keflex for UTI. Son is calling to ask for new antibiotic. He states his mother's body is resistant to Keflex. He reports that his mother is increasingly fatigued. She is not wanting to wake up and :having trouble gathering her words." pt advised to call 911 per protocol. He states he will do so.    Reason for Disposition   Difficult to awaken or acting confused (e.g., disoriented, slurred speech)    Additional Information   Negative: Shock suspected (e.g., cold/pale/clammy skin, too weak to stand, low BP, rapid pulse)    Protocols used: RECENT MEDICAL VISIT FOR ILLNESS FOLLOW-UP CALL-A-      "

## 2020-11-01 ENCOUNTER — OUTSIDE PLACE OF SERVICE (OUTPATIENT)
Dept: CARDIOLOGY | Facility: CLINIC | Age: 55
End: 2020-11-01
Payer: MEDICAID

## 2020-11-01 PROCEDURE — 93010 PR ELECTROCARDIOGRAM REPORT: ICD-10-PCS | Mod: ,,, | Performed by: INTERNAL MEDICINE

## 2020-11-01 PROCEDURE — 93010 ELECTROCARDIOGRAM REPORT: CPT | Mod: ,,, | Performed by: INTERNAL MEDICINE

## 2020-11-06 ENCOUNTER — TELEPHONE (OUTPATIENT)
Dept: PULMONOLOGY | Facility: CLINIC | Age: 55
End: 2020-11-06

## 2020-11-06 NOTE — TELEPHONE ENCOUNTER
----- Message from Jaret Harris sent at 11/6/2020  3:13 PM CST -----  Contact: Georgie (daughter in-law)  Pt's daughter in-law called to schedule the pt's appt with the dept, unable to schedule clinic visit with provider         Please contact Georgie (daughter in-law): [phone] or pt: [phone]

## 2020-11-06 NOTE — TELEPHONE ENCOUNTER
Left message on patient voicemail, informing her that I have received her message. I advised patient that if she wants to schedule a appointment or if she has any questions or concerns, she may contact the office. Office number has been provided.

## 2020-11-12 ENCOUNTER — TELEPHONE (OUTPATIENT)
Dept: PULMONOLOGY | Facility: CLINIC | Age: 55
End: 2020-11-12

## 2020-11-12 NOTE — TELEPHONE ENCOUNTER
----- Message from Misti Chang MA sent at 11/12/2020  2:25 PM CST -----  Contact: Georgie (daughter in-law): [phone] or [phone]  Georgie (daughter in-law): [phone] or [phone]   Pt was seen on 11/6/2020 and states that she is  waiting to hear  back about additional test that Dr Ramirez wanted to set her up for and to try and do everything on the same day as close as possible

## 2020-11-12 NOTE — TELEPHONE ENCOUNTER
Spoke with patient daughter in law, informed her that I have received her message. I advised patient daughter in law that I can schedule patient pulmonary function test on 11/16/20v for 1:00 pm. Patient daughter in law verbalized that she understand and excepted patient appointment.

## 2020-11-13 ENCOUNTER — CLINICAL SUPPORT (OUTPATIENT)
Dept: URGENT CARE | Facility: CLINIC | Age: 55
End: 2020-11-13
Payer: MEDICAID

## 2020-11-13 DIAGNOSIS — J45.909 ASTHMA, UNSPECIFIED ASTHMA SEVERITY, UNSPECIFIED WHETHER COMPLICATED, UNSPECIFIED WHETHER PERSISTENT: ICD-10-CM

## 2020-11-13 PROCEDURE — U0003 INFECTIOUS AGENT DETECTION BY NUCLEIC ACID (DNA OR RNA); SEVERE ACUTE RESPIRATORY SYNDROME CORONAVIRUS 2 (SARS-COV-2) (CORONAVIRUS DISEASE [COVID-19]), AMPLIFIED PROBE TECHNIQUE, MAKING USE OF HIGH THROUGHPUT TECHNOLOGIES AS DESCRIBED BY CMS-2020-01-R: HCPCS

## 2020-11-14 LAB — SARS-COV-2 RNA RESP QL NAA+PROBE: NOT DETECTED

## 2020-11-16 ENCOUNTER — HOSPITAL ENCOUNTER (OUTPATIENT)
Dept: PULMONOLOGY | Facility: CLINIC | Age: 55
Discharge: HOME | End: 2020-11-16
Payer: MEDICAID

## 2020-11-16 VITALS — HEIGHT: 60 IN | BODY MASS INDEX: 24.45 KG/M2 | WEIGHT: 124.56 LBS

## 2020-11-16 DIAGNOSIS — J44.9 CHRONIC OBSTRUCTIVE PULMONARY DISEASE, UNSPECIFIED COPD TYPE: ICD-10-CM

## 2020-11-16 DIAGNOSIS — J45.909 ASTHMA, UNSPECIFIED ASTHMA SEVERITY, UNSPECIFIED WHETHER COMPLICATED, UNSPECIFIED WHETHER PERSISTENT: ICD-10-CM

## 2020-11-16 LAB
FEF 25 75 LLN: 1.2
FEF 25 75 PRE REF: 7.5 %
FEF 25 75 REF: 2.25
FET100 CHG: -10.5 %
FEV05 LLN: 0.86
FEV05 REF: 1.71
FEV1 CHG: 9.5 %
FEV1 FVC LLN: 69
FEV1 FVC PRE REF: 37.4 %
FEV1 FVC REF: 80
FEV1 LLN: 1.74
FEV1 PRE REF: 17.6 %
FEV1 REF: 2.27
FEV1 VOL CHG: 0.04
FVC CHG: 5.9 %
FVC LLN: 2.18
FVC PRE REF: 46.8 %
FVC REF: 2.84
FVC VOL CHG: 0.08
PEF LLN: 4.43
PEF PRE REF: 24.8 %
PEF REF: 5.92
PHYSICIAN COMMENT: ABNORMAL
POST FEF 25 75: 0.18 L/S (ref 1.2–3.29)
POST FET 100: 7.78 SEC
POST FEV1 FVC: 31.13 % (ref 68.79–92.08)
POST FEV1: 0.44 L (ref 1.74–2.8)
POST FEV5: 0.28 L (ref 0.86–2.57)
POST FVC: 1.4 L (ref 2.18–3.49)
POST PEF: 1.63 L/S (ref 4.43–7.42)
PRE FEF 25 75: 0.17 L/S (ref 1.2–3.29)
PRE FET 100: 8.7 SEC
PRE FEV05 REF: 15.1 %
PRE FEV1 FVC: 30.09 % (ref 68.79–92.08)
PRE FEV1: 0.4 L (ref 1.74–2.8)
PRE FEV5: 0.26 L (ref 0.86–2.57)
PRE FVC: 1.33 L (ref 2.18–3.49)
PRE PEF: 1.47 L/S (ref 4.43–7.42)

## 2020-11-16 PROCEDURE — 94060 EVALUATION OF WHEEZING: CPT | Mod: PBBFAC | Performed by: INTERNAL MEDICINE

## 2020-11-16 PROCEDURE — 94729 DIFFUSING CAPACITY: CPT | Mod: 53,PBBFAC | Performed by: INTERNAL MEDICINE

## 2020-11-16 PROCEDURE — 94618 PULMONARY STRESS TESTING: CPT | Mod: 26,S$PBB,, | Performed by: INTERNAL MEDICINE

## 2020-11-16 PROCEDURE — 94060 EVALUATION OF WHEEZING: CPT | Mod: 26,S$PBB,, | Performed by: INTERNAL MEDICINE

## 2020-11-16 PROCEDURE — 94618 PULMONARY STRESS TESTING: ICD-10-PCS | Mod: 26,S$PBB,, | Performed by: INTERNAL MEDICINE

## 2020-11-16 PROCEDURE — 94618 PULMONARY STRESS TESTING: CPT | Mod: PBBFAC | Performed by: INTERNAL MEDICINE

## 2020-11-16 PROCEDURE — 94060 PR EVAL OF BRONCHOSPASM: ICD-10-PCS | Mod: 26,S$PBB,, | Performed by: INTERNAL MEDICINE

## 2020-11-16 NOTE — PROCEDURES
Elif Archibald is a 55 y.o.  female patient, who presents for a 6 minute walk test ordered by MD James.  The diagnosis is COPD.  The patient's BMI is 24.3 kg/m2.  Predicted distance (lower limit of normal) is 405.72 meters.      Test Results:    The test was not completed.  The patient stopped 3 times for a total of 91 seconds.  The total time walked was 269 seconds.  During walking, the patient reported:  Dyspnea, Leg pain, Lightheadedness. The patient used a wheelchair and supplemental oxygen during testing.     11/16/2020---------Distance: 121.92 meters (400 feet)     O2 Sat % Supplemental Oxygen Heart Rate Blood Pressure Lisette Scale   Pre-exercise  (Resting) 99 % 2 L/M 62 bpm 127/63 mmHg 0.5   During Exercise 99 % 2 L/M 77 bpm 135/79 mmHg 5-6   Post-exercise  (Recovery) 98 % 2 L/M  64 bpm   mmHg       Recovery Time: 122 seconds    Performing nurse/tech: Bart FERNANDEZ      PREVIOUS STUDY:   The patient has not had a previous study.      CLINICAL INTERPRETATION:  Six minute walk distance is 121.92 meters (400 feet) with heavy dyspnea.  During exercise, there was no significant desaturation while breathing supplemental oxygen.  Both blood pressure and heart rate remained stable with walking.  The patient reported non-pulmonary symptoms during exercise.  Severe exercise impairment is likely due to subjective symptoms.  The patient did not complete the study, walking 269 seconds of the 360 second test.  No previous study performed.  Based upon age and body mass index, exercise capacity is less than predicted.

## 2020-12-04 ENCOUNTER — PATIENT MESSAGE (OUTPATIENT)
Dept: ADMINISTRATIVE | Facility: OTHER | Age: 55
End: 2020-12-04

## 2020-12-09 DIAGNOSIS — J96.11 CHRONIC RESPIRATORY FAILURE WITH HYPOXIA: Primary | ICD-10-CM

## 2020-12-09 NOTE — TELEPHONE ENCOUNTER
Call returned. No prior Pulmonary notes in chart. However, discussed concerns with Ms. Archibald and her daughter-in-law. Patient reports that she is feeling better and thinks that she will be able to repeat 6MWT and complete.   Order placed in chart and MA notified for order so that test can be scheduled.         Felicia Ramirez M.D., PGY - V  [employer] Pulmonary/Critical Care Fellow

## 2020-12-11 ENCOUNTER — OFFICE VISIT (OUTPATIENT)
Dept: PULMONOLOGY | Facility: CLINIC | Age: 55
End: 2020-12-11
Payer: MEDICAID

## 2020-12-11 ENCOUNTER — HOSPITAL ENCOUNTER (OUTPATIENT)
Dept: PULMONOLOGY | Facility: CLINIC | Age: 55
Discharge: HOME | End: 2020-12-11
Payer: MEDICAID

## 2020-12-11 VITALS
BODY MASS INDEX: 24.35 KG/M2 | WEIGHT: 124 LBS | WEIGHT: 124 LBS | BODY MASS INDEX: 24.35 KG/M2 | HEIGHT: 60 IN | OXYGEN SATURATION: 100 % | HEART RATE: 68 BPM | DIASTOLIC BLOOD PRESSURE: 88 MMHG | HEIGHT: 60 IN | SYSTOLIC BLOOD PRESSURE: 156 MMHG

## 2020-12-11 DIAGNOSIS — J43.9 PULMONARY EMPHYSEMA, UNSPECIFIED EMPHYSEMA TYPE: Primary | ICD-10-CM

## 2020-12-11 DIAGNOSIS — R91.8 PULMONARY NODULES: ICD-10-CM

## 2020-12-11 DIAGNOSIS — Z00.00 ANNUAL PHYSICAL EXAM: ICD-10-CM

## 2020-12-11 DIAGNOSIS — J96.11 CHRONIC RESPIRATORY FAILURE WITH HYPOXIA: ICD-10-CM

## 2020-12-11 PROCEDURE — 94618 PULMONARY STRESS TESTING: ICD-10-PCS | Mod: 26,S$PBB,, | Performed by: INTERNAL MEDICINE

## 2020-12-11 PROCEDURE — 94618 PULMONARY STRESS TESTING: CPT | Mod: 26,S$PBB,, | Performed by: INTERNAL MEDICINE

## 2020-12-11 PROCEDURE — 94618 PULMONARY STRESS TESTING: CPT | Mod: PBBFAC | Performed by: INTERNAL MEDICINE

## 2020-12-11 PROCEDURE — 99214 OFFICE O/P EST MOD 30 MIN: CPT | Mod: 25,S$PBB,, | Performed by: INTERNAL MEDICINE

## 2020-12-11 PROCEDURE — 99999 PR PBB SHADOW E&M-EST. PATIENT-LVL IV: CPT | Mod: PBBFAC,,, | Performed by: INTERNAL MEDICINE

## 2020-12-11 PROCEDURE — 99214 PR OFFICE/OUTPT VISIT, EST, LEVL IV, 30-39 MIN: ICD-10-PCS | Mod: 25,S$PBB,, | Performed by: INTERNAL MEDICINE

## 2020-12-11 PROCEDURE — 99214 OFFICE O/P EST MOD 30 MIN: CPT | Mod: PBBFAC,25 | Performed by: INTERNAL MEDICINE

## 2020-12-11 PROCEDURE — 99999 PR PBB SHADOW E&M-EST. PATIENT-LVL IV: ICD-10-PCS | Mod: PBBFAC,,, | Performed by: INTERNAL MEDICINE

## 2020-12-11 RX ORDER — FLUTICASONE PROPIONATE 50 MCG
SPRAY, SUSPENSION (ML) NASAL
COMMUNITY
Start: 2020-11-18 | End: 2021-12-13 | Stop reason: SDUPTHER

## 2020-12-11 RX ORDER — FLUTICASONE FUROATE, UMECLIDINIUM BROMIDE AND VILANTEROL TRIFENATATE 200; 62.5; 25 UG/1; UG/1; UG/1
1 POWDER RESPIRATORY (INHALATION) DAILY
Qty: 60 EACH | Refills: 10 | Status: SHIPPED | OUTPATIENT
Start: 2020-12-11 | End: 2021-04-09 | Stop reason: RX

## 2020-12-11 RX ORDER — PANTOPRAZOLE SODIUM 40 MG/1
TABLET, DELAYED RELEASE ORAL
COMMUNITY
Start: 2020-09-10 | End: 2021-11-15

## 2020-12-11 RX ORDER — IPRATROPIUM BROMIDE AND ALBUTEROL SULFATE 2.5; .5 MG/3ML; MG/3ML
3 SOLUTION RESPIRATORY (INHALATION) EVERY 6 HOURS PRN
Qty: 120 VIAL | Refills: 5 | Status: SHIPPED | OUTPATIENT
Start: 2020-12-11 | End: 2021-10-20 | Stop reason: SDUPTHER

## 2020-12-11 NOTE — PROGRESS NOTES
Subjective:       Patient ID: Elif Archibald is a 55 y.o. female.    Chief Complaint: No chief complaint on file.    Elif Archibald is a 55 y.o. female with a past medical history of asthma, cirrhosis, COPD, schizophrenia, and subdural hematoma who is referred to Pulmonary Clinic to establish care. Previously seen by Dr. Martines in [place], [place]. Patient reports that she is weak overall and no longer tolerates exercise. Reports 10lb weight loss.   Fall in 2019 with subdural hematoma, rib fractures, complicated with respiratory failure with prior tracheostomy for 7months. Reports productive cough and dyspnea on exertion, wheezing with exertion. Memory is affected post-accident.          Use of Inhalers/Injections/Supplements: Currently only on albuterol prn.   Recent hospitalizations/urgent care/ED visits:   Tx GERD/Allergic Rhinitis: Reports hx of GERD, previously on Protonix, now with prn Tums. Allergic rhinitis on fluticasone  Recurrent PNA/Hx PTX: Reports 3x COPD exacerbations this year  Smoking Hx: Over a pack a day for with cessation 2 years ago   FamHx Lung Dx: Mother with COPD/asthma  Occupation Hx: Not currently working         Review of Systems   Constitutional: Positive for weight loss and weakness.   Respiratory: Positive for sputum production, shortness of breath, wheezing and dyspnea on extertion.        Objective:      Physical Exam   Constitutional: She is oriented to person, place, and time. She appears cachectic.   Cardiovascular: Normal rate.   Pulmonary/Chest: She has decreased breath sounds. She has rhonchi.   Neurological: She is alert and oriented to person, place, and time. Gait normal.   Skin: Skin is warm and dry.   Psychiatric: She has a normal mood and affect.   Nursing note and vitals reviewed.    Personal Diagnostic Review          Pulmonary Function Tests 12/11/2020   Ordering Provider MD James   Performing nurse/tech/RT Herrmann RRT   Diagnosis Shortness of Breath   Height 60   Weight  1984   BMI (Calculated) 24.2   Patient Race    6MWT Status completed without stopping   Patient Reported Dyspnea;Leg pain;Other (Comment)   Was O2 used? Yes   Delivery Method Cannula;Pull Tank;Continuous Flow   6MW Distance walked (feet) 800   Distance walked (meters) 243.84   Did patient stop? No   Type of assistive device(s) used? a wheelchair   Oxygen Saturation 98   Supplemental Oxygen 2 L/M   Heart Rate 69   Blood Pressure 125/72   Lisette Dyspnea Rating  very, very light (just noticeable)   Oxygen Saturation 92   Supplemental Oxygen 2 L/M   Heart Rate 91   Blood Pressure 147/85   Lisette Dyspnea Rating  somewhat heavy   Recovery Time (seconds) 175   Oxygen Saturation 96   Supplemental Oxygen 2 L/M   Heart Rate 78   Blood Pressure 137/80   Is procedure ready for interpretation? Yes   Oxygen Qualification? Yes   Oxygen Saturation 83   Supplemental Oxygen Room Air   Heart Rate 95   Blood Pressure 156/88   Lisette Dyspnea Rating  heavy   Some recent data might be hidden     1/2019 CT Chest/A/P     FINDINGS:  Aorta is nonaneurysmal.  No evidence of aortic injury or dissection.  Heart is normal in size without pericardial effusion.  Scattered fluid and ingested material is seen within the esophagus.     There is small left-sided pneumothorax and small left hemothorax.  Emphysematous changes are visualized throughout the lungs.  Bilateral scattered pulmonary nodules are seen.  Largest of these measure 1.1 cm within the right upper lobe and 0.6 cm within the right lower lobe.     No significant hepatic abnormalities are identified.  There is no intra-or extrahepatic biliary ductal dilatation.  The gallbladder is unremarkable.  The stomach, pancreas, spleen, and adrenal glands are unremarkable.     Kidneys, ureters, urinary bladder, and uterus show no significant abnormalities.     Appendix is not definitely visualized.  The visualized loops of small and large bowel show no evidence of obstruction or inflammation.  No  free air or free fluid.     Aorta tapers normally.     There is acute fracture seen of the left distal clavicle.  Multiple acute posterior and left lateral rib fractures are seen involving the left 3rd through 10th ribs.  Subcutaneous soft tissue structures are unremarkable.     IMPRESSION:      1. Small left pneumothorax and hemothorax with multiple acute left-sided rib fractures involving the left 3rd through 10th ribs.  2. Acute left distal clavicle fracture.  3. No acute intra-abdominal abnormalities identified.  4. Pulmonary emphysema with scattered bilateral pulmonary nodules.  For multiple solid nodules with any 6 mm or greater, Fleischner Society guidelines recommend follow up with non-contrast chest CT at 3-6 months and 18-24 months after discovery.             No diagnosis found.    Outpatient Encounter Medications as of 12/11/2020   Medication Sig Dispense Refill    albuterol (PROVENTIL) 2.5 mg /3 mL (0.083 %) nebulizer solution Take 3 mLs (2.5 mg total) by nebulization every 6 (six) hours as needed for Wheezing. Rescue 1 Box 6    budesonide-formoterol 160-4.5 mcg (SYMBICORT) 160-4.5 mcg/actuation HFAA Inhale into the lungs 1 puff once daily 10.2 g 2    diazePAM (VALIUM) 2 MG tablet Take 2 mg by mouth every 12 (twelve) hours as needed for Anxiety.      escitalopram oxalate (LEXAPRO) 10 MG tablet Take 1 tablet (10 mg total) by mouth once daily. 30 tablet 11    fluticasone furoate-vilanteroL (BREO) 200-25 mcg/dose DsDv diskus inhaler Inhale 1 puff into the lungs once daily. Controller 60 each 2    fluticasone propionate (FLONASE) 50 mcg/actuation nasal spray SPRAY ONCE IEN D      gabapentin (NEURONTIN) 100 MG capsule Take 100 mg by mouth 3 (three) times daily.      levETIRAcetam (KEPPRA) 500 MG Tab Take 500 mg by mouth 2 (two) times daily.      metoprolol tartrate (LOPRESSOR) 50 MG tablet Take 50 mg by mouth 3 (three) times daily.       miscellaneous medical supply (C-TUB) Misc Rolling walker       multivitamin Chew Take by mouth once daily.      oxyCODONE-acetaminophen (PERCOCET)  mg per tablet Take 1 tablet by mouth every 4 (four) hours as needed for Pain.      pantoprazole (PROTONIX) 40 MG tablet TK 1 T PO BID      polyethylene glycol (GLYCOLAX) 17 gram PwPk Take 17 g by mouth daily as needed.       risperiDONE (RISPERDAL) 0.25 MG Tab Take 1 tablet (0.25 mg total) by mouth 2 (two) times daily. 60 tablet 11    venlafaxine (EFFEXOR) 25 MG Tab Take by mouth 2 (two) times daily.       No facility-administered encounter medications on file as of 12/11/2020.      No orders of the defined types were placed in this encounter.      Assessment and Plan:     1. Pulmonary emphysema, unspecified emphysema type     2. Pulmonary nodules     3. Chronic respiratory failure with hypoxia       #COPD  Will severe obstruction per PFTs with 3 reported exacerbations {whe this year treated with antibiotics, steroids.  Previously on Breo with good response. Will prescribe ICS/LABA/LAMA: Tess Elizabeth.   Reports that she has a nebulizer machine.   Will also prescribe DuoNebs for her nebulizer machine.     #Chronic Hypoxic Respiratory Failure  Current on 2LPM NC with sats 98%. 6MWT 83% on RA with recovery to 98% on 2LPM NC.   Patient is a candidate from home oxygen. Will refer to Pulmonary Rehab to improve quality of life.     #Pulmonary Pulmonary nodules noted on CT scan from 01/2019:  Bilateral scattered pulmonary nodules are seen. Largest of these measure 1.1 cm within the right upper lobe and 0.6 cm within the right lower lobe. Will repeat CT Chest.     #General Wellness  Patient has other concerns including joint pain, etc. Currently not in care with a primary care provider. Will place IM referral for PCP.    RTC   2months        This case was discussed with staff, Dr. Petra Yeung.           Felicia Ramirez M.D., PGY - V  [employer] Pulmonary/Critical Care Fellow

## 2020-12-12 NOTE — PROCEDURES
Elif Archibald is a 55 y.o.  female patient, who presents for a 6 minute walk test ordered by MD James.  The diagnosis is Shortness of Breath; COPD/Emphysema.  The patient's BMI is 24.2 kg/m2. Predicted distance (lower limit of normal) is 406.34 meters.    Test Results:    The test was completed without stopping.  The total time walked was 360 seconds.  During walking, the patient reported:  Dyspnea, Leg pain, Other (Comment)(weakness). The patient used a wheelchair for assistance and supplemental oxygen during testing.     12/11/2020---------Distance: 243.84 meters (800 feet)     O2 Sat % Supplemental Oxygen Heart Rate Blood Pressure Lisette Scale   Pre-exercise  (Resting) 83 % Room Air 95 bpm 156/88 mmHg 5-6   Pre-exercise  (Resting) 98 % 2 L/M 69 bpm 125/72 mmHg 0.5   During Exercise 92 % 2 L/M 91 bpm 147/85 mmHg 4   Post-exercise   96 % 2 L/M  78 bpm 137/80 mmHg      Recovery Time: 175 seconds    Performing nurse/tech: Montez FERNANDEZ      PREVIOUS STUDY:   11/16/2020---------Distance: 121.92 meters (400 feet)       O2 Sat % Supplemental Oxygen Heart Rate Blood Pressure Lisette Scale   Pre-exercise  (Resting) 99 % 2 L/M 62 bpm 127/63 mmHg 0.5   During Exercise   99 % 2 L/M 77 bpm 135/79 mmHg 5-6   Post-exercise  (Recovery) 98 % 2 L/M  64 bpm   mmHg        CLINICAL INTERPRETATION:  Six minute walk distance is 243.84 meters (800 feet) with somewhat heavy dyspnea.  At rest, there was significant desaturation while breathing room air.  During exercise, there was significant desaturation while breathing supplemental oxygen.  Both blood pressure and heart rate increased significantly with walking.  The patient reported non-pulmonary symptoms during exercise.  Significant exercise impairment is likely due to desaturation and subjective symptoms.  The patient did complete the study, walking 360 seconds of the 360 second test.  The patient may benefit from using supplemental oxygen.  Since the previous study in November  2020, exercise capacity is significantly improved.  Based upon age and body mass index, exercise capacity is less than predicted.   Oxygen saturation did improve while breathing supplemental oxygen.

## 2020-12-16 ENCOUNTER — TELEPHONE (OUTPATIENT)
Dept: PULMONOLOGY | Facility: CLINIC | Age: 55
End: 2020-12-16

## 2020-12-16 NOTE — TELEPHONE ENCOUNTER
Spoke with patient, informed her that I have received her message. I advised patient that I can reschedule her CT Scan appointment in [facility] on 12/18/20 for 8:45 am. Patient verbalized that she understand and excepted the appointment.

## 2020-12-18 ENCOUNTER — HOSPITAL ENCOUNTER (OUTPATIENT)
Dept: RADIOLOGY | Facility: HOSPITAL | Age: 55
Discharge: HOME | End: 2020-12-18
Attending: INTERNAL MEDICINE
Payer: MEDICAID

## 2020-12-18 DIAGNOSIS — R91.8 PULMONARY NODULES: ICD-10-CM

## 2020-12-18 PROCEDURE — 71250 CT THORAX DX C-: CPT | Mod: TC,PO

## 2020-12-25 ENCOUNTER — PATIENT MESSAGE (OUTPATIENT)
Dept: ADMINISTRATIVE | Facility: OTHER | Age: 55
End: 2020-12-25

## 2020-12-28 ENCOUNTER — TELEPHONE (OUTPATIENT)
Dept: PULMONOLOGY | Facility: CLINIC | Age: 55
End: 2020-12-28

## 2020-12-28 NOTE — TELEPHONE ENCOUNTER
"Spoke with patient daughter in law (Ms son) informed her that I have received her message. Patient daughter in [place] states that patient is out of her medication "diazepam 2 mg" and "Duloxetine 30 mg". Patient daughter in law also states that patient has been feeling weakness in her legs, patient had a fall on last night and bruised her lower back. Patient daughter in law also states that patient has fell (5) times in the last 2 months, patient hurt her wrist, foot, and back during these falls. Patient also had a gash in her head from one of the falls. Patient daughter in law states that Dr Ramirez stated that she would refer patient to a Primary Care Physician but has not heard anything from Dr Ramirez. I verbalized to patient daughter in law that I understand and advised patient daughter in law that I will forward her message to Dr Ramirez to review/advise. Patient daughter in law verbalized that she understand.  "

## 2020-12-28 NOTE — TELEPHONE ENCOUNTER
----- Message from Deondre Zelaya sent at 12/28/2020 10:12 AM CST -----  Regarding: Rx's needed      The Caller(Pt's daughter Georgie) states that the Pt could really use some help out with two of her Rx's- 1st is- diazePAM (VALIUM) 2 MG tablet 's and 2nd is- Duloxetine 30 MG.      The Pt's appt isn't until next month with a new PCP and really needs a little help to get some relief.    Phone # [phone]

## 2020-12-29 ENCOUNTER — TELEPHONE (OUTPATIENT)
Dept: PULMONOLOGY | Facility: CLINIC | Age: 55
End: 2020-12-29

## 2020-12-29 NOTE — TELEPHONE ENCOUNTER
Spoke with patient  daughter in law  in regards to patient  referral that was placed by .  I informed patient daughter Internal medicine will reach out to get  an appointment.  I also informed  to reach out to patient PCP in regards to medication refills.   I also informed  to bring  to the [facility] ED if she isn't feeling well.  Both  and  verbalized they understand.

## 2020-12-29 NOTE — TELEPHONE ENCOUNTER
----- Message from Noemi Oconnor sent at 12/29/2020  3:46 PM CST -----  Pt states she would like to speak with nurse

## 2021-01-24 ENCOUNTER — HOSPITAL ENCOUNTER (EMERGENCY)
Facility: HOSPITAL | Age: 56
Discharge: HOME | End: 2021-01-24
Attending: EMERGENCY MEDICINE
Payer: MEDICAID

## 2021-01-24 VITALS
HEART RATE: 73 BPM | SYSTOLIC BLOOD PRESSURE: 137 MMHG | RESPIRATION RATE: 18 BRPM | TEMPERATURE: 98 F | HEIGHT: 62 IN | BODY MASS INDEX: 21.9 KG/M2 | DIASTOLIC BLOOD PRESSURE: 75 MMHG | WEIGHT: 119 LBS | OXYGEN SATURATION: 100 %

## 2021-01-24 DIAGNOSIS — E87.5 HYPERKALEMIA: ICD-10-CM

## 2021-01-24 DIAGNOSIS — R53.1 GENERALIZED WEAKNESS: Primary | ICD-10-CM

## 2021-01-24 DIAGNOSIS — R53.1 WEAKNESS: ICD-10-CM

## 2021-01-24 DIAGNOSIS — E87.1 HYPONATREMIA: ICD-10-CM

## 2021-01-24 LAB
ALBUMIN SERPL BCP-MCNC: 4.3 G/DL (ref 3.5–5.2)
ALP SERPL-CCNC: 47 U/L (ref 38–126)
ALT SERPL W/O P-5'-P-CCNC: 55 U/L (ref 10–44)
ANION GAP SERPL CALC-SCNC: 3 MMOL/L (ref 8–16)
AST SERPL-CCNC: 53 U/L (ref 15–46)
BASOPHILS # BLD AUTO: 0.02 K/UL (ref 0–0.2)
BASOPHILS NFR BLD: 0.3 % (ref 0–1.9)
BILIRUB SERPL-MCNC: 0.7 MG/DL (ref 0.1–1)
BILIRUB UR QL STRIP: NEGATIVE
CALCIUM SERPL-MCNC: 9.1 MG/DL (ref 8.7–10.5)
CHLORIDE SERPL-SCNC: 91 MMOL/L (ref 95–110)
CLARITY UR REFRACT.AUTO: CLEAR
CO2 SERPL-SCNC: 37 MMOL/L (ref 23–29)
COLOR UR AUTO: YELLOW
CREAT SERPL-MCNC: 0.52 MG/DL (ref 0.5–1.4)
DIFFERENTIAL METHOD: NORMAL
EOSINOPHIL # BLD AUTO: 0.2 K/UL (ref 0–0.5)
EOSINOPHIL NFR BLD: 2.2 % (ref 0–8)
ERYTHROCYTE [DISTWIDTH] IN BLOOD BY AUTOMATED COUNT: 11.9 % (ref 11.5–14.5)
EST. GFR  (AFRICAN AMERICAN): >60 ML/MIN/1.73 M^2
EST. GFR  (NON AFRICAN AMERICAN): >60 ML/MIN/1.73 M^2
GLUCOSE SERPL-MCNC: 90 MG/DL (ref 70–110)
GLUCOSE UR QL STRIP: NEGATIVE
HCT VFR BLD AUTO: 38.4 % (ref 37–48.5)
HGB BLD-MCNC: 12.4 G/DL (ref 12–16)
HGB UR QL STRIP: NEGATIVE
IMM GRANULOCYTES # BLD AUTO: 0.02 K/UL (ref 0–0.04)
IMM GRANULOCYTES NFR BLD AUTO: 0.3 % (ref 0–0.5)
KETONES UR QL STRIP: NEGATIVE
LEUKOCYTE ESTERASE UR QL STRIP: NEGATIVE
LYMPHOCYTES # BLD AUTO: 2 K/UL (ref 1–4.8)
LYMPHOCYTES NFR BLD: 26.6 % (ref 18–48)
MAGNESIUM SERPL-MCNC: 1.8 MG/DL (ref 1.6–2.6)
MCH RBC QN AUTO: 29.7 PG (ref 27–31)
MCHC RBC AUTO-ENTMCNC: 32.3 G/DL (ref 32–36)
MCV RBC AUTO: 92 FL (ref 82–98)
MONOCYTES # BLD AUTO: 0.6 K/UL (ref 0.3–1)
MONOCYTES NFR BLD: 7.9 % (ref 4–15)
NEUTROPHILS # BLD AUTO: 4.6 K/UL (ref 1.8–7.7)
NEUTROPHILS NFR BLD: 62.7 % (ref 38–73)
NITRITE UR QL STRIP: NEGATIVE
NRBC BLD-RTO: 0 /100 WBC
PH UR STRIP: 7 [PH] (ref 5–8)
PLATELET # BLD AUTO: 174 K/UL (ref 150–350)
PMV BLD AUTO: 9.3 FL (ref 9.2–12.9)
POTASSIUM SERPL-SCNC: 5.2 MMOL/L (ref 3.5–5.1)
PROT SERPL-MCNC: 8.1 G/DL (ref 6–8.4)
PROT UR QL STRIP: NEGATIVE
RBC # BLD AUTO: 4.18 M/UL (ref 4–5.4)
SARS-COV-2 RDRP RESP QL NAA+PROBE: NEGATIVE
SODIUM SERPL-SCNC: 131 MMOL/L (ref 136–145)
SP GR UR STRIP: 1.01 (ref 1–1.03)
TROPONIN I SERPL-MCNC: <0.012 NG/ML (ref 0.01–0.03)
URN SPEC COLLECT METH UR: NORMAL
UROBILINOGEN UR STRIP-ACNC: NEGATIVE EU/DL
UUN UR-MCNC: 8 MG/DL (ref 7–17)
WBC # BLD AUTO: 7.38 K/UL (ref 3.9–12.7)

## 2021-01-24 PROCEDURE — 83735 ASSAY OF MAGNESIUM: CPT | Mod: ER

## 2021-01-24 PROCEDURE — 25000003 PHARM REV CODE 250: Mod: ER | Performed by: PHYSICIAN ASSISTANT

## 2021-01-24 PROCEDURE — 27000221 HC OXYGEN, UP TO 24 HOURS: Mod: ER

## 2021-01-24 PROCEDURE — P9612 CATHETERIZE FOR URINE SPEC: HCPCS | Mod: ER

## 2021-01-24 PROCEDURE — 99285 EMERGENCY DEPT VISIT HI MDM: CPT | Mod: 25,ER

## 2021-01-24 PROCEDURE — 81003 URINALYSIS AUTO W/O SCOPE: CPT | Mod: ER

## 2021-01-24 PROCEDURE — 80053 COMPREHEN METABOLIC PANEL: CPT | Mod: ER

## 2021-01-24 PROCEDURE — 63600175 PHARM REV CODE 636 W HCPCS: Mod: ER | Performed by: PHYSICIAN ASSISTANT

## 2021-01-24 PROCEDURE — 84484 ASSAY OF TROPONIN QUANT: CPT | Mod: ER

## 2021-01-24 PROCEDURE — 85025 COMPLETE CBC W/AUTO DIFF WBC: CPT | Mod: ER

## 2021-01-24 PROCEDURE — 96360 HYDRATION IV INFUSION INIT: CPT | Mod: ER

## 2021-01-24 PROCEDURE — 93010 EKG 12-LEAD: ICD-10-PCS | Mod: ,,, | Performed by: INTERNAL MEDICINE

## 2021-01-24 PROCEDURE — 93010 ELECTROCARDIOGRAM REPORT: CPT | Mod: ,,, | Performed by: INTERNAL MEDICINE

## 2021-01-24 PROCEDURE — 93005 ELECTROCARDIOGRAM TRACING: CPT | Mod: ER

## 2021-01-24 PROCEDURE — U0002 COVID-19 LAB TEST NON-CDC: HCPCS | Mod: ER

## 2021-01-24 RX ADMIN — SODIUM CHLORIDE 500 ML: 0.9 INJECTION, SOLUTION INTRAVENOUS at 04:01

## 2021-01-24 RX ADMIN — SODIUM CHLORIDE, SODIUM LACTATE, POTASSIUM CHLORIDE, AND CALCIUM CHLORIDE 500 ML: .6; .31; .03; .02 INJECTION, SOLUTION INTRAVENOUS at 03:01

## 2021-01-28 ENCOUNTER — TELEPHONE (OUTPATIENT)
Dept: ADMINISTRATIVE | Facility: OTHER | Age: 56
End: 2021-01-28

## 2021-02-24 ENCOUNTER — TELEPHONE (OUTPATIENT)
Dept: PULMONOLOGY | Facility: CLINIC | Age: 56
End: 2021-02-24

## 2021-03-01 ENCOUNTER — TELEPHONE (OUTPATIENT)
Dept: PULMONOLOGY | Facility: CLINIC | Age: 56
End: 2021-03-01

## 2021-03-05 ENCOUNTER — IMMUNIZATION (OUTPATIENT)
Dept: INTERNAL MEDICINE | Facility: CLINIC | Age: 56
End: 2021-03-05
Payer: MEDICAID

## 2021-03-05 PROCEDURE — 90471 IMMUNIZATION ADMIN: CPT | Mod: PBBFAC

## 2021-03-18 ENCOUNTER — TELEPHONE (OUTPATIENT)
Dept: PULMONOLOGY | Facility: CLINIC | Age: 56
End: 2021-03-18

## 2021-03-26 ENCOUNTER — HOSPITAL ENCOUNTER (EMERGENCY)
Facility: HOSPITAL | Age: 56
Discharge: HOME | End: 2021-03-26
Attending: EMERGENCY MEDICINE
Payer: MEDICAID

## 2021-03-26 VITALS
TEMPERATURE: 98 F | RESPIRATION RATE: 19 BRPM | SYSTOLIC BLOOD PRESSURE: 137 MMHG | HEART RATE: 66 BPM | DIASTOLIC BLOOD PRESSURE: 90 MMHG | WEIGHT: 130 LBS | BODY MASS INDEX: 23.78 KG/M2 | OXYGEN SATURATION: 100 %

## 2021-03-26 DIAGNOSIS — S39.92XA TAILBONE INJURY: ICD-10-CM

## 2021-03-26 DIAGNOSIS — S16.1XXA STRAIN OF NECK MUSCLE, INITIAL ENCOUNTER: ICD-10-CM

## 2021-03-26 DIAGNOSIS — S60.041A CONTUSION OF RIGHT RING FINGER WITHOUT DAMAGE TO NAIL, INITIAL ENCOUNTER: ICD-10-CM

## 2021-03-26 DIAGNOSIS — S09.90XA INJURY OF HEAD, INITIAL ENCOUNTER: ICD-10-CM
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
## 2021-03-26 PROCEDURE — 96372 THER/PROPH/DIAG INJ SC/IM: CPT | Mod: ER

## 2021-03-26 PROCEDURE — 63600175 PHARM REV CODE 636 W HCPCS: Mod: ER | Performed by: PHYSICIAN ASSISTANT

## 2021-03-26 PROCEDURE — 99284 EMERGENCY DEPT VISIT MOD MDM: CPT | Mod: 25,ER

## 2021-03-26 RX ORDER — LIDOCAINE 50 MG/G
1 PATCH TOPICAL DAILY
Qty: 15 PATCH | Refills: 0 | Status: SHIPPED | OUTPATIENT
Start: 2021-03-26 | End: 2021-04-21

## 2021-03-26 RX ORDER — NAPROXEN 500 MG/1
500 TABLET ORAL 2 TIMES DAILY WITH MEALS
Qty: 14 TABLET | Refills: 0 | Status: SHIPPED | OUTPATIENT
Start: 2021-03-26 | End: 2021-04-21

## 2021-03-26 RX ORDER — KETOROLAC TROMETHAMINE 30 MG/ML
30 INJECTION, SOLUTION INTRAMUSCULAR; INTRAVENOUS
Status: COMPLETED | OUTPATIENT
Start: 2021-03-26 | End: 2021-03-26

## 2021-03-26 RX ADMIN — KETOROLAC TROMETHAMINE 30 MG: 30 INJECTION, SOLUTION INTRAMUSCULAR at 09:03

## 2021-04-09 ENCOUNTER — OFFICE VISIT (OUTPATIENT)
Dept: PULMONOLOGY | Facility: CLINIC | Age: 56
End: 2021-04-09
Payer: MEDICAID

## 2021-04-09 VITALS
BODY MASS INDEX: 21.86 KG/M2 | HEART RATE: 86 BPM | HEIGHT: 62 IN | OXYGEN SATURATION: 97 % | WEIGHT: 118.81 LBS | SYSTOLIC BLOOD PRESSURE: 132 MMHG | DIASTOLIC BLOOD PRESSURE: 82 MMHG

## 2021-04-09 DIAGNOSIS — J44.9 CHRONIC OBSTRUCTIVE PULMONARY DISEASE, UNSPECIFIED COPD TYPE: Primary | ICD-10-CM

## 2021-04-09 DIAGNOSIS — J96.11 CHRONIC RESPIRATORY FAILURE WITH HYPOXIA: ICD-10-CM

## 2021-04-09 PROCEDURE — 99214 PR OFFICE/OUTPT VISIT, EST, LEVL IV, 30-39 MIN: ICD-10-PCS | Mod: S$PBB,,, | Performed by: INTERNAL MEDICINE

## 2021-04-09 PROCEDURE — 99214 OFFICE O/P EST MOD 30 MIN: CPT | Mod: S$PBB,,, | Performed by: INTERNAL MEDICINE

## 2021-04-09 PROCEDURE — 99214 OFFICE O/P EST MOD 30 MIN: CPT | Mod: PBBFAC | Performed by: INTERNAL MEDICINE

## 2021-04-09 RX ORDER — TIOTROPIUM BROMIDE 18 UG/1
18 CAPSULE ORAL; RESPIRATORY (INHALATION) DAILY
Qty: 90 CAPSULE | Refills: 3 | Status: SHIPPED | OUTPATIENT
Start: 2021-04-09 | End: 2021-10-20 | Stop reason: SDUPTHER

## 2021-04-09 RX ORDER — BUDESONIDE AND FORMOTEROL FUMARATE DIHYDRATE 160; 4.5 UG/1; UG/1
2 AEROSOL RESPIRATORY (INHALATION) EVERY 12 HOURS
Qty: 1 INHALER | Refills: 3 | Status: SHIPPED | OUTPATIENT
Start: 2021-04-09 | End: 2021-10-19 | Stop reason: SDUPTHER

## 2021-04-09 RX ORDER — CLONAZEPAM 0.5 MG/1
0.5 TABLET ORAL 2 TIMES DAILY
COMMUNITY
Start: 2021-02-22

## 2021-04-09 RX ORDER — LEVETIRACETAM 100 MG/ML
SOLUTION ORAL
COMMUNITY
End: 2021-04-21

## 2021-04-13 ENCOUNTER — TELEPHONE (OUTPATIENT)
Dept: PULMONOLOGY | Facility: CLINIC | Age: 56
End: 2021-04-13

## 2021-04-21 ENCOUNTER — OFFICE VISIT (OUTPATIENT)
Dept: FAMILY MEDICINE | Facility: HOSPITAL | Age: 56
End: 2021-04-21
Attending: SPECIALIST
Payer: MEDICAID

## 2021-04-21 ENCOUNTER — LAB VISIT (OUTPATIENT)
Dept: LAB | Facility: HOSPITAL | Age: 56
End: 2021-04-21
Attending: SPECIALIST
Payer: MEDICAID

## 2021-04-21 VITALS
HEIGHT: 60 IN | WEIGHT: 119.94 LBS | SYSTOLIC BLOOD PRESSURE: 124 MMHG | DIASTOLIC BLOOD PRESSURE: 97 MMHG | HEART RATE: 83 BPM | BODY MASS INDEX: 23.55 KG/M2

## 2021-04-21 DIAGNOSIS — R25.1 TREMOR: ICD-10-CM

## 2021-04-21 DIAGNOSIS — J96.11 CHRONIC RESPIRATORY FAILURE WITH HYPOXIA: ICD-10-CM

## 2021-04-21 DIAGNOSIS — K74.60 HEPATIC CIRRHOSIS, UNSPECIFIED HEPATIC CIRRHOSIS TYPE, UNSPECIFIED WHETHER ASCITES PRESENT: ICD-10-CM

## 2021-04-21 DIAGNOSIS — J44.9 CHRONIC OBSTRUCTIVE PULMONARY DISEASE, UNSPECIFIED COPD TYPE: Primary | ICD-10-CM

## 2021-04-21 DIAGNOSIS — R53.81 DEBILITY: ICD-10-CM

## 2021-04-21 DIAGNOSIS — Z11.4 ENCOUNTER FOR SCREENING FOR HIV: ICD-10-CM

## 2021-04-21 DIAGNOSIS — B35.1 ONYCHOMYCOSIS: ICD-10-CM

## 2021-04-21 DIAGNOSIS — Z12.31 ENCOUNTER FOR SCREENING MAMMOGRAM FOR BREAST CANCER: ICD-10-CM

## 2021-04-21 DIAGNOSIS — R52 DIFFUSE PAIN: ICD-10-CM

## 2021-04-21 DIAGNOSIS — Z11.59 ENCOUNTER FOR HEPATITIS C SCREENING TEST FOR LOW RISK PATIENT: ICD-10-CM

## 2021-04-21 DIAGNOSIS — Z86.79 HX OF SUBDURAL HEMATOMA: ICD-10-CM

## 2021-04-21 PROCEDURE — 99215 OFFICE O/P EST HI 40 MIN: CPT | Performed by: STUDENT IN AN ORGANIZED HEALTH CARE EDUCATION/TRAINING PROGRAM

## 2021-04-21 RX ORDER — PRAMIPEXOLE DIHYDROCHLORIDE 0.12 MG/1
.125-.25 TABLET ORAL NIGHTLY
COMMUNITY
Start: 2021-03-23 | End: 2021-04-21

## 2021-04-21 RX ORDER — DICLOFENAC SODIUM 10 MG/G
2 GEL TOPICAL 4 TIMES DAILY PRN
Qty: 100 G | Refills: 0 | Status: SHIPPED | OUTPATIENT
Start: 2021-04-21 | End: 2021-11-26 | Stop reason: SDUPTHER

## 2021-04-21 RX ORDER — NAPROXEN 500 MG/1
500 TABLET ORAL 2 TIMES DAILY
Qty: 60 TABLET | Refills: 1 | Status: SHIPPED | OUTPATIENT
Start: 2021-04-21 | End: 2021-11-11

## 2021-04-21 RX ORDER — HYDROXYZINE PAMOATE 25 MG/1
25 CAPSULE ORAL 3 TIMES DAILY PRN
COMMUNITY
Start: 2021-03-18 | End: 2021-04-21

## 2021-04-21 RX ORDER — CICLOPIROX OLAMINE 7.7 MG/100ML
SUSPENSION TOPICAL 2 TIMES DAILY
Qty: 60 ML | Refills: 1 | Status: SHIPPED | OUTPATIENT
Start: 2021-04-21 | End: 2021-12-28

## 2021-04-21 RX ORDER — LIDOCAINE 50 MG/G
1 PATCH TOPICAL DAILY
Qty: 30 PATCH | Refills: 0 | Status: SHIPPED | OUTPATIENT
Start: 2021-04-21 | End: 2021-06-14 | Stop reason: SDUPTHER

## 2021-04-21 RX ORDER — AMITRIPTYLINE HYDROCHLORIDE 50 MG/1
50 TABLET, FILM COATED ORAL NIGHTLY
COMMUNITY
Start: 2021-04-11 | End: 2022-02-08

## 2021-05-10 RX ORDER — LEVETIRACETAM 500 MG/1
500 TABLET ORAL 2 TIMES DAILY
Qty: 60 TABLET | Refills: 3 | Status: SHIPPED | OUTPATIENT
Start: 2021-05-10 | End: 2021-06-14 | Stop reason: SDUPTHER

## 2021-09-30 ENCOUNTER — TELEPHONE (OUTPATIENT)
Dept: OBSTETRICS AND GYNECOLOGY | Facility: CLINIC | Age: 56
End: 2021-09-30

## 2021-10-13 ENCOUNTER — TELEPHONE (OUTPATIENT)
Dept: PULMONOLOGY | Facility: CLINIC | Age: 56
End: 2021-10-13

## 2021-10-19 ENCOUNTER — TELEPHONE (OUTPATIENT)
Dept: CRITICAL CARE MEDICINE | Facility: HOSPITAL | Age: 56
End: 2021-10-19

## 2021-10-20 RX ORDER — TIOTROPIUM BROMIDE 18 UG/1
18 CAPSULE ORAL; RESPIRATORY (INHALATION) DAILY
Qty: 90 CAPSULE | Refills: 3 | Status: SHIPPED | OUTPATIENT
Start: 2021-10-20 | End: 2021-11-04 | Stop reason: SDUPTHER

## 2021-10-20 RX ORDER — IPRATROPIUM BROMIDE AND ALBUTEROL SULFATE 2.5; .5 MG/3ML; MG/3ML
3 SOLUTION RESPIRATORY (INHALATION) EVERY 6 HOURS PRN
Qty: 90 EACH | Refills: 3 | Status: SHIPPED | OUTPATIENT
Start: 2021-10-20 | End: 2021-11-26 | Stop reason: SDUPTHER

## 2021-10-20 RX ORDER — BUDESONIDE AND FORMOTEROL FUMARATE DIHYDRATE 160; 4.5 UG/1; UG/1
2 AEROSOL RESPIRATORY (INHALATION) EVERY 12 HOURS
Qty: 1 INHALER | Refills: 3 | Status: SHIPPED | OUTPATIENT
Start: 2021-10-20 | End: 2021-11-04 | Stop reason: SDUPTHER

## 2021-10-21 ENCOUNTER — TELEPHONE (OUTPATIENT)
Dept: PULMONOLOGY | Facility: CLINIC | Age: 56
End: 2021-10-21

## 2021-11-04 DIAGNOSIS — J96.11 CHRONIC RESPIRATORY FAILURE WITH HYPOXIA: ICD-10-CM

## 2021-11-04 DIAGNOSIS — J44.9 CHRONIC OBSTRUCTIVE PULMONARY DISEASE, UNSPECIFIED COPD TYPE: Primary | ICD-10-CM

## 2021-11-05 RX ORDER — TIOTROPIUM BROMIDE 18 UG/1
18 CAPSULE ORAL; RESPIRATORY (INHALATION) DAILY
Qty: 90 CAPSULE | Refills: 3 | Status: SHIPPED | OUTPATIENT
Start: 2021-11-05 | End: 2021-11-11 | Stop reason: SDUPTHER

## 2021-11-05 RX ORDER — BUDESONIDE AND FORMOTEROL FUMARATE DIHYDRATE 160; 4.5 UG/1; UG/1
2 AEROSOL RESPIRATORY (INHALATION) EVERY 12 HOURS
Qty: 10.2 G | Refills: 11 | Status: SHIPPED | OUTPATIENT
Start: 2021-11-05 | End: 2021-11-11 | Stop reason: SDUPTHER

## 2021-11-11 ENCOUNTER — LAB VISIT (OUTPATIENT)
Dept: LAB | Facility: HOSPITAL | Age: 56
End: 2021-11-11
Attending: STUDENT IN AN ORGANIZED HEALTH CARE EDUCATION/TRAINING PROGRAM
Payer: MEDICAID

## 2021-11-11 ENCOUNTER — OFFICE VISIT (OUTPATIENT)
Dept: FAMILY MEDICINE | Facility: HOSPITAL | Age: 56
End: 2021-11-11
Payer: MEDICAID

## 2021-11-11 VITALS
SYSTOLIC BLOOD PRESSURE: 113 MMHG | HEIGHT: 63 IN | BODY MASS INDEX: 22.38 KG/M2 | WEIGHT: 126.31 LBS | DIASTOLIC BLOOD PRESSURE: 77 MMHG | HEART RATE: 84 BPM

## 2021-11-11 DIAGNOSIS — J96.11 CHRONIC RESPIRATORY FAILURE WITH HYPOXIA: ICD-10-CM

## 2021-11-11 DIAGNOSIS — R53.81 DEBILITY: ICD-10-CM

## 2021-11-11 DIAGNOSIS — Z23 NEED FOR PROPHYLACTIC VACCINATION AND INOCULATION AGAINST INFLUENZA: Primary | ICD-10-CM

## 2021-11-11 DIAGNOSIS — Z11.4 ENCOUNTER FOR SCREENING FOR HIV: ICD-10-CM

## 2021-11-11 DIAGNOSIS — Z11.59 ENCOUNTER FOR HEPATITIS C SCREENING TEST FOR LOW RISK PATIENT: ICD-10-CM

## 2021-11-11 DIAGNOSIS — R25.1 TREMOR: ICD-10-CM

## 2021-11-11 DIAGNOSIS — Z86.79 HX OF SUBDURAL HEMATOMA: ICD-10-CM

## 2021-11-11 DIAGNOSIS — J44.9 CHRONIC OBSTRUCTIVE PULMONARY DISEASE, UNSPECIFIED COPD TYPE: ICD-10-CM

## 2021-11-11 DIAGNOSIS — R52 DIFFUSE PAIN: ICD-10-CM

## 2021-11-11 LAB
ALBUMIN SERPL BCP-MCNC: 3.7 G/DL (ref 3.5–5.2)
ALP SERPL-CCNC: 52 U/L (ref 55–135)
ALT SERPL W/O P-5'-P-CCNC: 28 U/L (ref 10–44)
ANION GAP SERPL CALC-SCNC: 9 MMOL/L (ref 8–16)
AST SERPL-CCNC: 29 U/L (ref 10–40)
BASOPHILS # BLD AUTO: 0.02 K/UL (ref 0–0.2)
BASOPHILS NFR BLD: 0.3 % (ref 0–1.9)
BILIRUB SERPL-MCNC: 0.2 MG/DL (ref 0.1–1)
BUN SERPL-MCNC: 10 MG/DL (ref 6–20)
CALCIUM SERPL-MCNC: 9.5 MG/DL (ref 8.7–10.5)
CHLORIDE SERPL-SCNC: 94 MMOL/L (ref 95–110)
CHOLEST SERPL-MCNC: 187 MG/DL (ref 120–199)
CHOLEST/HDLC SERPL: 2.8 {RATIO} (ref 2–5)
CO2 SERPL-SCNC: 31 MMOL/L (ref 23–29)
CREAT SERPL-MCNC: 0.8 MG/DL (ref 0.5–1.4)
DIFFERENTIAL METHOD: ABNORMAL
EOSINOPHIL # BLD AUTO: 0.2 K/UL (ref 0–0.5)
EOSINOPHIL NFR BLD: 3 % (ref 0–8)
ERYTHROCYTE [DISTWIDTH] IN BLOOD BY AUTOMATED COUNT: 12.6 % (ref 11.5–14.5)
EST. GFR  (AFRICAN AMERICAN): >60 ML/MIN/1.73 M^2
EST. GFR  (NON AFRICAN AMERICAN): >60 ML/MIN/1.73 M^2
ESTIMATED AVG GLUCOSE: 97 MG/DL (ref 68–131)
GLUCOSE SERPL-MCNC: 74 MG/DL (ref 70–110)
HBA1C MFR BLD: 5 % (ref 4–5.6)
HCT VFR BLD AUTO: 38 % (ref 37–48.5)
HDLC SERPL-MCNC: 68 MG/DL (ref 40–75)
HDLC SERPL: 36.4 % (ref 20–50)
HGB BLD-MCNC: 12.2 G/DL (ref 12–16)
IMM GRANULOCYTES # BLD AUTO: 0.02 K/UL (ref 0–0.04)
IMM GRANULOCYTES NFR BLD AUTO: 0.3 % (ref 0–0.5)
LDLC SERPL CALC-MCNC: 100.6 MG/DL (ref 63–159)
LYMPHOCYTES # BLD AUTO: 1.3 K/UL (ref 1–4.8)
LYMPHOCYTES NFR BLD: 20.7 % (ref 18–48)
MCH RBC QN AUTO: 27.7 PG (ref 27–31)
MCHC RBC AUTO-ENTMCNC: 32.1 G/DL (ref 32–36)
MCV RBC AUTO: 86 FL (ref 82–98)
MONOCYTES # BLD AUTO: 0.6 K/UL (ref 0.3–1)
MONOCYTES NFR BLD: 8.8 % (ref 4–15)
NEUTROPHILS # BLD AUTO: 4.3 K/UL (ref 1.8–7.7)
NEUTROPHILS NFR BLD: 66.9 % (ref 38–73)
NONHDLC SERPL-MCNC: 119 MG/DL
NRBC BLD-RTO: 0 /100 WBC
PLATELET # BLD AUTO: 189 K/UL (ref 150–450)
PMV BLD AUTO: 8.3 FL (ref 9.2–12.9)
POTASSIUM SERPL-SCNC: 4.5 MMOL/L (ref 3.5–5.1)
PROT SERPL-MCNC: 8.1 G/DL (ref 6–8.4)
RBC # BLD AUTO: 4.41 M/UL (ref 4–5.4)
SODIUM SERPL-SCNC: 134 MMOL/L (ref 136–145)
TRIGL SERPL-MCNC: 92 MG/DL (ref 30–150)
TSH SERPL DL<=0.005 MIU/L-ACNC: 2.38 UIU/ML (ref 0.4–4)
WBC # BLD AUTO: 6.37 K/UL (ref 3.9–12.7)

## 2021-11-11 PROCEDURE — 83036 HEMOGLOBIN GLYCOSYLATED A1C: CPT | Performed by: STUDENT IN AN ORGANIZED HEALTH CARE EDUCATION/TRAINING PROGRAM

## 2021-11-11 PROCEDURE — 87389 HIV-1 AG W/HIV-1&-2 AB AG IA: CPT | Performed by: STUDENT IN AN ORGANIZED HEALTH CARE EDUCATION/TRAINING PROGRAM

## 2021-11-11 PROCEDURE — 80061 LIPID PANEL: CPT | Performed by: STUDENT IN AN ORGANIZED HEALTH CARE EDUCATION/TRAINING PROGRAM

## 2021-11-11 PROCEDURE — 99214 OFFICE O/P EST MOD 30 MIN: CPT | Performed by: STUDENT IN AN ORGANIZED HEALTH CARE EDUCATION/TRAINING PROGRAM

## 2021-11-11 PROCEDURE — 36415 COLL VENOUS BLD VENIPUNCTURE: CPT | Performed by: STUDENT IN AN ORGANIZED HEALTH CARE EDUCATION/TRAINING PROGRAM

## 2021-11-11 PROCEDURE — 80053 COMPREHEN METABOLIC PANEL: CPT | Performed by: STUDENT IN AN ORGANIZED HEALTH CARE EDUCATION/TRAINING PROGRAM

## 2021-11-11 PROCEDURE — 90471 IMMUNIZATION ADMIN: CPT

## 2021-11-11 PROCEDURE — 85025 COMPLETE CBC W/AUTO DIFF WBC: CPT | Performed by: STUDENT IN AN ORGANIZED HEALTH CARE EDUCATION/TRAINING PROGRAM

## 2021-11-11 PROCEDURE — 86803 HEPATITIS C AB TEST: CPT | Performed by: STUDENT IN AN ORGANIZED HEALTH CARE EDUCATION/TRAINING PROGRAM

## 2021-11-11 PROCEDURE — 84443 ASSAY THYROID STIM HORMONE: CPT | Performed by: STUDENT IN AN ORGANIZED HEALTH CARE EDUCATION/TRAINING PROGRAM

## 2021-11-11 RX ORDER — TIOTROPIUM BROMIDE 18 UG/1
18 CAPSULE ORAL; RESPIRATORY (INHALATION) DAILY
Qty: 90 CAPSULE | Refills: 3 | Status: SHIPPED | OUTPATIENT
Start: 2021-11-11 | End: 2022-01-11 | Stop reason: SDUPTHER

## 2021-11-11 RX ORDER — ALBUTEROL SULFATE 0.83 MG/ML
2.5 SOLUTION RESPIRATORY (INHALATION) EVERY 6 HOURS PRN
Qty: 1 EACH | Refills: 6 | Status: SHIPPED | OUTPATIENT
Start: 2021-11-11 | End: 2021-11-26 | Stop reason: SDUPTHER

## 2021-11-11 RX ORDER — NAPROXEN 500 MG/1
500 TABLET ORAL 2 TIMES DAILY
Qty: 60 TABLET | Refills: 2 | Status: SHIPPED | OUTPATIENT
Start: 2021-11-11 | End: 2021-12-28

## 2021-11-11 RX ORDER — BUDESONIDE AND FORMOTEROL FUMARATE DIHYDRATE 160; 4.5 UG/1; UG/1
2 AEROSOL RESPIRATORY (INHALATION) EVERY 12 HOURS
Qty: 10.2 G | Refills: 11 | Status: SHIPPED | OUTPATIENT
Start: 2021-11-11 | End: 2021-12-05 | Stop reason: SDUPTHER

## 2021-11-12 LAB
HCV AB SERPL QL IA: POSITIVE
HIV 1+2 AB+HIV1 P24 AG SERPL QL IA: NEGATIVE

## 2021-11-15 ENCOUNTER — TELEPHONE (OUTPATIENT)
Dept: FAMILY MEDICINE | Facility: HOSPITAL | Age: 56
End: 2021-11-15
Payer: MEDICAID

## 2021-11-15 ENCOUNTER — OFFICE VISIT (OUTPATIENT)
Dept: NEUROLOGY | Facility: CLINIC | Age: 56
End: 2021-11-15
Payer: MEDICAID

## 2021-11-15 VITALS
WEIGHT: 127 LBS | HEIGHT: 60 IN | DIASTOLIC BLOOD PRESSURE: 58 MMHG | BODY MASS INDEX: 24.94 KG/M2 | OXYGEN SATURATION: 95 % | SYSTOLIC BLOOD PRESSURE: 130 MMHG | HEART RATE: 79 BPM

## 2021-11-15 DIAGNOSIS — G40.909 SEIZURE DISORDER: ICD-10-CM

## 2021-11-15 DIAGNOSIS — Z86.79 HX OF SUBDURAL HEMATOMA: Primary | ICD-10-CM

## 2021-11-15 DIAGNOSIS — R25.1 TREMOR: ICD-10-CM

## 2021-11-15 DIAGNOSIS — R76.8 HEPATITIS C ANTIBODY POSITIVE IN BLOOD: Primary | ICD-10-CM

## 2021-11-15 DIAGNOSIS — G81.91 RIGHT HEMIPARESIS: ICD-10-CM

## 2021-11-15 PROBLEM — R65.20 SEVERE SEPSIS WITH ACUTE ORGAN DYSFUNCTION DUE TO GRAM-NEGATIVE BACTERIA: Status: RESOLVED | Noted: 2019-05-12 | Resolved: 2021-11-15

## 2021-11-15 PROBLEM — A41.50 SEVERE SEPSIS WITH ACUTE ORGAN DYSFUNCTION DUE TO GRAM-NEGATIVE BACTERIA: Status: RESOLVED | Noted: 2019-05-12 | Resolved: 2021-11-15

## 2021-11-15 PROCEDURE — 99999 PR PBB SHADOW E&M-EST. PATIENT-LVL V: ICD-10-PCS | Mod: PBBFAC,,, | Performed by: PHYSICIAN ASSISTANT

## 2021-11-15 PROCEDURE — 99999 PR PBB SHADOW E&M-EST. PATIENT-LVL V: CPT | Mod: PBBFAC,,, | Performed by: PHYSICIAN ASSISTANT

## 2021-11-15 PROCEDURE — 99214 OFFICE O/P EST MOD 30 MIN: CPT | Mod: S$PBB,,, | Performed by: PHYSICIAN ASSISTANT

## 2021-11-15 PROCEDURE — 99215 OFFICE O/P EST HI 40 MIN: CPT | Mod: PBBFAC | Performed by: PHYSICIAN ASSISTANT

## 2021-11-15 PROCEDURE — 99214 PR OFFICE/OUTPT VISIT, EST, LEVL IV, 30-39 MIN: ICD-10-PCS | Mod: S$PBB,,, | Performed by: PHYSICIAN ASSISTANT

## 2021-11-15 RX ORDER — CEFDINIR 300 MG/1
300 CAPSULE ORAL 2 TIMES DAILY
COMMUNITY
Start: 2021-11-02 | End: 2021-11-15

## 2021-11-16 ENCOUNTER — TELEPHONE (OUTPATIENT)
Dept: ADMINISTRATIVE | Facility: OTHER | Age: 56
End: 2021-11-16
Payer: MEDICAID

## 2021-12-03 ENCOUNTER — LAB VISIT (OUTPATIENT)
Dept: LAB | Facility: HOSPITAL | Age: 56
End: 2021-12-03
Attending: STUDENT IN AN ORGANIZED HEALTH CARE EDUCATION/TRAINING PROGRAM
Payer: MEDICAID

## 2021-12-03 DIAGNOSIS — R76.8 HEPATITIS C ANTIBODY POSITIVE IN BLOOD: ICD-10-CM

## 2021-12-03 LAB
INR PPP: 1 (ref 0.8–1.2)
PROTHROMBIN TIME: 10.1 SEC (ref 9–12.5)

## 2021-12-03 PROCEDURE — 86706 HEP B SURFACE ANTIBODY: CPT | Performed by: STUDENT IN AN ORGANIZED HEALTH CARE EDUCATION/TRAINING PROGRAM

## 2021-12-03 PROCEDURE — 86704 HEP B CORE ANTIBODY TOTAL: CPT | Performed by: STUDENT IN AN ORGANIZED HEALTH CARE EDUCATION/TRAINING PROGRAM

## 2021-12-03 PROCEDURE — 87902 NFCT AGT GNTYP ALYS HEP C: CPT | Performed by: STUDENT IN AN ORGANIZED HEALTH CARE EDUCATION/TRAINING PROGRAM

## 2021-12-03 PROCEDURE — 85610 PROTHROMBIN TIME: CPT | Performed by: STUDENT IN AN ORGANIZED HEALTH CARE EDUCATION/TRAINING PROGRAM

## 2021-12-03 PROCEDURE — 87340 HEPATITIS B SURFACE AG IA: CPT | Performed by: STUDENT IN AN ORGANIZED HEALTH CARE EDUCATION/TRAINING PROGRAM

## 2021-12-03 PROCEDURE — 86790 VIRUS ANTIBODY NOS: CPT | Performed by: STUDENT IN AN ORGANIZED HEALTH CARE EDUCATION/TRAINING PROGRAM

## 2021-12-05 DIAGNOSIS — J96.11 CHRONIC RESPIRATORY FAILURE WITH HYPOXIA: ICD-10-CM

## 2021-12-05 DIAGNOSIS — J44.9 CHRONIC OBSTRUCTIVE PULMONARY DISEASE, UNSPECIFIED COPD TYPE: ICD-10-CM

## 2021-12-06 ENCOUNTER — HOSPITAL ENCOUNTER (OUTPATIENT)
Dept: PULMONOLOGY | Facility: CLINIC | Age: 56
Discharge: HOME | End: 2021-12-06
Payer: MEDICAID

## 2021-12-06 DIAGNOSIS — J96.11 CHRONIC RESPIRATORY FAILURE WITH HYPOXIA: ICD-10-CM

## 2021-12-06 LAB
DLCO ADJ PRE: 8.11 ML/(MIN*MMHG) (ref 15.02–26.48)
DLCO SINGLE BREATH LLN: 15.02
DLCO SINGLE BREATH PRE REF: 39.1 %
DLCO SINGLE BREATH REF: 20.75
DLCOC SBVA LLN: 3.12
DLCOC SBVA PRE REF: 57.1 %
DLCOC SBVA REF: 4.89
DLCOC SINGLE BREATH LLN: 15.02
DLCOC SINGLE BREATH PRE REF: 39.1 %
DLCOC SINGLE BREATH REF: 20.75
DLCOCSBVAULN: 6.66
DLCOCSINGLEBREATHULN: 26.48
DLCOSINGLEBREATHULN: 26.48
DLCOVA LLN: 3.12
DLCOVA PRE REF: 57.1 %
DLCOVA PRE: 2.79 ML/(MIN*MMHG*L) (ref 3.12–6.66)
DLCOVA REF: 4.89
DLCOVAULN: 6.66
DLVAADJ PRE: 2.79 ML/(MIN*MMHG*L) (ref 3.12–6.66)
FEF 25 75 LLN: 1.17
FEF 25 75 PRE REF: 10 %
FEF 25 75 REF: 2.2
FET100 CHG: -0.9 %
FEV05 LLN: 0.84
FEV05 REF: 1.7
FEV1 CHG: -3.6 %
FEV1 FVC LLN: 69
FEV1 FVC PRE REF: 44.3 %
FEV1 FVC REF: 80
FEV1 LLN: 1.72
FEV1 PRE REF: 23.8 %
FEV1 REF: 2.25
FEV1 VOL CHG: -0.02
FVC CHG: -4.5 %
FVC LLN: 2.15
FVC PRE REF: 53.5 %
FVC REF: 2.81
FVC VOL CHG: -0.07
HAV IGG SER QL IA: NEGATIVE
HBV CORE AB SERPL QL IA: NEGATIVE
HBV SURFACE AB SER-ACNC: NEGATIVE M[IU]/ML
HBV SURFACE AG SERPL QL IA: NEGATIVE
IVC PRE: 1.12 L (ref 2.15–3.46)
IVC SINGLE BREATH LLN: 2.15
IVC SINGLE BREATH PRE REF: 40 %
IVC SINGLE BREATH REF: 2.81
IVCSINGLEBREATHULN: 3.46
PEF LLN: 4.38
PEF PRE REF: 36.1 %
PEF REF: 5.88
PHYSICIAN COMMENT: ABNORMAL
POST FEF 25 75: 0.21 L/S (ref 1.17–3.24)
POST FET 100: 7.42 SEC
POST FEV1 FVC: 35.91 % (ref 68.56–92.05)
POST FEV1: 0.52 L (ref 1.72–2.78)
POST FEV5: 0.33 L (ref 0.84–2.55)
POST FVC: 1.44 L (ref 2.15–3.46)
POST PEF: 2.16 L/S (ref 4.38–7.37)
PRE DLCO: 8.11 ML/(MIN*MMHG) (ref 15.02–26.48)
PRE FEF 25 75: 0.22 L/S (ref 1.17–3.24)
PRE FET 100: 7.49 SEC
PRE FEV05 REF: 21 %
PRE FEV1 FVC: 35.57 % (ref 68.56–92.05)
PRE FEV1: 0.54 L (ref 1.72–2.78)
PRE FEV5: 0.36 L (ref 0.84–2.55)
PRE FVC: 1.5 L (ref 2.15–3.46)
PRE PEF: 2.12 L/S (ref 4.38–7.37)
VA PRE: 2.9 L (ref 4.09–4.09)
VA SINGLE BREATH PRE REF: 71 %
VA SINGLE BREATH REF: 4.09

## 2021-12-06 PROCEDURE — 94729 PR C02/MEMBANE DIFFUSE CAPACITY: ICD-10-PCS | Mod: 26,S$PBB,, | Performed by: INTERNAL MEDICINE

## 2021-12-06 PROCEDURE — 94727 GAS DIL/WSHOT DETER LNG VOL: CPT | Mod: PBBFAC | Performed by: INTERNAL MEDICINE

## 2021-12-06 PROCEDURE — 94729 DIFFUSING CAPACITY: CPT | Mod: 26,S$PBB,, | Performed by: INTERNAL MEDICINE

## 2021-12-06 PROCEDURE — 94060 EVALUATION OF WHEEZING: CPT | Mod: PBBFAC | Performed by: INTERNAL MEDICINE

## 2021-12-06 PROCEDURE — 94060 PR EVAL OF BRONCHOSPASM: ICD-10-PCS | Mod: 26,S$PBB,, | Performed by: INTERNAL MEDICINE

## 2021-12-06 PROCEDURE — 94729 DIFFUSING CAPACITY: CPT | Mod: PBBFAC | Performed by: INTERNAL MEDICINE

## 2021-12-06 PROCEDURE — 94060 EVALUATION OF WHEEZING: CPT | Mod: 26,S$PBB,, | Performed by: INTERNAL MEDICINE

## 2021-12-06 RX ORDER — RISPERIDONE 0.25 MG/1
0.25 TABLET ORAL 2 TIMES DAILY
Qty: 60 TABLET | Refills: 11 | Status: SHIPPED | OUTPATIENT
Start: 2021-12-06 | End: 2022-02-08

## 2021-12-06 RX ORDER — BUDESONIDE AND FORMOTEROL FUMARATE DIHYDRATE 160; 4.5 UG/1; UG/1
2 AEROSOL RESPIRATORY (INHALATION) EVERY 12 HOURS
Qty: 10.2 G | Refills: 11 | Status: SHIPPED | OUTPATIENT
Start: 2021-12-06 | End: 2022-01-11 | Stop reason: SDUPTHER

## 2021-12-09 LAB
HCV GENTYP SERPL NAA+PROBE: ABNORMAL
HCV RNA SERPL NAA+PROBE-LOG IU: 4.87 LOG (10) IU/ML
HCV RNA SERPL QL NAA+PROBE: DETECTED
HCV RNA SPEC NAA+PROBE-ACNC: ABNORMAL IU/ML

## 2021-12-13 ENCOUNTER — TELEPHONE (OUTPATIENT)
Dept: FAMILY MEDICINE | Facility: HOSPITAL | Age: 56
End: 2021-12-13
Payer: MEDICAID

## 2021-12-13 ENCOUNTER — PATIENT MESSAGE (OUTPATIENT)
Dept: FAMILY MEDICINE | Facility: HOSPITAL | Age: 56
End: 2021-12-13
Payer: MEDICAID

## 2021-12-13 DIAGNOSIS — R76.8 HEPATITIS C ANTIBODY POSITIVE IN BLOOD: Primary | ICD-10-CM

## 2021-12-13 DIAGNOSIS — B18.2 CHRONIC HEPATITIS C WITHOUT HEPATIC COMA: ICD-10-CM

## 2021-12-13 DIAGNOSIS — J45.909 ASTHMA, UNSPECIFIED ASTHMA SEVERITY, UNSPECIFIED WHETHER COMPLICATED, UNSPECIFIED WHETHER PERSISTENT: ICD-10-CM

## 2021-12-13 RX ORDER — VELPATASVIR AND SOFOSBUVIR 100; 400 MG/1; MG/1
1 TABLET, FILM COATED ORAL DAILY
Qty: 30 TABLET | Refills: 2 | Status: SHIPPED | OUTPATIENT
Start: 2021-12-13 | End: 2022-09-26

## 2021-12-13 RX ORDER — FLUTICASONE PROPIONATE 50 MCG
2 SPRAY, SUSPENSION (ML) NASAL DAILY
Qty: 16 G | Refills: 2 | Status: SHIPPED | OUTPATIENT
Start: 2021-12-13 | End: 2022-01-11 | Stop reason: SDUPTHER

## 2021-12-23 ENCOUNTER — SPECIALTY PHARMACY (OUTPATIENT)
Dept: PHARMACY | Facility: CLINIC | Age: 56
End: 2021-12-23
Payer: MEDICAID

## 2021-12-28 ENCOUNTER — SPECIALTY PHARMACY (OUTPATIENT)
Dept: PHARMACY | Facility: CLINIC | Age: 56
End: 2021-12-28
Payer: MEDICAID

## 2021-12-28 DIAGNOSIS — K70.30 ALCOHOLIC CIRRHOSIS OF LIVER WITHOUT ASCITES: Primary | ICD-10-CM

## 2022-01-11 DIAGNOSIS — J44.9 CHRONIC OBSTRUCTIVE PULMONARY DISEASE, UNSPECIFIED COPD TYPE: ICD-10-CM

## 2022-01-11 DIAGNOSIS — J45.909 ASTHMA, UNSPECIFIED ASTHMA SEVERITY, UNSPECIFIED WHETHER COMPLICATED, UNSPECIFIED WHETHER PERSISTENT: ICD-10-CM

## 2022-01-11 DIAGNOSIS — J96.11 CHRONIC RESPIRATORY FAILURE WITH HYPOXIA: ICD-10-CM

## 2022-01-11 RX ORDER — FLUTICASONE PROPIONATE 50 MCG
2 SPRAY, SUSPENSION (ML) NASAL DAILY
Qty: 16 G | Refills: 2 | Status: SHIPPED | OUTPATIENT
Start: 2022-01-11 | End: 2022-01-13 | Stop reason: SDUPTHER

## 2022-01-11 RX ORDER — LEVETIRACETAM 500 MG/1
500 TABLET ORAL 2 TIMES DAILY
Qty: 60 TABLET | Refills: 3 | Status: SHIPPED | OUTPATIENT
Start: 2022-01-11 | End: 2022-05-11 | Stop reason: SDUPTHER

## 2022-01-11 RX ORDER — BUDESONIDE AND FORMOTEROL FUMARATE DIHYDRATE 160; 4.5 UG/1; UG/1
2 AEROSOL RESPIRATORY (INHALATION) EVERY 12 HOURS
Qty: 10.2 G | Refills: 11 | Status: SHIPPED | OUTPATIENT
Start: 2022-01-11 | End: 2022-02-18 | Stop reason: SDUPTHER

## 2022-01-11 RX ORDER — TIOTROPIUM BROMIDE 18 UG/1
18 CAPSULE ORAL; RESPIRATORY (INHALATION) DAILY
Qty: 90 CAPSULE | Refills: 3 | Status: SHIPPED | OUTPATIENT
Start: 2022-01-11 | End: 2022-12-16 | Stop reason: SDUPTHER

## 2022-01-12 ENCOUNTER — SPECIALTY PHARMACY (OUTPATIENT)
Dept: PHARMACY | Facility: CLINIC | Age: 57
End: 2022-01-12
Payer: MEDICAID

## 2022-01-12 NOTE — TELEPHONE ENCOUNTER
Outreach to patient for 7 day touchbase of epclusa. Patient did not answer. LVM to return call. Will next try to outreach at scheduled refill call.       
Go for a walk

## 2022-01-13 ENCOUNTER — TELEPHONE (OUTPATIENT)
Dept: FAMILY MEDICINE | Facility: HOSPITAL | Age: 57
End: 2022-01-13
Payer: MEDICAID

## 2022-01-13 DIAGNOSIS — J44.9 CHRONIC OBSTRUCTIVE PULMONARY DISEASE, UNSPECIFIED COPD TYPE: ICD-10-CM

## 2022-01-13 DIAGNOSIS — J45.909 ASTHMA, UNSPECIFIED ASTHMA SEVERITY, UNSPECIFIED WHETHER COMPLICATED, UNSPECIFIED WHETHER PERSISTENT: ICD-10-CM

## 2022-01-13 DIAGNOSIS — J96.11 CHRONIC RESPIRATORY FAILURE WITH HYPOXIA: ICD-10-CM

## 2022-01-13 DIAGNOSIS — J44.1 COPD WITH ACUTE EXACERBATION: Primary | ICD-10-CM

## 2022-01-13 DIAGNOSIS — R11.2 NON-INTRACTABLE VOMITING WITH NAUSEA, UNSPECIFIED VOMITING TYPE: ICD-10-CM

## 2022-01-13 RX ORDER — LEVOFLOXACIN 750 MG/1
750 TABLET ORAL DAILY
Qty: 5 TABLET | Refills: 0 | Status: SHIPPED | OUTPATIENT
Start: 2022-01-13 | End: 2022-01-18

## 2022-01-13 RX ORDER — ONDANSETRON 4 MG/1
4 TABLET, FILM COATED ORAL 2 TIMES DAILY
Qty: 30 TABLET | Refills: 1 | Status: SHIPPED | OUTPATIENT
Start: 2022-01-13 | End: 2022-02-18 | Stop reason: SDUPTHER

## 2022-01-13 RX ORDER — ALBUTEROL SULFATE 0.83 MG/ML
2.5 SOLUTION RESPIRATORY (INHALATION) EVERY 6 HOURS PRN
Qty: 1 EACH | Refills: 6 | Status: SHIPPED | OUTPATIENT
Start: 2022-01-13 | End: 2022-05-11 | Stop reason: SDUPTHER

## 2022-01-13 RX ORDER — FLUTICASONE PROPIONATE 50 MCG
2 SPRAY, SUSPENSION (ML) NASAL DAILY
Qty: 16 G | Refills: 2 | Status: SHIPPED | OUTPATIENT
Start: 2022-01-13 | End: 2022-05-11 | Stop reason: SDUPTHER

## 2022-01-13 NOTE — TELEPHONE ENCOUNTER
Received message from pharmacy about patient not feeling well and concern for side effects from epclusa. Discussed with pharmacist and it was felt that while the patient's fatigue may be 2/2 epclusa, she does have quite a few other things that could be causing it, namely a worsening of her depression (currently being followed by a psychiatrist), her chronic respiratory failure, and a recent illness. Thus it was felt as appropriate to continue epclusa for now.     Called to discuss with patient. Patient sounded unwell on the phone. She has been reportedly experiencing about 1 week of increased cough with sputum production. Sputum is described as brown. Patient also experiencing some nasuea and vomiting as well as all encompassing fatigue. Given her severe COPD and baseline respiratory failure, there is a high degree of concern that she may be suffering from a lower respiratory infection. Patient is already taking steroids that she has had at home. Will send in [facility]. Instructed patient to get a CXR as well. Patient voiced understanding. She has relatively stable oxygen usage (about 2-3L).    Jayden Mcknight MD PGY-3  [facility]   01/13/2022 2:55 PM

## 2022-01-13 NOTE — TELEPHONE ENCOUNTER
"Patient called [facility] with daughter-in-law regarding Epclusa 7 day touchbase.     Patient stated she has not missed any doses and typically takes Epclusa in the evenings. Patient reported side effect concerns with Epclusa, noting that she has been significantly nauseous throughout the day, and she feels drowsy/tired, with no energy. She stated she has not been able to exercise and has just enough energy to get up to use the bathroom. She also reports appetite has been reduced. Patient estimated side effects starting about 2-3 days ago.     Patient said she has been prescribed Fanapt for depression by outside psychiatrist - pending PA. Patient and daughter-in-law said she has been "very mean" and irritable lately as well.     Reviewed that Epclusa can be correlated with depression, irritability, and fatigue. Reviewed that [facility] will voice side effect concerns to provider for advisement. Patient voiced understanding.    Patient asked that OSP also ask Dr. Mcknight to send refills for Flonase and Albuterol to [facility].     Messaged Dr. Mcknight and staff on 1/12 regarding side effect concerns and refill requests. Asked if a prescription is appropriate to help manage nausea, and if patient to continue Epclusa as prescribed for now.     Patient had no further questions or concerns.   "

## 2022-01-14 ENCOUNTER — HOSPITAL ENCOUNTER (OUTPATIENT)
Dept: RADIOLOGY | Facility: HOSPITAL | Age: 57
Discharge: HOME | End: 2022-01-14
Attending: STUDENT IN AN ORGANIZED HEALTH CARE EDUCATION/TRAINING PROGRAM
Payer: MEDICAID

## 2022-01-14 DIAGNOSIS — J44.1 COPD WITH ACUTE EXACERBATION: ICD-10-CM

## 2022-01-14 PROCEDURE — 71045 X-RAY EXAM CHEST 1 VIEW: CPT | Mod: TC,FY,PO

## 2022-01-18 ENCOUNTER — TELEPHONE (OUTPATIENT)
Dept: FAMILY MEDICINE | Facility: HOSPITAL | Age: 57
End: 2022-01-18
Payer: MEDICAID

## 2022-01-18 NOTE — TELEPHONE ENCOUNTER
Called patient to follow up. Reports that she is feeling much better. No longer short of breath and reports that coughing has almost resolved completely. Has 1 day left of Abx. No other complaints or needs at this time.    Jayden Mcknight MD PGY-3  [facility]   01/18/2022 3:16 PM

## 2022-01-19 NOTE — TELEPHONE ENCOUNTER
"Specialty Pharmacy - Clinical Intervention  Specialty Pharmacy - Refill Coordination  EPCLUSA REFILL 2 OF 3  Specialty Medication Orders Linked to Encounter    Flowsheet Row Most Recent Value   Medication #1 sofosbuvir-velpatasvir (EPCLUSA) 400-100 mg Tab (Order#[id number], Rx#[id number])        Possible Side effects addressed with pt and DIL during refill call on 1/19. Discussed fatigue is possible w/ Epclusa advised much rest and hydration as able, and to keep in mind short duration of therapy (I.e not lifelong), so she may endure the fatigue but hopefully benefit from cured HCV long-term Pt's DIL reported pt did  Zofran Rx to assist w/ nausea and pt now denies nausea as Zofran therapy is working successfully. Also discussed decreased appetite and anhedonia unlikely from Epclusa--DIL reported since restarting amitriptyline, her appetite has improved. Per Dr. Mcknight, pt has extensive psych hx predating Epclusa so do not suspect Epclusa is affecting pt's mood. Instructed to continue plan w/ Zofran and Epclusa to completion of therapy= x12 weeks. Proceeded with refill below. [facility] to follow-up in February for last refill.     Of note, spoke to [place] Medicaid and plan reported pt has exceeded plan's allowance of 4 fills of any medication per month, thus the words "Medically Necessary" (notated on Rx, confirmed w/ Dr. Mcknight permission to addend) and PA type code 5 were required on the Rx to be able to fill for 1/19/22 date of service. Documentation added in [facility].       Refill Questions - Documented Responses    Flowsheet Row Most Recent Value   Patient Availability and HIPAA Verification    Does patient want to proceed with activity? Yes   HIPAA/medical authority confirmed? Yes   Relationship to patient of person spoken to? Family Member   Refill Screening Questions    Changes to allergies? No   Changes to medications? No   New conditions since last clinic visit? No   Unplanned office visit, " urgent care, ED, or hospital admission in the last 4 weeks? No   How does patient/caregiver feel medication is working? Good   Financial problems or insurance changes? No   How many doses of your specialty medications were missed in the last 4 weeks? 0   Would patient like to speak to a pharmacist? No   When does the patient need to receive the medication? 01/25/22   Refill Delivery Questions    How will the patient receive the medication? Delivery Zulma   When does the patient need to receive the medication? 01/25/22   Shipping Address Home   Address in [facility] confirmed and updated if neccessary? Yes   Expected Copay ($) 0   Is the patient able to afford the medication copay? Yes   Payment Method zero copay   Days supply of Refill 28   Supplies needed? No supplies needed   Refill activity completed? Yes   Refill activity plan Refill scheduled   Shipment/Pickup Date: 01/21/22          Current Outpatient Medications   Medication Sig    albuterol (PROVENTIL) 2.5 mg /3 mL (0.083 %) nebulizer solution Take 3 mLs (2.5 mg total) by nebulization every 6 (six) hours as needed for Wheezing. Rescue    albuterol-ipratropium (DUO-NEB) 2.5 mg-0.5 mg/3 mL nebulizer solution Take 3 mLs by nebulization every 6 (six) hours as needed for Wheezing. Rescue    amitriptyline (ELAVIL) 50 MG tablet Take 50 mg by mouth nightly.    budesonide-formoterol 160-4.5 mcg (SYMBICORT) 160-4.5 mcg/actuation HFAA Inhale 2 puffs into the lungs every 12 (twelve) hours. Controller    clonazePAM (KLONOPIN) 0.5 MG tablet Take 0.5 mg by mouth 2 (two) times daily. as needed for anxiety.    diclofenac sodium (VOLTAREN) 1 % Gel Apply 2 g topically 4 (four) times daily as needed.    fluticasone propionate (FLONASE) 50 mcg/actuation nasal spray 2 sprays (100 mcg total) by Each Nostril route once daily.    levETIRAcetam (KEPPRA) 500 MG Tab Take 1 tablet (500 mg total) by mouth 2 (two) times daily.    LIDOcaine (LIDODERM) 5 % Place 1 patch onto  the skin once daily. Remove & Discard patch within 12 hours or as directed by MD    metoprolol tartrate (LOPRESSOR) 50 MG tablet Take 50 mg by mouth 2 (two) times a day.    multivitamin Chew Take by mouth once daily.    ondansetron (ZOFRAN) 4 MG tablet Take 1 tablet (4 mg total) by mouth 2 (two) times daily.    risperiDONE (RISPERDAL) 0.25 MG Tab Take 1 tablet (0.25 mg total) by mouth 2 (two) times daily.    sofosbuvir-velpatasvir (EPCLUSA) 400-100 mg Tab Take 1 tablet by mouth once daily.    tiotropium (SPIRIVA) 18 mcg inhalation capsule Inhale 1 capsule (18 mcg total) into the lungs once daily. Controller   Last reviewed on 12/28/2021  1:14 PM by Nicole Torres, PharmFLORENCE    Review of patient's allergies indicates:  No Known Allergies Last reviewed on  1/13/2022 2:49 PM by Jayden Mcknight    Interventions added this encounter   Closed: [facility] Patient Intervention, [facility] Provider Intervention - Drug safety: sofosbuvir-velpatasvir (EPCLUSA) 400-100 mg Tab     Tasks added this encounter   2/15/2022 - Refill Call (Auto Added)   Tasks due within next 3 months   No tasks due.     Nicole Torres, PharmD  [facility]  [address] [place] [id number]  Phone: [phone]  Fax: [phone]

## 2022-01-27 ENCOUNTER — PATIENT MESSAGE (OUTPATIENT)
Dept: FAMILY MEDICINE | Facility: HOSPITAL | Age: 57
End: 2022-01-27
Payer: MEDICAID

## 2022-02-02 NOTE — PROGRESS NOTES
Subjective:       Patient ID: Elif Archibald is a 56 y.o. female.    Chief Complaint: No chief complaint on file.    Pt here for covid testing     ROS     Objective:      Physical Exam    Assessment:       1. Asthma, unspecified asthma severity, unspecified whether complicated, unspecified whether persistent        Plan:                   No follow-ups on file.

## 2022-02-08 ENCOUNTER — SPECIALTY PHARMACY (OUTPATIENT)
Dept: PHARMACY | Facility: CLINIC | Age: 57
End: 2022-02-08
Payer: MEDICAID

## 2022-02-08 RX ORDER — ILOPERIDONE 1 MG/1
1 TABLET ORAL 2 TIMES DAILY
COMMUNITY
Start: 2022-01-24 | End: 2022-11-07

## 2022-02-08 NOTE — TELEPHONE ENCOUNTER
Incoming call from patient's daughter in law, Georgie, to discuss a drug interaction with patient's depression medication and Eplcusa. Georgie requested assigned [facility], Nicole Torres, to give her a call back when able.

## 2022-02-09 ENCOUNTER — TELEPHONE (OUTPATIENT)
Dept: FAMILY MEDICINE | Facility: HOSPITAL | Age: 57
End: 2022-02-09
Payer: MEDICAID

## 2022-02-09 NOTE — TELEPHONE ENCOUNTER
----- Message from Haleigh Moreno sent at 2/8/2022  2:49 PM CST -----  Pt daughter in law called regarding her meds and ct- scan she have some questions.

## 2022-02-16 ENCOUNTER — TELEPHONE (OUTPATIENT)
Dept: FAMILY MEDICINE | Facility: HOSPITAL | Age: 57
End: 2022-02-16
Payer: MEDICAID

## 2022-02-16 ENCOUNTER — SPECIALTY PHARMACY (OUTPATIENT)
Dept: PHARMACY | Facility: CLINIC | Age: 57
End: 2022-02-16
Payer: MEDICAID

## 2022-02-16 NOTE — TELEPHONE ENCOUNTER
Specialty Pharmacy - Refill Coordination  EPCLUSA REFILL 3 OF 3  Specialty Medication Orders Linked to Encounter    Flowsheet Row Most Recent Value   Medication #1 sofosbuvir-velpatasvir (EPCLUSA) 400-100 mg Tab (Order#[id number], Rx#[id number])        Epclusa refill (3 of 3) confirmed and reassessment complete. Confirmed 2 patient identifiers - name and . Therapy appropriate. Spoke to both pt and daughter, Georgie.    Patient has a few doses of Epclusa remaining and takes it daily. Pt reports they are not having any side effects so far. No missed doses, no new medications, no new allergies or health conditions reported at this time. Allergies reviewed and medication reconciliation complete (reviewed and documented in [facility] and [facility]). Disease education reviewed (including transmission and prevention). Patient counseled on importance of maintaining adherence and keeping lab appointments which were scheduled. All questions answered and addressed to patients satisfaction. Advised to call [facility] and provider if any issues arise.  Pt verbalized understanding.       Refill Questions - Documented Responses    Flowsheet Row Most Recent Value   Patient Availability and HIPAA Verification    Does patient want to proceed with activity? Yes   HIPAA/medical authority confirmed? Yes   Relationship to patient of person spoken to? Self   Refill Screening Questions    Changes to allergies? No   Changes to medications? No   New conditions since last clinic visit? No   Unplanned office visit, urgent care, ED, or hospital admission in the last 4 weeks? No   How does patient/caregiver feel medication is working? Good   Financial problems or insurance changes? No   How many doses of your specialty medications were missed in the last 4 weeks? 0   Would patient like to speak to a pharmacist? No   When does the patient need to receive the medication? 22   Refill Delivery Questions    How will the patient receive  the medication? Delivery Zulma   When does the patient need to receive the medication? 02/18/22   Shipping Address Home   Address in [facility] confirmed and updated if neccessary? Yes   Expected Copay ($) 0   Is the patient able to afford the medication copay? Yes   Payment Method zero copay   Days supply of Refill 28   Supplies needed? No supplies needed   Refill activity completed? Yes   Refill activity plan Refill scheduled   Shipment/Pickup Date: 02/18/22          Current Outpatient Medications   Medication Sig    albuterol (PROVENTIL) 2.5 mg /3 mL (0.083 %) nebulizer solution Take 3 mLs (2.5 mg total) by nebulization every 6 (six) hours as needed for Wheezing. Rescue    albuterol-ipratropium (DUO-NEB) 2.5 mg-0.5 mg/3 mL nebulizer solution Take 3 mLs by nebulization every 6 (six) hours as needed for Wheezing. Rescue    budesonide-formoterol 160-4.5 mcg (SYMBICORT) 160-4.5 mcg/actuation HFAA Inhale 2 puffs into the lungs every 12 (twelve) hours. Controller    clonazePAM (KLONOPIN) 0.5 MG tablet Take 0.5 mg by mouth 2 (two) times daily. as needed for anxiety.    FANAPT 1 mg Tab Take 1 mg by mouth 2 (two) times daily.    fluticasone propionate (FLONASE) 50 mcg/actuation nasal spray 2 sprays (100 mcg total) by Each Nostril route once daily.    levETIRAcetam (KEPPRA) 500 MG Tab Take 1 tablet (500 mg total) by mouth 2 (two) times daily.    LIDOcaine (LIDODERM) 5 % Place 1 patch onto the skin once daily. Remove & Discard patch within 12 hours or as directed by MD    metoprolol tartrate (LOPRESSOR) 50 MG tablet Take 50 mg by mouth 2 (two) times a day.    multivitamin Chew Take by mouth once daily.    ondansetron (ZOFRAN) 4 MG tablet Take 1 tablet (4 mg total) by mouth 2 (two) times daily.    sofosbuvir-velpatasvir (EPCLUSA) 400-100 mg Tab Take 1 tablet by mouth once daily.    tiotropium (SPIRIVA) 18 mcg inhalation capsule Inhale 1 capsule (18 mcg total) into the lungs once daily. Controller   Last  reviewed on 2/8/2022  3:36 PM by Nicole Torres, Lisa    Review of patient's allergies indicates:  No Known Allergies Last reviewed on  2/8/2022 3:33 PM by Nicole Torres      Tasks added this encounter   No tasks added.   Tasks due within next 3 months   No tasks due.     Nicole Torres, PharmD  [facility]  [address] [place] [id number]  Phone: [phone]  Fax: [phone]

## 2022-02-16 NOTE — TELEPHONE ENCOUNTER
----- Message from Jacob Schaefer sent at 2/14/2022  4:44 PM CST -----  Regarding: Schedule CT  Contact: Patient  Pt. Requesting a call back in regards to scheduling CT.          [phone]

## 2022-02-18 DIAGNOSIS — J96.11 CHRONIC RESPIRATORY FAILURE WITH HYPOXIA: ICD-10-CM

## 2022-02-18 DIAGNOSIS — R11.2 NON-INTRACTABLE VOMITING WITH NAUSEA, UNSPECIFIED VOMITING TYPE: ICD-10-CM

## 2022-02-18 DIAGNOSIS — J44.9 CHRONIC OBSTRUCTIVE PULMONARY DISEASE, UNSPECIFIED COPD TYPE: ICD-10-CM

## 2022-02-21 RX ORDER — ONDANSETRON 4 MG/1
4 TABLET, FILM COATED ORAL 2 TIMES DAILY
Qty: 30 TABLET | Refills: 1 | Status: SHIPPED | OUTPATIENT
Start: 2022-02-21 | End: 2022-05-11 | Stop reason: SDUPTHER

## 2022-02-21 RX ORDER — BUDESONIDE AND FORMOTEROL FUMARATE DIHYDRATE 160; 4.5 UG/1; UG/1
2 AEROSOL RESPIRATORY (INHALATION) EVERY 12 HOURS
Qty: 10.2 G | Refills: 11 | Status: SHIPPED | OUTPATIENT
Start: 2022-02-21 | End: 2022-09-26

## 2022-02-24 ENCOUNTER — HOSPITAL ENCOUNTER (OUTPATIENT)
Dept: RADIOLOGY | Facility: HOSPITAL | Age: 57
Discharge: HOME | End: 2022-02-24
Attending: STUDENT IN AN ORGANIZED HEALTH CARE EDUCATION/TRAINING PROGRAM
Payer: MEDICAID

## 2022-02-24 DIAGNOSIS — Z12.31 ENCOUNTER FOR SCREENING MAMMOGRAM FOR BREAST CANCER: ICD-10-CM

## 2022-02-24 PROCEDURE — 71250 CT THORAX DX C-: CPT | Mod: TC,PO

## 2022-02-24 PROCEDURE — 77063 BREAST TOMOSYNTHESIS BI: CPT | Mod: TC,PO

## 2022-02-25 ENCOUNTER — TELEPHONE (OUTPATIENT)
Dept: PULMONOLOGY | Facility: CLINIC | Age: 57
End: 2022-02-25
Payer: MEDICAID

## 2022-02-25 NOTE — TELEPHONE ENCOUNTER
----- Message from Ondina Ventura sent at 2/25/2022  2:46 PM CST -----  Regarding: CT Chest scan results  Hello,     Patient read results of CT Chest scan on her patient portal, her results read that her lung nodules are bigger now. She would like to go over results and treatment plan. Could you assist patient to schedule an appt, please?    Patient's caregiver/daughter contact #:   [phone].     Thanks!

## 2022-02-25 NOTE — TELEPHONE ENCOUNTER
Spoke with patient, informed her that I have received her message. I advised patient that Dr Ramirez is not in clinic anymore but however, I can schedule her a appointment with another physician. Patient verbalized that she understand and states that she will like to visit with Dr Olmos. I verbalized to patient that I understand and advised patient that I can schedule her appointment with Dr Olmos on 3/29/22 for 3:00pm. Patient verbalized that she understand an excepted the appointment.

## 2022-02-28 ENCOUNTER — TELEPHONE (OUTPATIENT)
Dept: FAMILY MEDICINE | Facility: HOSPITAL | Age: 57
End: 2022-02-28
Payer: MEDICAID

## 2022-02-28 NOTE — TELEPHONE ENCOUNTER
----- Message from Ondina Ventura sent at 2/25/2022  2:40 PM CST -----  Regarding: CT Chest scan result  Hello,     Patient read results of CT Chest scan on her patient portal, her results read that her lung nodules are bigger now. She would like to go over results and treatment plan.   Patient's caregiver/daughter contact #:   [phone], Georgie.     Thanks!

## 2022-03-25 DIAGNOSIS — R91.1 SOLITARY PULMONARY NODULE: ICD-10-CM

## 2022-03-25 DIAGNOSIS — J44.9 CHRONIC OBSTRUCTIVE PULMONARY DISEASE, UNSPECIFIED COPD TYPE: Primary | ICD-10-CM

## 2022-03-29 ENCOUNTER — OFFICE VISIT (OUTPATIENT)
Dept: PULMONOLOGY | Facility: CLINIC | Age: 57
End: 2022-03-29
Payer: MEDICAID

## 2022-03-29 ENCOUNTER — HOSPITAL ENCOUNTER (OUTPATIENT)
Dept: PULMONOLOGY | Facility: CLINIC | Age: 57
Discharge: HOME | End: 2022-03-29
Payer: MEDICAID

## 2022-03-29 VITALS
HEIGHT: 60 IN | HEART RATE: 69 BPM | BODY MASS INDEX: 24.97 KG/M2 | OXYGEN SATURATION: 96 % | SYSTOLIC BLOOD PRESSURE: 128 MMHG | WEIGHT: 127.19 LBS | DIASTOLIC BLOOD PRESSURE: 64 MMHG

## 2022-03-29 DIAGNOSIS — R91.1 SOLITARY PULMONARY NODULE: ICD-10-CM

## 2022-03-29 DIAGNOSIS — J44.9 CHRONIC OBSTRUCTIVE PULMONARY DISEASE, UNSPECIFIED COPD TYPE: ICD-10-CM

## 2022-03-29 DIAGNOSIS — R91.8 LUNG NODULES: ICD-10-CM

## 2022-03-29 DIAGNOSIS — J43.2 CENTRILOBULAR EMPHYSEMA: Primary | ICD-10-CM

## 2022-03-29 LAB
FEF 25 75 LLN: 1.16
FEF 25 75 PRE REF: 8.9 %
FEF 25 75 REF: 2.2
FEV05 LLN: 0.85
FEV05 REF: 1.71
FEV1 FVC LLN: 68
FEV1 FVC PRE REF: 45.3 %
FEV1 FVC REF: 80
FEV1 LLN: 1.72
FEV1 PRE REF: 22.8 %
FEV1 REF: 2.25
FVC LLN: 2.16
FVC PRE REF: 50.2 %
FVC REF: 2.82
PEF LLN: 4.4
PEF PRE REF: 26.3 %
PEF REF: 5.9
PHYSICIAN COMMENT: ABNORMAL
PRE FEF 25 75: 0.2 L/S (ref 1.16–3.57)
PRE FET 100: 7.63 SEC
PRE FEV05 REF: 20.1 %
PRE FEV1 FVC: 36.34 % (ref 68.47–90.25)
PRE FEV1: 0.51 L (ref 1.72–2.76)
PRE FEV5: 0.34 L (ref 0.85–2.56)
PRE FVC: 1.42 L (ref 2.16–3.51)
PRE PEF: 1.55 L/S (ref 4.4–7.4)

## 2022-03-29 PROCEDURE — 94010 BREATHING CAPACITY TEST: CPT | Mod: 26,S$PBB,, | Performed by: INTERNAL MEDICINE

## 2022-03-29 PROCEDURE — 99214 OFFICE O/P EST MOD 30 MIN: CPT | Mod: PBBFAC | Performed by: INTERNAL MEDICINE

## 2022-03-29 PROCEDURE — 94010 BREATHING CAPACITY TEST: CPT | Mod: PBBFAC | Performed by: INTERNAL MEDICINE

## 2022-03-29 PROCEDURE — 99213 OFFICE O/P EST LOW 20 MIN: CPT | Mod: 25,S$PBB,, | Performed by: INTERNAL MEDICINE

## 2022-03-29 PROCEDURE — 94010 BREATHING CAPACITY TEST: ICD-10-PCS | Mod: 26,S$PBB,, | Performed by: INTERNAL MEDICINE

## 2022-03-29 PROCEDURE — 99213 PR OFFICE/OUTPT VISIT, EST, LEVL III, 20-29 MIN: ICD-10-PCS | Mod: 25,S$PBB,, | Performed by: INTERNAL MEDICINE

## 2022-03-29 PROCEDURE — 94729 DIFFUSING CAPACITY: CPT | Mod: 53,PBBFAC | Performed by: INTERNAL MEDICINE

## 2022-03-29 NOTE — PROGRESS NOTES
[facility] - 9th Floor Pulmonology Clinic    Subjective:       Patient ID: Elif Archibald is a 56 y.o. female.    Chief Complaint: Dyspnea and Fatigue x1 year    Elif Archibald is a 54 y.o. female who has a PMHx of JENNIE/MDD/Schizophrenia/Bipolar, SDH s/p craniotomy, COPD on 2LNC at home, Chronic Hep C (s/p epclus) who presents today for follow-up for persistent dyspnea for the past year. Since she had last seen us in clinic, she has continued to use her symbicort and spiriva daily without issues, duonebs twice a day. Denied any hospitalizations, steroid use, or antibiotics. She recently had her psychiatric medications changed due to somnolence and a fall, still feels generally well on them without issues currently. She comes to clinic today with continued concerns regarding her dyspnea. We went over her spirometry from today and last one a few months ago without significant decline, however she does have severe emphysema (seen on imaging as well). She continued to ask about lung transplant, discussed with her poor candidacy, however we would still refer her to see our lung transplant specialists and she is amenable to that. Still has not performed pulmonary rehabilitation, discussed with her extensively regarding the potential benefit and role for it, especially with her functional status. She typically is minimally active, can perform her simple ADLs, however any strenuous activity makes her very fatigued. She can cook, bathe, clean, change her clothes without issues, but is unable to carry anything >5lbs or walk a block without feeling significantly out of breath. Has stopped smoking. Denied any recent illnesses, fevers, chills, rigors, cough, chest pain, abdominal pain, nausea, vomiting. Recently finished epclusa for hepC    Review of Systems   Constitutional: Positive for weight gain and fatigue. Negative for appetite change.   HENT: Negative for postnasal drip, sinus pressure, trouble swallowing and  congestion.    Eyes: Negative for redness and itching.   Respiratory: Positive for cough, shortness of breath, dyspnea on extertion and use of rescue inhaler. Negative for sputum production and wheezing.    Cardiovascular: Negative for palpitations and leg swelling.   Genitourinary: Negative for difficulty urinating and hematuria.   Endocrine: Negative for cold intolerance and heat intolerance.    Musculoskeletal: Negative for arthralgias and back pain.   Skin: Negative for rash.   Gastrointestinal: Negative for nausea, vomiting and abdominal pain.   Neurological: Negative for dizziness, syncope and weakness.   Hematological: Negative for adenopathy. Does not bruise/bleed easily and no excessive bruising.   Psychiatric/Behavioral: Negative for confusion and sleep disturbance. The patient is not nervous/anxious.        Objective:       Vitals:    03/29/22 1528   BP: 128/64   BP Location: Right arm   Patient Position: Sitting   Pulse: 69   SpO2: 96%   Weight: 57.7 kg (127 lb 3.3 oz)   Height: 5' (1.524 m)     Physical Exam   Constitutional: She is oriented to person, place, and time. She appears well-developed and well-nourished. No distress.   HENT:   Head: Normocephalic.   Nose: Nose normal. No mucosal edema.   Mouth/Throat: Oropharynx is clear and moist. No oropharyngeal exudate.   Neck: No JVD present. No tracheal deviation present. No thyromegaly present.   Cardiovascular: Normal rate, regular rhythm, normal heart sounds and intact distal pulses.   No murmur heard.  Pulmonary/Chest: Normal expansion, hyperinflation, effort normal and breath sounds normal. She has no wheezes. She has no rhonchi.   Abdominal: Soft. Bowel sounds are normal. She exhibits no distension. There is no abdominal tenderness.   Musculoskeletal:         General: No edema. Normal range of motion.      Cervical back: Normal range of motion and neck supple.   Lymphadenopathy: No supraclavicular adenopathy is present.     She has no cervical  adenopathy.     She has no axillary adenopathy.   Neurological: She is alert and oriented to person, place, and time. She has normal reflexes. No cranial nerve deficit. Gait normal.   Skin: Skin is warm and dry. No rash noted. She is not diaphoretic. No erythema.   Psychiatric: She has a normal mood and affect. Her behavior is normal. Judgment and thought content normal.     Personal Diagnostic Review  3/29/2022 PFTs:  FVC: 1.42 (50.2)  FEV1: 0.51 (22.8)  FEV1/FVC: 36    2/24/2022 CT Chest Without Contrast  Small amount smooth pleural thickening in the posterior mid inferior left hemithorax.  Emphysematous changes in the lungs, greatest within the upper lobes.  Small amounts of scarring at the lung apices.  Stable tiny pulmonary nodules noted the largest of which is approximately 5 mm.  Several of the nodules are calcified and likely reflect small granulomas.    I have personally reviewed the above labs and imaging.    Assessment/Plan:     Elif Archibald is a 54 y.o. female who has a PMHx of JENNIE/MDD/Schizophrenia/Bipolar, SDH s/p craniotomy, COPD (class 4D) on 2LNC at home, Chronic Hep C (s/p epclusa) who presents today for follow-up for persistent dyspnea for the past year. Continued to take her inhalers, however she has still not undergone pulmonary rehabilitation. Very limited in her activity and ADLs, however is gaining weight and continues to have a good appetite. Came to clinic for routine follow-up and asking again regarding transplant evaluation, and we extensively discussed the ongoing issues and overall concerns. Will refer her to lung transplant for them to evaluate and have a thorough discussion with her, in the meantime also referred her to pulmonary rehab as family is making it a conscious effort to get her there and see if it provides her any benefit.    COPD (chronic obstructive pulmonary disease)  - GOLD Class 4D  - On continuous oxygen 2LNC  - Continue triple therapy: ICA/LABA/LAMA; duonebs  PRN  - Extensively discussed with her today regarding her overall lung disease, importance of maintaining smoking cessation, continued inhaler use, and as much activity as she can tolerate and importance in the role of pulmonary rehab which she still has not done due to difficulty with transportation. Daughter in law said family will make a strong effort to ensure she can go through it for the benefits it may provide.  - referral placed for pulmonary rehab  - referral placed for lung transplant evaluation understanding she may potentially not be the greatest candidate at this time; she would still like to have them see her for completeness sake.    Lung nodules  - Continues to be stable in size  - With her functional status and stability of the nodules, discussed with patient and daughter in law --- will plan to stop CT surveillance.    Jorje Lawton MD  [employer] [facility] Fellow

## 2022-03-29 NOTE — ASSESSMENT & PLAN NOTE
- Continues to be stable in size  - With her functional status and stability of the nodules, discussed with patient and daughter in law --- will plan to stop CT surveillance.

## 2022-03-29 NOTE — ASSESSMENT & PLAN NOTE
- GOLD Class 4D  - On continuous oxygen 2LNC  - Continue triple therapy: ICA/LABA/LAMA; duonebs PRN  - Extensively discussed with her today regarding her overall lung disease, importance of maintaining smoking cessation, continued inhaler use, and as much activity as she can tolerate and importance in the role of pulmonary rehab which she still has not done due to difficulty with transportation. Daughter in law said family will make a strong effort to ensure she can go through it for the benefits it may provide.  - referral placed for pulmonary rehab  - referral placed for lung transplant evaluation understanding she may potentially not be the greatest candidate at this time; she would still like to have them see her for completeness sake.

## 2022-04-07 ENCOUNTER — TELEPHONE (OUTPATIENT)
Dept: TRANSPLANT | Facility: CLINIC | Age: 57
End: 2022-04-07
Payer: MEDICAID

## 2022-04-07 DIAGNOSIS — J43.9 PULMONARY EMPHYSEMA, UNSPECIFIED EMPHYSEMA TYPE: Primary | ICD-10-CM

## 2022-04-07 DIAGNOSIS — R06.09 DYSPNEA ON EXERTION: ICD-10-CM

## 2022-04-07 DIAGNOSIS — J44.9 CHRONIC OBSTRUCTIVE PULMONARY DISEASE, UNSPECIFIED COPD TYPE: ICD-10-CM

## 2022-04-07 NOTE — TELEPHONE ENCOUNTER
"Received a lung transplant referral from Dr. Olmos on 3/29/22.  Message sent to Dr. Vang regarding the referral.  Awaiting provider's recommendations.    ----- Message -----  From: Skye Hill RN  Sent: 4/6/2022   2:52 PM CDT  To: Bonifacio Vang MD  Subject: Lung transplant referral                         Referral from: Dr. Arnav Olmos    Lung diagnosis: Centrilobular Emphysema  Comment on referral: Patient with severe COPD with underlying issues (chronic hep C s/p epcluza), psychiatric disease (on medications); wanting an evaluation if patient is a candidate for transplant    Age: 56 y.o.     Height/Weight/BMI:  5' 0" / 57.7 kg / 24.84 kg/m²     Smoking history: Social History     Tobacco Use       Smoking status: Former Smoker         Packs/day: 1        Years: 40        Pack years: 40      Smokeless tobacco: Never Used       Tobacco comment: quit 1980 per documentation; but was enrolled in smoking cessation in March 2020 and was still smoking several cigarettes    PFT date: 03/29/2022                       FVC 1.42 / 50.2  FEV1 0.51 / 22.8  FEV1/FVC  36    DLCO 8.11 / 39.1 on 12/6/21    6 MWT Date: 12/11/2020   Distance 800 feet       Chest CT date: 2/24/2022    Impression:  No adverse interval changes.  Small amount smooth pleural thickening in the posterior mid inferior left hemithorax.  Emphysematous changes in the lungs, greatest within the upper lobes.  Small amounts of scarring at the lung apices.  Stable tiny pulmonary nodules noted the largest of which is approximately 5 mm.  Several of the nodules are calcified and likely reflect small granulomas.        Echo date:  No echo documented in records    Recommendations?       ----- Message from Bonifacio Vang MD sent at 4/6/2022  8:53 PM CDT -----  Regarding: RE: Lung transplant referral    Routine ALD visit   Testing: Echo, 6MWT, V/Q scan, ABG.       "

## 2022-04-11 NOTE — PT/OT/SLP PROGRESS
Physical Therapy Treatment    Patient Name:  Elif Archibald   MRN:  [id number]    Recommendations:     Discharge Recommendations:  nursing facility, skilled, LTACH (long-term acute care hospital)   Discharge Equipment Recommendations: none   Barriers to discharge: None    Assessment:     Elif Archibald is a 53 y.o. female admitted with a medical diagnosis of HCAP (healthcare-associated pneumonia).  She presents with the following impairments/functional limitations:  weakness, impaired endurance, impaired self care skills, impaired functional mobilty, gait instability, impaired balance, impaired cognition, decreased upper extremity function, decreased lower extremity function, decreased safety awareness, impaired cardiopulmonary response to activity, impaired skin .    Rehab Prognosis: Fair; patient would benefit from acute skilled PT services to address these deficits and reach maximum level of function.    Recent Surgery: * No surgery found *      Plan:     During this hospitalization, patient to be seen 6 x/week to address the identified rehab impairments via therapeutic activities, therapeutic exercises and progress toward the following goals:    · Plan of Care Expires:  04/19/19    Subjective     Chief Complaint: none stated  Patient/Family Comments/goals: none stated  Pain/Comfort:  · Pain Rating 1: 0/10      Objective:     Communicated with nurse Huynh prior to session.  Patient found supine with telemetry, ramsay catheter, PEG Tube, tracheostomy, bed alarm(Katelynn Sys and mittens B hands) upon PT entry to room.     General Precautions: Standard, fall   Orthopedic Precautions:N/A   Braces: (helmet)     Functional Mobility:  · Bed Mobility:     · Rolling Right: maximal assistance  · Supine to Sit: maximal assistance  · Transfers:     · Bed to Chair: maximal assistance with  no AD  using  Squat Pivot  ·     AM-PAC 6 CLICK MOBILITY          Therapeutic Activities and Exercises:   Transferred to sitting EOB with  Received refill request for Atorvastatin 80mg nightly for 90 day supply.   LOV 02/09/22, NOV 02/09/23.   Lipid, CMP 11/19/21.   Refill sent to [facility].   extra time and tactile cues to achieve upright.   Helmet applied. Squat pivot to chair with Max A. Patient with multiple lines attached.   Reclined in chair with alarm in place.     Patient left up in chair with all lines intact, call button in reach, chair alarm on, nurse Amina notified and [place] real and  riya on both hands present..    GOALS:   Multidisciplinary Problems     Physical Therapy Goals        Problem: Physical Therapy Goal    Goal Priority Disciplines Outcome Goal Variances Interventions   Physical Therapy Goal     PT, PT/OT Ongoing (interventions implemented as appropriate)     Description:  Goals to be met by: 19     Patient will increase functional independence with mobility by performin. Supine to sit with MInimal Assistance  2. Rolling to Left and Right with Contact Guard Assistance.  3. Sit to stand transfer with Contact Guard Assistance with HHA  4. Bed to chair transfer with Minimal Assistance HHA  5. Sitting at edge of bed x5 minutes with Supervision  6. Lower extremity exercise program x10-20 reps per handout, with independence                      Time Tracking:     PT Received On: 19  PT Start Time: 0840     PT Stop Time: 0900  PT Total Time (min): 20 min     Billable Minutes: Therapeutic Activity 20min    Treatment Type: Treatment  PT/PTA: PTA     PTA Visit Number: 1     Mis Durham PTA  2019

## 2022-04-18 ENCOUNTER — PATIENT MESSAGE (OUTPATIENT)
Dept: ADMINISTRATIVE | Facility: OTHER | Age: 57
End: 2022-04-18
Payer: MEDICAID

## 2022-04-25 ENCOUNTER — TELEPHONE (OUTPATIENT)
Dept: TRANSPLANT | Facility: CLINIC | Age: 57
End: 2022-04-25
Payer: MEDICAID

## 2022-04-25 DIAGNOSIS — J43.9 PULMONARY EMPHYSEMA, UNSPECIFIED EMPHYSEMA TYPE: Primary | ICD-10-CM

## 2022-04-25 DIAGNOSIS — J44.9 CHRONIC OBSTRUCTIVE PULMONARY DISEASE, UNSPECIFIED COPD TYPE: ICD-10-CM

## 2022-04-25 NOTE — TELEPHONE ENCOUNTER
Attempted to contact patient to schedule an appointment with ABG, echo, 6 minute walk test and VQ scan per Dr. Vang's instructions.  No answer.  Left a message requesting a return call.

## 2022-04-25 NOTE — TELEPHONE ENCOUNTER
----- Message from Bonifacio Vang MD sent at 4/25/2022  9:25 AM CDT -----  Regarding: RE: Lung transplant to ALD clinic  Myra José.  These are the kind of patient that would be fitting for the Advanced Emphysema part of ALD clinic.      Thank you     ----- Message -----  From: Arnav Olmos MD  Sent: 4/25/2022  12:42 AM CDT  To: Bonifacio Vang MD  Subject: RE: Lung transplant to ALD clinic                Sounds fine to me.  I am doubtful that she will be transplant candidate.  Thanks, DET    ----- Message -----  From: Bonifacio Vang MD  Sent: 4/23/2022   7:54 AM CDT  To: Arnav Olmos MD, Skye Hill, RN  Subject: RE: Lung transplant to ALD clinic                Red José    Im not sure if I made you aware already but I will be changing this referral from Lung transplant to Advanced Emphysema under ALD clinic to verify all options including LT for the patient.  Thanks  ----- Message -----  From: Skye Hill, SOLO  Sent: 4/22/2022   5:02 PM CDT  To: Bonifacio Vang MD  Subject: Lung transplant to ALD clinic                    Is Dr. Olmos aware of the lung transplant referral being changed to ALD referral?    Skye

## 2022-05-09 ENCOUNTER — TELEPHONE (OUTPATIENT)
Dept: PULMONOLOGY | Facility: CLINIC | Age: 57
End: 2022-05-09
Payer: MEDICAID

## 2022-05-09 NOTE — TELEPHONE ENCOUNTER
I called Georgie, daughter in law of Elif Archibald, and left a message on her voicemail that I am calling back about Pulmonary rehab which is ordered at [facility]. I also told Georgie that Skye BANDA is trying to reach them about further testing. I said I will call back tomorrow. Jennifer Epstein LPN

## 2022-05-09 NOTE — TELEPHONE ENCOUNTER
----- Message from Katrin Sanz sent at 5/9/2022  3:51 PM CDT -----  Contact: Georgie - daughter-in-law  Pt's daughter-in-law requesting call back re: status of referral sent over to for pulmonary rehab.     Confirmed contact below:  Contact Name: Georgie   Phone Number: [phone]

## 2022-05-11 ENCOUNTER — TELEPHONE (OUTPATIENT)
Dept: TRANSPLANT | Facility: CLINIC | Age: 57
End: 2022-05-11
Payer: MEDICAID

## 2022-05-11 NOTE — TELEPHONE ENCOUNTER
----- Message from Marlon Oconnor sent at 5/11/2022  2:18 PM CDT -----  Regarding: return call  Patient's daughter in law, (Georgie) returning call from Monday. Requesting a call back.      Call: [phone] (Mobile  or  [phone]    Contacted patient regarding the referral to our department.  Patient requested that I speak with her daughter-in-law, Georgie.  Patient gave me permission to speak with Georgie and gave the phone to her.  Informed Georgie of the referral to our department.  Georgie stated that they received my previous message.  She requested that we contact her on her cell phone of [phone] since patient doesn't answer her phone.  Informed Georgie that patient was referred for lung transplant consideration, however, the referral was changed to an Advanced Lung Disease referral.  Informed her of our clinic availability and the testing required prior to the appointment (Echo, 6MWT, V/Q scan, ABG).  Informed Georgie that an appointment will be scheduled and she will be contacted with the date and times of the appointment.

## 2022-05-26 ENCOUNTER — LAB VISIT (OUTPATIENT)
Dept: LAB | Facility: HOSPITAL | Age: 57
End: 2022-05-26
Attending: STUDENT IN AN ORGANIZED HEALTH CARE EDUCATION/TRAINING PROGRAM
Payer: MEDICAID

## 2022-05-26 ENCOUNTER — OFFICE VISIT (OUTPATIENT)
Dept: FAMILY MEDICINE | Facility: HOSPITAL | Age: 57
End: 2022-05-26
Payer: MEDICAID

## 2022-05-26 VITALS
DIASTOLIC BLOOD PRESSURE: 72 MMHG | SYSTOLIC BLOOD PRESSURE: 120 MMHG | WEIGHT: 129.19 LBS | BODY MASS INDEX: 23.77 KG/M2 | HEART RATE: 77 BPM | HEIGHT: 62 IN

## 2022-05-26 DIAGNOSIS — R35.0 URINARY FREQUENCY: Primary | ICD-10-CM

## 2022-05-26 DIAGNOSIS — R10.13 EPIGASTRIC ABDOMINAL PAIN: ICD-10-CM

## 2022-05-26 DIAGNOSIS — B35.1 ONYCHOMYCOSIS: ICD-10-CM

## 2022-05-26 DIAGNOSIS — B18.2 CHRONIC HEPATITIS C WITHOUT HEPATIC COMA: ICD-10-CM

## 2022-05-26 DIAGNOSIS — Z12.11 ENCOUNTER FOR SCREENING FOR MALIGNANT NEOPLASM OF COLON: ICD-10-CM

## 2022-05-26 LAB
ALBUMIN SERPL BCP-MCNC: 3.6 G/DL (ref 3.5–5.2)
ALP SERPL-CCNC: 50 U/L (ref 55–135)
ALT SERPL W/O P-5'-P-CCNC: 14 U/L (ref 10–44)
ANION GAP SERPL CALC-SCNC: 11 MMOL/L (ref 8–16)
AST SERPL-CCNC: 18 U/L (ref 10–40)
BASOPHILS # BLD AUTO: 0.03 K/UL (ref 0–0.2)
BASOPHILS NFR BLD: 0.4 % (ref 0–1.9)
BILIRUB SERPL-MCNC: 0.3 MG/DL (ref 0.1–1)
BUN SERPL-MCNC: 13 MG/DL (ref 6–20)
CALCIUM SERPL-MCNC: 9.4 MG/DL (ref 8.7–10.5)
CHLORIDE SERPL-SCNC: 94 MMOL/L (ref 95–110)
CO2 SERPL-SCNC: 29 MMOL/L (ref 23–29)
CREAT SERPL-MCNC: 1 MG/DL (ref 0.5–1.4)
DIFFERENTIAL METHOD: ABNORMAL
EOSINOPHIL # BLD AUTO: 0.3 K/UL (ref 0–0.5)
EOSINOPHIL NFR BLD: 3.4 % (ref 0–8)
ERYTHROCYTE [DISTWIDTH] IN BLOOD BY AUTOMATED COUNT: 12.4 % (ref 11.5–14.5)
EST. GFR  (AFRICAN AMERICAN): >60 ML/MIN/1.73 M^2
EST. GFR  (NON AFRICAN AMERICAN): >60 ML/MIN/1.73 M^2
GLUCOSE SERPL-MCNC: 61 MG/DL (ref 70–110)
HCT VFR BLD AUTO: 37 % (ref 37–48.5)
HGB BLD-MCNC: 11.7 G/DL (ref 12–16)
IMM GRANULOCYTES # BLD AUTO: 0.04 K/UL (ref 0–0.04)
IMM GRANULOCYTES NFR BLD AUTO: 0.5 % (ref 0–0.5)
LIPASE SERPL-CCNC: 50 U/L (ref 4–60)
LYMPHOCYTES # BLD AUTO: 1.7 K/UL (ref 1–4.8)
LYMPHOCYTES NFR BLD: 20.1 % (ref 18–48)
MCH RBC QN AUTO: 28.5 PG (ref 27–31)
MCHC RBC AUTO-ENTMCNC: 31.6 G/DL (ref 32–36)
MCV RBC AUTO: 90 FL (ref 82–98)
MONOCYTES # BLD AUTO: 0.8 K/UL (ref 0.3–1)
MONOCYTES NFR BLD: 9.4 % (ref 4–15)
NEUTROPHILS # BLD AUTO: 5.4 K/UL (ref 1.8–7.7)
NEUTROPHILS NFR BLD: 66.2 % (ref 38–73)
NRBC BLD-RTO: 0 /100 WBC
PLATELET # BLD AUTO: 212 K/UL (ref 150–450)
PMV BLD AUTO: 9.1 FL (ref 9.2–12.9)
POTASSIUM SERPL-SCNC: 4.2 MMOL/L (ref 3.5–5.1)
PROT SERPL-MCNC: 8.1 G/DL (ref 6–8.4)
RBC # BLD AUTO: 4.1 M/UL (ref 4–5.4)
SODIUM SERPL-SCNC: 134 MMOL/L (ref 136–145)
WBC # BLD AUTO: 8.2 K/UL (ref 3.9–12.7)

## 2022-05-26 PROCEDURE — 85025 COMPLETE CBC W/AUTO DIFF WBC: CPT | Mod: NTX | Performed by: STUDENT IN AN ORGANIZED HEALTH CARE EDUCATION/TRAINING PROGRAM

## 2022-05-26 PROCEDURE — 99214 OFFICE O/P EST MOD 30 MIN: CPT | Performed by: STUDENT IN AN ORGANIZED HEALTH CARE EDUCATION/TRAINING PROGRAM

## 2022-05-26 PROCEDURE — 36415 COLL VENOUS BLD VENIPUNCTURE: CPT | Mod: NTX | Performed by: STUDENT IN AN ORGANIZED HEALTH CARE EDUCATION/TRAINING PROGRAM

## 2022-05-26 PROCEDURE — 83690 ASSAY OF LIPASE: CPT | Mod: TXP | Performed by: STUDENT IN AN ORGANIZED HEALTH CARE EDUCATION/TRAINING PROGRAM

## 2022-05-26 PROCEDURE — 80053 COMPREHEN METABOLIC PANEL: CPT | Mod: TXP | Performed by: STUDENT IN AN ORGANIZED HEALTH CARE EDUCATION/TRAINING PROGRAM

## 2022-05-26 RX ORDER — RISPERIDONE 0.5 MG/1
0.5 TABLET ORAL NIGHTLY
COMMUNITY
Start: 2022-03-25 | End: 2022-09-26

## 2022-05-26 RX ORDER — AMITRIPTYLINE HYDROCHLORIDE 25 MG/1
25 TABLET, FILM COATED ORAL NIGHTLY PRN
COMMUNITY
Start: 2022-03-25 | End: 2023-01-12

## 2022-05-26 RX ORDER — SERTRALINE HYDROCHLORIDE 50 MG/1
50 TABLET, FILM COATED ORAL DAILY
COMMUNITY
Start: 2022-04-17 | End: 2022-05-26

## 2022-05-26 RX ORDER — SERTRALINE HYDROCHLORIDE 100 MG/1
100 TABLET, FILM COATED ORAL DAILY
COMMUNITY
Start: 2022-05-20

## 2022-05-26 RX ORDER — NITROFURANTOIN 25; 75 MG/1; MG/1
100 CAPSULE ORAL 2 TIMES DAILY
Qty: 10 CAPSULE | Refills: 0 | Status: SHIPPED | OUTPATIENT
Start: 2022-05-26 | End: 2022-05-31

## 2022-05-26 RX ORDER — ACETAMINOPHEN AND CODEINE PHOSPHATE 300; 30 MG/1; MG/1
1 TABLET ORAL EVERY 6 HOURS PRN
COMMUNITY
Start: 2022-03-09 | End: 2023-07-03 | Stop reason: ALTCHOICE

## 2022-05-26 NOTE — PROGRESS NOTES
Progress Note  [facility]    Subjective:     Elif Archibald is a 57 y.o. year old female with PMHx of COPD/hypoxic respiratory failure (currently on home O2), possible cirrhosis, hx of subdural hematoma with longstanding residual deficits that have since improved, chronic pain, MDD vs Bipolar disorder  who presented to clinic today to follow up for chronic medical issues. patient with concerns regarding urinary frequency.  She reports that for about the past week, she has noted increased frequency and urgency.  Denies polyuria.  Also denies any dysuria.  Reports that she has had multiple UTIs in the past and the current symptoms she is experiencing are identical to her previous UTIs.  Patient also with concerns regarding epigastric abdominal pain.  Reports that the pain has been present for a number of months.  Worse with eating and accordingly patient has diminished appetite lately.  Denies any changes in bowel habits.  Denies any nausea/vomiting.  Pain does not radiate.  Patient is concerned that she may have a hernia as she feels a point is exquisitely tender and she feels that her abdomen is a bit distended. Other than these concerns, she reports that she is generally doing well, better than previously. Recently saw pulmonology who recommended to largely stay the course but set her up with pulmonary rehab which she will be starting soon. They are also going to lung transplant clinic as well for further opinions on managing her baseline respiratory failure. Patient also completed course of epclusa for hep c infection without issue. Has not had test of cure yet.       Review of Systems   Constitutional: Negative for chills, fever and malaise/fatigue.   Respiratory: Negative for cough and shortness of breath.    Cardiovascular: Negative for chest pain.   Gastrointestinal: Positive for abdominal pain. Negative for constipation, diarrhea, heartburn, nausea and vomiting.   Genitourinary: Positive for  frequency and urgency. Negative for dysuria.   Musculoskeletal: Negative for myalgias.   Neurological: Negative for weakness and headaches.       Objective:     Vitals:    05/26/22 1355   BP: 120/72   Pulse: 77       Wt Readings from Last 1 Encounters:   05/26/22 58.6 kg (129 lb 3 oz)       Physical Exam  Constitutional:       General: She is not in acute distress.     Appearance: Normal appearance. She is not ill-appearing.   HENT:      Head: Normocephalic and atraumatic.      Mouth/Throat:      Mouth: Mucous membranes are moist.      Pharynx: Oropharynx is clear.   Eyes:      Extraocular Movements: Extraocular movements intact.      Pupils: Pupils are equal, round, and reactive to light.   Pulmonary:      Effort: Pulmonary effort is normal.   Abdominal:      General: Abdomen is flat. There is distension (slight).      Tenderness: There is abdominal tenderness (marked epigastric). There is no guarding or rebound.      Hernia: No hernia is present.   Musculoskeletal:         General: Normal range of motion.      Cervical back: Normal range of motion.   Skin:     General: Skin is warm and dry.      Comments: onychomycosis   Neurological:      Mental Status: She is alert and oriented to person, place, and time.     Urinalysis with 2+ leukocytes, positive nitrates    Assessment/Plan:     Elif was seen today for abdominal pain.    Diagnoses and all orders for this visit:    Urinary frequency  -     POCT URINE DIPSTICK WITHOUT MICROSCOPE  -     nitrofurantoin, macrocrystal-monohydrate, (MACROBID) 100 MG capsule; Take 1 capsule (100 mg total) by mouth 2 (two) times daily. for 5 days  - Patient with urinary frequency/urgency consistent with previous UTI's as well as UA suggestive of UTI. No findings suggestive of pyelo. Treat with macrobid.     Epigastric abdominal pain  -     Lipase; Future  -     CT Abdomen Without Contrast; Future  -     Comprehensive Metabolic Panel; Future  -     CBC Auto Differential;  Future  - Unclear etiology of abdominal pain. Does have a hx of prior alcohol abuse and with location of pain exclusively in the epigastrum, could potentially be chronic pancreatitis? Maybe related to gallstones? No change in bowel habits though. Maybe ventral hernia in response to increased intrathoracic pressure from respiratory failure? Lipase and basic labwork to investigate for possible causes. Abdominal CT as well (would like w/ contrast but cannot order contrast right now 2/2 shortage of IV contrast).     Chronic hepatitis C without hepatic coma  -     Hepatitis C Genotype; Future  - Test of cure    Onychomycosis  -     Ambulatory referral/consult to Podiatry; Future    Encounter for screening for malignant neoplasm of colon  -     Cologuard Screening (Multitarget Stool DNA); Future  -     Cologuard Screening (Multitarget Stool DNA)      Follow up in about 3 months for routine follow up.     Case discussed with staff: Dr. Sharla Mcknight MD PGY-3  [facility]   05/26/2022 5:00 PM    This note was partially created using [employer] Voice Recognition software. Typographical and content errors may occur with this process. While efforts are made to detect and correct such errors, in some cases errors will persist. For this reason, wording in this document should be considered in the proper context and not strictly verbatim.

## 2022-05-27 NOTE — PROGRESS NOTES
I assume primary medical responsibility for this patient. I have reviewed the history, physical, and assessement & treatment plan with the resident and agree that the care is reasonable and necessary. This service has been performed by a resident without the presence of a teaching physician under the primary care exception. If necessary, an addendum of additional findings or evaluation beyond the resident documentation will be noted below.      Bri Magdaleno MD    [facility]

## 2022-05-31 ENCOUNTER — HOSPITAL ENCOUNTER (OUTPATIENT)
Dept: RADIOLOGY | Facility: HOSPITAL | Age: 57
Discharge: HOME | End: 2022-05-31
Attending: STUDENT IN AN ORGANIZED HEALTH CARE EDUCATION/TRAINING PROGRAM
Payer: MEDICAID

## 2022-05-31 DIAGNOSIS — R10.13 EPIGASTRIC ABDOMINAL PAIN: ICD-10-CM

## 2022-05-31 LAB
HCV GENTYP SERPL NAA+PROBE: NORMAL
HCV RNA SERPL NAA+PROBE-LOG IU: <1.08 LOG (10) IU/ML
HCV RNA SERPL QL NAA+PROBE: NOT DETECTED
HCV RNA SPEC NAA+PROBE-ACNC: <12 IU/ML

## 2022-05-31 PROCEDURE — 74150 CT ABDOMEN W/O CONTRAST: CPT | Mod: TC,PO,TXP

## 2022-06-09 ENCOUNTER — TELEPHONE (OUTPATIENT)
Dept: FAMILY MEDICINE | Facility: HOSPITAL | Age: 57
End: 2022-06-09
Payer: MEDICAID

## 2022-06-09 NOTE — TELEPHONE ENCOUNTER
Called patient to discuss results. Nothing very clear on CT to show reason for pain. Potentially stool burden? Recommended patient to take fiber supplements to help with constipation. Hep C cured per test of cure. Follow up after trial of fiber to see if that helps abdominal pain.    Jayden Mcknight MD PGY-3  [facility]   06/09/2022 5:14 PM

## 2022-06-09 NOTE — TELEPHONE ENCOUNTER
----- Message from Una Bess MA sent at 6/9/2022  3:06 PM CDT -----  Contact: Patient [phone]  Patient requests call with results of cat scan done last week at [facility].  Thanks.

## 2022-06-21 ENCOUNTER — HOSPITAL ENCOUNTER (OUTPATIENT)
Dept: PULMONOLOGY | Facility: CLINIC | Age: 57
Discharge: HOME | End: 2022-06-21
Payer: MEDICAID

## 2022-06-21 ENCOUNTER — TELEPHONE (OUTPATIENT)
Dept: PULMONOLOGY | Facility: CLINIC | Age: 57
End: 2022-06-21
Payer: MEDICAID

## 2022-06-21 ENCOUNTER — HOSPITAL ENCOUNTER (OUTPATIENT)
Dept: CARDIOLOGY | Facility: HOSPITAL | Age: 57
Discharge: HOME | End: 2022-06-21
Attending: INTERNAL MEDICINE
Payer: MEDICAID

## 2022-06-21 ENCOUNTER — OFFICE VISIT (OUTPATIENT)
Dept: TRANSPLANT | Facility: CLINIC | Age: 57
End: 2022-06-21
Payer: MEDICAID

## 2022-06-21 VITALS — BODY MASS INDEX: 24.74 KG/M2 | WEIGHT: 126 LBS | HEIGHT: 60 IN

## 2022-06-21 VITALS
HEART RATE: 61 BPM | SYSTOLIC BLOOD PRESSURE: 144 MMHG | BODY MASS INDEX: 24.74 KG/M2 | OXYGEN SATURATION: 98 % | WEIGHT: 126 LBS | DIASTOLIC BLOOD PRESSURE: 66 MMHG | HEIGHT: 60 IN | RESPIRATION RATE: 20 BRPM | TEMPERATURE: 98 F

## 2022-06-21 VITALS
SYSTOLIC BLOOD PRESSURE: 120 MMHG | HEIGHT: 62 IN | WEIGHT: 129 LBS | HEART RATE: 70 BPM | BODY MASS INDEX: 23.74 KG/M2 | DIASTOLIC BLOOD PRESSURE: 70 MMHG

## 2022-06-21 DIAGNOSIS — R06.09 DYSPNEA ON EXERTION: ICD-10-CM

## 2022-06-21 DIAGNOSIS — J43.9 PULMONARY EMPHYSEMA, UNSPECIFIED EMPHYSEMA TYPE: ICD-10-CM

## 2022-06-21 DIAGNOSIS — J44.9 CHRONIC OBSTRUCTIVE PULMONARY DISEASE, UNSPECIFIED COPD TYPE: ICD-10-CM

## 2022-06-21 DIAGNOSIS — Z76.82 LUNG TRANSPLANT CANDIDATE: Primary | ICD-10-CM

## 2022-06-21 LAB
ALLENS TEST: ABNORMAL
ASCENDING AORTA: 3.22 CM
AV INDEX (PROSTH): 0.71
AV MEAN GRADIENT: 4 MMHG
AV PEAK GRADIENT: 8 MMHG
AV VALVE AREA: 2.23 CM2
AV VELOCITY RATIO: 0.58
BSA FOR ECHO PROCEDURE: 1.6 M2
CV ECHO LV RWT: 0.3 CM
DELSYS: ABNORMAL
DOP CALC AO PEAK VEL: 1.44 M/S
DOP CALC AO VTI: 29.25 CM
DOP CALC LVOT AREA: 3.1 CM2
DOP CALC LVOT DIAMETER: 2 CM
DOP CALC LVOT PEAK VEL: 0.83 M/S
DOP CALC LVOT STROKE VOLUME: 65.16 CM3
DOP CALCLVOT PEAK VEL VTI: 20.75 CM
E WAVE DECELERATION TIME: 182.89 MSEC
E/A RATIO: 1.12
E/E' RATIO: 7.91 M/S
ECHO LV POSTERIOR WALL: 0.6 CM (ref 0.6–1.1)
EJECTION FRACTION: 55 %
FRACTIONAL SHORTENING: 26 % (ref 28–44)
HCO3 UR-SCNC: 31.6 MMOL/L (ref 24–28)
INTERVENTRICULAR SEPTUM: 0.63 CM (ref 0.6–1.1)
LA MAJOR: 3.42 CM
LA MINOR: 3.4 CM
LA WIDTH: 2.79 CM
LEFT ATRIUM SIZE: 2.69 CM
LEFT ATRIUM VOLUME INDEX MOD: 13.4 ML/M2
LEFT ATRIUM VOLUME INDEX: 13.7 ML/M2
LEFT ATRIUM VOLUME MOD: 21.25 CM3
LEFT ATRIUM VOLUME: 21.75 CM3
LEFT INTERNAL DIMENSION IN SYSTOLE: 2.97 CM (ref 2.1–4)
LEFT VENTRICLE DIASTOLIC VOLUME INDEX: 43.66 ML/M2
LEFT VENTRICLE DIASTOLIC VOLUME: 69.42 ML
LEFT VENTRICLE MASS INDEX: 42 G/M2
LEFT VENTRICLE SYSTOLIC VOLUME INDEX: 21.6 ML/M2
LEFT VENTRICLE SYSTOLIC VOLUME: 34.28 ML
LEFT VENTRICULAR INTERNAL DIMENSION IN DIASTOLE: 3.99 CM (ref 3.5–6)
LEFT VENTRICULAR MASS: 66.09 G
LV LATERAL E/E' RATIO: 7.91 M/S
LV SEPTAL E/E' RATIO: 7.91 M/S
MV A" WAVE DURATION": 9.71 MSEC
MV PEAK A VEL: 0.78 M/S
MV PEAK E VEL: 0.87 M/S
MV STENOSIS PRESSURE HALF TIME: 53.04 MS
MV VALVE AREA P 1/2 METHOD: 4.15 CM2
PCO2 BLDA: 56.5 MMHG (ref 35–45)
PH SMN: 7.36 [PH] (ref 7.35–7.45)
PO2 BLDA: 75 MMHG (ref 80–100)
POC BE: 6 MMOL/L
POC SATURATED O2: 94 % (ref 95–100)
POC TCO2: 33 MMOL/L (ref 23–27)
PULM VEIN S/D RATIO: 1.19
PV PEAK D VEL: 0.47 M/S
PV PEAK S VEL: 0.56 M/S
RA MAJOR: 3.05 CM
RA PRESSURE: 3 MMHG
RA WIDTH: 2.56 CM
RIGHT VENTRICULAR END-DIASTOLIC DIMENSION: 2.44 CM
RV TISSUE DOPPLER FREE WALL SYSTOLIC VELOCITY 1 (APICAL 4 CHAMBER VIEW): 9.91 CM/S
SAMPLE: ABNORMAL
SINUS: 2.95 CM
SITE: ABNORMAL
STJ: 2.93 CM
TDI LATERAL: 0.11 M/S
TDI SEPTAL: 0.11 M/S
TDI: 0.11 M/S
TRICUSPID ANNULAR PLANE SYSTOLIC EXCURSION: 1.9 CM

## 2022-06-21 PROCEDURE — 94618 PULMONARY STRESS TESTING: ICD-10-PCS | Mod: 26,S$PBB,NTX, | Performed by: INTERNAL MEDICINE

## 2022-06-21 PROCEDURE — 93306 TTE W/DOPPLER COMPLETE: CPT | Mod: TXP

## 2022-06-21 PROCEDURE — 93306 ECHO (CUPID ONLY): ICD-10-PCS | Mod: 26,NTX,, | Performed by: INTERNAL MEDICINE

## 2022-06-21 PROCEDURE — 3077F PR MOST RECENT SYSTOLIC BLOOD PRESSURE >= 140 MM HG: ICD-10-PCS | Mod: CPTII,NTX,, | Performed by: INTERNAL MEDICINE

## 2022-06-21 PROCEDURE — 3008F BODY MASS INDEX DOCD: CPT | Mod: CPTII,NTX,, | Performed by: INTERNAL MEDICINE

## 2022-06-21 PROCEDURE — 1159F PR MEDICATION LIST DOCUMENTED IN MEDICAL RECORD: ICD-10-PCS | Mod: CPTII,NTX,, | Performed by: INTERNAL MEDICINE

## 2022-06-21 PROCEDURE — 82803 BLOOD GASES ANY COMBINATION: CPT | Mod: PBBFAC,TXP | Performed by: INTERNAL MEDICINE

## 2022-06-21 PROCEDURE — 3008F PR BODY MASS INDEX (BMI) DOCUMENTED: ICD-10-PCS | Mod: CPTII,NTX,, | Performed by: INTERNAL MEDICINE

## 2022-06-21 PROCEDURE — 99204 PR OFFICE/OUTPT VISIT, NEW, LEVL IV, 45-59 MIN: ICD-10-PCS | Mod: 25,S$PBB,NTX, | Performed by: INTERNAL MEDICINE

## 2022-06-21 PROCEDURE — 3077F SYST BP >= 140 MM HG: CPT | Mod: CPTII,NTX,, | Performed by: INTERNAL MEDICINE

## 2022-06-21 PROCEDURE — 36600 PR WITHDRAWAL OF ARTERIAL BLOOD: ICD-10-PCS | Mod: S$PBB,NTX,, | Performed by: INTERNAL MEDICINE

## 2022-06-21 PROCEDURE — 99999 PR PBB SHADOW E&M-EST. PATIENT-LVL V: ICD-10-PCS | Mod: PBBFAC,TXP,, | Performed by: INTERNAL MEDICINE

## 2022-06-21 PROCEDURE — 99999 PR PBB SHADOW E&M-EST. PATIENT-LVL V: CPT | Mod: PBBFAC,TXP,, | Performed by: INTERNAL MEDICINE

## 2022-06-21 PROCEDURE — 1159F MED LIST DOCD IN RCRD: CPT | Mod: CPTII,NTX,, | Performed by: INTERNAL MEDICINE

## 2022-06-21 PROCEDURE — 3078F DIAST BP <80 MM HG: CPT | Mod: CPTII,NTX,, | Performed by: INTERNAL MEDICINE

## 2022-06-21 PROCEDURE — 94618 PULMONARY STRESS TESTING: CPT | Mod: PBBFAC,NTX | Performed by: INTERNAL MEDICINE

## 2022-06-21 PROCEDURE — 3078F PR MOST RECENT DIASTOLIC BLOOD PRESSURE < 80 MM HG: ICD-10-PCS | Mod: CPTII,NTX,, | Performed by: INTERNAL MEDICINE

## 2022-06-21 PROCEDURE — 99215 OFFICE O/P EST HI 40 MIN: CPT | Mod: PBBFAC,25,TXP | Performed by: INTERNAL MEDICINE

## 2022-06-21 PROCEDURE — 36600 WITHDRAWAL OF ARTERIAL BLOOD: CPT | Mod: PBBFAC,NTX | Performed by: INTERNAL MEDICINE

## 2022-06-21 PROCEDURE — 1160F PR REVIEW ALL MEDS BY PRESCRIBER/CLIN PHARMACIST DOCUMENTED: ICD-10-PCS | Mod: CPTII,NTX,, | Performed by: INTERNAL MEDICINE

## 2022-06-21 PROCEDURE — 1160F RVW MEDS BY RX/DR IN RCRD: CPT | Mod: CPTII,NTX,, | Performed by: INTERNAL MEDICINE

## 2022-06-21 PROCEDURE — 94618 PULMONARY STRESS TESTING: CPT | Mod: 26,S$PBB,NTX, | Performed by: INTERNAL MEDICINE

## 2022-06-21 PROCEDURE — 93306 TTE W/DOPPLER COMPLETE: CPT | Mod: 26,NTX,, | Performed by: INTERNAL MEDICINE

## 2022-06-21 PROCEDURE — 99204 OFFICE O/P NEW MOD 45 MIN: CPT | Mod: 25,S$PBB,NTX, | Performed by: INTERNAL MEDICINE

## 2022-06-21 PROCEDURE — 36600 WITHDRAWAL OF ARTERIAL BLOOD: CPT | Mod: S$PBB,NTX,, | Performed by: INTERNAL MEDICINE

## 2022-06-21 RX ORDER — AMITRIPTYLINE HYDROCHLORIDE 10 MG/1
20 TABLET, FILM COATED ORAL NIGHTLY PRN
COMMUNITY
Start: 2022-06-16 | End: 2023-01-12

## 2022-06-21 NOTE — PROGRESS NOTES
LUNG TRANSPLANT INITIAL EVALUATION                                                                                                                                           Reason for Visit:  Evaluation for lung transplant    Referring Physician: Arnav Olmos MD    History of Present Illness: Elif Archibald is a 57 y.o. female who is on 2L of oxygen.  She is on no assisted ventilation.  Her New [place] Heart Association Class is II and a Karnofsky score of 70% - Cares for self: Unable to carry on normal activity or active work. She is not diabetic.     Elif Archibald is a 54 y.o. female who has a PMHx of JENNIE/MDD/Schizophrenia/Bipolar, SDH s/p craniotomy, COPD on 2LNC at home, Chronic Hep C (s/p epclus) who presents today for clinic appointment to discuss lung transplant.  She has been following in general pulmonology clinic for persistent dyspnea for the past year. She uses symbicort and spiriva daily without issues, duonebs twice a day. Denies any recent hospitalizations, steroid use, or antibiotics. She reports dyspnea with exertion, which she describes as unchanged from previous.  Still has not performed pulmonary rehabilitation, discussed with her extensively regarding the potential benefit and role for it, especially with her functional status. Can perform her simple ADLs, however any strenuous activity makes her very fatigued. Has stopped smoking. Denied any recent illnesses, fevers, chills, rigors, cough, chest pain, abdominal pain, nausea, vomiting. Recently finished epclusa for hepC    Past Medical History:   Diagnosis Date    Anxiety     Asthma     Bipolar 1 disorder     Cirrhosis, alcoholic     Cocaine abuse     COPD (chronic obstructive pulmonary disease)     Depression     Encounter for blood transfusion     Schizophrenia     Seizures     Subdural hematoma     Tachycardia        Past Surgical History:   Procedure Laterality Date    APPENDECTOMY      CRANIECTOMY Left 01/31/2019     ESOPHAGOGASTRODUODENOSCOPY      PEG W/TRACHEOSTOMY PLACEMENT  02/16/2019       Allergies: Dye    Current Outpatient Medications   Medication Sig    acetaminophen-codeine 300-30mg (TYLENOL #3) 300-30 mg Tab Take 1 tablet by mouth every 6 (six) hours as needed.    albuterol (PROVENTIL) 2.5 mg /3 mL (0.083 %) nebulizer solution Take 3 mLs (2.5 mg total) by nebulization every 6 (six) hours as needed for Wheezing. Rescue    albuterol-ipratropium (DUO-NEB) 2.5 mg-0.5 mg/3 mL nebulizer solution Take 3 mLs by nebulization every 6 (six) hours as needed for Wheezing. Rescue    amitriptyline (ELAVIL) 10 MG tablet Take 20 mg by mouth nightly as needed.    budesonide-formoterol 160-4.5 mcg (SYMBICORT) 160-4.5 mcg/actuation HFAA Inhale 2 puffs into the lungs every 12 (twelve) hours. Controller    clonazePAM (KLONOPIN) 0.5 MG tablet Take 0.5 mg by mouth 2 (two) times daily. as needed for anxiety.    fluticasone propionate (FLONASE) 50 mcg/actuation nasal spray 2 sprays (100 mcg total) by Each Nostril route once daily.    levETIRAcetam (KEPPRA) 500 MG Tab Take 1 tablet (500 mg total) by mouth 2 (two) times daily.    metoprolol tartrate (LOPRESSOR) 50 MG tablet Take 50 mg by mouth 2 (two) times a day.    multivitamin Chew Take by mouth once daily.    ondansetron (ZOFRAN) 4 MG tablet Take 1 tablet (4 mg total) by mouth 2 (two) times daily.    sertraline (ZOLOFT) 100 MG tablet Take 100 mg by mouth once daily.    tiotropium (SPIRIVA) 18 mcg inhalation capsule Inhale 1 capsule (18 mcg total) into the lungs once daily. Controller    amitriptyline (ELAVIL) 25 MG tablet Take 25 mg by mouth nightly as needed.    FANAPT 1 mg Tab Take 1 mg by mouth 2 (two) times daily.    LIDOcaine (LIDODERM) 5 % Place 1 patch onto the skin once daily. Remove & Discard patch within 12 hours or as directed by MD (Patient not taking: Reported on 6/21/2022)    risperiDONE (RISPERDAL) 0.5 MG Tab Take 0.5 mg by mouth nightly.     sofosbuvir-velpatasvir (EPCLUSA) 400-100 mg Tab Take 1 tablet by mouth once daily.     No current facility-administered medications for this visit.       Immunization History   Administered Date(s) Administered    COVID-19, MRNA, LN-S, PF (Pfizer) (Purple Cap) 03/24/2021, 04/14/2021    Influenza - Quadrivalent - PF *Preferred* (6 months and older) 03/05/2021, 11/11/2021    Pneumococcal Conjugate - 13 Valent 03/12/2020    Tdap 09/12/2016, 07/29/2017, 03/24/2018     Family History:    Family History   Problem Relation Age of Onset    COPD Mother     Cirrhosis Father      Social History     Substance and Sexual Activity   Alcohol Use Not Currently    Comment: social      Social History     Substance and Sexual Activity   Drug Use Not Currently      Social History     Socioeconomic History    Marital status:    Tobacco Use    Smoking status: Former Smoker     Packs/day: 1.00     Years: 40.00     Pack years: 40.00     Types: Cigarettes     Start date: 1980     Quit date: 6/18/2019     Years since quitting: 3.0    Smokeless tobacco: Never Used    Tobacco comment: Previously 1 pack per day smoker - quit smoking January 2019   Substance and Sexual Activity    Alcohol use: Not Currently     Comment: social    Drug use: Not Currently     Social Determinants of Health     Financial Resource Strain: High Risk    Difficulty of Paying Living Expenses: Very hard   Food Insecurity: Food Insecurity Present    Worried About Running Out of Food in the Last Year: Sometimes true    Ran Out of Food in the Last Year: Often true   Transportation Needs: No Transportation Needs    Lack of Transportation (Medical): No    Lack of Transportation (Non-Medical): No   Physical Activity: Insufficiently Active    Days of Exercise per Week: 1 day    Minutes of Exercise per Session: 10 min   Stress: Stress Concern Present    Feeling of Stress : Very much   Social Connections: Unknown    Frequency of Communication with  Friends and Family: Twice a week    Frequency of Social Gatherings with Friends and Family: More than three times a week    Active Member of Clubs or Organizations: No    Attends Club or Organization Meetings: Never    Marital Status:    Housing Stability: Low Risk     Unable to Pay for Housing in the Last Year: No    Number of Places Lived in the Last Year: 2    Unstable Housing in the Last Year: No     Review of Systems   Constitutional: Positive for malaise/fatigue.   Respiratory: Positive for shortness of breath. Negative for cough, hemoptysis, sputum production and wheezing.    Cardiovascular: Negative for chest pain, palpitations, orthopnea, claudication, leg swelling and PND.     Vitals  BP (!) 144/66   Pulse 61   Temp 97.6 °F (36.4 °C) (Oral)   Resp 20   Ht 5' (1.524 m)   Wt 57.2 kg (126 lb)   SpO2 98% Comment: 2 liters  BMI 24.61 kg/m²   Physical Exam  Constitutional:       Appearance: She is well-developed and normal weight.      Interventions: Nasal cannula in place.   HENT:      Head: Normocephalic.   Cardiovascular:      Rate and Rhythm: Normal rate and regular rhythm.   Pulmonary:      Effort: Prolonged expiration present. No respiratory distress.      Breath sounds: Examination of the right-upper field reveals decreased breath sounds. Examination of the left-upper field reveals decreased breath sounds. Examination of the right-middle field reveals decreased breath sounds. Examination of the left-middle field reveals decreased breath sounds. Examination of the right-lower field reveals decreased breath sounds. Examination of the left-lower field reveals decreased breath sounds. Decreased breath sounds present.   Abdominal:      Palpations: Abdomen is soft.   Musculoskeletal:      Right lower leg: No edema.      Left lower leg: No edema.   Skin:     General: Skin is warm and dry.   Neurological:      Mental Status: She is alert. Mental status is at baseline.      Comments: Slow to  "respond at times. Needs statements repeated. At patient's baseline per her reynaldo          Labs:  Hospital Outpatient Visit on 06/21/2022   Component Date Value    POC PH 06/21/2022 7.355     POC PCO2 06/21/2022 56.5 (A)    POC PO2 06/21/2022 75 (A)    POC HCO3 06/21/2022 31.6 (A)    POC BE 06/21/2022 6     POC SATURATED O2 06/21/2022 94 (A)    POC TCO2 06/21/2022 33 (A)    Sample 06/21/2022 ARTERIAL     Site 06/21/2022 RR     Allens Test 06/21/2022 Pass     DelSys 06/21/2022 Nasal Can    Hospital Outpatient Visit on 06/21/2022   Component Date Value    Ascending aorta 06/21/2022 3.22     STJ 06/21/2022 2.93     AV mean gradient 06/21/2022 4     Ao peak claudette 06/21/2022 1.44     Ao VTI 06/21/2022 29.25     IVS 06/21/2022 0.63     LA size 06/21/2022 2.69     Left Atrium Major Axis 06/21/2022 3.42     Left Atrium Minor Axis 06/21/2022 3.40     LVIDd 06/21/2022 3.99     LVIDs 06/21/2022 2.97     LVOT diameter 06/21/2022 2.00     LVOT peak VTI 06/21/2022 20.75     Posterior Wall 06/21/2022 0.60     MV Peak A Claudette 06/21/2022 0.78     E wave deceleration time 06/21/2022 182.89     MV Peak E Claudette 06/21/2022 0.87     PV Peak D Claudette 06/21/2022 0.47     PV Peak S Claudette 06/21/2022 0.56     RA Major Axis 06/21/2022 3.05     RA Width 06/21/2022 2.56     RVDD 06/21/2022 2.44     Sinus 06/21/2022 2.95     TAPSE 06/21/2022 1.90     TDI LATERAL 06/21/2022 0.11     TDI SEPTAL 06/21/2022 0.11     LA WIDTH 06/21/2022 2.79     MV stenosis pressure 1/2* 06/21/2022 53.04     LV Diastolic Volume 06/21/2022 69.42     LV Systolic Volume 06/21/2022 34.28     RV S' 06/21/2022 9.91     LVOT peak claudette 06/21/2022 0.83     LA volume (mod) 06/21/2022 21.25     MV "A" wave duration 06/21/2022 9.71     LV LATERAL E/E' RATIO 06/21/2022 7.91     LV SEPTAL E/E' RATIO 06/21/2022 7.91     FS 06/21/2022 26     LA volume 06/21/2022 21.75     LV mass 06/21/2022 66.09     Left Ventricle Relative * 06/21/2022 0.30  "    AV valve area 06/21/2022 2.23     AV Velocity Ratio 06/21/2022 0.58     AV index (prosthetic) 06/21/2022 0.71     MV valve area p 1/2 meth* 06/21/2022 4.15     E/A ratio 06/21/2022 1.12     Mean e' 06/21/2022 0.11     Pulm vein S/D ratio 06/21/2022 1.19     LVOT area 06/21/2022 3.1     LVOT stroke volume 06/21/2022 65.16     AV peak gradient 06/21/2022 8     E/E' ratio 06/21/2022 7.91     BSA 06/21/2022 1.6     LV Systolic Volume Index 06/21/2022 21.6     LV Diastolic Volume Index 06/21/2022 43.66     LA Volume Index 06/21/2022 13.7     LV Mass Index 06/21/2022 42     LA Volume Index (Mod) 06/21/2022 13.4     Right Atrial Pressure (f* 06/21/2022 3     EF 06/21/2022 55        Pulmonary Function Tests 3/29/2022 12/6/2021   FVC 1.42 1.5   FEV1 0.51 0.54   DLCO (ml/mmHg sec) - 8.11   FVC% 50 54   FEV1% 23 24   FEF 25-75 0.2 0.22   FEF 25-75% 8.9 10   DLCO% - 39     6MW 6/21/2022 12/11/2020 11/16/2020   6MWT Status completed with stops completed without stopping not completed   Patient Reported Dyspnea Dyspnea;Leg pain;Other (Comment) Dyspnea;Leg pain;Lightheadedness   Was O2 used? Yes Yes Yes   Delivery Method Cannula;Pull Tank;Continuous Flow Cannula;Pull Tank;Continuous Flow Cannula;Pull Tank;Continuous Flow   6MW Distance walked (feet) 762 800 400   Distance walked (meters) 232.26 243.84 121.92   Did patient stop? Yes No Yes   Oxygen Saturation 97 98 99   Supplemental Oxygen 2 L/M 2 L/M 2 L/M   Heart Rate 65 69 62   Blood Pressure 112/71 125/72 127/63   Lisette Dyspnea Rating  moderate very, very light (just noticeable) very, very light (just noticeable)   Oxygen Saturation 98 92 99   Supplemental Oxygen 2 L/M 2 L/M 2 L/M   Heart Rate 89 91 77   Blood Pressure 145/76 147/85 135/79   Lisette Dyspnea Rating  somewhat heavy somewhat heavy heavy   Recovery Time (seconds) 315 175 122   Oxygen Saturation 98 96 98   Supplemental Oxygen Room Air 2 L/M 2 L/M   Heart Rate 72 78 64       Imaging:  Results for  orders placed during the hospital encounter of 12/26/19    X-Ray Chest PA And Lateral    Narrative  EXAMINATION:  XR CHEST PA AND LATERAL    CLINICAL HISTORY:  Short of breath.    COMPARISON:  11/29/2019    FINDINGS:  There is trace left pleural effusion.  There is no lobar consolidation or pneumothorax..   The cardiac silhouette is within normal limits for size. Vascular calcifications are present.  No acute displaced fracture is seen.  Chronic left rib fractures are noted.    Impression  1. There is trace left pleural effusion.      Electronically signed by: Moreno Robert MD  Date:    12/26/2019  Time:    17:58      Assessment:  1. Lung transplant candidate    2. Pulmonary emphysema, unspecified emphysema type    3. Chronic obstructive pulmonary disease, unspecified COPD type      Plan: 1. GOLD Class 4D, On continuous oxygen 2LNC  - Continue triple therapy: ICA/LABA/LAMA; duonebs PRN  - Would benefit from pulmonary rehab    The process of lung transplant, as well as pre and post care, was discussed.  All questions were answered.  The patient stated that she is not interested in being considered for lung transplant.  Her daughter, who was in clinic with her, agrees.  The patient was informed that she can contact our office should she wish to be considered for lung transplant in the future.    Patient can continue care with general pulmonary clinic    Bone KAREN Leal  Lung Transplant/Advanced Lung Disease

## 2022-06-21 NOTE — PROCEDURES
Elif Archibald is a 57 y.o.  female patient, who presents for a 6 minute walk test ordered by MD Ciera.  The diagnosis is COPD.  The patient's BMI is 24.6 kg/m2.  Predicted distance (lower limit of normal) is 392.19 meters.      Test Results:    The test was completed with stops.  The patient stopped 1 times for a total of 15 seconds.  The total time walked was 345 seconds.  During walking, the patient reported:  Dyspnea. The patient used a wheelchair and supplemental oxygen during testing.     06/21/2022---------Distance: 232.26 meters (762 feet)     O2 Sat % Supplemental Oxygen Heart Rate Blood Pressure Lisette Scale   Pre-exercise  (Resting) 97 % 2 L/M 65 bpm 112/71 mmHg 3   During Exercise 98 % 2 L/M 89 bpm 145/76 mmHg 4   Post-exercise  (Recovery) 98 % 2 L/M 72 bpm 143/76 mmHg       Recovery Time: 315 seconds    Performing nurse/tech: Ramana FERNANDEZ      PREVIOUS STUDY:   The patient had a previous study.    12/11/2020---------Distance: 243.84 meters (800 feet)       O2 Sat % Supplemental Oxygen Heart Rate Blood Pressure Lisette Scale   Pre-exercise  (Resting) 83 % Room Air 95 bpm 156/88 mmHg 5-6   Pre-exercise  (Resting) 98 % 2 L/M 69 bpm 125/72 mmHg 0.5   During Exercise 92 % 2 L/M 91 bpm 147/85 mmHg 4   Post-exercise    96 % 2 L/M  78 bpm 137/80 mmHg            CLINICAL INTERPRETATION:  Six minute walk distance is 232.26 meters (762 feet) with somewhat heavy dyspnea.  During exercise, there was no significant desaturation while breathing supplemental oxygen.  Both blood pressure and heart rate increased significantly with walking.  This may represent a hypertensive and a tachycardic response to exercise.  The patient did not report non-pulmonary symptoms during exercise.  Significant exercise impairment is likely due to cardiovascular causes.  The patient did complete the study, walking 345 seconds of the 360 second test.  Since the previous study in 12/11/2020, exercise capacity is unchanged.  Based  upon age and body mass index, exercise capacity is less than predicted.

## 2022-06-21 NOTE — TELEPHONE ENCOUNTER
----- Message from Ravi Russell sent at 6/21/2022  8:09 AM CDT -----  Contact: Georgie ( dtr n law ) @ [phone]  PATIENT RUNNING LATE DUE TO TRAFFIC ( GPS has arriving at 8:27)

## 2022-06-21 NOTE — LETTER
June 22, 2022        Arnav Olmos  [address] [facility] [address]  Phone: [phone]  Fax: [phone]             [facility] - [address]  [address]  [place] [address]  Phone: [phone]   Patient: Elif Archibald   MR Number: [id number]   YOB: 1965   Date of Visit: 6/21/2022       Dear Dr. Arnav Olmos    Thank you for referring Elif Archibald to me for evaluation. Attached you will find relevant portions of my assessment and plan of care.    If you have questions, please do not hesitate to call me. I look forward to following Elif Archibald along with you.    Sincerely,    Bonifacio Vang MD    Enclosure    If you would like to receive this communication electronically, please contact [email or web address] or [phone] to request [employer] Link access.    [employer] is a tool which provides read-only access to select patient information with whom you have a relationship. Its easy to use and provides real time access to review your patients record including encounter summaries, notes, results, and demographic information.    If you feel you have received this communication in error or would no longer like to receive these types of communications, please e-mail [email or web address]

## 2022-06-21 NOTE — TELEPHONE ENCOUNTER
Spoke with patient daughter (Ms Jacques) informed her that I have received her message. Patient daughter states that patient has arrived to her 8:45 am appointment and wants to know if patient can complete 6 minute walk after patient completes Echo Test. I verbalized to patient daughter that I understand and advised patient daughter that patient can complete her 6 minute walk after she completes Echo Test.

## 2022-06-22 LAB — NONINV COLON CA DNA+OCC BLD SCRN STL QL: NEGATIVE

## 2022-07-07 NOTE — NURSING
7/6/22 - Patient's transplant episode closed since patient is not interested in lung transplantation and since she is too well.  Patient to follow with general pulmonary.

## 2022-09-12 DIAGNOSIS — R52 DIFFUSE PAIN: ICD-10-CM

## 2022-09-12 DIAGNOSIS — J45.909 ASTHMA, UNSPECIFIED ASTHMA SEVERITY, UNSPECIFIED WHETHER COMPLICATED, UNSPECIFIED WHETHER PERSISTENT: ICD-10-CM

## 2022-09-13 RX ORDER — LIDOCAINE 50 MG/G
1 PATCH TOPICAL DAILY
Qty: 30 PATCH | Refills: 0 | Status: SHIPPED | OUTPATIENT
Start: 2022-09-13 | End: 2023-02-09 | Stop reason: SDUPTHER

## 2022-09-13 RX ORDER — FLUTICASONE PROPIONATE 50 MCG
2 SPRAY, SUSPENSION (ML) NASAL DAILY
Qty: 16 G | Refills: 2 | Status: SHIPPED | OUTPATIENT
Start: 2022-09-13 | End: 2022-12-27 | Stop reason: SDUPTHER

## 2022-09-20 ENCOUNTER — TELEPHONE (OUTPATIENT)
Dept: FAMILY MEDICINE | Facility: HOSPITAL | Age: 57
End: 2022-09-20
Payer: MEDICAID

## 2022-09-26 ENCOUNTER — OFFICE VISIT (OUTPATIENT)
Dept: FAMILY MEDICINE | Facility: HOSPITAL | Age: 57
End: 2022-09-26
Attending: FAMILY MEDICINE
Payer: MEDICAID

## 2022-09-26 VITALS
HEIGHT: 61 IN | BODY MASS INDEX: 24.47 KG/M2 | SYSTOLIC BLOOD PRESSURE: 120 MMHG | HEART RATE: 80 BPM | WEIGHT: 129.63 LBS | DIASTOLIC BLOOD PRESSURE: 69 MMHG

## 2022-09-26 DIAGNOSIS — N76.0 BV (BACTERIAL VAGINOSIS): ICD-10-CM

## 2022-09-26 DIAGNOSIS — N89.8 VAGINAL ITCHING: ICD-10-CM

## 2022-09-26 DIAGNOSIS — B18.2 HEP C W/O COMA, CHRONIC: ICD-10-CM

## 2022-09-26 DIAGNOSIS — R29.6 FALLS FREQUENTLY: Primary | ICD-10-CM

## 2022-09-26 DIAGNOSIS — B37.31 VAGINAL CANDIDIASIS: ICD-10-CM

## 2022-09-26 DIAGNOSIS — B35.1 ONYCHOMYCOSIS: ICD-10-CM

## 2022-09-26 DIAGNOSIS — B96.89 BV (BACTERIAL VAGINOSIS): ICD-10-CM

## 2022-09-26 DIAGNOSIS — R25.1 TREMOR: ICD-10-CM

## 2022-09-26 DIAGNOSIS — G40.909 SEIZURE DISORDER: ICD-10-CM

## 2022-09-26 DIAGNOSIS — R52 PAIN: ICD-10-CM

## 2022-09-26 DIAGNOSIS — I10 ESSENTIAL HYPERTENSION: ICD-10-CM

## 2022-09-26 DIAGNOSIS — L60.3 ONYCHODYSTROPHY: ICD-10-CM

## 2022-09-26 PROCEDURE — 99215 OFFICE O/P EST HI 40 MIN: CPT | Performed by: FAMILY MEDICINE

## 2022-09-26 RX ORDER — MELOXICAM 15 MG/1
15 TABLET ORAL DAILY
Qty: 30 TABLET | Refills: 0 | Status: SHIPPED | OUTPATIENT
Start: 2022-09-26 | End: 2022-10-26

## 2022-09-26 RX ORDER — LEVETIRACETAM 500 MG/1
500 TABLET ORAL 2 TIMES DAILY
Qty: 60 TABLET | Refills: 1 | Status: SHIPPED | OUTPATIENT
Start: 2022-09-26 | End: 2022-12-20 | Stop reason: SDUPTHER

## 2022-09-26 RX ORDER — FLUCONAZOLE 150 MG/1
TABLET ORAL
Qty: 1 TABLET | Refills: 0 | Status: SHIPPED | OUTPATIENT
Start: 2022-09-26 | End: 2022-09-28 | Stop reason: SDUPTHER

## 2022-09-26 RX ORDER — METRONIDAZOLE 500 MG/1
500 TABLET ORAL EVERY 12 HOURS
Qty: 14 TABLET | Refills: 0 | Status: SHIPPED | OUTPATIENT
Start: 2022-09-26 | End: 2022-10-03

## 2022-09-26 RX ORDER — TERBINAFINE HYDROCHLORIDE 250 MG/1
250 TABLET ORAL DAILY
Qty: 90 TABLET | Refills: 0 | Status: SHIPPED | OUTPATIENT
Start: 2022-09-26 | End: 2022-12-25

## 2022-09-26 NOTE — PROGRESS NOTES
Progress Note   Family Medicine    Subjective:    Chief Complaint: Vaginal Discharge      History of Present Illness:     Patient ID: Elif Archibald is a 57 y.o. female presents for multiple issues.     HPI    Though appointment notes states visit for falls, pt with complaint of vaginal itching. Patient denies being sexually active. No LMP recorded. Patient is postmenopausal. Patient has also had generalized pain since craniotomy. Description of pain is nonspecific. Patient also states has tremor has gotten worse and ad she is having increase in the number of falls.       Review of Systems   Constitutional:  Negative for activity change, appetite change, chills, diaphoresis, fatigue, fever and unexpected weight change.   HENT:  Negative for tinnitus and voice change.    Eyes:  Negative for photophobia and visual disturbance.   Respiratory:  Negative for shortness of breath.    Cardiovascular:  Negative for palpitations and leg swelling.   Neurological:  Positive for dizziness and tremors. Negative for speech difficulty, weakness and numbness.       The following portions of the patient's history were reviewed and updated as appropriate: allergies, current medications, past family history, past medical history, past social history, past surgical history and problem list.    Past Medical History:   Diagnosis Date    Anxiety     Asthma     Bipolar 1 disorder     Cirrhosis, alcoholic     Cocaine abuse     COPD (chronic obstructive pulmonary disease)     Depression     Encounter for blood transfusion     Schizophrenia     Seizures     Subdural hematoma     Tachycardia        Family History   Problem Relation Age of Onset    COPD Mother     Cirrhosis Father        Social History     Socioeconomic History    Marital status:    Tobacco Use    Smoking status: Former     Packs/day: 1.00     Years: 40.00     Pack years: 40.00     Types: Cigarettes     Start date: 1980     Quit date: 6/18/2019     Years since  quitting: 3.3    Smokeless tobacco: Never    Tobacco comments:     Previously 1 pack per day smoker - quit smoking January 2019   Substance and Sexual Activity    Alcohol use: Not Currently     Comment: social    Drug use: Not Currently    Sexual activity: Not Currently     Social Determinants of Health     Financial Resource Strain: High Risk    Difficulty of Paying Living Expenses: Very hard   Food Insecurity: Food Insecurity Present    Worried About Running Out of Food in the Last Year: Sometimes true    Ran Out of Food in the Last Year: Often true   Transportation Needs: No Transportation Needs    Lack of Transportation (Medical): No    Lack of Transportation (Non-Medical): No   Physical Activity: Insufficiently Active    Days of Exercise per Week: 1 day    Minutes of Exercise per Session: 10 min   Stress: Stress Concern Present    Feeling of Stress : Very much   Social Connections: Unknown    Frequency of Communication with Friends and Family: Twice a week    Frequency of Social Gatherings with Friends and Family: More than three times a week    Active Member of Clubs or Organizations: No    Attends Club or Organization Meetings: Never    Marital Status:    Housing Stability: Low Risk     Unable to Pay for Housing in the Last Year: No    Number of Places Lived in the Last Year: 2    Unstable Housing in the Last Year: No       Past Surgical History:   Procedure Laterality Date    APPENDECTOMY      CRANIECTOMY Left 01/31/2019    ESOPHAGOGASTRODUODENOSCOPY      PEG W/TRACHEOSTOMY PLACEMENT  02/16/2019         Current Outpatient Medications:     acetaminophen-codeine 300-30mg (TYLENOL #3) 300-30 mg Tab, Take 1 tablet by mouth every 6 (six) hours as needed., Disp: , Rfl:     albuterol (PROVENTIL) 2.5 mg /3 mL (0.083 %) nebulizer solution, Take 3 mLs (2.5 mg total) by nebulization every 6 (six) hours as needed for Wheezing. Rescue, Disp: 1 each, Rfl: 6    albuterol-ipratropium (DUO-NEB) 2.5 mg-0.5 mg/3 mL  nebulizer solution, Take 3 mLs by nebulization every 6 (six) hours as needed for Wheezing. Rescue, Disp: 90 each, Rfl: 3    amitriptyline (ELAVIL) 10 MG tablet, Take 20 mg by mouth nightly as needed., Disp: , Rfl:     amitriptyline (ELAVIL) 25 MG tablet, Take 25 mg by mouth nightly as needed., Disp: , Rfl:     clonazePAM (KLONOPIN) 0.5 MG tablet, Take 0.5 mg by mouth 2 (two) times daily. as needed for anxiety., Disp: , Rfl:     fluticasone propionate (FLONASE) 50 mcg/actuation nasal spray, 2 sprays (100 mcg total) by Each Nostril route once daily., Disp: 16 g, Rfl: 2    levETIRAcetam (KEPPRA) 500 MG Tab, Take 1 tablet (500 mg total) by mouth 2 (two) times daily., Disp: 60 tablet, Rfl: 1    LIDOcaine (LIDODERM) 5 %, Place 1 patch onto the skin once daily. Remove & Discard patch within 12 hours or as directed by MD, Disp: 30 patch, Rfl: 0    metoprolol tartrate (LOPRESSOR) 50 MG tablet, Take 50 mg by mouth 2 (two) times a day., Disp: , Rfl:     multivitamin Chew, Take by mouth once daily., Disp: , Rfl:     sertraline (ZOLOFT) 100 MG tablet, Take 100 mg by mouth once daily., Disp: , Rfl:     tiotropium (SPIRIVA) 18 mcg inhalation capsule, Inhale 1 capsule (18 mcg total) into the lungs once daily. Controller, Disp: 90 capsule, Rfl: 3    FANAPT 1 mg Tab, Take 1 mg by mouth 2 (two) times daily., Disp: , Rfl:     flu vacc [id number] 6mos up,PF, (FLUARIX QUAD [id number], PF,) 60 mcg (15 mcg x 4)/0.5 mL Syrg, use as directed, Disp: 0.5 mL, Rfl: 0    fluconazole (DIFLUCAN) 150 MG Tab, One tab today repeat in 7days., Disp: 1 tablet, Rfl: 0    ondansetron (ZOFRAN) 4 MG tablet, Take 1 tablet (4 mg total) by mouth 2 (two) times daily., Disp: 30 tablet, Rfl: 1    pneumococcal 23-thom ps (PNEUMOVAX 23) 25 mcg/0.5 mL vaccine, Inject into the muscle., Disp: 0.5 mL, Rfl: 0    terbinafine HCL (LAMISIL) 250 mg tablet, Take 1 tablet (250 mg total) by mouth once daily., Disp: 90 tablet, Rfl: 0    Review of patient's allergies indicates:  "  Allergen Reactions    Dye Anxiety, Blisters, Hives, Itching, Rash and Swelling       Social History     Substance and Sexual Activity   Sexual Activity Not Currently        Fall Risk Assessment 3/26/2021 1/24/2021 9/3/2020 3/12/2020 3/11/2020 3/11/2020 3/11/2020   Is the patient at risk for fall? - - - - - - -   History Of Fall (W/I 3 Mos) 4-->Yes 0-->No 4-->Yes 0-->No 0-->No 0-->No 0-->No   Polypharmacy 0-->No 3-->Yes 3-->Yes 3-->Yes 3-->Yes 3-->Yes 3-->Yes   Central Nervous System/Psychotropic Medication 3-->Yes 3-->Yes 3-->Yes 3-->Yes 3-->Yes 3-->Yes 3-->Yes   Cardiovascular Medication 3-->Yes 3-->Yes 0-->No 3-->Yes 3-->Yes 3-->Yes 3-->Yes   Age Greater Than 65 Years 0-->No 0-->No 2-->Yes 0-->No 0-->No 0-->No 0-->No   Altered Elimination 0-->No 0-->No 2-->Yes 2-->Yes 2-->Yes 2-->Yes 2-->Yes   Cognitive Deficit 0-->No 0-->No 2-->Yes 2-->Yes 2-->Yes 2-->Yes 0-->No   Sensory Deficit 0-->No 0-->No 0-->No 0-->No 0-->No 0-->No 0-->No   Dizziness/Vertigo 0-->No 0-->No 0-->No 0-->No 0-->No 0-->No 0-->No   Depression 0-->No 2-->Yes 0-->No 2-->Yes 2-->Yes 2-->Yes 2-->Yes   Mobility Deficit/Weakness 2-->Yes 2-->Yes 2-->Yes 2-->Yes 2-->Yes 2-->Yes 2-->Yes   Male 0-->No 0-->No 0-->No 0-->No 0-->No 0-->No 0-->No   Fall Risk Score 12 13 18 17 17 17 15   History of Falls - - - - - - -   Physical Alterations/Impairment - - - - - - -   Functional Status - - - - - - -   Equipment - - - - - - -   Cognitive/Psychological - - - - - - -   Medications that alter equilibrium - - - - - - -   Fall Risk Score - - - - - - -       The 10-year ASCVD risk score (Chad KERNS, et al., 2019) is: 2.3%    Values used to calculate the score:      Age: 57 years      Sex: Female      Is Non- : No      Diabetic: No      Tobacco smoker: No      Systolic Blood Pressure: 120 mmHg      Is BP treated: Yes      HDL Cholesterol: 68 mg/dL      Total Cholesterol: 187 mg/dL     Physical Examination    /69   Pulse 80   Ht 5' 1" " "(1.549 m)   Wt 58.8 kg (129 lb 10.1 oz)   BMI 24.49 kg/m²   Wt Readings from Last 3 Encounters:   09/26/22 58.8 kg (129 lb 10.1 oz)   06/21/22 57.2 kg (126 lb)   06/21/22 58.5 kg (129 lb)     BP Readings from Last 3 Encounters:   09/26/22 120/69   06/21/22 (!) 144/66   06/21/22 120/70     Estimated body mass index is 24.49 kg/m² as calculated from the following:    Height as of this encounter: 5' 1" (1.549 m).    Weight as of this encounter: 58.8 kg (129 lb 10.1 oz).     Physical Exam  Exam conducted with a chaperone present.   Constitutional:       Appearance: Normal appearance.   HENT:      Head: Normocephalic and atraumatic.      Nose: Nose normal.   Eyes:      General: No scleral icterus.     Conjunctiva/sclera: Conjunctivae normal.      Pupils: Pupils are equal, round, and reactive to light.   Cardiovascular:      Rate and Rhythm: Normal rate and regular rhythm.   Pulmonary:      Effort: Pulmonary effort is normal.      Breath sounds: Normal breath sounds.   Genitourinary:     General: Normal vulva.      Pubic Area: No rash.       Labia:         Right: Lesion present. No rash or tenderness.         Left: No rash, tenderness or lesion.       Vagina: No signs of injury and foreign body. No vaginal discharge or erythema.      Cervix: Normal.   Neurological:      Mental Status: She is alert and oriented to person, place, and time.      Motor: Tremor present.      Coordination: Romberg sign negative. Finger-Nose-Finger Test normal.      Gait: Gait is intact.        Functional Assessment:  Patient is independent with mobility/ambulation, transfers, ADL's, IADL's.      Advanced Care Planning: has NO advanced directive  - add't info requested. Referral to SW: not applicable   Advanced care documents were reviewed/encouraged. This was discussed at length.     Laboratory    I have reviewed old labs below:    Lab Results   Component Value Date    WBC 8.20 05/26/2022    HGB 11.7 (L) 05/26/2022    HCT 37.0 05/26/2022    "  05/26/2022    CHOL 187 11/11/2021    TRIG 92 11/11/2021    HDL 68 11/11/2021    ALT 14 05/26/2022    AST 18 05/26/2022     (L) 05/26/2022    K 4.2 05/26/2022    CL 94 (L) 05/26/2022    CREATININE 1.0 05/26/2022    BUN 13 05/26/2022    CO2 29 05/26/2022    TSH 2.384 11/11/2021    INR 1.0 12/03/2021    HGBA1C 5.0 11/11/2021       Lab reviewed by me: most recent labs reviewed.     Assessment     1. Falls frequently    2. Onychodystrophy    3. Onychomycosis    4. Tremor    5. Hep C w/o coma, chronic    6. Seizure disorder    7. Vaginal candidiasis    8. BV (bacterial vaginosis)    9. Pain    10. Essential hypertension    11. Vaginal itching         Plan     Falls frequently  --     Ambulatory referral/consult to Neurology; Future; Expected date: 10/03/2022    Onychodystrophy  -     terbinafine HCL (LAMISIL) 250 mg tablet; Take 1 tablet (250 mg total) by mouth once daily.  Dispense: 90 tablet; Refill: 0    Onychomycosis  -     terbinafine HCL (LAMISIL) 250 mg tablet; Take 1 tablet (250 mg total) by mouth once daily.  Dispense: 90 tablet; Refill: 0    Tremor  -     Ambulatory referral/consult to Neurology; Future; Expected date: 10/03/2022    Hep C w/o coma, chronic  Stable chronic condition. Continue current meditation(s).  Treated.     Seizure disorder  Stable chronic condition. Continue current meditation(s).    Vaginal candidiasis/Vaginal itching  -     metroNIDAZOLE (FLAGYL) 500 MG tablet; Take 1 tablet (500 mg total) by mouth every 12 (twelve) hours. for 7 days  Dispense: 14 tablet; Refill: 0        -     unable to preform wet prep due to vag supp pt use prior to appt.    BV (bacterial vaginosis)          Pain  -     meloxicam (MOBIC) 15 MG tablet; Take 1 tablet (15 mg total) by mouth once daily.  Dispense: 30 tablet; Refill: 0    Essential hypertension  -     Comprehensive Metabolic Panel; Future; Expected date: 09/26/2022  -     Hemoglobin A1C; Future; Expected date: 09/26/2022                Follow  up if symptoms worsen or fail to improve. for further workup and reassessment if labs and tests obtained are stable or sooner as needed.     This note is dictated using the M*Modal Fluency Direct word recognition program. There are word recognition mistakes that are occasionally missed on review.    Godwin Davalos M.D.

## 2022-09-28 ENCOUNTER — TELEPHONE (OUTPATIENT)
Dept: NEUROLOGY | Facility: CLINIC | Age: 57
End: 2022-09-28

## 2022-09-28 DIAGNOSIS — R11.2 NON-INTRACTABLE VOMITING WITH NAUSEA, UNSPECIFIED VOMITING TYPE: ICD-10-CM

## 2022-09-28 NOTE — TELEPHONE ENCOUNTER
----- Message from Alida Roberts sent at 9/27/2022  3:38 PM CDT -----  Regarding: referral  Hello,    A referral was put in for this pt. Dx is Falls frequently and Tremor. Please contact pt to get scheduled.    Thanks,  Alida  Physician Referral Specialist

## 2022-09-28 NOTE — TELEPHONE ENCOUNTER
Loy Irwin,    Unfortunately, at this time, we do not have any appointments available within our currently available schedules through January. You may be able to obtain an appt with the [facility] in [place] or [place]. There may be appts available with [facility] as well. I have included their contact #s below. Thank you.  [facility] [phone]  [facility] [phone]  [facility] [phone]

## 2022-10-03 RX ORDER — ONDANSETRON 4 MG/1
4 TABLET, FILM COATED ORAL 2 TIMES DAILY
Qty: 30 TABLET | Refills: 1 | Status: SHIPPED | OUTPATIENT
Start: 2022-10-03 | End: 2022-12-20 | Stop reason: SDUPTHER

## 2022-10-04 ENCOUNTER — TELEPHONE (OUTPATIENT)
Dept: FAMILY MEDICINE | Facility: HOSPITAL | Age: 57
End: 2022-10-04
Payer: MEDICAID

## 2022-10-04 DIAGNOSIS — R52 PAIN: Primary | ICD-10-CM

## 2022-10-04 NOTE — TELEPHONE ENCOUNTER
----- Message from Rosario Cabrera sent at 10/4/2022  4:04 PM CDT -----  Pt called stated that the medications she was prescribe make her feel sleepy meloxicam (MOBIC) 15 MG tablet please give a patient a call back [phone]

## 2022-10-05 NOTE — TELEPHONE ENCOUNTER
----- Message from Rosario Cabrera sent at 10/5/2022  1:39 PM CDT -----  Regarding: second noticed  Pt called stated the medication for arthritis is making her sleepy she also is in pain the medications patients is taking is  meloxicam (MOBIC) 15 MG tablet please give patient a call back at   [phone]

## 2022-10-10 ENCOUNTER — TELEPHONE (OUTPATIENT)
Dept: FAMILY MEDICINE | Facility: HOSPITAL | Age: 57
End: 2022-10-10
Payer: MEDICAID

## 2022-10-10 NOTE — TELEPHONE ENCOUNTER
----- Message from Una Bess MA sent at 10/6/2022  4:10 PM CDT -----  Contact: Georgie Paulerson [facility] [phone]  Daughter states that meds for tremors was supposed to have been sent to [facility] in [place], They have nothing.  States left 3 messages over past three days with no response..Please call to discuss.  Thanks.

## 2022-10-12 ENCOUNTER — TELEPHONE (OUTPATIENT)
Dept: FAMILY MEDICINE | Facility: HOSPITAL | Age: 57
End: 2022-10-12
Payer: MEDICAID

## 2022-10-12 NOTE — TELEPHONE ENCOUNTER
----- Message from Una Bess MA sent at 10/12/2022  3:23 PM CDT -----  Contact: Patient [phone]  Sorry; this was not meant for Dr. Hernandez.  Thanks.          ----- Message -----  From: Una Bess MA  Sent: 10/12/2022   3:21 PM CDT  To: Wilmer Byers Staff    Patient states you spoke with her two days ago and said you would send methocarbonal to [facility].  Pharmacy has nothing.  Thanks.

## 2022-10-12 NOTE — TELEPHONE ENCOUNTER
----- Message from Una Bess MA sent at 10/12/2022  3:19 PM CDT -----  Contact: Patient [phone]  Patient states you spoke with her two days ago and said you would send methocarbonal to [facility].  Pharmacy has nothing.  Thanks.

## 2022-10-27 ENCOUNTER — TELEPHONE (OUTPATIENT)
Dept: PULMONOLOGY | Facility: CLINIC | Age: 57
End: 2022-10-27
Payer: MEDICAID

## 2022-10-27 NOTE — TELEPHONE ENCOUNTER
I spoke with [facility] to let her know I have not received a CMN for patient. She will fax over.

## 2022-10-27 NOTE — TELEPHONE ENCOUNTER
----- Message from Chiquis Mcnamara sent at 10/27/2022  8:22 AM CDT -----  [facility] calling regarding checking to see if the order for the PT Oxygen was received, call back to confirm, [phone]

## 2022-11-01 ENCOUNTER — TELEPHONE (OUTPATIENT)
Dept: FAMILY MEDICINE | Facility: HOSPITAL | Age: 57
End: 2022-11-01
Payer: MEDICAID

## 2022-11-01 NOTE — TELEPHONE ENCOUNTER
----- Message from Jasmina Rothman sent at 10/31/2022  2:58 PM CDT -----  Pt needs to schedule appt [phone]

## 2022-12-16 DIAGNOSIS — J44.9 CHRONIC OBSTRUCTIVE PULMONARY DISEASE, UNSPECIFIED COPD TYPE: ICD-10-CM

## 2022-12-16 DIAGNOSIS — J96.11 CHRONIC RESPIRATORY FAILURE WITH HYPOXIA: ICD-10-CM

## 2022-12-19 RX ORDER — TIOTROPIUM BROMIDE 18 UG/1
18 CAPSULE ORAL; RESPIRATORY (INHALATION) DAILY
Qty: 90 CAPSULE | Refills: 3 | Status: SHIPPED | OUTPATIENT
Start: 2022-12-19 | End: 2022-12-27 | Stop reason: SDUPTHER

## 2023-01-12 ENCOUNTER — OFFICE VISIT (OUTPATIENT)
Dept: FAMILY MEDICINE | Facility: HOSPITAL | Age: 58
End: 2023-01-12
Payer: MEDICAID

## 2023-01-12 ENCOUNTER — LAB VISIT (OUTPATIENT)
Dept: LAB | Facility: HOSPITAL | Age: 58
End: 2023-01-12
Attending: FAMILY MEDICINE
Payer: MEDICAID

## 2023-01-12 VITALS
HEART RATE: 77 BPM | SYSTOLIC BLOOD PRESSURE: 131 MMHG | WEIGHT: 126.56 LBS | DIASTOLIC BLOOD PRESSURE: 85 MMHG | BODY MASS INDEX: 23.9 KG/M2 | HEIGHT: 61 IN

## 2023-01-12 DIAGNOSIS — J44.9 CHRONIC OBSTRUCTIVE PULMONARY DISEASE, UNSPECIFIED COPD TYPE: ICD-10-CM

## 2023-01-12 DIAGNOSIS — I10 ESSENTIAL HYPERTENSION: ICD-10-CM

## 2023-01-12 DIAGNOSIS — J96.11 CHRONIC RESPIRATORY FAILURE WITH HYPOXIA: ICD-10-CM

## 2023-01-12 DIAGNOSIS — J45.909 ASTHMA, UNSPECIFIED ASTHMA SEVERITY, UNSPECIFIED WHETHER COMPLICATED, UNSPECIFIED WHETHER PERSISTENT: ICD-10-CM

## 2023-01-12 DIAGNOSIS — L72.11 PILAR CYST: ICD-10-CM

## 2023-01-12 DIAGNOSIS — G40.909 SEIZURE DISORDER: ICD-10-CM

## 2023-01-12 DIAGNOSIS — R11.2 NON-INTRACTABLE VOMITING WITH NAUSEA: ICD-10-CM

## 2023-01-12 DIAGNOSIS — G47.00 INSOMNIA, UNSPECIFIED TYPE: Primary | ICD-10-CM

## 2023-01-12 DIAGNOSIS — R25.1 TREMORS OF NERVOUS SYSTEM: ICD-10-CM

## 2023-01-12 LAB
ALBUMIN SERPL BCP-MCNC: 3.8 G/DL (ref 3.5–5.2)
ALP SERPL-CCNC: 47 U/L (ref 55–135)
ALT SERPL W/O P-5'-P-CCNC: 15 U/L (ref 10–44)
ANION GAP SERPL CALC-SCNC: 8 MMOL/L (ref 8–16)
AST SERPL-CCNC: 20 U/L (ref 10–40)
BILIRUB SERPL-MCNC: 0.3 MG/DL (ref 0.1–1)
BUN SERPL-MCNC: 17 MG/DL (ref 6–20)
CALCIUM SERPL-MCNC: 9.6 MG/DL (ref 8.7–10.5)
CHLORIDE SERPL-SCNC: 96 MMOL/L (ref 95–110)
CO2 SERPL-SCNC: 30 MMOL/L (ref 23–29)
CREAT SERPL-MCNC: 0.9 MG/DL (ref 0.5–1.4)
EST. GFR  (NO RACE VARIABLE): >60 ML/MIN/1.73 M^2
ESTIMATED AVG GLUCOSE: 97 MG/DL (ref 68–131)
GLUCOSE SERPL-MCNC: 88 MG/DL (ref 70–110)
HBA1C MFR BLD: 5 % (ref 4–5.6)
POTASSIUM SERPL-SCNC: 4.7 MMOL/L (ref 3.5–5.1)
PROT SERPL-MCNC: 7.9 G/DL (ref 6–8.4)
SODIUM SERPL-SCNC: 134 MMOL/L (ref 136–145)

## 2023-01-12 PROCEDURE — 80053 COMPREHEN METABOLIC PANEL: CPT | Performed by: FAMILY MEDICINE

## 2023-01-12 PROCEDURE — 36415 COLL VENOUS BLD VENIPUNCTURE: CPT | Performed by: FAMILY MEDICINE

## 2023-01-12 PROCEDURE — 83036 HEMOGLOBIN GLYCOSYLATED A1C: CPT | Performed by: FAMILY MEDICINE

## 2023-01-12 PROCEDURE — 99214 OFFICE O/P EST MOD 30 MIN: CPT

## 2023-01-12 RX ORDER — METOPROLOL TARTRATE 50 MG/1
50 TABLET ORAL 2 TIMES DAILY
Qty: 90 TABLET | Refills: 1 | Status: SHIPPED | OUTPATIENT
Start: 2023-01-12 | End: 2023-09-05 | Stop reason: SDUPTHER

## 2023-01-12 RX ORDER — TIOTROPIUM BROMIDE 18 UG/1
18 CAPSULE ORAL; RESPIRATORY (INHALATION) DAILY
Qty: 90 CAPSULE | Refills: 3 | Status: SHIPPED | OUTPATIENT
Start: 2023-01-12 | End: 2024-01-02 | Stop reason: SDUPTHER

## 2023-01-12 RX ORDER — ALBUTEROL SULFATE 0.83 MG/ML
2.5 SOLUTION RESPIRATORY (INHALATION) EVERY 6 HOURS PRN
Qty: 1 EACH | Refills: 6 | Status: SHIPPED | OUTPATIENT
Start: 2023-01-12 | End: 2023-10-17 | Stop reason: SDUPTHER

## 2023-01-12 RX ORDER — LEVETIRACETAM 500 MG/1
500 TABLET ORAL 2 TIMES DAILY
Qty: 60 TABLET | Refills: 1 | Status: SHIPPED | OUTPATIENT
Start: 2023-01-12 | End: 2023-09-05 | Stop reason: SDUPTHER

## 2023-01-12 RX ORDER — ONDANSETRON 4 MG/1
4 TABLET, FILM COATED ORAL DAILY PRN
Qty: 30 TABLET | Refills: 1 | Status: SHIPPED | OUTPATIENT
Start: 2023-01-12 | End: 2023-05-02 | Stop reason: SDUPTHER

## 2023-01-12 RX ORDER — IPRATROPIUM BROMIDE AND ALBUTEROL SULFATE 2.5; .5 MG/3ML; MG/3ML
3 SOLUTION RESPIRATORY (INHALATION) EVERY 6 HOURS PRN
Qty: 90 EACH | Refills: 3 | Status: SHIPPED | OUTPATIENT
Start: 2023-01-12 | End: 2023-07-03 | Stop reason: SDUPTHER

## 2023-01-12 RX ORDER — AMITRIPTYLINE HYDROCHLORIDE 10 MG/1
10 TABLET, FILM COATED ORAL NIGHTLY PRN
Qty: 90 TABLET | Refills: 0 | Status: SHIPPED | OUTPATIENT
Start: 2023-01-12 | End: 2023-09-05 | Stop reason: SDUPTHER

## 2023-01-12 RX ORDER — FLUTICASONE PROPIONATE 50 MCG
2 SPRAY, SUSPENSION (ML) NASAL DAILY
Qty: 16 G | Refills: 2 | Status: SHIPPED | OUTPATIENT
Start: 2023-01-12 | End: 2023-05-02 | Stop reason: SDUPTHER

## 2023-01-12 NOTE — PROGRESS NOTES
"[facility]  History & Physical    SUBJECTIVE:     Chief Complaint:   Chief Complaint   Patient presents with    Cyst     BACK OF NECK GROWING     Tremors    Nail Problem     FEET    BLOOD WORK    Back Pain     LEG        History of Present Illness:  57 y.o. female who  has a past medical history of Anxiety, Asthma, Bipolar 1 disorder, Cirrhosis, alcoholic, Cocaine abuse, COPD (chronic obstructive pulmonary disease), Depression, Encounter for blood transfusion, Schizophrenia, Seizures, Subdural hematoma, and Tachycardia. presents to clinic today for follow up    #Tremors  Patient noting increased tremors 9 months after her surgery. At night time it is worsened. Patient denies seizures since taking keppra. Patient notes tremors can come about for any reason and is not associated with activity or stress. Patient denies new focal changes or new weakness.      #Pilar cyst  Patient with pilar cyst seen on back of neck. Patient denies discharge, erythema, or recent fevers or chills.     #Medication refill  Patient requesting refills today of her chronic medications. Patient has been adherent to her medications. Patient states she has run out of her albuterol and states she "needs it from time to time". Patient is compliant with her spiriva and this controller medication normally helps her pulmonary symptoms. Patient to contact [facility] pulmonary clinic for continued follow up of her advanced stage COPD.       Allergies:  Review of patient's allergies indicates:   Allergen Reactions    Dye Anxiety, Blisters, Hives, Itching, Rash and Swelling       Home Medications:  Current Outpatient Medications on File Prior to Visit   Medication Sig    clonazePAM (KLONOPIN) 0.5 MG tablet Take 0.5 mg by mouth 2 (two) times daily. as needed for anxiety.    multivitamin Chew Take by mouth once daily.    sertraline (ZOLOFT) 100 MG tablet Take 100 mg by mouth once daily.    acetaminophen-codeine 300-30mg (TYLENOL #3) 300-30 mg Tab Take 1 " tablet by mouth every 6 (six) hours as needed.    flu vacc [id number] 6mos up,PF, (FLUARIX QUAD [id number], PF,) 60 mcg (15 mcg x 4)/0.5 mL Syrg use as directed (Patient not taking: Reported on 1/12/2023)    fluconazole (DIFLUCAN) 150 MG Tab One tab today repeat in 7days.    LIDOcaine (LIDODERM) 5 % Place 1 patch onto the skin once daily. Remove & Discard patch within 12 hours or as directed by MD (Patient not taking: Reported on 1/12/2023)    pneumococcal 23-thom ps (PNEUMOVAX 23) 25 mcg/0.5 mL vaccine Inject into the muscle. (Patient not taking: Reported on 1/12/2023)     No current facility-administered medications on file prior to visit.       Past Medical History:   Diagnosis Date    Anxiety     Asthma     Bipolar 1 disorder     Cirrhosis, alcoholic     Cocaine abuse     COPD (chronic obstructive pulmonary disease)     Depression     Encounter for blood transfusion     Schizophrenia     Seizures     Subdural hematoma     Tachycardia      Past Surgical History:   Procedure Laterality Date    APPENDECTOMY      CRANIECTOMY Left 01/31/2019    ESOPHAGOGASTRODUODENOSCOPY      PEG W/TRACHEOSTOMY PLACEMENT  02/16/2019     Family History   Problem Relation Age of Onset    COPD Mother     Cirrhosis Father      Social History     Tobacco Use    Smoking status: Former     Packs/day: 1.00     Years: 40.00     Pack years: 40.00     Types: Cigarettes     Start date: 1980     Quit date: 6/18/2019     Years since quitting: 3.6    Smokeless tobacco: Never    Tobacco comments:     Previously 1 pack per day smoker - quit smoking January 2019   Substance Use Topics    Alcohol use: Not Currently     Comment: social    Drug use: Not Currently             OBJECTIVE:     Vital Signs:  Pulse: 77 (01/12/23 1447)  BP: 131/85 (01/12/23 1447)    Physical Exam  Exam conducted with a chaperone present.   Constitutional:       Appearance: Normal appearance.   HENT:      Head: Normocephalic and atraumatic.      Nose: Nose normal.   Eyes:       General: No scleral icterus.     Conjunctiva/sclera: Conjunctivae normal.      Pupils: Pupils are equal, round, and reactive to light.   Cardiovascular:      Rate and Rhythm: Normal rate and regular rhythm.   Pulmonary:      Effort: Pulmonary effort is normal.      Breath sounds: Normal breath sounds.   Genitourinary:     General: Normal vulva.      Pubic Area: No rash.       Labia:         Right: Lesion present. No rash or tenderness.         Left: No rash, tenderness or lesion.       Vagina: No signs of injury and foreign body. No vaginal discharge or erythema.      Cervix: Normal.   Neurological:      Mental Status: She is alert and oriented to person, place, and time.      Motor: Tremor present.      Coordination: Romberg sign negative. Finger-Nose-Finger Test normal.      Gait: Gait is intact.       Laboratory:  Hemoglobin A1C   Date Value Ref Range Status   01/12/2023 5.0 4.0 - 5.6 % Final     Comment:     ADA Screening Guidelines:  5.7-6.4%  Consistent with prediabetes  >or=6.5%  Consistent with diabetes    High levels of fetal hemoglobin interfere with the HbA1C  assay. Heterozygous hemoglobin variants (HbS, HgC, etc)do  not significantly interfere with this assay.   However, presence of multiple variants may affect accuracy.     11/11/2021 5.0 4.0 - 5.6 % Final     Comment:     ADA Screening Guidelines:  5.7-6.4%  Consistent with prediabetes  >or=6.5%  Consistent with diabetes    High levels of fetal hemoglobin interfere with the HbA1C  assay. Heterozygous hemoglobin variants (HbS, HgC, etc)do  not significantly interfere with this assay.   However, presence of multiple variants may affect accuracy.         A/P:  Elif was seen today for cyst, tremors, nail problem, blood work and back pain.    Diagnoses and all orders for this visit:    Insomnia, unspecified type  -     amitriptyline (ELAVIL) 10 MG tablet; Take 1 tablet (10 mg total) by mouth nightly as needed for Insomnia.    Asthma, unspecified asthma  severity, unspecified whether complicated, unspecified whether persistent  -     fluticasone propionate (FLONASE) 50 mcg/actuation nasal spray; 2 sprays (100 mcg total) by Each Nostril route once daily.  -     albuterol-ipratropium (DUO-NEB) 2.5 mg-0.5 mg/3 mL nebulizer solution; Take 3 mLs by nebulization every 6 (six) hours as needed for Wheezing. Rescue    Seizure disorder  -     levETIRAcetam (KEPPRA) 500 MG Tab; Take 1 tablet (500 mg total) by mouth 2 (two) times daily.    Tremors of nervous system  -     Ambulatory referral/consult to Neurology; Future    Chronic respiratory failure with hypoxia  -     albuterol (PROVENTIL) 2.5 mg /3 mL (0.083 %) nebulizer solution; Take 3 mLs (2.5 mg total) by nebulization every 6 (six) hours as needed for Wheezing. Rescue  -     tiotropium (SPIRIVA) 18 mcg inhalation capsule; Inhale 1 capsule (18 mcg total) into the lungs once daily. Controller    Chronic obstructive pulmonary disease, unspecified COPD type  -     albuterol (PROVENTIL) 2.5 mg /3 mL (0.083 %) nebulizer solution; Take 3 mLs (2.5 mg total) by nebulization every 6 (six) hours as needed for Wheezing. Rescue  -     tiotropium (SPIRIVA) 18 mcg inhalation capsule; Inhale 1 capsule (18 mcg total) into the lungs once daily. Controller    Non-intractable vomiting with nausea  -     ondansetron (ZOFRAN) 4 MG tablet; Take 1 tablet (4 mg total) by mouth daily as needed for Nausea (nausea).    Pilar cyst  -     Ambulatory referral/consult to Dermatology; Future    Other orders  -     metoprolol tartrate (LOPRESSOR) 50 MG tablet; Take 1 tablet (50 mg total) by mouth 2 (two) times a day.    Patient with history of tremors since recent cranial surgery. Considering new onset nature of symptoms will refer to neurology for further evaluation. Patient also with notable pilar cyst seen on physical exam and will refer to dermatology. Patient chronic medications refilled today.     Follow up in 2-3 months     Zeeshan Guillen MD  PGY-2  [facility]       This note was partially created using iMove Voice Recognition software. Typographical and content errors may occur with this process. While efforts are made to detect and correct such errors, in some cases errors will persist. For this reason, wording in this document should be considered in the proper context and not strictly verbatim

## 2023-01-13 ENCOUNTER — PATIENT MESSAGE (OUTPATIENT)
Dept: FAMILY MEDICINE | Facility: HOSPITAL | Age: 58
End: 2023-01-13
Payer: MEDICAID

## 2023-01-24 ENCOUNTER — TELEPHONE (OUTPATIENT)
Dept: FAMILY MEDICINE | Facility: HOSPITAL | Age: 58
End: 2023-01-24
Payer: MEDICAID

## 2023-01-24 DIAGNOSIS — R52 DIFFUSE PAIN: Primary | ICD-10-CM

## 2023-01-24 RX ORDER — TIZANIDINE 2 MG/1
2 TABLET ORAL EVERY 8 HOURS PRN
Qty: 30 TABLET | Refills: 0 | Status: SHIPPED | OUTPATIENT
Start: 2023-01-24 | End: 2023-02-03

## 2023-01-24 RX ORDER — TIZANIDINE 2 MG/1
4 TABLET ORAL EVERY 8 HOURS PRN
Qty: 30 TABLET | Refills: 0 | Status: SHIPPED | OUTPATIENT
Start: 2023-01-24 | End: 2023-01-24

## 2023-01-24 NOTE — TELEPHONE ENCOUNTER
"Called patient back about pain medication refill for patient's chronic diffuse pain. Attempted calls previously with no response back. Upon chart review, noted only pain medication on chart as tylenol with codeine. Recommended to patient that acetaminophen with codeine is not a safe long term choice for pain medication Recommended NSAIDS, lidocaine patches, voltaren gel for help with chronic pain however patient refused stating that "these medications have not helped in the past". Patient requesting hydrocodone/acetaminophen as option as it has "helped in the past". Counseled to patient that vicodin would also not be good long term option and cannot be filled over the phone. Discussed use of muscle relaxants and patient's states that she may have received benefit with them in the past. Will trial lowest dose of zanaflex along with OTC NSAIDS prn so patient can be able to undertake home exercises.     Zeeshan Guillen MD PGY-2  [facility]  "

## 2023-01-25 NOTE — PROGRESS NOTES
I have reviewed the notes, assessments, and/or procedures performed by Dr. Guillen, I concur with her/his documentation of Elif Archibald.

## 2023-02-02 ENCOUNTER — IMMUNIZATION (OUTPATIENT)
Dept: INTERNAL MEDICINE | Facility: CLINIC | Age: 58
End: 2023-02-02
Payer: MEDICAID

## 2023-02-02 DIAGNOSIS — Z23 NEED FOR VACCINATION: Primary | ICD-10-CM

## 2023-02-02 PROCEDURE — 0124A COVID-19, MRNA, LNP-S, BIVALENT BOOSTER, PF, 30 MCG/0.3 ML DOSE: CPT | Mod: PBBFAC,PO

## 2023-02-02 PROCEDURE — 91312 COVID-19, MRNA, LNP-S, BIVALENT BOOSTER, PF, 30 MCG/0.3 ML DOSE: CPT | Mod: PBBFAC,PO

## 2023-02-09 ENCOUNTER — PATIENT MESSAGE (OUTPATIENT)
Dept: FAMILY MEDICINE | Facility: HOSPITAL | Age: 58
End: 2023-02-09
Payer: MEDICAID

## 2023-02-16 ENCOUNTER — TELEPHONE (OUTPATIENT)
Dept: PULMONOLOGY | Facility: CLINIC | Age: 58
End: 2023-02-16
Payer: MEDICAID

## 2023-02-16 NOTE — TELEPHONE ENCOUNTER
----- Message from Phyllis Rosales sent at 2/16/2023  1:26 PM CST -----  Regarding: appt  Contact: @[phone]  Pt daughter in law is requesting a appointment for the following Nodule//check up on growth was told there would be a appointment on 3/7 no available dates  ...Please call and adv @[phone]

## 2023-02-16 NOTE — TELEPHONE ENCOUNTER
I called Mrs Archibald and spoke to her and her daughter in law offering a visit with Dr Olmos on 3-14-23 or Dr Stephanie Moore on 3-3-23. They chose Dr Olmos on 3-14-23. I will add a ct scan and mail to her home. Jennifer Epstein LPN

## 2023-03-14 ENCOUNTER — OFFICE VISIT (OUTPATIENT)
Dept: PULMONOLOGY | Facility: CLINIC | Age: 58
End: 2023-03-14
Payer: MEDICAID

## 2023-03-14 ENCOUNTER — TELEPHONE (OUTPATIENT)
Dept: PULMONOLOGY | Facility: CLINIC | Age: 58
End: 2023-03-14
Payer: MEDICAID

## 2023-03-14 ENCOUNTER — HOSPITAL ENCOUNTER (OUTPATIENT)
Dept: RADIOLOGY | Facility: HOSPITAL | Age: 58
Discharge: HOME | End: 2023-03-14
Attending: INTERNAL MEDICINE
Payer: MEDICAID

## 2023-03-14 VITALS
SYSTOLIC BLOOD PRESSURE: 130 MMHG | HEIGHT: 61 IN | HEART RATE: 85 BPM | WEIGHT: 130.94 LBS | DIASTOLIC BLOOD PRESSURE: 78 MMHG | OXYGEN SATURATION: 90 % | BODY MASS INDEX: 24.72 KG/M2

## 2023-03-14 DIAGNOSIS — R91.8 LUNG NODULES: ICD-10-CM

## 2023-03-14 DIAGNOSIS — J44.9 CHRONIC OBSTRUCTIVE PULMONARY DISEASE, UNSPECIFIED COPD TYPE: Primary | ICD-10-CM

## 2023-03-14 DIAGNOSIS — Z99.81 HYPOXEMIA REQUIRING SUPPLEMENTAL OXYGEN: ICD-10-CM

## 2023-03-14 DIAGNOSIS — R09.02 HYPOXEMIA REQUIRING SUPPLEMENTAL OXYGEN: ICD-10-CM

## 2023-03-14 DIAGNOSIS — R91.1 SOLITARY PULMONARY NODULE: ICD-10-CM

## 2023-03-14 DIAGNOSIS — J44.9 CHRONIC OBSTRUCTIVE PULMONARY DISEASE, UNSPECIFIED COPD TYPE: ICD-10-CM

## 2023-03-14 PROCEDURE — 3078F DIAST BP <80 MM HG: CPT | Mod: CPTII,,, | Performed by: INTERNAL MEDICINE

## 2023-03-14 PROCEDURE — 99999 PR PBB SHADOW E&M-EST. PATIENT-LVL IV: ICD-10-PCS | Mod: PBBFAC,,, | Performed by: INTERNAL MEDICINE

## 2023-03-14 PROCEDURE — 1159F MED LIST DOCD IN RCRD: CPT | Mod: CPTII,,, | Performed by: INTERNAL MEDICINE

## 2023-03-14 PROCEDURE — 71250 CT CHEST WITHOUT CONTRAST: ICD-10-PCS | Mod: 26,,, | Performed by: RADIOLOGY

## 2023-03-14 PROCEDURE — 1159F PR MEDICATION LIST DOCUMENTED IN MEDICAL RECORD: ICD-10-PCS | Mod: CPTII,,, | Performed by: INTERNAL MEDICINE

## 2023-03-14 PROCEDURE — 3075F PR MOST RECENT SYSTOLIC BLOOD PRESS GE 130-139MM HG: ICD-10-PCS | Mod: CPTII,,, | Performed by: INTERNAL MEDICINE

## 2023-03-14 PROCEDURE — 99214 OFFICE O/P EST MOD 30 MIN: CPT | Mod: S$PBB,,, | Performed by: INTERNAL MEDICINE

## 2023-03-14 PROCEDURE — 99999 PR PBB SHADOW E&M-EST. PATIENT-LVL IV: CPT | Mod: PBBFAC,,, | Performed by: INTERNAL MEDICINE

## 2023-03-14 PROCEDURE — 71250 CT THORAX DX C-: CPT | Mod: 26,,, | Performed by: RADIOLOGY

## 2023-03-14 PROCEDURE — 3008F PR BODY MASS INDEX (BMI) DOCUMENTED: ICD-10-PCS | Mod: CPTII,,, | Performed by: INTERNAL MEDICINE

## 2023-03-14 PROCEDURE — 99214 PR OFFICE/OUTPT VISIT, EST, LEVL IV, 30-39 MIN: ICD-10-PCS | Mod: S$PBB,,, | Performed by: INTERNAL MEDICINE

## 2023-03-14 PROCEDURE — 99214 OFFICE O/P EST MOD 30 MIN: CPT | Mod: PBBFAC,25 | Performed by: INTERNAL MEDICINE

## 2023-03-14 PROCEDURE — 3008F BODY MASS INDEX DOCD: CPT | Mod: CPTII,,, | Performed by: INTERNAL MEDICINE

## 2023-03-14 PROCEDURE — 3078F PR MOST RECENT DIASTOLIC BLOOD PRESSURE < 80 MM HG: ICD-10-PCS | Mod: CPTII,,, | Performed by: INTERNAL MEDICINE

## 2023-03-14 PROCEDURE — 71250 CT THORAX DX C-: CPT | Mod: TC

## 2023-03-14 PROCEDURE — 3044F PR MOST RECENT HEMOGLOBIN A1C LEVEL <7.0%: ICD-10-PCS | Mod: CPTII,,, | Performed by: INTERNAL MEDICINE

## 2023-03-14 PROCEDURE — 3075F SYST BP GE 130 - 139MM HG: CPT | Mod: CPTII,,, | Performed by: INTERNAL MEDICINE

## 2023-03-14 PROCEDURE — 3044F HG A1C LEVEL LT 7.0%: CPT | Mod: CPTII,,, | Performed by: INTERNAL MEDICINE

## 2023-03-14 RX ORDER — BUDESONIDE AND FORMOTEROL FUMARATE DIHYDRATE 160; 4.5 UG/1; UG/1
2 AEROSOL RESPIRATORY (INHALATION) EVERY 12 HOURS
COMMUNITY
Start: 2022-11-18 | End: 2023-07-03 | Stop reason: SDUPTHER

## 2023-03-14 RX ORDER — PREDNISONE 10 MG/1
TABLET ORAL DAILY
Qty: 21 TABLET | Refills: 0 | Status: SHIPPED | OUTPATIENT
Start: 2023-03-14 | End: 2023-04-04

## 2023-03-14 RX ORDER — LURASIDONE HYDROCHLORIDE 20 MG/1
20 TABLET, FILM COATED ORAL
COMMUNITY
Start: 2023-02-20

## 2023-03-14 RX ORDER — ALBUTEROL SULFATE 90 UG/1
2 AEROSOL, METERED RESPIRATORY (INHALATION) EVERY 6 HOURS PRN
Qty: 18 G | Refills: 12 | Status: SHIPPED | OUTPATIENT
Start: 2023-03-14 | End: 2023-05-02 | Stop reason: SDUPTHER

## 2023-03-14 RX ORDER — BENZONATATE 100 MG/1
100 CAPSULE ORAL 3 TIMES DAILY PRN
Qty: 60 CAPSULE | Refills: 3 | Status: SHIPPED | OUTPATIENT
Start: 2023-03-14 | End: 2023-05-02 | Stop reason: SDUPTHER

## 2023-03-14 NOTE — TELEPHONE ENCOUNTER
I spoke to the [facility] pharmacist about Mrs Archibald's Prednisone RX, She will take 30mg daily for 5 days, then 20mg daily for 5 days, then 10mg daily for 5 days then stop.Jennifer Epstein LPN

## 2023-03-14 NOTE — TELEPHONE ENCOUNTER
----- Message from Kellie Diaz sent at 3/14/2023  3:56 PM CDT -----  Regarding: refill  Contact: Roshan Ernst    [phone]  Calling to get clarification on the directions and qty for pts prescription of predniSONE (DELTASONE) 10 mg tablet pack.       [facility] [place] [facility] [place] [facility] [address]   Phone:  [phone]  Fax:  [phone]

## 2023-03-31 PROBLEM — Z99.81 HYPOXEMIA REQUIRING SUPPLEMENTAL OXYGEN: Status: ACTIVE | Noted: 2023-03-31

## 2023-03-31 PROBLEM — R09.02 HYPOXEMIA REQUIRING SUPPLEMENTAL OXYGEN: Status: ACTIVE | Noted: 2023-03-31

## 2023-03-31 NOTE — ASSESSMENT & PLAN NOTE
· Near baseline after recent outside ED visit.  · Continue current regimen of Spiriva/Symbicort with prn nebulizer treatments.  · Refill Tessalon prn for cough  · Continue supplemental oxygen use

## 2023-03-31 NOTE — ASSESSMENT & PLAN NOTE
· Chest CT at this visit remains stable without evidence to suggest malignancy or active pulmonary infection.  · No need for ongoing chest CT scan surveillance.

## 2023-03-31 NOTE — ASSESSMENT & PLAN NOTE
· Continue with supplemental oxygen at 2 lpm.  · Avoid increasing oxygen indiscriminately due to baseline CO2 retention.  · Likely to need BiPAP in the event of future hospitalization for COPD exacerbation.

## 2023-03-31 NOTE — PROGRESS NOTES
"Subjective:      Patient ID: Elif Archibald is a 57 y.o. female.    Chief Complaint: Shortness of Breath    HPI    Ms. Archibald returns to Pulmonary Clinic for ongoing care of severe COPD/emphysema.  She was previously followed in [facility] and last saw me in March 2022.  Following that visit, she was seen in Lung Transplant Clinic by Dr. 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  Patient's disease appears to be grossly stable for over 2 years as noted by her PFT values and O2 needs in this time frame.  She does not have a history of frequent AECOPD.  Given these parameters, the consideration of lung transplant seems early for the patient.  I am concerned about the patient's comprehension of the ramifications of lung transplant and her inability to care for herself independently due to her cognitive impairment.  As such, at this point I don't sounds good thank she is an ideal candidate for lung transplant.  Discussed options of lung volume reduction including bronchoscopic approaches.  Given patient's lack of respiratory limitation,  these  approaches will be of limited benefit to the patient."      She continues to report compliance with her medication regimen but notes ongoing severe activity limitations due to severe dyspnea with even minimal activity.  At prior visits, she was strongly encouraged to participate in pulmonary rehabilitation but she has not done so.  She was recently seen in the ED at [facility] and was given a prednisone taper and Tessalon.  She completed both of these prescriptions in the last week or so and requests a refill on the Tessalon.  She continues to use supplemental oxygen at 2 lpm.    Current pulmonary medications:  Spiriva once daily  Symbicort 2-3 x per day  Flonase 2-3 x per " day  Nebulizer medications 2-3 x per day    Review of Systems   Constitutional:  Positive for fatigue and weakness.   Respiratory:  Positive for cough, sputum production, shortness of breath, wheezing, dyspnea on extertion and use of rescue inhaler.    Objective:     Physical Exam   Constitutional: She is oriented to person, place, and time.   Pulmonary/Chest: She has decreased breath sounds. She has wheezes. She has rhonchi. She has no rales.   Musculoskeletal:         General: No edema.   Neurological: She is alert and oriented to person, place, and time.       Personal Diagnostic Review    Chest CT from 3/14/2023 =>    FINDINGS:     Lymph nodes: None abnormally enlarged.  Lungs/pleura/airways:    There is a stable small left pleural effusion.  There is moderate upper lobe predominant emphysema.  There are several pulmonary nodules measuring 5 mm or less.  Some appear diffusely calcified and are consistent with calcified granulomas.  Some are indeterminate.  An example of these is in the right lower lobe posterior segment 4-352 and in the left apicoposterior segment 4-67).  However, these do not appear new or enlarging.     Impression:     No significant interval change since prior CT of the chest 02/24/2022.  Findings are as above.      PFTs show severe obstruction and DLCO impairment consistent with COPD/emphysema.  6MWT has shown exercise impairment with adequate SpO2 while on supplemental oxygen.    PFTs 11/16/2020 12/06/2021 03/29/2022   FVC  (pre-BD) 1.33 liters 1.50 liters 1.42 liters   FVC%  47% 54% 50%   FEV1 (pre-BD) 0.40 liters 0.54 liters 0.51 liters   FEV1%  18% 24% 23%   FEV1/FVC  30 36 36   FVC (post-BD) 1.40 liters 1.44 liters    FVC% 49% 51%    FEV1 (post-BD) 0.44 liters 0.52 liters    FEV1% 19% 23%    FEV1/FVC 31 36    DLCO   8.11 mL/mmHg/min    DLCO%   39%    VA  2.90 liters liters   IVC  1.12 liters liters   6MWT 11/16/2020 12/11/2020 6/21/2022   Distance 122 meters 244 meters 232 meters    SpO2 kenji 98 % 92 % 97 %   SpO2 delta -1 % -6 % + 1 %   Oxygen 2 lpm 2 lpm 2 lpm       Arterial blood gases have shown significant baseline chronic hypercapnic respiratory failure.     01/17/19 19:42 03/10/20 12:24 03/10/20 16:12 06/21/22 10:57   POC PH 7.241 (LL) 7.374 7.419 7.355   POC PCO2 57.3 (HH) 73.9 (HH) 66.5 (HH) 56.5 (HH)   POC PO2 75 (L) 80 72 (L) 75 (L)   POC HCO3 24.6 43.1 (H) 43.1 (H) 31.6 (H)   POC TCO2 26 45 (H) 45 (H) 33 (H)   POC SATURATED O2 92 (L) 95 94 (L) 94 (L)   POC Ionized Calcium 1.22      POC Sodium 134 (L)      POC Potassium 3.9      Allens Test N/A Pass Pass Pass   POC HEMOGLOBIN 13      POC Hematocrit 39      POC BE -3 18 19 6   Flow   3    DelSys Nasal Can Nasal Can Nasal Can Nasal Can      Assessment:     1. Chronic obstructive pulmonary disease, unspecified COPD type    2. Lung nodules    3. Hypoxemia requiring supplemental oxygen         Outpatient Encounter Medications as of 3/14/2023   Medication Sig Dispense Refill    acetaminophen-codeine 300-30mg (TYLENOL #3) 300-30 mg Tab Take 1 tablet by mouth every 6 (six) hours as needed.      albuterol (PROVENTIL) 2.5 mg /3 mL (0.083 %) nebulizer solution Take 3 mLs (2.5 mg total) by nebulization every 6 (six) hours as needed for Wheezing. Rescue 1 each 6    albuterol-ipratropium (DUO-NEB) 2.5 mg-0.5 mg/3 mL nebulizer solution Take 3 mLs by nebulization every 6 (six) hours as needed for Wheezing. Rescue 90 each 3    amitriptyline (ELAVIL) 10 MG tablet Take 1 tablet (10 mg total) by mouth nightly as needed for Insomnia. 90 tablet 0    clonazePAM (KLONOPIN) 0.5 MG tablet Take 0.5 mg by mouth 2 (two) times daily. as needed for anxiety.      fluconazole (DIFLUCAN) 150 MG Tab One tab today repeat in 7days. 1 tablet 0    fluticasone propionate (FLONASE) 50 mcg/actuation nasal spray 2 sprays (100 mcg total) by Each Nostril route once daily. 16 g 2    LATUDA 20 mg Tab tablet Take 20 mg by mouth.      levETIRAcetam (KEPPRA) 500 MG Tab Take 1  tablet (500 mg total) by mouth 2 (two) times daily. 60 tablet 1    LIDOcaine (LIDODERM) 5 % Place 1 patch onto the skin once daily. Remove & Discard patch within 12 hours or as directed by MD 30 patch 0    metoprolol tartrate (LOPRESSOR) 50 MG tablet Take 1 tablet (50 mg total) by mouth 2 (two) times a day. 90 tablet 1    multivitamin Chew Take by mouth once daily.      ondansetron (ZOFRAN) 4 MG tablet Take 1 tablet (4 mg total) by mouth daily as needed for Nausea (nausea). 30 tablet 1    sertraline (ZOLOFT) 100 MG tablet Take 100 mg by mouth once daily.      SYMBICORT 160-4.5 mcg/actuation HFAA Inhale 2 puffs into the lungs every 12 (twelve) hours.      tiotropium (SPIRIVA) 18 mcg inhalation capsule Inhale 1 capsule (18 mcg total) into the lungs once daily. Controller 90 capsule 3    albuterol (PROVENTIL/VENTOLIN HFA) 90 mcg/actuation inhaler Inhale 2 puffs into the lungs every 6 (six) hours as needed for Wheezing. Rescue 18 g 12    benzonatate (TESSALON PERLES) 100 MG capsule Take 1 capsule (100 mg total) by mouth 3 (three) times daily as needed for Cough. 60 capsule 3    flu vacc [id number] 6mos up,PF, (FLUARIX QUAD 3722-3865, PF,) 60 mcg (15 mcg x 4)/0.5 mL Syrg use as directed (Patient not taking: Reported on 1/12/2023) 0.5 mL 0    pneumococcal 23-thom ps (PNEUMOVAX 23) 25 mcg/0.5 mL vaccine Inject into the muscle. (Patient not taking: Reported on 1/12/2023) 0.5 mL 0    predniSONE (DELTASONE) 10 mg tablet pack Take by mouth once daily. 3 tablets daily for 5 days, 2 tablets daily for 5 days, then 1 tablet daily for 5 days. for 21 days 21 tablet 0     No facility-administered encounter medications on file as of 3/14/2023.     No orders of the defined types were placed in this encounter.      Plan:     Problem List Items Addressed This Visit       COPD (chronic obstructive pulmonary disease) - Primary    Overview     Severe obstruction and DLCO impairment on PFTs  Chest CT with bilateral upper lobe emphysema changes  (no large bullae noted)  Oxygen dependence confirmed on prior 6MWT in 12/2020 with room air SpO2 83% at rest.  Seen by Lung Transplant in June 2022 => not a transplant candidate.  Has not been able to complete Pulmonary Rehab due to logistics.           Current Assessment & Plan     Near baseline after recent outside ED visit.  Continue current regimen of Spiriva/Symbicort with prn nebulizer treatments.  Refill Tessalon prn for cough  Continue supplemental oxygen use             Relevant Medications    predniSONE (DELTASONE) 10 mg tablet pack    benzonatate (TESSALON PERLES) 100 MG capsule    albuterol (PROVENTIL/VENTOLIN HFA) 90 mcg/actuation inhaler    Hypoxemia requiring supplemental oxygen    Overview     Baseline hypercapnia on past blood gases.  Resting SpO2 <88% on 6MWT in 12/2020.           Current Assessment & Plan     Continue with supplemental oxygen at 2 lpm.  Avoid increasing oxygen indiscriminately due to baseline CO2 retention.  Likely to need BiPAP in the event of future hospitalization for COPD exacerbation.         Lung nodules    Overview     Noted on initial chest CT scan from January 2019.  Remain stable/improved without findings suspicious for malignancy or active pulmonary infection.           Current Assessment & Plan     Chest CT at this visit remains stable without evidence to suggest malignancy or active pulmonary infection.  No need for ongoing chest CT scan surveillance.          Return to Pulmonary Clinic in Fall 2023.    Total Time = 30 minutes    Arnav Olmos MD  Pulmonary/Critical Care Medicine

## 2023-04-12 ENCOUNTER — OUTSIDE PLACE OF SERVICE (OUTPATIENT)
Dept: CARDIOLOGY | Facility: CLINIC | Age: 58
End: 2023-04-12
Payer: MEDICAID

## 2023-04-12 PROCEDURE — 93010 ELECTROCARDIOGRAM REPORT: CPT | Mod: ,,, | Performed by: INTERNAL MEDICINE

## 2023-04-12 PROCEDURE — 93010 PR ELECTROCARDIOGRAM REPORT: ICD-10-PCS | Mod: ,,, | Performed by: INTERNAL MEDICINE

## 2023-05-02 DIAGNOSIS — R11.2 NON-INTRACTABLE VOMITING WITH NAUSEA: ICD-10-CM

## 2023-05-02 DIAGNOSIS — J98.01 COUGH DUE TO BRONCHOSPASM: Primary | ICD-10-CM

## 2023-05-02 DIAGNOSIS — J44.9 CHRONIC OBSTRUCTIVE PULMONARY DISEASE, UNSPECIFIED COPD TYPE: ICD-10-CM

## 2023-05-02 DIAGNOSIS — J45.909 ASTHMA, UNSPECIFIED ASTHMA SEVERITY, UNSPECIFIED WHETHER COMPLICATED, UNSPECIFIED WHETHER PERSISTENT: ICD-10-CM

## 2023-05-03 RX ORDER — ONDANSETRON 4 MG/1
4 TABLET, FILM COATED ORAL DAILY PRN
Qty: 30 TABLET | Refills: 1 | Status: SHIPPED | OUTPATIENT
Start: 2023-05-03 | End: 2023-07-03 | Stop reason: SDUPTHER

## 2023-05-03 RX ORDER — ALBUTEROL SULFATE 90 UG/1
2 AEROSOL, METERED RESPIRATORY (INHALATION) EVERY 6 HOURS PRN
Qty: 18 G | Refills: 12 | Status: SHIPPED | OUTPATIENT
Start: 2023-05-03 | End: 2023-07-03 | Stop reason: SDUPTHER

## 2023-05-03 RX ORDER — BENZONATATE 100 MG/1
100 CAPSULE ORAL 3 TIMES DAILY PRN
Qty: 60 CAPSULE | Refills: 3 | Status: SHIPPED | OUTPATIENT
Start: 2023-05-03 | End: 2023-07-03 | Stop reason: SDUPTHER

## 2023-05-03 RX ORDER — FLUTICASONE PROPIONATE 50 MCG
2 SPRAY, SUSPENSION (ML) NASAL DAILY
Qty: 16 G | Refills: 2 | Status: SHIPPED | OUTPATIENT
Start: 2023-05-03 | End: 2023-07-03 | Stop reason: SDUPTHER

## 2023-06-26 ENCOUNTER — TELEPHONE (OUTPATIENT)
Dept: PULMONOLOGY | Facility: CLINIC | Age: 58
End: 2023-06-26
Payer: MEDICAID

## 2023-06-26 NOTE — TELEPHONE ENCOUNTER
----- Message from Annel Alexander sent at 6/26/2023  8:16 AM CDT -----  Regarding: Appt Access  Contact: pt [phone]  Pt calling to schedule appt for COPD follow up from hospital visit. Pls call

## 2023-07-03 ENCOUNTER — OFFICE VISIT (OUTPATIENT)
Dept: FAMILY MEDICINE | Facility: HOSPITAL | Age: 58
End: 2023-07-03
Payer: MEDICAID

## 2023-07-03 VITALS
DIASTOLIC BLOOD PRESSURE: 77 MMHG | HEIGHT: 61 IN | HEART RATE: 95 BPM | WEIGHT: 130.94 LBS | BODY MASS INDEX: 24.72 KG/M2 | OXYGEN SATURATION: 92 % | SYSTOLIC BLOOD PRESSURE: 101 MMHG

## 2023-07-03 DIAGNOSIS — J45.909 ASTHMA, UNSPECIFIED ASTHMA SEVERITY, UNSPECIFIED WHETHER COMPLICATED, UNSPECIFIED WHETHER PERSISTENT: ICD-10-CM

## 2023-07-03 DIAGNOSIS — R11.2 NON-INTRACTABLE VOMITING WITH NAUSEA: ICD-10-CM

## 2023-07-03 DIAGNOSIS — J44.9 CHRONIC OBSTRUCTIVE PULMONARY DISEASE, UNSPECIFIED COPD TYPE: ICD-10-CM

## 2023-07-03 DIAGNOSIS — J98.01 COUGH DUE TO BRONCHOSPASM: ICD-10-CM

## 2023-07-03 PROCEDURE — 99214 OFFICE O/P EST MOD 30 MIN: CPT

## 2023-07-03 RX ORDER — PREDNISONE 10 MG/1
10 TABLET ORAL DAILY
Qty: 21 TABLET | Refills: 0 | Status: SHIPPED | OUTPATIENT
Start: 2023-07-03 | End: 2023-09-19 | Stop reason: SDUPTHER

## 2023-07-03 RX ORDER — FLUTICASONE PROPIONATE 50 MCG
2 SPRAY, SUSPENSION (ML) NASAL DAILY
Qty: 16 G | Refills: 2 | Status: SHIPPED | OUTPATIENT
Start: 2023-07-03 | End: 2023-10-17 | Stop reason: SDUPTHER

## 2023-07-03 RX ORDER — IPRATROPIUM BROMIDE AND ALBUTEROL SULFATE 2.5; .5 MG/3ML; MG/3ML
3 SOLUTION RESPIRATORY (INHALATION) EVERY 6 HOURS PRN
Qty: 90 EACH | Refills: 3 | Status: SHIPPED | OUTPATIENT
Start: 2023-07-03 | End: 2023-09-05 | Stop reason: SDUPTHER

## 2023-07-03 RX ORDER — BENZONATATE 100 MG/1
100 CAPSULE ORAL 3 TIMES DAILY PRN
Qty: 30 CAPSULE | Refills: 3 | Status: SHIPPED | OUTPATIENT
Start: 2023-07-03 | End: 2023-09-05 | Stop reason: SDUPTHER

## 2023-07-03 RX ORDER — ONDANSETRON 4 MG/1
4 TABLET, FILM COATED ORAL DAILY PRN
Qty: 30 TABLET | Refills: 1 | Status: SHIPPED | OUTPATIENT
Start: 2023-07-03 | End: 2023-09-05 | Stop reason: SDUPTHER

## 2023-07-03 RX ORDER — ALBUTEROL SULFATE 90 UG/1
2 AEROSOL, METERED RESPIRATORY (INHALATION) EVERY 6 HOURS PRN
Qty: 18 G | Refills: 12 | Status: SHIPPED | OUTPATIENT
Start: 2023-07-03 | End: 2023-10-17 | Stop reason: SDUPTHER

## 2023-07-03 RX ORDER — BUDESONIDE AND FORMOTEROL FUMARATE DIHYDRATE 160; 4.5 UG/1; UG/1
2 AEROSOL RESPIRATORY (INHALATION) EVERY 12 HOURS
Qty: 10.2 G | Refills: 1 | Status: SHIPPED | OUTPATIENT
Start: 2023-07-03 | End: 2023-10-26 | Stop reason: SDUPTHER
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
## 2023-08-14 DIAGNOSIS — R52 DIFFUSE PAIN: ICD-10-CM

## 2023-08-16 ENCOUNTER — TELEPHONE (OUTPATIENT)
Dept: FAMILY MEDICINE | Facility: HOSPITAL | Age: 58
End: 2023-08-16
Payer: MEDICAID

## 2023-08-16 RX ORDER — LIDOCAINE 50 MG/G
1 PATCH TOPICAL DAILY
Qty: 30 PATCH | Refills: 0 | Status: SHIPPED | OUTPATIENT
Start: 2023-08-16 | End: 2023-10-05 | Stop reason: SDUPTHER

## 2023-08-16 NOTE — TELEPHONE ENCOUNTER
----- Message from Helen Armenta sent at 8/15/2023  4:07 PM CDT -----  Regarding: referral  Pt called to see if the r can give her a call for a referral. The referral the dr request the pt for that Dr has moved. Call back # [phone]

## 2023-08-16 NOTE — TELEPHONE ENCOUNTER
Called spoke with daughter in law who is her care taker. She needs a new referral to a pulmonologist. She asked that we send a referral to Dr. Ajay Guillen fax number [phone]. Gave it to Ondina she said she would take care of it.

## 2023-09-05 DIAGNOSIS — J44.9 CHRONIC OBSTRUCTIVE PULMONARY DISEASE, UNSPECIFIED COPD TYPE: ICD-10-CM

## 2023-09-05 DIAGNOSIS — J45.909 ASTHMA, UNSPECIFIED ASTHMA SEVERITY, UNSPECIFIED WHETHER COMPLICATED, UNSPECIFIED WHETHER PERSISTENT: ICD-10-CM

## 2023-09-05 DIAGNOSIS — G40.909 SEIZURE DISORDER: ICD-10-CM

## 2023-09-05 DIAGNOSIS — R11.2 NON-INTRACTABLE VOMITING WITH NAUSEA: ICD-10-CM

## 2023-09-05 DIAGNOSIS — G47.00 INSOMNIA, UNSPECIFIED TYPE: ICD-10-CM

## 2023-09-05 DIAGNOSIS — J98.01 COUGH DUE TO BRONCHOSPASM: ICD-10-CM

## 2023-09-06 RX ORDER — IPRATROPIUM BROMIDE AND ALBUTEROL SULFATE 2.5; .5 MG/3ML; MG/3ML
3 SOLUTION RESPIRATORY (INHALATION) EVERY 6 HOURS PRN
Qty: 90 EACH | Refills: 3 | Status: SHIPPED | OUTPATIENT
Start: 2023-09-06 | End: 2023-09-07 | Stop reason: SDUPTHER

## 2023-09-06 RX ORDER — LEVETIRACETAM 500 MG/1
500 TABLET ORAL 2 TIMES DAILY
Qty: 60 TABLET | Refills: 1 | Status: SHIPPED | OUTPATIENT
Start: 2023-09-06 | End: 2023-10-17 | Stop reason: SDUPTHER

## 2023-09-06 RX ORDER — AMITRIPTYLINE HYDROCHLORIDE 10 MG/1
10 TABLET, FILM COATED ORAL NIGHTLY PRN
Qty: 90 TABLET | Refills: 0 | Status: SHIPPED | OUTPATIENT
Start: 2023-09-06

## 2023-09-06 RX ORDER — METOPROLOL TARTRATE 50 MG/1
50 TABLET ORAL 2 TIMES DAILY
Qty: 90 TABLET | Refills: 1 | Status: SHIPPED | OUTPATIENT
Start: 2023-09-06 | End: 2023-10-17 | Stop reason: SDUPTHER

## 2023-09-06 RX ORDER — ONDANSETRON 4 MG/1
4 TABLET, FILM COATED ORAL DAILY PRN
Qty: 30 TABLET | Refills: 1 | Status: SHIPPED | OUTPATIENT
Start: 2023-09-06 | End: 2023-10-05 | Stop reason: SDUPTHER

## 2023-09-06 RX ORDER — BENZONATATE 100 MG/1
100 CAPSULE ORAL 3 TIMES DAILY PRN
Qty: 30 CAPSULE | Refills: 3 | Status: SHIPPED | OUTPATIENT
Start: 2023-09-06 | End: 2023-09-07 | Stop reason: SDUPTHER

## 2023-09-07 DIAGNOSIS — J45.909 ASTHMA, UNSPECIFIED ASTHMA SEVERITY, UNSPECIFIED WHETHER COMPLICATED, UNSPECIFIED WHETHER PERSISTENT: ICD-10-CM

## 2023-09-07 DIAGNOSIS — J98.01 COUGH DUE TO BRONCHOSPASM: ICD-10-CM

## 2023-09-07 DIAGNOSIS — J44.9 CHRONIC OBSTRUCTIVE PULMONARY DISEASE, UNSPECIFIED COPD TYPE: ICD-10-CM

## 2023-09-08 RX ORDER — BENZONATATE 100 MG/1
100 CAPSULE ORAL 3 TIMES DAILY PRN
Qty: 30 CAPSULE | Refills: 3 | Status: SHIPPED | OUTPATIENT
Start: 2023-09-08 | End: 2023-10-05 | Stop reason: SDUPTHER

## 2023-09-08 RX ORDER — IPRATROPIUM BROMIDE AND ALBUTEROL SULFATE 2.5; .5 MG/3ML; MG/3ML
3 SOLUTION RESPIRATORY (INHALATION) EVERY 6 HOURS PRN
Qty: 90 EACH | Refills: 3 | Status: SHIPPED | OUTPATIENT
Start: 2023-09-08 | End: 2023-11-15 | Stop reason: SDUPTHER

## 2023-09-19 DIAGNOSIS — J44.9 CHRONIC OBSTRUCTIVE PULMONARY DISEASE, UNSPECIFIED COPD TYPE: ICD-10-CM

## 2023-09-20 RX ORDER — PREDNISONE 10 MG/1
10 TABLET ORAL DAILY
Qty: 21 TABLET | Refills: 0 | Status: SHIPPED | OUTPATIENT
Start: 2023-09-20 | End: 2023-10-05 | Stop reason: SDUPTHER

## 2023-10-05 ENCOUNTER — OFFICE VISIT (OUTPATIENT)
Dept: FAMILY MEDICINE | Facility: HOSPITAL | Age: 58
End: 2023-10-05
Payer: MEDICAID

## 2023-10-05 VITALS
HEART RATE: 88 BPM | SYSTOLIC BLOOD PRESSURE: 136 MMHG | DIASTOLIC BLOOD PRESSURE: 86 MMHG | WEIGHT: 121.94 LBS | BODY MASS INDEX: 23.94 KG/M2 | HEIGHT: 60 IN

## 2023-10-05 DIAGNOSIS — J44.9 CHRONIC OBSTRUCTIVE PULMONARY DISEASE, UNSPECIFIED COPD TYPE: ICD-10-CM

## 2023-10-05 DIAGNOSIS — J98.01 COUGH DUE TO BRONCHOSPASM: ICD-10-CM

## 2023-10-05 DIAGNOSIS — G89.29 CHRONIC BILATERAL LOW BACK PAIN WITHOUT SCIATICA: Primary | ICD-10-CM

## 2023-10-05 DIAGNOSIS — H91.93 BILATERAL HEARING LOSS, UNSPECIFIED HEARING LOSS TYPE: ICD-10-CM

## 2023-10-05 DIAGNOSIS — M54.50 CHRONIC BILATERAL LOW BACK PAIN WITHOUT SCIATICA: Primary | ICD-10-CM

## 2023-10-05 DIAGNOSIS — R11.2 NON-INTRACTABLE VOMITING WITH NAUSEA: ICD-10-CM

## 2023-10-05 PROCEDURE — 90677 PCV20 VACCINE IM: CPT

## 2023-10-05 PROCEDURE — 99215 OFFICE O/P EST HI 40 MIN: CPT

## 2023-10-05 PROCEDURE — 90471 IMMUNIZATION ADMIN: CPT

## 2023-10-05 RX ORDER — DICLOFENAC SODIUM 10 MG/G
2 GEL TOPICAL 4 TIMES DAILY
Qty: 350 G | Refills: 1 | Status: SHIPPED | OUTPATIENT
Start: 2023-10-05

## 2023-10-05 RX ORDER — BENZONATATE 200 MG/1
200 CAPSULE ORAL 3 TIMES DAILY PRN
Qty: 30 CAPSULE | Refills: 3 | Status: SHIPPED | OUTPATIENT
Start: 2023-10-05 | End: 2024-01-03

## 2023-10-05 RX ORDER — ONDANSETRON 4 MG/1
4 TABLET, FILM COATED ORAL DAILY PRN
Qty: 30 TABLET | Refills: 0 | Status: SHIPPED | OUTPATIENT
Start: 2023-10-05 | End: 2023-11-15 | Stop reason: SDUPTHER

## 2023-10-05 RX ORDER — LIDOCAINE 50 MG/G
1 PATCH TOPICAL DAILY
Qty: 30 PATCH | Refills: 1 | Status: SHIPPED | OUTPATIENT
Start: 2023-10-05 | End: 2023-10-17 | Stop reason: SDUPTHER

## 2023-10-05 RX ORDER — PREDNISONE 10 MG/1
10 TABLET ORAL DAILY
Qty: 21 TABLET | Refills: 0 | Status: SHIPPED | OUTPATIENT
Start: 2023-10-05 | End: 2023-10-26 | Stop reason: SDUPTHER

## 2023-10-05 RX ORDER — PRIMIDONE 50 MG/1
1 TABLET ORAL NIGHTLY
COMMUNITY
Start: 2023-08-15 | End: 2023-12-07 | Stop reason: SDUPTHER

## 2023-10-05 NOTE — PROGRESS NOTES
"[facility]  History & Physical    SUBJECTIVE:     Chief Complaint:   Chief Complaint   Patient presents with    Back Pain       History of Present Illness:  58 y.o. female who  has a past medical history of Anxiety, Asthma, Bipolar 1 disorder, Cirrhosis, alcoholic, Cocaine abuse, COPD (chronic obstructive pulmonary disease), Depression, Encounter for blood transfusion, Schizophrenia, Seizures, Subdural hematoma, and Tachycardia. presents to clinic today for follow up    #COPD  Patient with severe stage COPD and is now seeing new pulmonologist outside of [facility] system. Patient states she is adherent to her spiriva and symbicort as her controllers and albuterol/duo-nebs as her prn inhalers. Patient also currently on her home 2L of O2. Patient states she will need from time to time to use a "steroid course" in order to keep herself away from exacerbations as when she does have exacerbations she will need to go to the ER for continuous inhalations/steroid management. Upon last note with prior pulmonologist it was recommended that patient undergo pulmonary rehab however this has been hard for patient due to logistical reasons.     #Back pain  Patient with back pain for 1 month. Patient has had symptoms of chronic back pain along with back spasms and sciatica. Patient currently sedentary and is primarily in wheel chair due to her declining lung function. Patient requesting hydrocodone/acetaminophen as option as it has "helped in the past". Counseled to patient that vicodin and other opioid based would not be good long term option for her considering her pulmonary status.     #Need for audiology referral  Caretaker also noting patient with reduced hearing bilaterally. Patient was noted of having hearing loss in the past however has never been set up with hearing aids.    Allergies:  Review of patient's allergies indicates:   Allergen Reactions    Dye Anxiety, Blisters, Hives, Itching, Rash and Swelling       Home " Medications:  Current Outpatient Medications on File Prior to Visit   Medication Sig    albuterol-ipratropium (DUO-NEB) 2.5 mg-0.5 mg/3 mL nebulizer solution Take 3 mLs by nebulization every 6 (six) hours as needed for Wheezing. Rescue    amitriptyline (ELAVIL) 10 MG tablet Take 1 tablet (10 mg total) by mouth nightly as needed for Insomnia.    clonazePAM (KLONOPIN) 0.5 MG tablet Take 0.5 mg by mouth 2 (two) times daily. as needed for anxiety.    LATUDA 20 mg Tab tablet Take 20 mg by mouth.    multivitamin Chew Take by mouth once daily.    primidone (MYSOLINE) 50 MG Tab Take 1 tablet by mouth every evening.    sertraline (ZOLOFT) 100 MG tablet Take 100 mg by mouth once daily.    tiotropium (SPIRIVA) 18 mcg inhalation capsule Inhale 1 capsule (18 mcg total) into the lungs once daily. Controller    flu vacc [id number] 6mos up,PF, (FLUARIX QUAD 0086-7095, PF,) 60 mcg (15 mcg x 4)/0.5 mL Syrg use as directed (Patient not taking: Reported on 1/12/2023)    fluconazole (DIFLUCAN) 150 MG Tab One tab today repeat in 7days. (Patient not taking: Reported on 7/3/2023)    pneumococcal 23-thom ps (PNEUMOVAX 23) 25 mcg/0.5 mL vaccine Inject into the muscle. (Patient not taking: Reported on 1/12/2023)     No current facility-administered medications on file prior to visit.       Past Medical History:   Diagnosis Date    Anxiety     Asthma     Bipolar 1 disorder     Cirrhosis, alcoholic     Cocaine abuse     COPD (chronic obstructive pulmonary disease)     Depression     Encounter for blood transfusion     Schizophrenia     Seizures     Subdural hematoma     Tachycardia      Past Surgical History:   Procedure Laterality Date    APPENDECTOMY      CRANIECTOMY Left 01/31/2019    ESOPHAGOGASTRODUODENOSCOPY      PEG W/TRACHEOSTOMY PLACEMENT  02/16/2019     Family History   Problem Relation Age of Onset    COPD Mother     Cirrhosis Father      Social History     Tobacco Use    Smoking status: Former     Current packs/day: 0.00     Average  packs/day: 1 pack/day for 40.0 years (40.0 ttl pk-yrs)     Types: Cigarettes     Start date:      Quit date: 2019     Years since quittin.4     Passive exposure: Past    Smokeless tobacco: Never    Tobacco comments:     Previously 1 pack per day smoker - quit smoking 2019   Substance Use Topics    Alcohol use: Not Currently     Comment: social    Drug use: Not Currently          OBJECTIVE:     Vital Signs:  Pulse: 88 (10/05/23 1619)  BP: 136/86 (10/05/23 1619)    Physical Exam  Constitutional:       Appearance: Normal appearance.   HENT:      Head: Normocephalic and atraumatic.      Nose: Nose normal.   Eyes:      General: No scleral icterus.     Conjunctiva/sclera: Conjunctivae normal.      Pupils: Pupils are equal, round, and reactive to light.   Cardiovascular:      Rate and Rhythm: Normal rate and regular rhythm.   Pulmonary:      Effort: Pulmonary effort is normal.   Neurological:      Mental Status: She is alert and oriented to person, place, and time.      Motor: Tremor present.      Coordination: Romberg sign negative. Finger-Nose-Finger Test normal.      Gait: Gait is intact.         Laboratory:  Hemoglobin A1C   Date Value Ref Range Status   2023 5.0 4.0 - 5.6 % Final     Comment:     ADA Screening Guidelines:  5.7-6.4%  Consistent with prediabetes  >or=6.5%  Consistent with diabetes    High levels of fetal hemoglobin interfere with the HbA1C  assay. Heterozygous hemoglobin variants (HbS, HgC, etc)do  not significantly interfere with this assay.   However, presence of multiple variants may affect accuracy.     2021 5.0 4.0 - 5.6 % Final     Comment:     ADA Screening Guidelines:  5.7-6.4%  Consistent with prediabetes  >or=6.5%  Consistent with diabetes    High levels of fetal hemoglobin interfere with the HbA1C  assay. Heterozygous hemoglobin variants (HbS, HgC, etc)do  not significantly interfere with this assay.   However, presence of multiple variants may affect  accuracy.         A/P:  Elif was seen today for back pain.    Diagnoses and all orders for this visit:    Chronic bilateral low back pain without sciatica  -     X-Ray Lumbar Spine AP And Lateral; Future  -     X-Ray Thoracic Spine AP Lateral; Future    Bilateral hearing loss, unspecified hearing loss type  -     Ambulatory referral/consult to Audiology; Future    Chronic obstructive pulmonary disease, unspecified COPD type  -     Comprehensive Metabolic Panel; Future  -     CBC Auto Differential; Future  -     benzonatate (TESSALON) 200 MG capsule; Take 1 capsule (200 mg total) by mouth 3 (three) times daily as needed for Cough.  -     Discontinue: predniSONE (DELTASONE) 10 MG tablet; Take 1 tablet (10 mg total) by mouth once daily. Please take 3 tablets daily for 5 days, 2 tablets daily for 5 days, then 1 tablet daily for 5 days. for 21 days as needed for exacerbation    Cough due to bronchospasm  -     benzonatate (TESSALON) 200 MG capsule; Take 1 capsule (200 mg total) by mouth 3 (three) times daily as needed for Cough.    Non-intractable vomiting with nausea  -     ondansetron (ZOFRAN) 4 MG tablet; Take 1 tablet (4 mg total) by mouth daily as needed for Nausea (nausea).    Other orders  -     Discontinue: LIDOcaine (LIDODERM) 5 %; Place 1 patch onto the skin once daily. Remove & Discard patch within 12 hours or as directed by MD  -     diclofenac sodium (VOLTAREN) 1 % Gel; Apply 2 g topically 4 (four) times daily.  -     (In Office Administered) Pneumococcal Conjugate Vaccine (20 Valent) (IM) (Preferred)  -     Influenza - Quadrivalent *Preferred* (6 months+) (PF)    Patient here today for follow up. Patient's COPD stable however with need for intermittent steroid taper during episodes of exacerbations. Patient likely to need pulmonary rehab for help with symptom control. Patient to work closely with new pulmonologist in obtaining pulmonary rehab or increasing regimen if patient seems to be having more  frequent exacerbations. Will treat back pain symptoms conservatively at this time with lidocaine patch and OTC pain medications and obtain updated imaging. Will also refer to audiology for bilateral reduced hearing loss.    Follow up in 1 month    Zeeshan Guillen MD PGY-3  [facility]

## 2023-10-05 NOTE — PROGRESS NOTES
Prevnar 20 0.5ml given IM R deltoid.  Consent signed- tolerated well- instructed to wait 15 minutes post vaccine.   Flu consent signed by patient. Flu vaccine administered.

## 2023-10-06 ENCOUNTER — HOSPITAL ENCOUNTER (OUTPATIENT)
Dept: RADIOLOGY | Facility: HOSPITAL | Age: 58
Discharge: HOME | End: 2023-10-06
Payer: MEDICAID

## 2023-10-06 DIAGNOSIS — G89.29 CHRONIC BILATERAL LOW BACK PAIN WITHOUT SCIATICA: ICD-10-CM

## 2023-10-06 DIAGNOSIS — M54.50 CHRONIC BILATERAL LOW BACK PAIN WITHOUT SCIATICA: ICD-10-CM

## 2023-10-06 PROCEDURE — 72070 X-RAY EXAM THORAC SPINE 2VWS: CPT | Mod: TC,FY,PN

## 2023-10-06 PROCEDURE — 72070 X-RAY EXAM THORAC SPINE 2VWS: CPT | Mod: 26,,, | Performed by: RADIOLOGY

## 2023-10-06 PROCEDURE — 72100 X-RAY EXAM L-S SPINE 2/3 VWS: CPT | Mod: TC,FY,PN

## 2023-10-06 PROCEDURE — 72100 X-RAY EXAM L-S SPINE 2/3 VWS: CPT | Mod: 26,,, | Performed by: RADIOLOGY

## 2023-10-06 PROCEDURE — 72070 XR THORACIC SPINE AP LATERAL: ICD-10-PCS | Mod: 26,,, | Performed by: RADIOLOGY

## 2023-10-06 PROCEDURE — 72100 XR LUMBAR SPINE AP AND LATERAL: ICD-10-PCS | Mod: 26,,, | Performed by: RADIOLOGY

## 2023-10-12 ENCOUNTER — PATIENT MESSAGE (OUTPATIENT)
Dept: FAMILY MEDICINE | Facility: HOSPITAL | Age: 58
End: 2023-10-12
Payer: MEDICAID

## 2023-10-13 ENCOUNTER — TELEPHONE (OUTPATIENT)
Dept: FAMILY MEDICINE | Facility: HOSPITAL | Age: 58
End: 2023-10-13
Payer: MEDICAID

## 2023-10-13 DIAGNOSIS — M54.50 CHRONIC BILATERAL LOW BACK PAIN WITHOUT SCIATICA: Primary | ICD-10-CM

## 2023-10-13 DIAGNOSIS — M62.830 MUSCLE SPASM OF BACK: ICD-10-CM

## 2023-10-13 DIAGNOSIS — G89.29 CHRONIC BILATERAL LOW BACK PAIN WITHOUT SCIATICA: Primary | ICD-10-CM

## 2023-10-13 DIAGNOSIS — S22.040A CLOSED WEDGE COMPRESSION FRACTURE OF T4 VERTEBRA, INITIAL ENCOUNTER: ICD-10-CM

## 2023-10-13 RX ORDER — TIZANIDINE HYDROCHLORIDE 2 MG/1
2 CAPSULE, GELATIN COATED ORAL 2 TIMES DAILY PRN
Qty: 40 CAPSULE | Refills: 0 | Status: SHIPPED | OUTPATIENT
Start: 2023-10-13 | End: 2023-11-15 | Stop reason: SDUPTHER

## 2023-10-13 RX ORDER — IBUPROFEN 600 MG/1
600 TABLET ORAL EVERY 8 HOURS PRN
Qty: 30 TABLET | Refills: 1 | Status: SHIPPED | OUTPATIENT
Start: 2023-10-13 | End: 2024-01-19 | Stop reason: SDUPTHER

## 2023-10-13 NOTE — TELEPHONE ENCOUNTER
----- Message from Deanne Kaplan MA sent at 10/10/2023 12:00 PM CDT -----  Regarding: FW: test results    ----- Message -----  From: Helen Armenta  Sent: 10/10/2023  11:39 AM CDT  To: [facility] Staff  Subject: test results                                     Pt called to see if the DR can give her a call back about her test results... pt # [phone]

## 2023-10-13 NOTE — TELEPHONE ENCOUNTER
Attempted to call patient on 10/10/23 at 1700 with no response noted and unable to send voicemail. Attempted again on 10/13/23 and was able to discuss with patient recent imaging results. Patient with understanding of mild compression fracture seen on thoracic radiograph. Counseled with patient on conservative management such as back brace and pain control with topical and OTC pain medications. Patient wanting opioid based medications for pain and discussed with patient that opioid based medications would be unsafe considering patient's underlying severe COPD, oxygen requirement, and cirrhosis. Patient with understanding but would like daughter-in-law to also be informed of plan. Attempted to reach out to daughter-in-law but patient stating daughter-in-law is sleeping. Will reach out again at later time.      Zeeshan uGillen MD PGY-3  [facility]

## 2023-10-13 NOTE — TELEPHONE ENCOUNTER
Spoke with patient and daughter in law (Georgie) about patient's recently found mild compression fracture on imaging. Reviewed conservative management techniques with patient and daughter in law. Daughter in law to request custom back brace for patient and will continue to give patient ibuprofen/voltaren gel prn for back pain. Patient also with chronic pain from her associated medical history. Daughter-in-law with understanding that opioid-based medications would be unsafe for patient to take for pain considering patient's extensive medical history. Daughter-in-law is patient's caretaker and helps give patient her medications. Daughter-in-law states that patient was previously prescribed low dose zanaflex for her chronic back pain/muscle spasms. Will trial tizanidine for chronic back pain/muscle spasms as it has helped in the past.

## 2023-10-17 DIAGNOSIS — J45.909 ASTHMA, UNSPECIFIED ASTHMA SEVERITY, UNSPECIFIED WHETHER COMPLICATED, UNSPECIFIED WHETHER PERSISTENT: ICD-10-CM

## 2023-10-17 DIAGNOSIS — G40.909 SEIZURE DISORDER: ICD-10-CM

## 2023-10-17 DIAGNOSIS — J44.9 CHRONIC OBSTRUCTIVE PULMONARY DISEASE, UNSPECIFIED COPD TYPE: ICD-10-CM

## 2023-10-17 DIAGNOSIS — J96.11 CHRONIC RESPIRATORY FAILURE WITH HYPOXIA: ICD-10-CM

## 2023-10-17 RX ORDER — ALBUTEROL SULFATE 90 UG/1
2 AEROSOL, METERED RESPIRATORY (INHALATION) EVERY 6 HOURS PRN
Qty: 18 G | Refills: 12 | Status: SHIPPED | OUTPATIENT
Start: 2023-10-17 | End: 2023-11-20 | Stop reason: SDUPTHER

## 2023-10-17 RX ORDER — METOPROLOL TARTRATE 50 MG/1
50 TABLET ORAL 2 TIMES DAILY
Qty: 90 TABLET | Refills: 1 | Status: SHIPPED | OUTPATIENT
Start: 2023-10-17 | End: 2024-01-19 | Stop reason: SDUPTHER

## 2023-10-17 RX ORDER — LEVETIRACETAM 500 MG/1
500 TABLET ORAL 2 TIMES DAILY
Qty: 60 TABLET | Refills: 1 | Status: SHIPPED | OUTPATIENT
Start: 2023-10-17 | End: 2023-12-10 | Stop reason: SDUPTHER

## 2023-10-17 RX ORDER — LIDOCAINE 50 MG/G
1 PATCH TOPICAL DAILY
Qty: 30 PATCH | Refills: 1 | Status: SHIPPED | OUTPATIENT
Start: 2023-10-17 | End: 2024-03-19 | Stop reason: SDUPTHER

## 2023-10-17 RX ORDER — ALBUTEROL SULFATE 0.83 MG/ML
2.5 SOLUTION RESPIRATORY (INHALATION) EVERY 6 HOURS PRN
Qty: 1 EACH | Refills: 6 | Status: SHIPPED | OUTPATIENT
Start: 2023-10-17 | End: 2023-12-07 | Stop reason: SDUPTHER

## 2023-10-17 RX ORDER — FLUTICASONE PROPIONATE 50 MCG
2 SPRAY, SUSPENSION (ML) NASAL DAILY
Qty: 16 G | Refills: 2 | Status: SHIPPED | OUTPATIENT
Start: 2023-10-17 | End: 2024-03-19 | Stop reason: SDUPTHER

## 2023-10-26 DIAGNOSIS — J44.9 CHRONIC OBSTRUCTIVE PULMONARY DISEASE, UNSPECIFIED COPD TYPE: ICD-10-CM

## 2023-10-27 RX ORDER — PREDNISONE 10 MG/1
10 TABLET ORAL DAILY
Qty: 21 TABLET | Refills: 0 | Status: SHIPPED | OUTPATIENT
Start: 2023-10-27 | End: 2023-11-15 | Stop reason: SDUPTHER

## 2023-10-27 RX ORDER — BUDESONIDE AND FORMOTEROL FUMARATE DIHYDRATE 160; 4.5 UG/1; UG/1
2 AEROSOL RESPIRATORY (INHALATION) EVERY 12 HOURS
Qty: 10.2 G | Refills: 1 | Status: SHIPPED | OUTPATIENT
Start: 2023-10-27 | End: 2023-11-15 | Stop reason: SDUPTHER

## 2023-11-15 DIAGNOSIS — J44.9 CHRONIC OBSTRUCTIVE PULMONARY DISEASE, UNSPECIFIED COPD TYPE: ICD-10-CM

## 2023-11-15 DIAGNOSIS — R11.2 NON-INTRACTABLE VOMITING WITH NAUSEA: ICD-10-CM

## 2023-11-15 DIAGNOSIS — G89.29 CHRONIC BILATERAL LOW BACK PAIN WITHOUT SCIATICA: ICD-10-CM

## 2023-11-15 DIAGNOSIS — M54.50 CHRONIC BILATERAL LOW BACK PAIN WITHOUT SCIATICA: ICD-10-CM

## 2023-11-15 DIAGNOSIS — M62.830 MUSCLE SPASM OF BACK: ICD-10-CM

## 2023-11-15 DIAGNOSIS — J45.909 ASTHMA, UNSPECIFIED ASTHMA SEVERITY, UNSPECIFIED WHETHER COMPLICATED, UNSPECIFIED WHETHER PERSISTENT: ICD-10-CM

## 2023-11-15 RX ORDER — PREDNISONE 2.5 MG/1
TABLET ORAL
Qty: 42 TABLET | Refills: 0 | Status: SHIPPED | OUTPATIENT
Start: 2023-11-15 | End: 2023-12-06

## 2023-11-15 RX ORDER — TIZANIDINE 2 MG/1
2 TABLET ORAL 2 TIMES DAILY PRN
Qty: 40 TABLET | Refills: 0 | Status: SHIPPED | OUTPATIENT
Start: 2023-11-15 | End: 2023-12-07

## 2023-11-15 RX ORDER — BUDESONIDE AND FORMOTEROL FUMARATE DIHYDRATE 160; 4.5 UG/1; UG/1
2 AEROSOL RESPIRATORY (INHALATION) EVERY 12 HOURS
Qty: 10.2 G | Refills: 1 | Status: SHIPPED | OUTPATIENT
Start: 2023-11-15 | End: 2023-11-20 | Stop reason: SDUPTHER

## 2023-11-15 RX ORDER — ONDANSETRON 4 MG/1
4 TABLET, FILM COATED ORAL DAILY PRN
Qty: 30 TABLET | Refills: 0 | Status: SHIPPED | OUTPATIENT
Start: 2023-11-15 | End: 2023-11-20 | Stop reason: SDUPTHER

## 2023-11-15 RX ORDER — IPRATROPIUM BROMIDE AND ALBUTEROL SULFATE 2.5; .5 MG/3ML; MG/3ML
3 SOLUTION RESPIRATORY (INHALATION) EVERY 6 HOURS PRN
Qty: 90 EACH | Refills: 3 | Status: SHIPPED | OUTPATIENT
Start: 2023-11-15 | End: 2023-11-20 | Stop reason: SDUPTHER

## 2023-11-16 NOTE — PROGRESS NOTES
I assume primary medical responsibility for this patient. I have reviewed the history, physical, and assessement & treatment plan with the resident and agree that the care is reasonable and necessary. This service has been performed by a resident without the presence of a teaching physician under the primary care exception. If necessary, an addendum of additional findings or evaluation beyond the resident documentation will be noted below.      Bri Magdaleno MD    [facility]

## 2023-11-20 DIAGNOSIS — J45.909 ASTHMA, UNSPECIFIED ASTHMA SEVERITY, UNSPECIFIED WHETHER COMPLICATED, UNSPECIFIED WHETHER PERSISTENT: ICD-10-CM

## 2023-11-20 DIAGNOSIS — J44.9 CHRONIC OBSTRUCTIVE PULMONARY DISEASE, UNSPECIFIED COPD TYPE: ICD-10-CM

## 2023-11-20 DIAGNOSIS — R11.2 NON-INTRACTABLE VOMITING WITH NAUSEA: ICD-10-CM

## 2023-11-21 RX ORDER — ALBUTEROL SULFATE 90 UG/1
2 AEROSOL, METERED RESPIRATORY (INHALATION) EVERY 6 HOURS PRN
Qty: 18 G | Refills: 12 | Status: SHIPPED | OUTPATIENT
Start: 2023-11-21 | End: 2023-12-07 | Stop reason: SDUPTHER

## 2023-11-21 RX ORDER — ONDANSETRON 4 MG/1
4 TABLET, FILM COATED ORAL DAILY PRN
Qty: 30 TABLET | Refills: 0 | Status: SHIPPED | OUTPATIENT
Start: 2023-11-21 | End: 2023-12-07 | Stop reason: SDUPTHER

## 2023-11-21 RX ORDER — IPRATROPIUM BROMIDE AND ALBUTEROL SULFATE 2.5; .5 MG/3ML; MG/3ML
3 SOLUTION RESPIRATORY (INHALATION) EVERY 6 HOURS PRN
Qty: 90 EACH | Refills: 3 | Status: SHIPPED | OUTPATIENT
Start: 2023-11-21 | End: 2023-12-10 | Stop reason: SDUPTHER

## 2023-11-21 RX ORDER — BUDESONIDE AND FORMOTEROL FUMARATE DIHYDRATE 160; 4.5 UG/1; UG/1
2 AEROSOL RESPIRATORY (INHALATION) EVERY 12 HOURS
Qty: 10.2 G | Refills: 1 | Status: SHIPPED | OUTPATIENT
Start: 2023-11-21 | End: 2023-12-23 | Stop reason: SDUPTHER

## 2023-12-07 ENCOUNTER — OFFICE VISIT (OUTPATIENT)
Dept: FAMILY MEDICINE | Facility: HOSPITAL | Age: 58
End: 2023-12-07
Payer: MEDICAID

## 2023-12-07 VITALS
BODY MASS INDEX: 23.81 KG/M2 | DIASTOLIC BLOOD PRESSURE: 71 MMHG | WEIGHT: 121.25 LBS | SYSTOLIC BLOOD PRESSURE: 102 MMHG | HEART RATE: 93 BPM | HEIGHT: 60 IN

## 2023-12-07 DIAGNOSIS — R11.2 NON-INTRACTABLE VOMITING WITH NAUSEA: ICD-10-CM

## 2023-12-07 DIAGNOSIS — J44.9 CHRONIC OBSTRUCTIVE PULMONARY DISEASE, UNSPECIFIED COPD TYPE: ICD-10-CM

## 2023-12-07 DIAGNOSIS — J96.11 CHRONIC RESPIRATORY FAILURE WITH HYPOXIA: ICD-10-CM

## 2023-12-07 PROCEDURE — 94640 AIRWAY INHALATION TREATMENT: CPT

## 2023-12-07 PROCEDURE — 99213 OFFICE O/P EST LOW 20 MIN: CPT

## 2023-12-07 RX ORDER — TIZANIDINE HYDROCHLORIDE 2 MG/1
2 CAPSULE, GELATIN COATED ORAL EVERY 12 HOURS PRN
Qty: 14 CAPSULE | Refills: 0 | Status: SHIPPED | OUTPATIENT
Start: 2023-12-07 | End: 2024-01-23

## 2023-12-07 RX ORDER — DIAZEPAM 5 MG/1
5 TABLET ORAL
COMMUNITY

## 2023-12-07 RX ORDER — PREDNISONE 20 MG/1
40 TABLET ORAL DAILY
Qty: 10 TABLET | Refills: 0 | Status: SHIPPED | OUTPATIENT
Start: 2023-12-07 | End: 2023-12-12

## 2023-12-07 RX ORDER — ONDANSETRON 4 MG/1
4 TABLET, FILM COATED ORAL DAILY PRN
Qty: 30 TABLET | Refills: 0 | Status: SHIPPED | OUTPATIENT
Start: 2023-12-07 | End: 2024-01-19 | Stop reason: SDUPTHER

## 2023-12-07 RX ORDER — HYDROCORTISONE 1 %
CREAM (GRAM) TOPICAL 2 TIMES DAILY
Qty: 20 G | Refills: 0 | Status: SHIPPED | OUTPATIENT
Start: 2023-12-07

## 2023-12-07 RX ORDER — ALBUTEROL SULFATE 90 UG/1
2 AEROSOL, METERED RESPIRATORY (INHALATION) EVERY 6 HOURS PRN
Qty: 18 G | Refills: 12 | Status: SHIPPED | OUTPATIENT
Start: 2023-12-07 | End: 2023-12-23 | Stop reason: SDUPTHER

## 2023-12-07 RX ORDER — PRIMIDONE 50 MG/1
50 TABLET ORAL NIGHTLY
Qty: 30 TABLET | Refills: 11 | Status: SHIPPED | OUTPATIENT
Start: 2023-12-07 | End: 2024-12-06

## 2023-12-07 RX ORDER — ALBUTEROL SULFATE 0.83 MG/ML
2.5 SOLUTION RESPIRATORY (INHALATION) EVERY 6 HOURS PRN
Qty: 1 EACH | Refills: 6 | Status: SHIPPED | OUTPATIENT
Start: 2023-12-07 | End: 2024-12-06

## 2023-12-07 RX ORDER — IPRATROPIUM BROMIDE AND ALBUTEROL SULFATE 2.5; .5 MG/3ML; MG/3ML
3 SOLUTION RESPIRATORY (INHALATION)
Status: COMPLETED | OUTPATIENT
Start: 2023-12-07 | End: 2023-12-07

## 2023-12-07 RX ADMIN — IPRATROPIUM BROMIDE AND ALBUTEROL SULFATE 3 ML: .5; 3 SOLUTION RESPIRATORY (INHALATION) at 05:12

## 2023-12-07 NOTE — PROGRESS NOTES
[facility]  History & Physical    SUBJECTIVE:     Chief Complaint:   Chief Complaint   Patient presents with    Follow-up     HOSPTIAL       History of Present Illness:  58 y.o. female who  has a past medical history of Anxiety, Asthma, Bipolar 1 disorder, Cirrhosis, alcoholic, Cocaine abuse, COPD (chronic obstructive pulmonary disease), Depression, Encounter for blood transfusion, Schizophrenia, Seizures, Subdural hematoma, and Tachycardia. presents to clinic today for SOB. Wheezing on exam. Had tried nebulizer at home.  Patient also complains of chronic back pain, recent imaging non-diagnostic findings.  She adds complaint of rash to R buttock, possibly from insect bite. Improving, but still complains of mild itching.  She has no complaints of fevers, increased sputum production, CP, abd pain, changes to bowel/bladder habits. She does add occasional nausea which has occurred in past. Treated with Zofran, she requests refill.  Patient given nebulizer treatment started at 4:56 with mouth piece. Patient states she feels better after treatment, wishes to go home.       Allergies:  Review of patient's allergies indicates:   Allergen Reactions    Dye Anxiety, Blisters, Hives, Itching, Rash and Swelling       Home Medications:  Current Outpatient Medications on File Prior to Visit   Medication Sig    amitriptyline (ELAVIL) 10 MG tablet Take 1 tablet (10 mg total) by mouth nightly as needed for Insomnia.    benzonatate (TESSALON) 200 MG capsule Take 1 capsule (200 mg total) by mouth 3 (three) times daily as needed for Cough.    diazePAM (VALIUM) 5 MG tablet 5 mg.    fluticasone propionate (FLONASE) 50 mcg/actuation nasal spray 2 sprays (100 mcg total) by Each Nostril route once daily.    ibuprofen (ADVIL,MOTRIN) 600 MG tablet Take 1 tablet (600 mg total) by mouth every 8 (eight) hours as needed for Pain (moderate back pain).    LATUDA 20 mg Tab tablet Take 20 mg by mouth.    metoprolol tartrate (LOPRESSOR) 50 MG  tablet Take 1 tablet (50 mg total) by mouth 2 (two) times a day.    sertraline (ZOLOFT) 100 MG tablet Take 100 mg by mouth once daily.    SYMBICORT 160-4.5 mcg/actuation HFAA Inhale 2 puffs into the lungs every 12 (twelve) hours.    tiotropium (SPIRIVA) 18 mcg inhalation capsule Inhale 1 capsule (18 mcg total) into the lungs once daily. Controller    clonazePAM (KLONOPIN) 0.5 MG tablet Take 0.5 mg by mouth 2 (two) times daily. as needed for anxiety.    diclofenac sodium (VOLTAREN) 1 % Gel Apply 2 g topically 4 (four) times daily. (Patient not taking: Reported on 12/7/2023)    flu vacc [id number] 6mos up,PF, (FLUARIX QUAD 2240-3581, PF,) 60 mcg (15 mcg x 4)/0.5 mL Syrg use as directed (Patient not taking: Reported on 1/12/2023)    fluconazole (DIFLUCAN) 150 MG Tab One tab today repeat in 7days. (Patient not taking: Reported on 7/3/2023)    LIDOcaine (LIDODERM) 5 % Place 1 patch onto the skin once daily. Remove & Discard patch within 12 hours or as directed by MD (Patient not taking: Reported on 12/7/2023)    multivitamin Chew Take by mouth once daily.    pneumococcal 23-thom ps (PNEUMOVAX 23) 25 mcg/0.5 mL vaccine Inject into the muscle. (Patient not taking: Reported on 1/12/2023)     No current facility-administered medications on file prior to visit.       Past Medical History:   Diagnosis Date    Anxiety     Asthma     Bipolar 1 disorder     Cirrhosis, alcoholic     Cocaine abuse     COPD (chronic obstructive pulmonary disease)     Depression     Encounter for blood transfusion     Schizophrenia     Seizures     Subdural hematoma     Tachycardia      Past Surgical History:   Procedure Laterality Date    APPENDECTOMY      CRANIECTOMY Left 01/31/2019    ESOPHAGOGASTRODUODENOSCOPY      PEG W/TRACHEOSTOMY PLACEMENT  02/16/2019     Family History   Problem Relation Age of Onset    COPD Mother     Cirrhosis Father      Social History     Tobacco Use    Smoking status: Former     Current packs/day: 0.00     Average  packs/day: 1 pack/day for 40.0 years (40.0 ttl pk-yrs)     Types: Cigarettes     Start date:      Quit date: 2019     Years since quittin.5     Passive exposure: Past    Smokeless tobacco: Never    Tobacco comments:     Previously 1 pack per day smoker - quit smoking 2019   Substance Use Topics    Alcohol use: Not Currently     Comment: social    Drug use: Not Currently        Review of Systems   Constitutional:  Negative for chills and fever.   Respiratory:  Positive for cough and shortness of breath.    Cardiovascular:  Negative for chest pain and leg swelling.   Gastrointestinal:  Negative for abdominal pain, constipation, diarrhea, heartburn, nausea and vomiting.   Genitourinary:  Negative for dysuria.   Musculoskeletal:  Positive for back pain.   Skin:  Positive for rash.   Neurological:  Negative for dizziness and headaches.        OBJECTIVE:     Vital Signs:  Pulse: 93 (23 1608)  BP: 102/71 (23 1608)    Physical Exam  Constitutional:       Appearance: Normal appearance.   HENT:      Head: Normocephalic and atraumatic.   Cardiovascular:      Rate and Rhythm: Normal rate and regular rhythm.      Pulses: Normal pulses.      Heart sounds: Normal heart sounds. No murmur heard.  Pulmonary:      Effort: Pulmonary effort is normal.      Breath sounds: Wheezing present.   Abdominal:      General: Abdomen is flat.      Palpations: Abdomen is soft.      Tenderness: There is no abdominal tenderness.   Musculoskeletal:      Right lower leg: No edema.      Left lower leg: No edema.   Skin:     Findings: Lesion and rash present.      Comments: 2-3cm erythematous patch to R buttock, well-healing, no drainage/sign of infection   Neurological:      General: No focal deficit present.      Mental Status: She is alert and oriented to person, place, and time.   Psychiatric:      Comments: Patient anxious about symptoms         A/P:  Elif was seen today for follow-up.    Diagnoses and all orders  for this visit:    Non-intractable vomiting with nausea  -     ondansetron (ZOFRAN) 4 MG tablet; Take 1 tablet (4 mg total) by mouth daily as needed for Nausea (nausea).  -     Hemoglobin A1C; Future    Chronic obstructive pulmonary disease, unspecified COPD type  -     albuterol (PROVENTIL/VENTOLIN HFA) 90 mcg/actuation inhaler; Inhale 2 puffs into the lungs every 6 (six) hours as needed for Wheezing or Shortness of Breath. Rescue  -     albuterol (PROVENTIL) 2.5 mg /3 mL (0.083 %) nebulizer solution; Take 3 mLs (2.5 mg total) by nebulization every 6 (six) hours as needed for Wheezing. Rescue  -     Comprehensive Metabolic Panel; Future  -     EKG 12-lead; Future  -     CBC Auto Differential; Future    Chronic respiratory failure with hypoxia  -     albuterol (PROVENTIL) 2.5 mg /3 mL (0.083 %) nebulizer solution; Take 3 mLs (2.5 mg total) by nebulization every 6 (six) hours as needed for Wheezing. Rescue  -     predniSONE (DELTASONE) 20 MG tablet; Take 2 tablets (40 mg total) by mouth once daily. for 5 days  -     albuterol-ipratropium 2.5 mg-0.5 mg/3 mL nebulizer solution 3 mL    Rash to R buttock  Possible insect bite, healing well  -     hydrocortisone 1 % cream; Apply topically 2 (two) times daily.    Back pain  -     primidone (MYSOLINE) 50 MG Tab; Take 1 tablet (50 mg total) by mouth every evening.  -     tiZANidine 2 mg Cap; Take 1 capsule (2 mg total) by mouth every 12 (twelve) hours as needed.              DUE AT NEXT/FUTURE VISIT:        Andrew Olvera  [facility], PGY-2  12/22/2023    This note was partially created using [employer] Voice Recognition software. Typographical and content errors may occur with this process. While efforts are made to detect and correct such errors, in some cases errors will persist. For this reason, wording in this document should be considered in the proper context and not strictly verbatim     The following information is provided to all patients.  This  information is to help you find resources for any of the problems found today that may be affecting your health:                Living healthy guide: www.[facility].[place].[facility]       Understanding Diabetes: www.diabetes.org       Eating healthy: www.cdc.gov/healthyweight      CDC home safety checklist: www.cdc.gov/steadi/patient.html      Agency on Aging: www.goea.[place].[facility]       Alcoholics anonymous (AA): www.aa.org      Physical Activity: www.viral.nih.gov/ks0rjib       Tobacco use: www.quitwithusla.org

## 2023-12-10 DIAGNOSIS — G40.909 SEIZURE DISORDER: ICD-10-CM

## 2023-12-10 DIAGNOSIS — J44.9 CHRONIC OBSTRUCTIVE PULMONARY DISEASE, UNSPECIFIED COPD TYPE: ICD-10-CM

## 2023-12-10 DIAGNOSIS — J45.909 ASTHMA, UNSPECIFIED ASTHMA SEVERITY, UNSPECIFIED WHETHER COMPLICATED, UNSPECIFIED WHETHER PERSISTENT: ICD-10-CM

## 2023-12-11 RX ORDER — ALBUTEROL SULFATE 90 UG/1
2 AEROSOL, METERED RESPIRATORY (INHALATION) EVERY 6 HOURS PRN
Qty: 18 G | Refills: 12 | OUTPATIENT
Start: 2023-12-11

## 2023-12-11 RX ORDER — IPRATROPIUM BROMIDE AND ALBUTEROL SULFATE 2.5; .5 MG/3ML; MG/3ML
3 SOLUTION RESPIRATORY (INHALATION) EVERY 6 HOURS PRN
Qty: 90 EACH | Refills: 3 | Status: SHIPPED | OUTPATIENT
Start: 2023-12-11 | End: 2024-12-10

## 2023-12-11 RX ORDER — LEVETIRACETAM 500 MG/1
500 TABLET ORAL 2 TIMES DAILY
Qty: 60 TABLET | Refills: 1 | Status: SHIPPED | OUTPATIENT
Start: 2023-12-11 | End: 2024-04-01 | Stop reason: SDUPTHER

## 2023-12-22 NOTE — PROGRESS NOTES
I assume primary medical responsibility for this patient. I have reviewed the history, physical, and assessement & treatment plan with the resident and agree that the care is reasonable and necessary. This service has been performed by a resident with the presence of a teaching physician under the primary care exception. If necessary, an addendum of additional findings or evaluation beyond the resident documentation will be noted below.    Seen by this provider with suspected COPD exacerbation. Non tachypneic but poor air movement for which duoneb provided with some relief. Prednisone course prescribed with anticipatory guidance given.

## 2023-12-23 DIAGNOSIS — J44.9 CHRONIC OBSTRUCTIVE PULMONARY DISEASE, UNSPECIFIED COPD TYPE: ICD-10-CM

## 2023-12-27 RX ORDER — BUDESONIDE AND FORMOTEROL FUMARATE DIHYDRATE 160; 4.5 UG/1; UG/1
2 AEROSOL RESPIRATORY (INHALATION) EVERY 12 HOURS
Qty: 10.2 G | Refills: 1 | Status: SHIPPED | OUTPATIENT
Start: 2023-12-27 | End: 2024-01-19 | Stop reason: SDUPTHER

## 2024-01-01 RX ORDER — PREDNISONE 20 MG/1
40 TABLET ORAL DAILY
Qty: 10 TABLET | Refills: 0 | OUTPATIENT
Start: 2024-01-01 | End: 2024-01-06

## 2024-01-01 RX ORDER — ALBUTEROL SULFATE 90 UG/1
2 AEROSOL, METERED RESPIRATORY (INHALATION) EVERY 6 HOURS PRN
Qty: 18 G | Refills: 12 | Status: SHIPPED | OUTPATIENT
Start: 2024-01-01 | End: 2024-01-19 | Stop reason: SDUPTHER

## 2024-01-02 DIAGNOSIS — J44.9 CHRONIC OBSTRUCTIVE PULMONARY DISEASE, UNSPECIFIED COPD TYPE: ICD-10-CM

## 2024-01-02 DIAGNOSIS — J96.11 CHRONIC RESPIRATORY FAILURE WITH HYPOXIA: ICD-10-CM

## 2024-01-02 RX ORDER — TIOTROPIUM BROMIDE 18 UG/1
18 CAPSULE ORAL; RESPIRATORY (INHALATION) DAILY
Qty: 90 CAPSULE | Refills: 3 | Status: SHIPPED | OUTPATIENT
Start: 2024-01-02 | End: 2025-01-01

## 2024-01-02 RX ORDER — ALBUTEROL SULFATE 90 UG/1
2 AEROSOL, METERED RESPIRATORY (INHALATION) EVERY 6 HOURS PRN
Qty: 18 G | Refills: 12 | OUTPATIENT
Start: 2024-01-02

## 2024-01-19 DIAGNOSIS — J44.9 CHRONIC OBSTRUCTIVE PULMONARY DISEASE, UNSPECIFIED COPD TYPE: ICD-10-CM

## 2024-01-19 DIAGNOSIS — G89.29 CHRONIC BILATERAL LOW BACK PAIN WITHOUT SCIATICA: ICD-10-CM

## 2024-01-19 DIAGNOSIS — R11.2 NON-INTRACTABLE VOMITING WITH NAUSEA: ICD-10-CM

## 2024-01-19 DIAGNOSIS — M62.830 MUSCLE SPASM OF BACK: ICD-10-CM

## 2024-01-19 DIAGNOSIS — M54.50 CHRONIC BILATERAL LOW BACK PAIN WITHOUT SCIATICA: ICD-10-CM

## 2024-01-22 RX ORDER — METOPROLOL TARTRATE 50 MG/1
50 TABLET ORAL 2 TIMES DAILY
Qty: 90 TABLET | Refills: 1 | Status: SHIPPED | OUTPATIENT
Start: 2024-01-22

## 2024-01-22 RX ORDER — ALBUTEROL SULFATE 90 UG/1
2 AEROSOL, METERED RESPIRATORY (INHALATION) EVERY 6 HOURS PRN
Qty: 18 G | Refills: 12 | Status: SHIPPED | OUTPATIENT
Start: 2024-01-22 | End: 2024-03-19 | Stop reason: SDUPTHER

## 2024-01-22 RX ORDER — BUDESONIDE AND FORMOTEROL FUMARATE DIHYDRATE 160; 4.5 UG/1; UG/1
2 AEROSOL RESPIRATORY (INHALATION) EVERY 12 HOURS
Qty: 10.2 G | Refills: 1 | Status: SHIPPED | OUTPATIENT
Start: 2024-01-22 | End: 2024-03-30 | Stop reason: SDUPTHER

## 2024-01-22 RX ORDER — IBUPROFEN 600 MG/1
600 TABLET ORAL EVERY 8 HOURS PRN
Qty: 30 TABLET | Refills: 1 | Status: SHIPPED | OUTPATIENT
Start: 2024-01-22

## 2024-01-23 DIAGNOSIS — M62.830 MUSCLE SPASM OF BACK: Primary | ICD-10-CM

## 2024-01-23 RX ORDER — TIZANIDINE 2 MG/1
2 TABLET ORAL EVERY 12 HOURS PRN
Qty: 14 TABLET | Refills: 0 | Status: SHIPPED | OUTPATIENT
Start: 2024-01-23 | End: 2024-02-02

## 2024-01-23 RX ORDER — TIZANIDINE HYDROCHLORIDE 2 MG/1
2 CAPSULE, GELATIN COATED ORAL EVERY 12 HOURS PRN
Qty: 14 CAPSULE | Refills: 0 | OUTPATIENT
Start: 2024-01-23

## 2024-01-23 RX ORDER — ONDANSETRON 4 MG/1
4 TABLET, FILM COATED ORAL DAILY PRN
Qty: 30 TABLET | Refills: 0 | Status: SHIPPED | OUTPATIENT
Start: 2024-01-23 | End: 2024-04-11 | Stop reason: SDUPTHER

## 2024-02-27 ENCOUNTER — OFFICE VISIT (OUTPATIENT)
Dept: FAMILY MEDICINE | Facility: HOSPITAL | Age: 59
End: 2024-02-27
Payer: MEDICAID

## 2024-02-27 DIAGNOSIS — J44.9 COPD, SEVERE: Primary | ICD-10-CM

## 2024-02-27 DIAGNOSIS — N30.80 ACUTE BACTERIAL SIMPLE CYSTITIS: ICD-10-CM

## 2024-02-27 DIAGNOSIS — B96.89 ACUTE BACTERIAL SIMPLE CYSTITIS: ICD-10-CM

## 2024-02-27 RX ORDER — NITROFURANTOIN 25; 75 MG/1; MG/1
100 CAPSULE ORAL 2 TIMES DAILY
Qty: 10 CAPSULE | Refills: 0 | Status: SHIPPED | OUTPATIENT
Start: 2024-02-27 | End: 2024-03-03

## 2024-02-27 NOTE — PROGRESS NOTES
"                                                                                                                                  [facility]                                                                                                                                      Virtual Visit    Elif Archibald is a 58 y.o. female with PMHx of end-stage COPD, anxiety, Asthma, bipolar?/schizophrenia?, alcoholic liver disease, hx cocaine use, seizures, subdural hematoma, and who attended a televisit with concerns regarding UTI symptoms and continued COPD symptoms.    Wants to get checked for UTI - states stomach all bloated, "difficult to pee" for past week. Last UTI was long time ago. One episode of pink urine during this time. Denies back pain/flank pain. No fever/chills. Patient okay with empiric treatment and coming in for cultures when able.    Severe COPD - follows with pulm, last appointment few weeks ago next appointment in few weeks. Getting prescribe daily steroids now. Asking me what to do about her frequent flares all the time. States albuterol giving her tremors, says she can hardly breath through nose, can't eat. Difficult to ambulate. Denies increased O2 requirement (on 6L at baseline). Denies smoking currently.      Review of Systems   All other systems reviewed and are negative.      Assessment/Plan:    Diagnoses and all orders for this visit:    COPD, severe  -     Ambulatory referral/consult to CLINIC Palliative Care; Future    Patient with maximal COPD therapy it seems and continuing to struggle with breathing, worsening PO and difficulty ambulating. Feel that in addition to close follow up with pulmonology that patient would be a very good candidate for a palliative care evaluation and will try to get the process with having her seen by outpatient palliative. Patient in agreement to have this discussion. Depending on access - may serve patient better to explore home palliative eval. Advised " patient to be seen in ED if symptoms worsen any further, if she develops any fever, new onset confusion, alterations in consciousness, worsening air hunger.    Acute bacterial simple cystitis  -     nitrofurantoin, macrocrystal-monohydrate, (MACROBID) 100 MG capsule; Take 1 capsule (100 mg total) by mouth 2 (two) times daily. for 5 days  -     CULTURE, URINE; Future    Urine symptoms concerning for cystitis without apparent signs of systemic involvement, will treat empirically with macrobid, have patient come in for cultures to rule out resistant strain    Advised to make effort to follow up in person in clinic in next month to be evaluated for symptom progression. Patient instructed to contact the clinic at [phone] with any additional questions or concerns.    Case discussed with staff, Dr. Lul Abebe MD   [facility]   02/27/2024

## 2024-02-28 NOTE — PROGRESS NOTES
I assume primary medical responsibility for this patient. I have reviewed the history, physical, and assessment & treatment plan with the resident and agree that the care is reasonable and necessary. This service has been performed by a resident without the presence of a teaching physician under the primary care exception. If necessary, an addendum of additional findings or evaluation beyond the resident documentation will be noted below.        Son Mccarty Jr., DO    [facility]

## 2024-03-19 DIAGNOSIS — J45.909 ASTHMA, UNSPECIFIED ASTHMA SEVERITY, UNSPECIFIED WHETHER COMPLICATED, UNSPECIFIED WHETHER PERSISTENT: ICD-10-CM

## 2024-03-19 DIAGNOSIS — J44.9 CHRONIC OBSTRUCTIVE PULMONARY DISEASE, UNSPECIFIED COPD TYPE: ICD-10-CM

## 2024-03-20 RX ORDER — FLUTICASONE PROPIONATE 50 MCG
2 SPRAY, SUSPENSION (ML) NASAL DAILY
Qty: 16 G | Refills: 2 | Status: SHIPPED | OUTPATIENT
Start: 2024-03-20 | End: 2024-06-12 | Stop reason: SDUPTHER

## 2024-03-20 RX ORDER — ALBUTEROL SULFATE 90 UG/1
2 AEROSOL, METERED RESPIRATORY (INHALATION) EVERY 6 HOURS PRN
Qty: 18 G | Refills: 12 | Status: SHIPPED | OUTPATIENT
Start: 2024-03-20 | End: 2024-03-30 | Stop reason: SDUPTHER

## 2024-03-20 RX ORDER — LIDOCAINE 50 MG/G
1 PATCH TOPICAL DAILY
Qty: 30 PATCH | Refills: 1 | Status: SHIPPED | OUTPATIENT
Start: 2024-03-20 | End: 2024-05-02 | Stop reason: SDUPTHER

## 2024-03-30 DIAGNOSIS — J44.9 CHRONIC OBSTRUCTIVE PULMONARY DISEASE, UNSPECIFIED COPD TYPE: ICD-10-CM

## 2024-03-30 DIAGNOSIS — G40.909 SEIZURE DISORDER: ICD-10-CM

## 2024-03-30 RX ORDER — LEVETIRACETAM 500 MG/1
500 TABLET ORAL 2 TIMES DAILY
Qty: 60 TABLET | Refills: 1 | Status: CANCELLED | OUTPATIENT
Start: 2024-03-30

## 2024-04-01 DIAGNOSIS — G40.909 SEIZURE DISORDER: ICD-10-CM

## 2024-04-01 RX ORDER — LEVETIRACETAM 500 MG/1
500 TABLET ORAL 2 TIMES DAILY
Qty: 60 TABLET | Refills: 1 | Status: SHIPPED | OUTPATIENT
Start: 2024-04-01

## 2024-04-01 RX ORDER — ALBUTEROL SULFATE 90 UG/1
2 AEROSOL, METERED RESPIRATORY (INHALATION) EVERY 6 HOURS PRN
Qty: 18 G | Refills: 12 | Status: SHIPPED | OUTPATIENT
Start: 2024-04-01 | End: 2024-04-11 | Stop reason: SDUPTHER

## 2024-04-01 RX ORDER — BUDESONIDE AND FORMOTEROL FUMARATE DIHYDRATE 160; 4.5 UG/1; UG/1
2 AEROSOL RESPIRATORY (INHALATION) EVERY 12 HOURS
Qty: 10.2 G | Refills: 1 | Status: SHIPPED | OUTPATIENT
Start: 2024-04-01 | End: 2024-04-11 | Stop reason: SDUPTHER

## 2024-04-11 DIAGNOSIS — J44.9 CHRONIC OBSTRUCTIVE PULMONARY DISEASE, UNSPECIFIED COPD TYPE: ICD-10-CM

## 2024-04-11 DIAGNOSIS — R11.2 NON-INTRACTABLE VOMITING WITH NAUSEA: ICD-10-CM

## 2024-04-15 RX ORDER — BUDESONIDE AND FORMOTEROL FUMARATE DIHYDRATE 160; 4.5 UG/1; UG/1
2 AEROSOL RESPIRATORY (INHALATION) EVERY 12 HOURS
Qty: 10.2 G | Refills: 1 | Status: SHIPPED | OUTPATIENT
Start: 2024-04-15

## 2024-04-15 RX ORDER — ALBUTEROL SULFATE 90 UG/1
2 AEROSOL, METERED RESPIRATORY (INHALATION) EVERY 6 HOURS PRN
Qty: 18 G | Refills: 12 | Status: SHIPPED | OUTPATIENT
Start: 2024-04-15

## 2024-04-15 RX ORDER — ONDANSETRON 4 MG/1
4 TABLET, FILM COATED ORAL DAILY PRN
Qty: 30 TABLET | Refills: 0 | Status: SHIPPED | OUTPATIENT
Start: 2024-04-15

## 2024-05-03 RX ORDER — LIDOCAINE 50 MG/G
1 PATCH TOPICAL DAILY
Qty: 30 PATCH | Refills: 1 | Status: SHIPPED | OUTPATIENT
Start: 2024-05-03

## 2024-06-12 DIAGNOSIS — J45.909 ASTHMA, UNSPECIFIED ASTHMA SEVERITY, UNSPECIFIED WHETHER COMPLICATED, UNSPECIFIED WHETHER PERSISTENT: ICD-10-CM

## 2024-06-13 RX ORDER — FLUTICASONE PROPIONATE 50 MCG
2 SPRAY, SUSPENSION (ML) NASAL DAILY
Qty: 16 G | Refills: 2 | Status: SHIPPED | OUTPATIENT
Start: 2024-06-13

## 2024-10-04 NOTE — NURSING
Appointment for Ct scan is being scheduled by the referral coordinator.  
Detail Level: Detailed
Depth Of Biopsy: dermis
Was A Bandage Applied: Yes
Size Of Lesion In Cm: 0.4
X Size Of Lesion In Cm: 0.3
Biopsy Type: H and E
Biopsy Method: Personna blade
Anesthesia Type: 1% lidocaine with epinephrine
Anesthesia Volume In Cc: 0.5
Additional Anesthesia Volume In Cc (Will Not Render If 0): 0
Hemostasis: Aluminum Chloride
Wound Care: Aquaphor
Dressing: bandage
Destruction After The Procedure: No
Type Of Destruction Used: Curettage
Curettage Text: The wound bed was treated with curettage after the biopsy was performed.
Cryotherapy Text: The wound bed was treated with cryotherapy after the biopsy was performed.
Electrodesiccation Text: The wound bed was treated with electrodesiccation after the biopsy was performed.
Electrodesiccation And Curettage Text: The wound bed was treated with electrodesiccation and curettage after the biopsy was performed.
Silver Nitrate Text: The wound bed was treated with silver nitrate after the biopsy was performed.
Lab: [id number]
Lab Facility: [id number]
Consent: Written consent was obtained and risks were reviewed including but not limited to scarring, infection, bleeding, scabbing, incomplete removal, nerve damage and allergy to anesthesia.
Post-Care Instructions: I reviewed with the patient in detail post-care instructions. Patient is to keep the biopsy site dry overnight, and then apply Aquaphor and a bandage daily until healed. Patient may apply warm compresses to remove any crusting.
Notification Instructions: Patient will be notified of biopsy results. However, patient instructed to call the office if not contacted within 2 weeks.
Billing Type: Third-Party Bill
Information: Selecting Yes will display possible errors in your note based on the variables you have selected. This validation is only offered as a suggestion for you. PLEASE NOTE THAT THE VALIDATION TEXT WILL BE REMOVED WHEN YOU FINALIZE YOUR NOTE. IF YOU WANT TO FAX A PRELIMINARY NOTE YOU WILL NEED TO TOGGLE THIS TO 'NO' IF YOU DO NOT WANT IT IN YOUR FAXED NOTE.

## 2024-11-19 ENCOUNTER — HOSPITAL ENCOUNTER (OUTPATIENT)
Facility: HOSPITAL | Age: 59
Discharge: NURSING, REHAB OR LONG-TERM CARE | End: 2024-11-22
Attending: EMERGENCY MEDICINE | Admitting: HOSPITALIST
Payer: MEDICAID

## 2024-11-19 DIAGNOSIS — J44.1 COPD EXACERBATION: Primary | ICD-10-CM

## 2024-11-19 DIAGNOSIS — J44.0 CHRONIC OBSTRUCTIVE PULMONARY DISEASE WITH ACUTE LOWER RESPIRATORY INFECTION: ICD-10-CM

## 2024-11-19 DIAGNOSIS — G40.909 SEIZURE DISORDER: ICD-10-CM

## 2024-11-19 DIAGNOSIS — J44.9 CHRONIC OBSTRUCTIVE PULMONARY DISEASE, UNSPECIFIED COPD TYPE: ICD-10-CM

## 2024-11-19 DIAGNOSIS — J96.11 CHRONIC RESPIRATORY FAILURE WITH HYPOXIA: ICD-10-CM

## 2024-11-19 DIAGNOSIS — R06.02 SHORTNESS OF BREATH: ICD-10-CM

## 2024-11-19 DIAGNOSIS — R06.02 SOB (SHORTNESS OF BREATH): ICD-10-CM

## 2024-11-19 DIAGNOSIS — R53.81 DEBILITY: ICD-10-CM

## 2024-11-19 DIAGNOSIS — F33.41 RECURRENT MAJOR DEPRESSIVE DISORDER, IN PARTIAL REMISSION: ICD-10-CM

## 2024-11-19 LAB
ALBUMIN SERPL BCP-MCNC: 4.3 G/DL (ref 3.5–5.2)
ALP SERPL-CCNC: 54 U/L (ref 38–126)
ALT SERPL W/O P-5'-P-CCNC: 27 U/L (ref 10–44)
ANION GAP SERPL CALC-SCNC: 7 MMOL/L (ref 8–16)
AST SERPL-CCNC: 27 U/L (ref 15–46)
BASOPHILS # BLD AUTO: 0.01 K/UL (ref 0–0.2)
BASOPHILS NFR BLD: 0.1 % (ref 0–1.9)
BILIRUB SERPL-MCNC: 0.3 MG/DL (ref 0.1–1)
CALCIUM SERPL-MCNC: 8.9 MG/DL (ref 8.7–10.5)
CHLORIDE SERPL-SCNC: 87 MMOL/L (ref 95–110)
CO2 SERPL-SCNC: 35 MMOL/L (ref 23–29)
CREAT SERPL-MCNC: 0.87 MG/DL (ref 0.5–1.4)
DIFFERENTIAL METHOD BLD: ABNORMAL
EOSINOPHIL # BLD AUTO: 0.2 K/UL (ref 0–0.5)
EOSINOPHIL NFR BLD: 2.1 % (ref 0–8)
ERYTHROCYTE [DISTWIDTH] IN BLOOD BY AUTOMATED COUNT: 13.2 % (ref 11.5–14.5)
EST. GFR  (NO RACE VARIABLE): >60 ML/MIN/1.73 M^2
GLUCOSE SERPL-MCNC: 92 MG/DL (ref 70–110)
HCT VFR BLD AUTO: 36 % (ref 37–48.5)
HGB BLD-MCNC: 11.5 G/DL (ref 12–16)
IMM GRANULOCYTES # BLD AUTO: 0.02 K/UL (ref 0–0.04)
IMM GRANULOCYTES NFR BLD AUTO: 0.2 % (ref 0–0.5)
INFLUENZA A, MOLECULAR: NEGATIVE
INFLUENZA B, MOLECULAR: NEGATIVE
LYMPHOCYTES # BLD AUTO: 1.3 K/UL (ref 1–4.8)
LYMPHOCYTES NFR BLD: 16 % (ref 18–48)
MCH RBC QN AUTO: 28.1 PG (ref 27–31)
MCHC RBC AUTO-ENTMCNC: 31.9 G/DL (ref 32–36)
MCV RBC AUTO: 88 FL (ref 82–98)
MONOCYTES # BLD AUTO: 0.7 K/UL (ref 0.3–1)
MONOCYTES NFR BLD: 8.4 % (ref 4–15)
NEUTROPHILS # BLD AUTO: 5.9 K/UL (ref 1.8–7.7)
NEUTROPHILS NFR BLD: 73.2 % (ref 38–73)
NRBC BLD-RTO: 0 /100 WBC
NT-PROBNP SERPL-MCNC: 448 PG/ML (ref 5–900)
PLATELET # BLD AUTO: 229 K/UL (ref 150–450)
PMV BLD AUTO: 8.3 FL (ref 9.2–12.9)
POTASSIUM SERPL-SCNC: 4.4 MMOL/L (ref 3.5–5.1)
PROT SERPL-MCNC: 7.6 G/DL (ref 6–8.4)
RBC # BLD AUTO: 4.09 M/UL (ref 4–5.4)
SARS-COV-2 RDRP RESP QL NAA+PROBE: NEGATIVE
SODIUM SERPL-SCNC: 129 MMOL/L (ref 136–145)
SPECIMEN SOURCE: NORMAL
TROPONIN I SERPL-MCNC: <0.012 NG/ML (ref 0.01–0.03)
UUN UR-MCNC: 11 MG/DL (ref 7–17)
WBC # BLD AUTO: 8.1 K/UL (ref 3.9–12.7)

## 2024-11-19 PROCEDURE — G0378 HOSPITAL OBSERVATION PER HR: HCPCS | Mod: ER

## 2024-11-19 PROCEDURE — 93010 ELECTROCARDIOGRAM REPORT: CPT | Mod: ,,, | Performed by: INTERNAL MEDICINE

## 2024-11-19 PROCEDURE — 94640 AIRWAY INHALATION TREATMENT: CPT | Mod: ER

## 2024-11-19 PROCEDURE — 93005 ELECTROCARDIOGRAM TRACING: CPT | Mod: ER

## 2024-11-19 PROCEDURE — 85025 COMPLETE CBC W/AUTO DIFF WBC: CPT | Mod: ER | Performed by: EMERGENCY MEDICINE

## 2024-11-19 PROCEDURE — 84484 ASSAY OF TROPONIN QUANT: CPT | Mod: ER | Performed by: EMERGENCY MEDICINE

## 2024-11-19 PROCEDURE — 27000221 HC OXYGEN, UP TO 24 HOURS: Mod: ER

## 2024-11-19 PROCEDURE — 25000242 PHARM REV CODE 250 ALT 637 W/ HCPCS: Mod: ER | Performed by: EMERGENCY MEDICINE

## 2024-11-19 PROCEDURE — 94760 N-INVAS EAR/PLS OXIMETRY 1: CPT | Mod: ER

## 2024-11-19 PROCEDURE — 87635 SARS-COV-2 COVID-19 AMP PRB: CPT | Mod: ER | Performed by: EMERGENCY MEDICINE

## 2024-11-19 PROCEDURE — 87502 INFLUENZA DNA AMP PROBE: CPT | Mod: ER | Performed by: EMERGENCY MEDICINE

## 2024-11-19 PROCEDURE — 83880 ASSAY OF NATRIURETIC PEPTIDE: CPT | Mod: ER | Performed by: EMERGENCY MEDICINE

## 2024-11-19 PROCEDURE — 63700000 PHARM REV CODE 250 ALT 637 W/O HCPCS: Mod: ER | Performed by: EMERGENCY MEDICINE

## 2024-11-19 PROCEDURE — 25000003 PHARM REV CODE 250: Mod: ER | Performed by: EMERGENCY MEDICINE

## 2024-11-19 PROCEDURE — 63600175 PHARM REV CODE 636 W HCPCS: Mod: ER | Performed by: EMERGENCY MEDICINE

## 2024-11-19 PROCEDURE — 80053 COMPREHEN METABOLIC PANEL: CPT | Mod: ER | Performed by: EMERGENCY MEDICINE

## 2024-11-19 RX ORDER — KETOROLAC TROMETHAMINE 30 MG/ML
10 INJECTION, SOLUTION INTRAMUSCULAR; INTRAVENOUS
Status: COMPLETED | OUTPATIENT
Start: 2024-11-19 | End: 2024-11-19

## 2024-11-19 RX ORDER — ACETAMINOPHEN 500 MG
1000 TABLET ORAL
Status: COMPLETED | OUTPATIENT
Start: 2024-11-19 | End: 2024-11-19

## 2024-11-19 RX ORDER — FLUOXETINE HYDROCHLORIDE 40 MG/1
40 CAPSULE ORAL
Status: ON HOLD | COMMUNITY
Start: 2024-09-04 | End: 2024-11-21 | Stop reason: HOSPADM

## 2024-11-19 RX ORDER — ALBUTEROL SULFATE 90 UG/1
2 INHALANT RESPIRATORY (INHALATION)
Status: COMPLETED | OUTPATIENT
Start: 2024-11-19 | End: 2024-11-19

## 2024-11-19 RX ORDER — PREDNISONE 10 MG/1
10 TABLET ORAL
COMMUNITY

## 2024-11-19 RX ORDER — AZITHROMYCIN 250 MG/1
500 TABLET, FILM COATED ORAL
Status: COMPLETED | OUTPATIENT
Start: 2024-11-19 | End: 2024-11-19

## 2024-11-19 RX ORDER — PREDNISONE 20 MG/1
60 TABLET ORAL
Status: COMPLETED | OUTPATIENT
Start: 2024-11-19 | End: 2024-11-19

## 2024-11-19 RX ADMIN — ACETAMINOPHEN 1000 MG: 500 TABLET ORAL at 09:11

## 2024-11-19 RX ADMIN — ALBUTEROL SULFATE 2 PUFF: 90 AEROSOL, METERED RESPIRATORY (INHALATION) at 08:11

## 2024-11-19 RX ADMIN — PREDNISONE 60 MG: 20 TABLET ORAL at 08:11

## 2024-11-19 RX ADMIN — AZITHROMYCIN DIHYDRATE 500 MG: 250 TABLET ORAL at 08:11

## 2024-11-19 RX ADMIN — KETOROLAC TROMETHAMINE 10 MG: 30 INJECTION, SOLUTION INTRAMUSCULAR; INTRAVENOUS at 09:11

## 2024-11-19 NOTE — Clinical Note
Diagnosis: Shortness of breath [786.05.ICD-9-CM]   Future Attending Provider: DANK SIMPSON III [[id number]]

## 2024-11-20 PROBLEM — E87.1 HYPONATREMIA: Status: ACTIVE | Noted: 2024-11-20

## 2024-11-20 PROBLEM — J44.1 COPD EXACERBATION: Status: ACTIVE | Noted: 2024-11-20

## 2024-11-20 PROBLEM — Z91.81 HISTORY OF RECENT FALL: Status: ACTIVE | Noted: 2024-11-20

## 2024-11-20 PROBLEM — E87.1 CHRONIC HYPONATREMIA: Status: ACTIVE | Noted: 2024-11-20

## 2024-11-20 PROBLEM — J44.1 COPD EXACERBATION: Chronic | Status: ACTIVE | Noted: 2024-11-20

## 2024-11-20 LAB
ADENOVIRUS: NOT DETECTED
ALBUMIN SERPL BCP-MCNC: 3.2 G/DL (ref 3.5–5.2)
ALP SERPL-CCNC: 47 U/L (ref 40–150)
ALT SERPL W/O P-5'-P-CCNC: 25 U/L (ref 10–44)
ANION GAP SERPL CALC-SCNC: 9 MMOL/L (ref 8–16)
APICAL FOUR CHAMBER EJECTION FRACTION: 59 %
APICAL TWO CHAMBER EJECTION FRACTION: 66 %
AST SERPL-CCNC: 22 U/L (ref 10–40)
AV INDEX (PROSTH): 0.84
AV MEAN GRADIENT: 2.3 MMHG
AV PEAK GRADIENT: 3.2 MMHG
AV VALVE AREA BY VELOCITY RATIO: 2 CM²
AV VALVE AREA: 2.1 CM²
AV VELOCITY RATIO: 0.78
BASOPHILS # BLD AUTO: 0 K/UL (ref 0–0.2)
BASOPHILS NFR BLD: 0 % (ref 0–1.9)
BILIRUB SERPL-MCNC: 0.2 MG/DL (ref 0.1–1)
BILIRUB UR QL STRIP: NEGATIVE
BORDETELLA PARAPERTUSSIS (IS1001): NOT DETECTED
BORDETELLA PERTUSSIS (PTXP): NOT DETECTED
BSA FOR ECHO PROCEDURE: 1.45 M2
BUN SERPL-MCNC: 11 MG/DL (ref 6–20)
CALCIUM SERPL-MCNC: 8.9 MG/DL (ref 8.7–10.5)
CHLAMYDIA PNEUMONIAE: NOT DETECTED
CHLORIDE SERPL-SCNC: 89 MMOL/L (ref 95–110)
CLARITY UR: ABNORMAL
CO2 SERPL-SCNC: 30 MMOL/L (ref 23–29)
COLOR UR: YELLOW
CORONAVIRUS 229E, COMMON COLD VIRUS: NOT DETECTED
CORONAVIRUS HKU1, COMMON COLD VIRUS: NOT DETECTED
CORONAVIRUS NL63, COMMON COLD VIRUS: NOT DETECTED
CORONAVIRUS OC43, COMMON COLD VIRUS: NOT DETECTED
CREAT SERPL-MCNC: 0.9 MG/DL (ref 0.5–1.4)
CV ECHO LV RWT: 0.42 CM
DIFFERENTIAL METHOD BLD: ABNORMAL
DOP CALC AO PEAK VEL: 0.9 M/S
DOP CALC AO VTI: 20.7 CM
DOP CALC LVOT AREA: 2.5 CM2
DOP CALC LVOT DIAMETER: 1.8 CM
DOP CALC LVOT PEAK VEL: 0.7 M/S
DOP CALC LVOT STROKE VOLUME: 44 CM3
DOP CALCLVOT PEAK VEL VTI: 17.3 CM
E WAVE DECELERATION TIME: 233.23 MSEC
E/A RATIO: 3.83
E/E' RATIO: 24.9 M/S
ECHO LV POSTERIOR WALL: 0.7 CM (ref 0.6–1.1)
EOSINOPHIL # BLD AUTO: 0 K/UL (ref 0–0.5)
EOSINOPHIL NFR BLD: 0.2 % (ref 0–8)
ERYTHROCYTE [DISTWIDTH] IN BLOOD BY AUTOMATED COUNT: 13.4 % (ref 11.5–14.5)
EST. GFR  (NO RACE VARIABLE): >60 ML/MIN/1.73 M^2
FIO2: 28 %
FLUBV RNA NPH QL NAA+NON-PROBE: NOT DETECTED
FRACTIONAL SHORTENING: 24.2 % (ref 28–44)
GLUCOSE SERPL-MCNC: 159 MG/DL (ref 70–110)
GLUCOSE UR QL STRIP: NEGATIVE
HCT VFR BLD AUTO: 33.3 % (ref 37–48.5)
HGB BLD-MCNC: 10.8 G/DL (ref 12–16)
HGB UR QL STRIP: ABNORMAL
HPIV1 RNA NPH QL NAA+NON-PROBE: NOT DETECTED
HPIV2 RNA NPH QL NAA+NON-PROBE: NOT DETECTED
HPIV3 RNA NPH QL NAA+NON-PROBE: NOT DETECTED
HPIV4 RNA NPH QL NAA+NON-PROBE: NOT DETECTED
HUMAN METAPNEUMOVIRUS: NOT DETECTED
IMM GRANULOCYTES # BLD AUTO: 0.02 K/UL (ref 0–0.04)
IMM GRANULOCYTES NFR BLD AUTO: 0.3 % (ref 0–0.5)
INFLUENZA A (SUBTYPES H1,H1-2009,H3): NOT DETECTED
INTERVENTRICULAR SEPTUM: 0.6 CM (ref 0.6–1.1)
IVC DIAMETER: 1.66 CM
IVRT: 111.32 MSEC
KETONES UR QL STRIP: NEGATIVE
LEFT ATRIUM AREA SYSTOLIC (APICAL 2 CHAMBER): 12.07 CM2
LEFT ATRIUM AREA SYSTOLIC (APICAL 4 CHAMBER): 12.32 CM2
LEFT ATRIUM VOLUME INDEX MOD: 19.4 ML/M2
LEFT ATRIUM VOLUME MOD: 27.88 ML
LEFT INTERNAL DIMENSION IN SYSTOLE: 2.5 CM (ref 2.1–4)
LEFT VENTRICLE DIASTOLIC VOLUME INDEX: 29.95 ML/M2
LEFT VENTRICLE DIASTOLIC VOLUME: 43.13 ML
LEFT VENTRICLE END DIASTOLIC VOLUME APICAL 2 CHAMBER: 43.9 ML
LEFT VENTRICLE END DIASTOLIC VOLUME APICAL 4 CHAMBER: 43.86 ML
LEFT VENTRICLE END SYSTOLIC VOLUME APICAL 2 CHAMBER: 27.57 ML
LEFT VENTRICLE END SYSTOLIC VOLUME APICAL 4 CHAMBER: 26.95 ML
LEFT VENTRICLE MASS INDEX: 35.9 G/M2
LEFT VENTRICLE SYSTOLIC VOLUME INDEX: 15.5 ML/M2
LEFT VENTRICLE SYSTOLIC VOLUME: 22.37 ML
LEFT VENTRICULAR INTERNAL DIMENSION IN DIASTOLE: 3.3 CM (ref 3.5–6)
LEFT VENTRICULAR MASS: 51.7 G
LEUKOCYTE ESTERASE UR QL STRIP: NEGATIVE
LV LATERAL E/E' RATIO: 22.64 M/S
LV SEPTAL E/E' RATIO: 27.67 M/S
LVED V (TEICH): 43.13 ML
LVES V (TEICH): 22.37 ML
LVOT MG: 1.22 MMHG
LVOT MV: 0.52 CM/S
LYMPHOCYTES # BLD AUTO: 0.3 K/UL (ref 1–4.8)
LYMPHOCYTES NFR BLD: 5.4 % (ref 18–48)
MAGNESIUM SERPL-MCNC: 2.4 MG/DL (ref 1.6–2.6)
MCH RBC QN AUTO: 27.8 PG (ref 27–31)
MCHC RBC AUTO-ENTMCNC: 32.4 G/DL (ref 32–36)
MCV RBC AUTO: 86 FL (ref 82–98)
MONOCYTES # BLD AUTO: 0.1 K/UL (ref 0.3–1)
MONOCYTES NFR BLD: 1.6 % (ref 4–15)
MV PEAK A VEL: 0.65 M/S
MV PEAK E VEL: 2.49 M/S
MV STENOSIS PRESSURE HALF TIME: 67.64 MS
MV VALVE AREA P 1/2 METHOD: 3.25 CM2
MYCOPLASMA PNEUMONIAE: NOT DETECTED
NEUTROPHILS # BLD AUTO: 5.7 K/UL (ref 1.8–7.7)
NEUTROPHILS NFR BLD: 92.5 % (ref 38–73)
NITRITE UR QL STRIP: NEGATIVE
NRBC BLD-RTO: 0 /100 WBC
OHS CV RV/LV RATIO: 1.06 CM
OHS LV EJECTION FRACTION SIMPSONS BIPLANE MOD: 59 %
OHS QRS DURATION: 74 MS
OHS QTC CALCULATION: 430 MS
OSMOLALITY SERPL: 275 MOSM/KG (ref 275–295)
OSMOLALITY UR: 407 MOSM/KG (ref 50–1200)
PCO2 BLDA: 65.8 MMHG (ref 35–45)
PH SMN: 7.32 [PH] (ref 7.35–7.45)
PH UR STRIP: 7 [PH] (ref 5–8)
PHOSPHATE SERPL-MCNC: 4 MG/DL (ref 2.7–4.5)
PISA MRMAX VEL: 4.31 M/S
PISA TR MAX VEL: 2.92 M/S
PLATELET # BLD AUTO: 209 K/UL (ref 150–450)
PMV BLD AUTO: 8.6 FL (ref 9.2–12.9)
PO2 BLDA: 49.2 MMHG (ref 40–60)
POC BASE DEFICIT: 6.3 MMOL/L (ref -2–2)
POC HCO3: 34.1 MMOL/L (ref 24–28)
POC PERFORMED BY: ABNORMAL
POC SATURATED O2: 82.6 % (ref 95–100)
POTASSIUM SERPL-SCNC: 4.5 MMOL/L (ref 3.5–5.1)
PROT SERPL-MCNC: 7 G/DL (ref 6–8.4)
PROT UR QL STRIP: NEGATIVE
PULM VEIN S/D RATIO: 1.11
PV PEAK D VEL: 0.28 M/S
PV PEAK S VEL: 0.31 M/S
RA PRESSURE ESTIMATED: 3 MMHG
RA VOL SYS: 30.42 ML
RBC # BLD AUTO: 3.88 M/UL (ref 4–5.4)
RESPIRATORY INFECTION PANEL SOURCE: NORMAL
RIGHT ATRIAL AREA: 12.2 CM2
RIGHT ATRIUM VOLUME AREA LENGTH APICAL 4 CHAMBER: 28.78 ML
RIGHT VENTRICLE DIASTOLIC BASEL DIMENSION: 3.5 CM
RSV RNA NPH QL NAA+NON-PROBE: NOT DETECTED
RV TB RVSP: 6 MMHG
RV TISSUE DOPPLER FREE WALL SYSTOLIC VELOCITY 1 (APICAL 4 CHAMBER VIEW): 8.9 CM/S
RV+EV RNA NPH QL NAA+NON-PROBE: NOT DETECTED
SARS-COV-2 RNA RESP QL NAA+PROBE: NOT DETECTED
SINUS: 3.25 CM
SODIUM SERPL-SCNC: 128 MMOL/L (ref 136–145)
SODIUM UR-SCNC: 70 MMOL/L (ref 20–250)
SP GR UR STRIP: 1.01 (ref 1–1.03)
SPECIMEN SOURCE: ABNORMAL
TDI LATERAL: 0.11 M/S
TDI SEPTAL: 0.09 M/S
TDI: 0.1 M/S
TR MAX PG: 34 MMHG
TRICUSPID ANNULAR PLANE SYSTOLIC EXCURSION: 2.16 CM
TV REST PULMONARY ARTERY PRESSURE: 37 MMHG
URN SPEC COLLECT METH UR: ABNORMAL
UROBILINOGEN UR STRIP-ACNC: NEGATIVE EU/DL
WBC # BLD AUTO: 6.12 K/UL (ref 3.9–12.7)
Z-SCORE OF LEFT VENTRICULAR DIMENSION IN END DIASTOLE: -2.82
Z-SCORE OF LEFT VENTRICULAR DIMENSION IN END SYSTOLE: -0.66

## 2024-11-20 PROCEDURE — 25000003 PHARM REV CODE 250

## 2024-11-20 PROCEDURE — 99900035 HC TECH TIME PER 15 MIN (STAT)

## 2024-11-20 PROCEDURE — 99285 EMERGENCY DEPT VISIT HI MDM: CPT | Mod: 25

## 2024-11-20 PROCEDURE — 82803 BLOOD GASES ANY COMBINATION: CPT

## 2024-11-20 PROCEDURE — G0378 HOSPITAL OBSERVATION PER HR: HCPCS | Mod: ER

## 2024-11-20 PROCEDURE — 25000003 PHARM REV CODE 250: Mod: ER | Performed by: EMERGENCY MEDICINE

## 2024-11-20 PROCEDURE — 63600175 PHARM REV CODE 636 W HCPCS: Mod: ER

## 2024-11-20 PROCEDURE — 83735 ASSAY OF MAGNESIUM: CPT

## 2024-11-20 PROCEDURE — 63600175 PHARM REV CODE 636 W HCPCS: Mod: ER | Performed by: EMERGENCY MEDICINE

## 2024-11-20 PROCEDURE — 94640 AIRWAY INHALATION TREATMENT: CPT | Mod: XB

## 2024-11-20 PROCEDURE — 83935 ASSAY OF URINE OSMOLALITY: CPT

## 2024-11-20 PROCEDURE — 81003 URINALYSIS AUTO W/O SCOPE: CPT

## 2024-11-20 PROCEDURE — 25000242 PHARM REV CODE 250 ALT 637 W/ HCPCS

## 2024-11-20 PROCEDURE — 83930 ASSAY OF BLOOD OSMOLALITY: CPT

## 2024-11-20 PROCEDURE — 63600175 PHARM REV CODE 636 W HCPCS

## 2024-11-20 PROCEDURE — 96372 THER/PROPH/DIAG INJ SC/IM: CPT

## 2024-11-20 PROCEDURE — G0378 HOSPITAL OBSERVATION PER HR: HCPCS

## 2024-11-20 PROCEDURE — 85025 COMPLETE CBC W/AUTO DIFF WBC: CPT

## 2024-11-20 PROCEDURE — 87798 DETECT AGENT NOS DNA AMP: CPT

## 2024-11-20 PROCEDURE — 84100 ASSAY OF PHOSPHORUS: CPT

## 2024-11-20 PROCEDURE — 99205 OFFICE O/P NEW HI 60 MIN: CPT | Mod: ,,, | Performed by: STUDENT IN AN ORGANIZED HEALTH CARE EDUCATION/TRAINING PROGRAM

## 2024-11-20 PROCEDURE — 63700000 PHARM REV CODE 250 ALT 637 W/O HCPCS

## 2024-11-20 PROCEDURE — 96375 TX/PRO/DX INJ NEW DRUG ADDON: CPT

## 2024-11-20 PROCEDURE — 96365 THER/PROPH/DIAG IV INF INIT: CPT

## 2024-11-20 PROCEDURE — 84300 ASSAY OF URINE SODIUM: CPT

## 2024-11-20 PROCEDURE — 80053 COMPREHEN METABOLIC PANEL: CPT

## 2024-11-20 RX ORDER — FLUOXETINE HYDROCHLORIDE 20 MG/1
40 CAPSULE ORAL DAILY
Status: DISCONTINUED | OUTPATIENT
Start: 2024-11-20 | End: 2024-11-21

## 2024-11-20 RX ORDER — ENOXAPARIN SODIUM 100 MG/ML
40 INJECTION SUBCUTANEOUS EVERY 24 HOURS
Status: DISCONTINUED | OUTPATIENT
Start: 2024-11-20 | End: 2024-11-20

## 2024-11-20 RX ORDER — GLUCAGON 1 MG
1 KIT INJECTION
Status: DISCONTINUED | OUTPATIENT
Start: 2024-11-20 | End: 2024-11-22 | Stop reason: HOSPADM

## 2024-11-20 RX ORDER — METOPROLOL TARTRATE 50 MG/1
50 TABLET ORAL
Status: COMPLETED | OUTPATIENT
Start: 2024-11-20 | End: 2024-11-20

## 2024-11-20 RX ORDER — LEVETIRACETAM 500 MG/1
500 TABLET ORAL
Status: COMPLETED | OUTPATIENT
Start: 2024-11-20 | End: 2024-11-20

## 2024-11-20 RX ORDER — METOPROLOL TARTRATE 50 MG/1
50 TABLET ORAL 2 TIMES DAILY
Status: DISCONTINUED | OUTPATIENT
Start: 2024-11-20 | End: 2024-11-22 | Stop reason: HOSPADM

## 2024-11-20 RX ORDER — NAPROXEN 250 MG/1
250 TABLET ORAL ONCE
Status: COMPLETED | OUTPATIENT
Start: 2024-11-21 | End: 2024-11-21

## 2024-11-20 RX ORDER — NALOXONE HCL 0.4 MG/ML
0.02 VIAL (ML) INJECTION
Status: DISCONTINUED | OUTPATIENT
Start: 2024-11-20 | End: 2024-11-22 | Stop reason: HOSPADM

## 2024-11-20 RX ORDER — PREDNISONE 20 MG/1
40 TABLET ORAL DAILY
Status: DISCONTINUED | OUTPATIENT
Start: 2024-11-20 | End: 2024-11-20

## 2024-11-20 RX ORDER — AZITHROMYCIN 250 MG/1
500 TABLET, FILM COATED ORAL NIGHTLY
Status: COMPLETED | OUTPATIENT
Start: 2024-11-20 | End: 2024-11-21

## 2024-11-20 RX ORDER — IBUPROFEN 200 MG
24 TABLET ORAL
Status: DISCONTINUED | OUTPATIENT
Start: 2024-11-20 | End: 2024-11-22 | Stop reason: HOSPADM

## 2024-11-20 RX ORDER — HYDROXYZINE PAMOATE 25 MG/1
25 CAPSULE ORAL
Status: COMPLETED | OUTPATIENT
Start: 2024-11-20 | End: 2024-11-20

## 2024-11-20 RX ORDER — POLYETHYLENE GLYCOL 3350 17 G/17G
17 POWDER, FOR SOLUTION ORAL DAILY PRN
Status: DISCONTINUED | OUTPATIENT
Start: 2024-11-20 | End: 2024-11-22 | Stop reason: HOSPADM

## 2024-11-20 RX ORDER — SODIUM CHLORIDE 0.9 % (FLUSH) 0.9 %
5 SYRINGE (ML) INJECTION
Status: DISCONTINUED | OUTPATIENT
Start: 2024-11-20 | End: 2024-11-22 | Stop reason: HOSPADM

## 2024-11-20 RX ORDER — ACETAMINOPHEN 325 MG/1
650 TABLET ORAL EVERY 4 HOURS PRN
Status: DISCONTINUED | OUTPATIENT
Start: 2024-11-20 | End: 2024-11-22 | Stop reason: HOSPADM

## 2024-11-20 RX ORDER — DIAZEPAM 2 MG/1
2 TABLET ORAL NIGHTLY
Status: DISCONTINUED | OUTPATIENT
Start: 2024-11-20 | End: 2024-11-22 | Stop reason: HOSPADM

## 2024-11-20 RX ORDER — MAGNESIUM SULFATE HEPTAHYDRATE 40 MG/ML
2 INJECTION, SOLUTION INTRAVENOUS
Status: COMPLETED | OUTPATIENT
Start: 2024-11-20 | End: 2024-11-20

## 2024-11-20 RX ORDER — ONDANSETRON HYDROCHLORIDE 2 MG/ML
4 INJECTION, SOLUTION INTRAVENOUS
Status: COMPLETED | OUTPATIENT
Start: 2024-11-20 | End: 2024-11-20

## 2024-11-20 RX ORDER — PREDNISONE 20 MG/1
40 TABLET ORAL DAILY
Status: DISCONTINUED | OUTPATIENT
Start: 2024-11-20 | End: 2024-11-22 | Stop reason: HOSPADM

## 2024-11-20 RX ORDER — PRIMIDONE 50 MG/1
50 TABLET ORAL NIGHTLY
Status: DISCONTINUED | OUTPATIENT
Start: 2024-11-20 | End: 2024-11-22 | Stop reason: HOSPADM

## 2024-11-20 RX ORDER — FLUTICASONE FUROATE AND VILANTEROL 100; 25 UG/1; UG/1
1 POWDER RESPIRATORY (INHALATION) DAILY
Status: DISCONTINUED | OUTPATIENT
Start: 2024-11-20 | End: 2024-11-22 | Stop reason: HOSPADM

## 2024-11-20 RX ORDER — IPRATROPIUM BROMIDE AND ALBUTEROL SULFATE 2.5; .5 MG/3ML; MG/3ML
3 SOLUTION RESPIRATORY (INHALATION) EVERY 4 HOURS PRN
Status: DISCONTINUED | OUTPATIENT
Start: 2024-11-20 | End: 2024-11-22 | Stop reason: HOSPADM

## 2024-11-20 RX ORDER — TALC
6 POWDER (GRAM) TOPICAL NIGHTLY PRN
Status: DISCONTINUED | OUTPATIENT
Start: 2024-11-20 | End: 2024-11-22 | Stop reason: HOSPADM

## 2024-11-20 RX ORDER — IBUPROFEN 200 MG
16 TABLET ORAL
Status: DISCONTINUED | OUTPATIENT
Start: 2024-11-20 | End: 2024-11-22 | Stop reason: HOSPADM

## 2024-11-20 RX ORDER — ONDANSETRON HYDROCHLORIDE 2 MG/ML
4 INJECTION, SOLUTION INTRAVENOUS EVERY 8 HOURS PRN
Status: DISCONTINUED | OUTPATIENT
Start: 2024-11-20 | End: 2024-11-22 | Stop reason: HOSPADM

## 2024-11-20 RX ORDER — AMOXICILLIN 250 MG
1 CAPSULE ORAL 2 TIMES DAILY
Status: DISCONTINUED | OUTPATIENT
Start: 2024-11-20 | End: 2024-11-22 | Stop reason: HOSPADM

## 2024-11-20 RX ORDER — LEVETIRACETAM 500 MG/1
500 TABLET ORAL 2 TIMES DAILY
Status: DISCONTINUED | OUTPATIENT
Start: 2024-11-20 | End: 2024-11-22 | Stop reason: HOSPADM

## 2024-11-20 RX ORDER — ENOXAPARIN SODIUM 100 MG/ML
40 INJECTION SUBCUTANEOUS EVERY 24 HOURS
Status: DISCONTINUED | OUTPATIENT
Start: 2024-11-20 | End: 2024-11-22 | Stop reason: HOSPADM

## 2024-11-20 RX ADMIN — MAGNESIUM SULFATE HEPTAHYDRATE 2 G: 40 INJECTION, SOLUTION INTRAVENOUS at 02:11

## 2024-11-20 RX ADMIN — SENNOSIDES AND DOCUSATE SODIUM 1 TABLET: 8.6; 5 TABLET ORAL at 09:11

## 2024-11-20 RX ADMIN — HYDROXYZINE PAMOATE 25 MG: 25 CAPSULE ORAL at 02:11

## 2024-11-20 RX ADMIN — ONDANSETRON 4 MG: 2 INJECTION INTRAMUSCULAR; INTRAVENOUS at 01:11

## 2024-11-20 RX ADMIN — ENOXAPARIN SODIUM 40 MG: 40 INJECTION SUBCUTANEOUS at 08:11

## 2024-11-20 RX ADMIN — FLUTICASONE FUROATE AND VILANTEROL TRIFENATATE 1 PUFF: 100; 25 POWDER RESPIRATORY (INHALATION) at 09:11

## 2024-11-20 RX ADMIN — PREDNISONE 40 MG: 20 TABLET ORAL at 08:11

## 2024-11-20 RX ADMIN — METOPROLOL TARTRATE 50 MG: 50 TABLET, FILM COATED ORAL at 10:11

## 2024-11-20 RX ADMIN — SENNOSIDES AND DOCUSATE SODIUM 1 TABLET: 8.6; 5 TABLET ORAL at 10:11

## 2024-11-20 RX ADMIN — AZITHROMYCIN DIHYDRATE 500 MG: 250 TABLET ORAL at 10:11

## 2024-11-20 RX ADMIN — METOPROLOL TARTRATE 50 MG: 50 TABLET, FILM COATED ORAL at 01:11

## 2024-11-20 RX ADMIN — LEVETIRACETAM 500 MG: 500 TABLET, FILM COATED ORAL at 02:11

## 2024-11-20 RX ADMIN — ACETAMINOPHEN 650 MG: 325 TABLET ORAL at 11:11

## 2024-11-20 RX ADMIN — TIOTROPIUM BROMIDE INHALATION SPRAY 2 PUFF: 3.12 SPRAY, METERED RESPIRATORY (INHALATION) at 09:11

## 2024-11-20 RX ADMIN — FLUOXETINE HYDROCHLORIDE 40 MG: 20 CAPSULE ORAL at 09:11

## 2024-11-20 RX ADMIN — LEVETIRACETAM 500 MG: 500 TABLET, FILM COATED ORAL at 08:11

## 2024-11-20 RX ADMIN — PRIMIDONE 50 MG: 50 TABLET ORAL at 11:11

## 2024-11-20 RX ADMIN — DIAZEPAM 2 MG: 2 TABLET ORAL at 10:11

## 2024-11-20 NOTE — ED NOTES
[id number]  by [place] EMT. Notable facial bruising extending from the frontal sinus to the maxillary sinus. 2L NC in place. VSS.  Notable hand tremors present. Admitting MD at . Standard safety measures in place will continue to monitor.

## 2024-11-20 NOTE — ASSESSMENT & PLAN NOTE
History of end stage COPD followed Pulmonology at [facility]  PFT's notable for FEV1/FVC ratio 36.34 in 2022  On Symbicort and Spiriva as well as chronic prednisone 10mg with inability to be weaned off due to decompensation  On 2L oxygen at home, currently at baseline  On exam, poor inspiratory/expiratory effort, however, no wheezing noted   CXR largely unremarkable without areas of consolidation  Subjective purulent sputum production  Likely progression of COPD, however, patient would benefit from COPD exacerbation treatment  Patient's COPD is with exacerbation noted by continued dyspnea.    Plan:  Duonebs q4hr prn; space as tolerable  Azithromycin 500mg x3 days  Prednisone 60mg x5 days  SpO2 goal 88-92%, can tolerate 1L NC at rest. Provided fan for comfort.  Continue home maintenance inhalers  VBG pending  Palliative consulted- Patient's family would like to try pulmonary rehab after discharge and follow with palliative outpt.

## 2024-11-20 NOTE — ED NOTES
"2 purple tops and 2 green top tubes sent to the lab pt scanned labs collected and sent through tube to the lab. [id number] received a call from Marianne in [place] stated," Icant see orders on her end."  "

## 2024-11-20 NOTE — ED PROVIDER NOTES
Encounter Date: 2024       History     Chief Complaint   Patient presents with    Shortness of Breath     PT to the ED with c/o COPD exacerbation x 2 days. PT on home OT 2 Liters NC. PT becomes SOB with activity more than normal.      HPI  59 y.o.   Co wheezing, sob, BISHOP x 2 days  H/o COPD  S/p fall last week with bruising to face  Passive smoke exposure  Some uri sx    Review of patient's allergies indicates:   Allergen Reactions    Dye Anxiety, Blisters, Hives, Itching, Rash and Swelling     Past Medical History:   Diagnosis Date    Anxiety     Asthma     Bipolar 1 disorder     Cirrhosis, alcoholic     Cocaine abuse     COPD (chronic obstructive pulmonary disease)     Depression     Encounter for blood transfusion     Schizophrenia     Seizures     Subdural hematoma     Tachycardia      Past Surgical History:   Procedure Laterality Date    APPENDECTOMY      CRANIECTOMY Left 2019    ESOPHAGOGASTRODUODENOSCOPY      PEG W/TRACHEOSTOMY PLACEMENT  2019     Family History   Problem Relation Name Age of Onset    COPD Mother      Cirrhosis Father       Social History     Tobacco Use    Smoking status: Former     Current packs/day: 0.00     Average packs/day: 1 pack/day for 40.0 years (40.0 ttl pk-yrs)     Types: Cigarettes     Start date:      Quit date: 2019     Years since quittin.4     Passive exposure: Past    Smokeless tobacco: Never    Tobacco comments:     Previously 1 pack per day smoker - quit smoking 2019   Substance Use Topics    Alcohol use: Not Currently     Comment: social    Drug use: Not Currently     Review of Systems  All systems were reviewed/examined and were negative except as noted in the HPI.    Physical Exam     Initial Vitals [24 195]   BP Pulse Resp Temp SpO2   121/86 99 17 98.1 °F (36.7 °C) 99 %      MAP       --         Physical Exam    General: the patient is awake, alert, and in no apparent distress.  Ecchymosis periorbital  Head: normocephalic  and sclera are clear  Neck: supple without meningismus  Chest: scattered exp wheeze no respiratory distress  Heart: regular rate and rhythm  ABD soft, nontender, nondistended, no peritoneal signs  Extremities: warm and well perfused    No calf t  Skin: warm and dry  Psych conversant  Neuro: awake, alert, moving all extremities    ED Course   Procedures  Labs Reviewed   CBC W/ AUTO DIFFERENTIAL - Abnormal       Result Value    WBC 8.10      RBC 4.09      Hemoglobin 11.5 (*)     Hematocrit 36.0 (*)     MCV 88      MCH 28.1      MCHC 31.9 (*)     RDW 13.2      Platelets 229      MPV 8.3 (*)     Immature Granulocytes 0.2      Gran # (ANC) 5.9      Immature Grans (Abs) 0.02      Lymph # 1.3      Mono # 0.7      Eos # 0.2      Baso # 0.01      nRBC 0      Gran % 73.2 (*)     Lymph % 16.0 (*)     Mono % 8.4      Eosinophil % 2.1      Basophil % 0.1      Differential Method Automated     COMPREHENSIVE METABOLIC PANEL - Abnormal    Sodium 129 (*)     Potassium 4.4      Chloride 87 (*)     CO2 35 (*)     Glucose 92      BUN 11      Creatinine 0.87      Calcium 8.9      Total Protein 7.6      Albumin 4.3      Total Bilirubin 0.3      Alkaline Phosphatase 54      AST 27      ALT 27      Anion Gap 7 (*)     eGFR >60.0     CBC W/ AUTO DIFFERENTIAL - Abnormal    WBC 6.12      RBC 3.88 (*)     Hemoglobin 10.8 (*)     Hematocrit 33.3 (*)     MCV 86      MCH 27.8      MCHC 32.4      RDW 13.4      Platelets 209      MPV 8.6 (*)     Immature Granulocytes 0.3      Gran # (ANC) 5.7      Immature Grans (Abs) 0.02      Lymph # 0.3 (*)     Mono # 0.1 (*)     Eos # 0.0      Baso # 0.00      nRBC 0      Gran % 92.5 (*)     Lymph % 5.4 (*)     Mono % 1.6 (*)     Eosinophil % 0.2      Basophil % 0.0      Differential Method Automated     COMPREHENSIVE METABOLIC PANEL - Abnormal    Sodium 128 (*)     Potassium 4.5      Chloride 89 (*)     CO2 30 (*)     Glucose 159 (*)     BUN 11      Creatinine 0.9      Calcium 8.9      Total Protein 7.0       Albumin 3.2 (*)     Total Bilirubin 0.2      Alkaline Phosphatase 47      AST 22      ALT 25      eGFR >60      Anion Gap 9     INFLUENZA A & B BY MOLECULAR    Influenza A, Molecular Negative      Influenza B, Molecular Negative      Flu A & B Source Nasal swab     RESPIRATORY INFECTION PANEL (PCR), NASOPHARYNGEAL    Respiratory Infection Panel Source NP Swab      Narrative:     Assay not valid for lower respiratory specimens, alternate  testing required.   TROPONIN I    Troponin I <0.012     NT-PRO NATRIURETIC PEPTIDE    NT-proBNP 448     SARS-COV-2 RNA AMPLIFICATION, QUAL    SARS-CoV-2 RNA, Amplification, Qual Negative     PHOSPHORUS    Phosphorus 4.0     MAGNESIUM    Magnesium 2.4     SODIUM, URINE, RANDOM   OSMOLALITY, URINE RANDOM   OSMOLALITY, SERUM          Imaging Results              X-Ray Chest AP Portable (Final result)  Result time 11/19/24 20:49:29      Final result by Tao Gonzalez ([facility]), MD (11/19/24 20:49:29)                   Impression:      Small left pleural effusion.  Follow-up is recommended.      Electronically signed by: Tao Gonzalez MD  Date:    11/19/2024  Time:    20:49               Narrative:    EXAMINATION:  XR CHEST AP PORTABLE    CLINICAL HISTORY:  Shortness of breath, sob;    COMPARISON:  Chest, 01/14/2022.    FINDINGS:  Heart is normal in size.  Small left pleural effusion.  Right lung is clear.                                       Medications   sodium chloride 0.9% flush 5 mL (has no administration in time range)   melatonin tablet 6 mg (has no administration in time range)   ondansetron injection 4 mg (has no administration in time range)   polyethylene glycol packet 17 g (has no administration in time range)   senna-docusate 8.6-50 mg per tablet 1 tablet (has no administration in time range)   acetaminophen tablet 650 mg (has no administration in time range)   naloxone 0.4 mg/mL injection 0.02 mg (has no administration in time range)   glucose chewable tablet 16 g (has  no administration in time range)   glucose chewable tablet 24 g (has no administration in time range)   glucagon (human recombinant) injection 1 mg (has no administration in time range)   enoxaparin injection 40 mg (has no administration in time range)   tiotropium bromide 2.5 mcg/actuation inhaler 2 puff (has no administration in time range)   fluticasone furoate-vilanteroL 100-25 mcg/dose diskus inhaler 1 puff (has no administration in time range)   metoprolol tartrate (LOPRESSOR) tablet 50 mg (has no administration in time range)   levETIRAcetam tablet 500 mg (has no administration in time range)   FLUoxetine capsule 40 mg (has no administration in time range)   dextrose 10% bolus 125 mL 125 mL (has no administration in time range)   dextrose 10% bolus 250 mL 250 mL (has no administration in time range)   albuterol-ipratropium 2.5 mg-0.5 mg/3 mL nebulizer solution 3 mL (has no administration in time range)   azithromycin tablet 500 mg (has no administration in time range)   predniSONE tablet 60 mg (60 mg Oral Given 11/19/24 2058)   azithromycin tablet 500 mg (500 mg Oral Given 11/19/24 2058)   albuterol inhaler 2 puff (2 puffs Inhalation Given 11/19/24 2034)   acetaminophen tablet 1,000 mg (1,000 mg Oral Given 11/19/24 2117)   ketorolac injection 9.999 mg (9.999 mg Intravenous Given 11/19/24 2120)   metoprolol tartrate (LOPRESSOR) tablet 50 mg (50 mg Oral Given 11/20/24 0129)   ondansetron injection 4 mg (4 mg Intravenous Given 11/20/24 0133)   levETIRAcetam tablet 500 mg (500 mg Oral Given 11/20/24 0209)   hydrOXYzine pamoate capsule 25 mg (25 mg Oral Given 11/20/24 0209)   magnesium sulfate 2g in water 50mL IVPB (premix) (0 g Intravenous Stopped 11/20/24 0331)     Medical Decision Making                Medical Decision Making:    This is an emergent evaluation of a patient presenting to the ED.  Nursing notes were reviewed.  I personally reviewed, read, and interpreted the ECG and any monitoring strips.  ECG:  normal EKG, normal sinus rhythm, unchanged from previous tracings   There are no critical interval prolongations nor critical ST-segment ischemic changes.  Compared with prior if available.  Read and interpreted by me independently.      I reviewed radiology images personally along with interpretations.  Cxr ?able pleural effusion  I personally reviewed and interpreted the laboratory results.  BNP trop wnl  Communicated with another physician regarding patient's care: medicine for admission  I decided to obtain and review old medical records, which showed: h/o  COPD    Critical Care Time    Critical care time was provided for 30 minutes exclusive of other billable procedures and teaching time for the treatment of  COPD exacerbation   where the potential for death, shock, or further decline was possible.  Critical care time can include documentation, discussion with consultants, developing a care plan, as well as direct patient care.    Adam Kate MD                     Clinical Impression:  Final diagnoses:  [R06.02] Shortness of breath  [J44.1] COPD exacerbation (Primary)          ED Disposition Condition    Observation Stable             Giorgio Kate MD, SVETA, FACEP  Department of Emergency Medicine       Adam Kate MD  11/20/24 [id number]
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
## 2024-11-20 NOTE — HOSPITAL COURSE
"58yo female presents to [facility] as a transfer from OSH with 4d hx of SOB and change in sputum, [facility] team for COPD exacerbation. Patient initiated on azithromycin, albuterol MDI, and stress dose steroids. CXR illustrated chronic L pleural effusion. Labs significant for mild hyponatremia. Initial PE with B/L infraorbital edema and nasal bridge tenderness following fall at home ~1wk ago that was not evaluated. CT max/face with no bony injury requiring acute intervention. Previous TTE unremarkable, update during this hospitalization with LVEF of 59% and PASP 37. Palliative consulted for ongoing goals of care conversation and additional layer of support during this hospitalization. Family expressed desire for patient to complete pulmonary rehab prior to further discussions involving hospice. Patients home psychiatrist (Dr. Herrera) contacted for possible medication adjustment setting of patients report of increased somnolence and recent falls which could be attributed to polypharmacy. Recommendations from Dr. Herrera were to decrease Latuda to 40mg qhs and diazepam 2mg qhs. Plan of care discussed with patient and family.     On day of discharge patient was back to baseline and hemodynamically stable. Patient was sent to inpatient rehab for moderate intensity therapy with PT/OT with instructions to continue home medications as stated under "Medication Reconciliation" below. Decrease Latuda to 40mg QHS and diazepam to 2mg. Started on duoneb nebulizer every 4 hours as needed.  Close outpatient f/u was advised with PCP, Psychiatry and Pulmonology. Of note, pt has chronic hyponatremia likely to her chronic conditions. Patient agreeable to discharge plan & expressed understanding of all aforementioned changes. All questions were answered and return precautions were discussed & detailed in the after-visit summary.      "

## 2024-11-20 NOTE — SUBJECTIVE & OBJECTIVE
Past Medical History:   Diagnosis Date    Anxiety     Asthma     Bipolar 1 disorder     Cirrhosis, alcoholic     Cocaine abuse     COPD (chronic obstructive pulmonary disease)     Depression     Encounter for blood transfusion     Schizophrenia     Seizures     Subdural hematoma     Tachycardia        Past Surgical History:   Procedure Laterality Date    APPENDECTOMY      CRANIECTOMY Left 01/31/2019    ESOPHAGOGASTRODUODENOSCOPY      PEG W/TRACHEOSTOMY PLACEMENT  02/16/2019       Review of patient's allergies indicates:   Allergen Reactions    Dye Anxiety, Blisters, Hives, Itching, Rash and Swelling       No current facility-administered medications on file prior to encounter.     Current Outpatient Medications on File Prior to Encounter   Medication Sig    FLUoxetine 40 MG capsule 40 mg.    albuterol (PROVENTIL) 2.5 mg /3 mL (0.083 %) nebulizer solution Take 3 mLs (2.5 mg total) by nebulization every 6 (six) hours as needed for Wheezing. Rescue    albuterol (PROVENTIL/VENTOLIN HFA) 90 mcg/actuation inhaler Inhale 2 puffs into the lungs every 6 (six) hours as needed for Wheezing or Shortness of Breath. Rescue    albuterol-ipratropium (DUO-NEB) 2.5 mg-0.5 mg/3 mL nebulizer solution Take 3 mLs by nebulization every 6 (six) hours as needed for Wheezing. Rescue    amitriptyline (ELAVIL) 10 MG tablet Take 1 tablet (10 mg total) by mouth nightly as needed for Insomnia.    clonazePAM (KLONOPIN) 0.5 MG tablet Take 0.5 mg by mouth 2 (two) times daily. as needed for anxiety.    diazePAM (VALIUM) 5 MG tablet 5 mg.    diclofenac sodium (VOLTAREN) 1 % Gel Apply 2 g topically 4 (four) times daily. (Patient not taking: Reported on 12/7/2023)    flu vacc [id number] 6mos up,PF, (FLUARIX QUAD [id number], PF,) 60 mcg (15 mcg x 4)/0.5 mL Syrg use as directed (Patient not taking: Reported on 1/12/2023)    fluconazole (DIFLUCAN) 150 MG Tab One tab today repeat in 7days. (Patient not taking: Reported on 7/3/2023)    fluticasone propionate  (FLONASE) 50 mcg/actuation nasal spray 2 sprays (100 mcg total) by Each Nostril route once daily.    hydrocortisone 1 % cream Apply topically 2 (two) times daily.    ibuprofen (ADVIL,MOTRIN) 600 MG tablet Take 1 tablet (600 mg total) by mouth every 8 (eight) hours as needed for Pain (moderate back pain).    LATUDA 20 mg Tab tablet Take 40 mg by mouth 2 (two) times a day.    levETIRAcetam (KEPPRA) 500 MG Tab Take 1 tablet (500 mg total) by mouth 2 (two) times daily.    LIDOcaine (LIDODERM) 5 % Place 1 patch onto the skin once daily. Remove & Discard patch within 12 hours or as directed by MD    metoprolol tartrate (LOPRESSOR) 50 MG tablet Take 1 tablet (50 mg total) by mouth 2 (two) times a day.    multivitamin Chew Take by mouth once daily.    ondansetron (ZOFRAN) 4 MG tablet Take 1 tablet (4 mg total) by mouth daily as needed for Nausea (nausea).    pneumococcal 23-thom ps (PNEUMOVAX 23) 25 mcg/0.5 mL vaccine Inject into the muscle. (Patient not taking: Reported on 2023)    predniSONE (DELTASONE) 10 MG tablet Take 10 mg by mouth.    primidone (MYSOLINE) 50 MG Tab Take 1 tablet (50 mg total) by mouth every evening.    sertraline (ZOLOFT) 100 MG tablet Take 100 mg by mouth once daily.    SYMBICORT 160-4.5 mcg/actuation HFAA Inhale 2 puffs into the lungs every 12 (twelve) hours.    tiotropium (SPIRIVA) 18 mcg inhalation capsule Inhale 1 capsule (18 mcg total) into the lungs once daily. Controller     Family History       Problem Relation (Age of Onset)    COPD Mother    Cirrhosis Father          Tobacco Use    Smoking status: Former     Current packs/day: 0.00     Average packs/day: 1 pack/day for 40.0 years (40.0 ttl pk-yrs)     Types: Cigarettes     Start date:      Quit date: 2019     Years since quittin.4     Passive exposure: Past    Smokeless tobacco: Never    Tobacco comments:     Previously 1 pack per day smoker - quit smoking 2019   Substance and Sexual Activity    Alcohol use: Not  Currently     Comment: social    Drug use: Not Currently    Sexual activity: Not Currently     Partners: Male     Review of Systems   Constitutional:  Positive for chills and fatigue. Negative for fever.   HENT:  Negative for congestion.    Eyes:  Negative for pain.   Respiratory:  Positive for cough, shortness of breath and wheezing. Negative for chest tightness.    Cardiovascular:  Negative for chest pain, palpitations and leg swelling.   Gastrointestinal:  Negative for abdominal distention, abdominal pain, constipation, diarrhea, nausea and vomiting.   Endocrine: Negative for polydipsia.   Musculoskeletal:  Negative for back pain and myalgias.   Neurological:  Positive for weakness. Negative for dizziness, light-headedness and headaches.     Objective:     Vital Signs (Most Recent):  Temp: 98 °F (36.7 °C) (11/20/24 0100)  Pulse: 76 (11/20/24 0519)  Resp: (!) 30 (11/20/24 0519)  BP: 128/69 (11/20/24 0301)  SpO2: 98 % (11/20/24 0519) Vital Signs (24h Range):  Temp:  [98 °F (36.7 °C)-98.1 °F (36.7 °C)] 98 °F (36.7 °C)  Pulse:  [76-99] 76  Resp:  [17-30] 30  SpO2:  [96 %-100 %] 98 %  BP: (121-173)/(69-92) 128/69     Weight: 49.4 kg (109 lb)  Body mass index is 21.29 kg/m².     Physical Exam  Constitutional:       Appearance: She is ill-appearing.   HENT:      Head: Normocephalic and atraumatic.      Comments: Facial bruising from subjective fall 1 week ago  Tracheostomy      Mouth/Throat:      Mouth: Mucous membranes are moist.      Pharynx: Oropharynx is clear.   Eyes:      Extraocular Movements: Extraocular movements intact.      Conjunctiva/sclera: Conjunctivae normal.      Pupils: Pupils are equal, round, and reactive to light.   Cardiovascular:      Rate and Rhythm: Normal rate and regular rhythm.      Pulses: Normal pulses.      Heart sounds: Normal heart sounds.   Pulmonary:      Comments: Poor inspiratory and expiratory effort  No wheezing noted   Poor air movement diffusely  Abdominal:      General: Abdomen  "is flat.      Palpations: Abdomen is soft.   Musculoskeletal:         General: Normal range of motion.      Cervical back: Normal range of motion and neck supple.   Skin:     General: Skin is warm and dry.      Capillary Refill: Capillary refill takes less than 2 seconds.   Neurological:      General: No focal deficit present.      Mental Status: She is alert.   Psychiatric:         Mood and Affect: Mood normal.              CRANIAL NERVES     CN III, IV, VI   Pupils are equal, round, and reactive to light.       Significant Labs: All pertinent labs within the past 24 hours have been reviewed.  CBC:   Recent Labs   Lab 11/19/24 2053   WBC 8.10   HGB 11.5*   HCT 36.0*        CMP:   Recent Labs   Lab 11/19/24 2053   *   K 4.4   CL 87*   CO2 35*   GLU 92   BUN 11   CREATININE 0.87   CALCIUM 8.9   PROT 7.6   ALBUMIN 4.3   BILITOT 0.3   ALKPHOS 54   AST 27   ALT 27   ANIONGAP 7*     Cardiac Markers: No results for input(s): "CKMB", "MYOGLOBIN", "BNP", "TROPISTAT" in the last 48 hours.  Troponin:   Recent Labs   Lab 11/19/24 2053   TROPONINI <0.012       Significant Imaging: I have reviewed all pertinent imaging results/findings within the past 24 hours.  Imaging Results              X-Ray Chest AP Portable (Final result)  Result time 11/19/24 20:49:29      Final result by Tao Gonzalez MD (Timothy) (11/19/24 20:49:29)                   Impression:      Small left pleural effusion.  Follow-up is recommended.      Electronically signed by: Tao Gonzalez MD  Date:    11/19/2024  Time:    20:49               Narrative:    EXAMINATION:  XR CHEST AP PORTABLE    CLINICAL HISTORY:  Shortness of breath, sob;    COMPARISON:  Chest, 01/14/2022.    FINDINGS:  Heart is normal in size.  Small left pleural effusion.  Right lung is clear.                                     "

## 2024-11-20 NOTE — PLAN OF CARE
[facility] - Emergency Dept  Initial Discharge Assessment       Primary Care Provider: Zeeshan Guillen MD    Admission Diagnosis: Shortness of breath [R06.02]  COPD exacerbation [J44.1]    Admission Date: 11/19/2024  Expected Discharge Date:     Transition of Care Barriers: (P) Mobility    Payor: MEDICAID / Plan: MEDICAID OF [place] / Product Type: Government /     Extended Emergency Contact Information  Primary Emergency Contact: Vito Archibald   [place]  Mobile Phone: [phone]  Relation: Son   needed? No  Secondary Emergency Contact: Georgie Mchugh  Mobile Phone: [phone]  Relation: Daughter    Discharge Plan A: (P) Home Health, Home with family  Discharge Plan B: (P)  (Palliative appointments)      [facility] [place] [facility] [place] - [address]  [address]  [address]  [place] [address]  Phone: [phone] Fax: [phone]    [facility] [address] [place] [address]  [address] [place] [address]  Phone: [phone] Fax: [phone]    [id number] - [place], [place] - [address] AT [facility] [address]  [address] [facility] [address]  Phone: [phone] Fax: [phone]    [id number] - [place], [place] - [address] [place] [facility] [address] [facility] & [place] [address] [address] [facility] [address]  [facility] [address]  Phone: [phone] Fax: [phone]      Initial Assessment (most recent)       Adult Discharge Assessment - 11/20/24 1530          Discharge Assessment    Assessment Type Discharge Planning Assessment     Confirmed/corrected address, phone number and insurance Yes     Source of Information patient     People in Home other relative(s);child(scott), adult   son and daughter in law    Do you expect to return to your current living situation? Yes     Do you have help at home or someone to help you manage your care at home? Yes     Prior to hospitilization cognitive status: Alert/Oriented      Current cognitive status: Alert/Oriented (P)      Walking or Climbing Stairs Difficulty yes (P)      Walking or Climbing Stairs ambulation difficulty, requires equipment (P)      Home Accessibility wheelchair accessible (P)      Home Layout Able to live on 1st floor (P)      Equipment Currently Used at Home walker, rolling;bedside commode;shower chair;oxygen (P)    pt previously used CPAP reports she declined continuing to use it    Patient currently being followed by outpatient case management? Unable to determine (comments) (P)      Do you currently have service(s) that help you manage your care at home? No (P)      Do you take prescription medications? Yes (P)      Do you have prescription coverage? Yes (P)      Coverage Medicaid of [place] (P)      Do you have any problems affording any of your prescribed medications? No (P)      Is the patient taking medications as prescribed? yes (P)      Who is going to help you get home at discharge? Vito Archibald (Son)  [phone] (Mobile) (P)      How do you get to doctors appointments? family or friend will provide (P)      Are you on dialysis? No (P)      Discharge Plan A Home Health;Home with family (P)      Discharge Plan B -- (P)    Palliative appointments    DME Needed Upon Discharge  none (P)      Discharge Plan discussed with: Patient;Adult children (P)      Transition of Care Barriers Mobility (P)         Physical Activity    On average, how many days per week do you engage in moderate to strenuous exercise (like a brisk walk)? 3 days (P)      On average, how many minutes do you engage in exercise at this level? 10 min (P)         Financial Resource Strain    How hard is it for you to pay for the very basics like food, housing, medical care, and heating? Not very hard (P)         Housing Stability    In the last 12 months, was there a time when you were not able to pay the mortgage or rent on time? No (P)      At any time in the past 12 months, were you  homeless or living in a shelter (including now)? No (P)         Transportation Needs    Has the lack of transportation kept you from medical appointments, meetings, work or from getting things needed for daily living? No (P)         Food Insecurity    Within the past 12 months, you worried that your food would run out before you got the money to buy more. Never true (P)      Within the past 12 months, the food you bought just didn't last and you didn't have money to get more. Never true (P)         Stress    Do you feel stress - tense, restless, nervous, or anxious, or unable to sleep at night because your mind is troubled all the time - these days? Very much (P)    pt attributes this to mental health       Social Isolation    How often do you feel lonely or isolated from those around you?  Sometimes (P)         Alcohol Use    Q1: How often do you have a drink containing alcohol? Never (P)      Q2: How many drinks containing alcohol do you have on a typical day when you are drinking? Patient does not drink (P)      Q3: How often do you have six or more drinks on one occasion? Never (P)         Utilities    In the past 12 months has the electric, gas, oil, or water company threatened to shut off services in your home? No (P)         Health Literacy    How often do you need to have someone help you when you read instructions, pamphlets, or other written material from your doctor or pharmacy? Often (P)         OTHER    Name(s) of People in Home Vito Archibald (Son)  [phone] (Mobile) (P)                    Future Appointments   Date Time Provider Department Center   11/20/2024  4:45 PM [facility] CT1 LIMIT 450 LBS [facility] CT SCAN [facility], [facility]  [phone]  Emergency Room

## 2024-11-20 NOTE — SUBJECTIVE & OBJECTIVE
Interval History: Pt arrived in ED around 4am this morning. Pt has not felt much improvement since then. Pt states that she is illiterate and appears that she may have poor insight into her medical conditions. Discussed code status with patient and she expressed that she would  not want to suffer at the end of life and would like to be DNR. Dr. Padgett had discussion with patient's family about goals of care and they would like to try pulmonary rehab and are willing to follow with palliative care outpatient.      Review of Systems   Constitutional:  Negative for chills and fever.   Respiratory:  Positive for cough and shortness of breath.    Cardiovascular:  Negative for chest pain.   Gastrointestinal:  Negative for abdominal pain, constipation, diarrhea and nausea.   Genitourinary:  Negative for difficulty urinating.   Skin:  Negative for rash.     Objective:     Vital Signs (Most Recent):  Temp: 98 °F (36.7 °C) (11/20/24 0100)  Pulse: 78 (11/20/24 0947)  Resp: (!) 26 (11/20/24 0947)  BP: 128/69 (11/20/24 0301)  SpO2: (!) 92 % (11/20/24 0947) Vital Signs (24h Range):  Temp:  [98 °F (36.7 °C)-98.1 °F (36.7 °C)] 98 °F (36.7 °C)  Pulse:  [76-99] 78  Resp:  [17-34] 26  SpO2:  [92 %-100 %] 92 %  BP: (121-173)/(69-92) 128/69     Weight: 49.4 kg (109 lb)  Body mass index is 21.29 kg/m².    Intake/Output Summary (Last 24 hours) at 11/20/2024 1533  Last data filed at 11/20/2024 0717  Gross per 24 hour   Intake 50 ml   Output 350 ml   Net -300 ml         Physical Exam  Constitutional:       Appearance: She is ill-appearing.   HENT:      Head: Normocephalic and atraumatic.      Comments: Facial bruising from subjective fall 1 week ago  Tracheostomy      Mouth/Throat:      Mouth: Mucous membranes are moist.      Pharynx: Oropharynx is clear.   Eyes:      Extraocular Movements: Extraocular movements intact.      Conjunctiva/sclera: Conjunctivae normal.      Pupils: Pupils are equal, round, and reactive to light.   Cardiovascular:       Rate and Rhythm: Normal rate and regular rhythm.      Pulses: Normal pulses.      Heart sounds: Normal heart sounds.   Pulmonary:      Comments: Poor inspiratory and expiratory effort  No wheezing noted   Poor air movement diffusely  Abdominal:      General: Abdomen is flat.      Palpations: Abdomen is soft.   Musculoskeletal:         General: Normal range of motion.      Cervical back: Normal range of motion and neck supple.   Skin:     General: Skin is warm and dry.      Capillary Refill: Capillary refill takes less than 2 seconds.   Neurological:      General: No focal deficit present.      Mental Status: She is alert.   Psychiatric:         Mood and Affect: Mood normal.             Significant Labs: All pertinent labs within the past 24 hours have been reviewed.  BMP:   Recent Labs   Lab 11/20/24  0529   *   *   K 4.5   CL 89*   CO2 30*   BUN 11   CREATININE 0.9   CALCIUM 8.9   MG 2.4     CBC:   Recent Labs   Lab 11/19/24 2053 11/20/24  0529   WBC 8.10 6.12   HGB 11.5* 10.8*   HCT 36.0* 33.3*    209       Significant Imaging: I have reviewed all pertinent imaging results/findings within the past 24 hours.

## 2024-11-20 NOTE — ASSESSMENT & PLAN NOTE
Patient had fall about a week ago. Has significant echymosis around periorbital region and nose.   CT Max face obtained without evidence for acute facial fracture.    PT/OT consulted

## 2024-11-20 NOTE — H&P
[facility] Medicine  History & Physical    Patient Name: Elif Archibald  MRN: [id number]  Patient Class: OP- Observation  Admission Date: 11/19/2024  Attending Physician: Godwin Davalos III, MD   Primary Care Provider: Zeeshan Guillen MD         Patient information was obtained from patient, past medical records, and ER records.     Subjective:     Principal Problem:COPD exacerbation    Chief Complaint:   Chief Complaint   Patient presents with    Shortness of Breath     PT to the ED with c/o COPD exacerbation x 2 days. PT on home OT 2 Liters NC. PT becomes SOB with activity more than normal.         HPI: Patient is a 60 yo female w/ PMHx of end stage COPD, anxiety, alcoholic liver disease, seizures presenting today with shortness of breath. Patient states she has been having more shortness of breath on exertion for the past x4 days despite being on her baseline home medication regimen and oxygen 2L. Additionally, she states she has been having dark brown sputum production. Denies any swelling or edema. Denies orthopnea. Denies any sick contacts. Patient follows with Dr. Samuels with Pulmonology at [facility]. Last seen July 16, 2024. Diagnosed with severe end stage COPD w/ FEV1 17% predicted. On spiriva and symbicort with chronic prednisone. Also on 2L NC oxygen as well as BIPAP at night.     In the ED, initial vital signs /86, HR 99, Temp 98.1, SpO2 99% on home 2L oxygen. Labs include CBC with stable H/H 11.5/36 and WBC within normal limits 8.1. CMP with Na 129, Cl 87 CO2 35 otherwise normal electrolytes and BUN/Cr 11/0.87 (baseline Cr 0.8). NT Pro-BNP wnl at 448. Troponin wnl. CXR w/ small left pleural effusion. Initiated on albuterol, azithromycin, and prednisone in the ED. [facility] consulted for evaluation for admission for dyspnea 2/2 COPD exacerbation.    Past Medical History:   Diagnosis Date    Anxiety     Asthma     Bipolar 1 disorder     Cirrhosis, alcoholic     Cocaine  abuse     COPD (chronic obstructive pulmonary disease)     Depression     Encounter for blood transfusion     Schizophrenia     Seizures     Subdural hematoma     Tachycardia        Past Surgical History:   Procedure Laterality Date    APPENDECTOMY      CRANIECTOMY Left 01/31/2019    ESOPHAGOGASTRODUODENOSCOPY      PEG W/TRACHEOSTOMY PLACEMENT  02/16/2019       Review of patient's allergies indicates:   Allergen Reactions    Dye Anxiety, Blisters, Hives, Itching, Rash and Swelling       No current facility-administered medications on file prior to encounter.     Current Outpatient Medications on File Prior to Encounter   Medication Sig    FLUoxetine 40 MG capsule 40 mg.    albuterol (PROVENTIL) 2.5 mg /3 mL (0.083 %) nebulizer solution Take 3 mLs (2.5 mg total) by nebulization every 6 (six) hours as needed for Wheezing. Rescue    albuterol (PROVENTIL/VENTOLIN HFA) 90 mcg/actuation inhaler Inhale 2 puffs into the lungs every 6 (six) hours as needed for Wheezing or Shortness of Breath. Rescue    albuterol-ipratropium (DUO-NEB) 2.5 mg-0.5 mg/3 mL nebulizer solution Take 3 mLs by nebulization every 6 (six) hours as needed for Wheezing. Rescue    amitriptyline (ELAVIL) 10 MG tablet Take 1 tablet (10 mg total) by mouth nightly as needed for Insomnia.    clonazePAM (KLONOPIN) 0.5 MG tablet Take 0.5 mg by mouth 2 (two) times daily. as needed for anxiety.    diazePAM (VALIUM) 5 MG tablet 5 mg.    diclofenac sodium (VOLTAREN) 1 % Gel Apply 2 g topically 4 (four) times daily. (Patient not taking: Reported on 12/7/2023)    flu vacc [id number] 6mos up,PF, (FLUARIX QUAD [id number], PF,) 60 mcg (15 mcg x 4)/0.5 mL Syrg use as directed (Patient not taking: Reported on 1/12/2023)    fluconazole (DIFLUCAN) 150 MG Tab One tab today repeat in 7days. (Patient not taking: Reported on 7/3/2023)    fluticasone propionate (FLONASE) 50 mcg/actuation nasal spray 2 sprays (100 mcg total) by Each Nostril route once daily.    hydrocortisone 1 %  cream Apply topically 2 (two) times daily.    ibuprofen (ADVIL,MOTRIN) 600 MG tablet Take 1 tablet (600 mg total) by mouth every 8 (eight) hours as needed for Pain (moderate back pain).    LATUDA 20 mg Tab tablet Take 40 mg by mouth 2 (two) times a day.    levETIRAcetam (KEPPRA) 500 MG Tab Take 1 tablet (500 mg total) by mouth 2 (two) times daily.    LIDOcaine (LIDODERM) 5 % Place 1 patch onto the skin once daily. Remove & Discard patch within 12 hours or as directed by MD    metoprolol tartrate (LOPRESSOR) 50 MG tablet Take 1 tablet (50 mg total) by mouth 2 (two) times a day.    multivitamin Chew Take by mouth once daily.    ondansetron (ZOFRAN) 4 MG tablet Take 1 tablet (4 mg total) by mouth daily as needed for Nausea (nausea).    pneumococcal 23-thom ps (PNEUMOVAX 23) 25 mcg/0.5 mL vaccine Inject into the muscle. (Patient not taking: Reported on 2023)    predniSONE (DELTASONE) 10 MG tablet Take 10 mg by mouth.    primidone (MYSOLINE) 50 MG Tab Take 1 tablet (50 mg total) by mouth every evening.    sertraline (ZOLOFT) 100 MG tablet Take 100 mg by mouth once daily.    SYMBICORT 160-4.5 mcg/actuation HFAA Inhale 2 puffs into the lungs every 12 (twelve) hours.    tiotropium (SPIRIVA) 18 mcg inhalation capsule Inhale 1 capsule (18 mcg total) into the lungs once daily. Controller     Family History       Problem Relation (Age of Onset)    COPD Mother    Cirrhosis Father          Tobacco Use    Smoking status: Former     Current packs/day: 0.00     Average packs/day: 1 pack/day for 40.0 years (40.0 ttl pk-yrs)     Types: Cigarettes     Start date:      Quit date: 2019     Years since quittin.4     Passive exposure: Past    Smokeless tobacco: Never    Tobacco comments:     Previously 1 pack per day smoker - quit smoking 2019   Substance and Sexual Activity    Alcohol use: Not Currently     Comment: social    Drug use: Not Currently    Sexual activity: Not Currently     Partners: Male     Review  of Systems   Constitutional:  Positive for chills and fatigue. Negative for fever.   HENT:  Negative for congestion.    Eyes:  Negative for pain.   Respiratory:  Positive for cough, shortness of breath and wheezing. Negative for chest tightness.    Cardiovascular:  Negative for chest pain, palpitations and leg swelling.   Gastrointestinal:  Negative for abdominal distention, abdominal pain, constipation, diarrhea, nausea and vomiting.   Endocrine: Negative for polydipsia.   Musculoskeletal:  Negative for back pain and myalgias.   Neurological:  Positive for weakness. Negative for dizziness, light-headedness and headaches.     Objective:     Vital Signs (Most Recent):  Temp: 98 °F (36.7 °C) (11/20/24 0100)  Pulse: 76 (11/20/24 0519)  Resp: (!) 30 (11/20/24 0519)  BP: 128/69 (11/20/24 0301)  SpO2: 98 % (11/20/24 0519) Vital Signs (24h Range):  Temp:  [98 °F (36.7 °C)-98.1 °F (36.7 °C)] 98 °F (36.7 °C)  Pulse:  [76-99] 76  Resp:  [17-30] 30  SpO2:  [96 %-100 %] 98 %  BP: (121-173)/(69-92) 128/69     Weight: 49.4 kg (109 lb)  Body mass index is 21.29 kg/m².     Physical Exam  Constitutional:       Appearance: She is ill-appearing.   HENT:      Head: Normocephalic and atraumatic.      Comments: Facial bruising from subjective fall 1 week ago  Tracheostomy      Mouth/Throat:      Mouth: Mucous membranes are moist.      Pharynx: Oropharynx is clear.   Eyes:      Extraocular Movements: Extraocular movements intact.      Conjunctiva/sclera: Conjunctivae normal.      Pupils: Pupils are equal, round, and reactive to light.   Cardiovascular:      Rate and Rhythm: Normal rate and regular rhythm.      Pulses: Normal pulses.      Heart sounds: Normal heart sounds.   Pulmonary:      Comments: Poor inspiratory and expiratory effort  No wheezing noted   Poor air movement diffusely  Abdominal:      General: Abdomen is flat.      Palpations: Abdomen is soft.   Musculoskeletal:         General: Normal range of motion.      Cervical  "back: Normal range of motion and neck supple.   Skin:     General: Skin is warm and dry.      Capillary Refill: Capillary refill takes less than 2 seconds.   Neurological:      General: No focal deficit present.      Mental Status: She is alert.   Psychiatric:         Mood and Affect: Mood normal.              CRANIAL NERVES     CN III, IV, VI   Pupils are equal, round, and reactive to light.       Significant Labs: All pertinent labs within the past 24 hours have been reviewed.  CBC:   Recent Labs   Lab 11/19/24 2053   WBC 8.10   HGB 11.5*   HCT 36.0*        CMP:   Recent Labs   Lab 11/19/24 2053   *   K 4.4   CL 87*   CO2 35*   GLU 92   BUN 11   CREATININE 0.87   CALCIUM 8.9   PROT 7.6   ALBUMIN 4.3   BILITOT 0.3   ALKPHOS 54   AST 27   ALT 27   ANIONGAP 7*     Cardiac Markers: No results for input(s): "CKMB", "MYOGLOBIN", "BNP", "TROPISTAT" in the last 48 hours.  Troponin:   Recent Labs   Lab 11/19/24 2053   TROPONINI <0.012       Significant Imaging: I have reviewed all pertinent imaging results/findings within the past 24 hours.  Imaging Results              X-Ray Chest AP Portable (Final result)  Result time 11/19/24 20:49:29      Final result by [facility] MD (11/19/24 20:49:29)                   Impression:      Small left pleural effusion.  Follow-up is recommended.      Electronically signed by: Tao Gonzalez MD  Date:    11/19/2024  Time:    20:49               Narrative:    EXAMINATION:  XR CHEST AP PORTABLE    CLINICAL HISTORY:  Shortness of breath, sob;    COMPARISON:  Chest, 01/14/2022.    FINDINGS:  Heart is normal in size.  Small left pleural effusion.  Right lung is clear.                                     Assessment/Plan:     * COPD exacerbation  History of end stage COPD followed Pulmonology at [facility]  PFT's notable for FEV1/FVC ratio 36.34 in 2022  On Symbicort and Spiriva as well as chronic prednisone 10mg with inability to be weaned off due to " decompensation  On 2L oxygen at home, currently at baseline  On exam, poor inspiratory/expiratory effort, however, no wheezing noted   CXR largely unremarkable without areas of consolidation  Subjective purulent sputum production  Likely progression of COPD, however, patient would benefit from COPD exacerbation treatment  Patient's COPD is with exacerbation noted by continued dyspnea.    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 The patient's most recent sodium results are listed below.  Recent Labs     11/19/24 2053   *     Plan  - Obtain the following studies: Urine sodium, urine osmolality, serum osmolality.  - Will treat the hyponatremia with Fluid restriction of:  1.5 liter per day  - Monitor sodium Daily.   - Patient hyponatremia is stable    Seizure disorder  Plan:  Continue home Keppra    Essential hypertension  Plan:  Continue home metoprolol    Recurrent major depressive disorder, in partial remission  Plan:  Continue home fluoxetine       VTE Risk Mitigation (From admission, onward)           Ordered     enoxaparin injection 40 mg  Daily         11/20/24 0452     IP VTE HIGH RISK PATIENT  Once         11/20/24 0452     Place sequential compression device  Until discontinued         11/20/24 0452                     On 11/20/2024, patient should be placed in hospital observation services under my care in collaboration with Dr. Davalos.         Adam Frost MD  Department of Hospital Medicine  [facility] - Emergency Dept

## 2024-11-20 NOTE — ED NOTES
Notified Dr. Kate that patient's family is asking about her home medications and that patient is c/o nausea and that her BP is 173/87

## 2024-11-20 NOTE — PROGRESS NOTES
[facility] - Emergency Dept  [facility] Medicine  Progress Note    Patient Name: Elif Archibald  MRN: [id number]  Patient Class: OP- Observation   Admission Date: 11/19/2024  Length of Stay: 0 days  Attending Physician: Joe Figueroa,*  Primary Care Provider: Zeeshan Guillen MD        Subjective:     Principal Problem:COPD exacerbation        HPI:  Patient is a 60 yo female w/ PMHx of end stage COPD, depression, alcoholic liver disease, seizures presenting today with shortness of breath. Patient states she has been having more shortness of breath on exertion for the past x4 days despite being on her baseline home medication regimen and oxygen 2L. Additionally, she states she has been having dark brown sputum production. Denies any swelling or edema. Denies orthopnea. Denies any sick contacts. Patient follows with Dr. Samuels with Pulmonology at [facility]. Last seen July 16, 2024. Diagnosed with severe end stage COPD w/ FEV1 17% predicted. On spiriva and symbicort with chronic prednisone. Also on 2L NC oxygen as well as BIPAP at night.     In the ED, initial vital signs /86, HR 99, Temp 98.1, SpO2 99% on home 2L oxygen. Labs include CBC with stable H/H 11.5/36 and WBC within normal limits 8.1. CMP with Na 129, Cl 87 CO2 35 otherwise normal electrolytes and BUN/Cr 11/0.87 (baseline Cr 0.8). NT Pro-BNP wnl at 448. Troponin wnl. CXR w/ small left pleural effusion. Initiated on albuterol, azithromycin, and prednisone in the ED. [facility] consulted for evaluation for admission for dyspnea 2/2 COPD exacerbation.    Overview/Hospital Course:  58yo female presents to [facility] as a transfer from OSH with 4d hx of SOB and change in sputum, [facility] Medicine team for COPD exacerbation. Patient initiated on azithromycin, albuterol MDI, and stress dose steroids. CXR illustrated chronic L pleural effusion. Labs significant for mild hyponatremia to 128. Initial PE with B/L infraorbital edema and nasal bridge  tenderness following fall at home ~1wk ago that was not evaluated. CT max/face pending. Previous TTE unremarkable, update pending in the setting of worsening COPD. Palliative consulted for ongoing goals of care conversation and additional layer of support during this hospitalization. Patient remains hemodynamically stable with NAD noted. Plan of care discussed with patient. Opportunities for questions and answers provided. Care ongoing.     Interval History: Pt arrived in ED around 4am this morning. Pt has not felt much improvement since then. Pt states that she is illiterate and appears that she may have poor insight into her medical conditions. Discussed code status with patient and she expressed that she would  not want to suffer at the end of life and would like to be DNR. Dr. Padgett had discussion with patient's family about goals of care and they would like to try pulmonary rehab and are willing to follow with palliative care outpatient.      Review of Systems   Constitutional:  Negative for chills and fever.   Respiratory:  Positive for cough and shortness of breath.    Cardiovascular:  Negative for chest pain.   Gastrointestinal:  Negative for abdominal pain, constipation, diarrhea and nausea.   Genitourinary:  Negative for difficulty urinating.   Skin:  Negative for rash.     Objective:     Vital Signs (Most Recent):  Temp: 98 °F (36.7 °C) (11/20/24 0100)  Pulse: 78 (11/20/24 0947)  Resp: (!) 26 (11/20/24 0947)  BP: 128/69 (11/20/24 0301)  SpO2: (!) 92 % (11/20/24 0947) Vital Signs (24h Range):  Temp:  [98 °F (36.7 °C)-98.1 °F (36.7 °C)] 98 °F (36.7 °C)  Pulse:  [76-99] 78  Resp:  [17-34] 26  SpO2:  [92 %-100 %] 92 %  BP: (121-173)/(69-92) 128/69     Weight: 49.4 kg (109 lb)  Body mass index is 21.29 kg/m².    Intake/Output Summary (Last 24 hours) at 11/20/2024 153  Last data filed at 11/20/2024 0717  Gross per 24 hour   Intake 50 ml   Output 350 ml   Net -300 ml         Physical Exam  Constitutional:        Appearance: She is ill-appearing.   HENT:      Head: Normocephalic and atraumatic.      Comments: Facial bruising from subjective fall 1 week ago  Tracheostomy      Mouth/Throat:      Mouth: Mucous membranes are moist.      Pharynx: Oropharynx is clear.   Eyes:      Extraocular Movements: Extraocular movements intact.      Conjunctiva/sclera: Conjunctivae normal.      Pupils: Pupils are equal, round, and reactive to light.   Cardiovascular:      Rate and Rhythm: Normal rate and regular rhythm.      Pulses: Normal pulses.      Heart sounds: Normal heart sounds.   Pulmonary:      Comments: Poor inspiratory and expiratory effort  No wheezing noted   Poor air movement diffusely  Abdominal:      General: Abdomen is flat.      Palpations: Abdomen is soft.   Musculoskeletal:         General: Normal range of motion.      Cervical back: Normal range of motion and neck supple.   Skin:     General: Skin is warm and dry.      Capillary Refill: Capillary refill takes less than 2 seconds.   Neurological:      General: No focal deficit present.      Mental Status: She is alert.   Psychiatric:         Mood and Affect: Mood normal.             Significant Labs: All pertinent labs within the past 24 hours have been reviewed.  BMP:   Recent Labs   Lab 11/20/24  0529   *   *   K 4.5   CL 89*   CO2 30*   BUN 11   CREATININE 0.9   CALCIUM 8.9   MG 2.4     CBC:   Recent Labs   Lab 11/19/24 2053 11/20/24  0529   WBC 8.10 6.12   HGB 11.5* 10.8*   HCT 36.0* 33.3*    209       Significant Imaging: I have reviewed all pertinent imaging results/findings within the past 24 hours.    Assessment/Plan:      * COPD exacerbation  History of end stage COPD followed Pulmonology at [facility]  PFT's notable for FEV1/FVC ratio 36.34 in 2022  On Symbicort and Spiriva as well as chronic prednisone 10mg with inability to be weaned off due to decompensation  On 2L oxygen at home, currently at baseline  On exam, poor inspiratory/expiratory  effort, however, no wheezing noted   CXR largely unremarkable without areas of consolidation  Subjective purulent sputum production  Likely progression of COPD, however, patient would benefit from COPD exacerbation treatment  Patient's COPD is with exacerbation noted by continued dyspnea.    Plan:  Duonebs q4hr prn; space as tolerable  Azithromycin 500mg x3 days  Prednisone 60mg x5 days  SpO2 goal 88-92%, can tolerate 1L NC at rest. Provided fan for comfort.  Continue home maintenance inhalers  VBG pending  Palliative consulted- Patient's family would like to try pulmonary rehab after discharge and follow with palliative outpt.    History of recent fall  Patient had fall about a week ago. Has significant echymosis around periorbital region and nose.   CT Max face obtained without evidence for acute facial fracture.    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 The patient's most recent sodium results are listed below.  Recent Labs     11/19/24 2053   *     Plan  - Obtain the following studies: Urine sodium, urine osmolality, serum osmolality.  - Will treat the hyponatremia with Fluid restriction of:  1.5 liter per day  - Monitor sodium Daily.   - Patient hyponatremia is stable    Seizure disorder  Plan:  Continue home Keppra    Essential hypertension  Plan:  Continue home metoprolol    Recurrent major depressive disorder, in partial remission  Plan:  Continue home fluoxetine       VTE Risk Mitigation (From admission, onward)           Ordered     enoxaparin injection 40 mg  Daily         11/20/24 0452     IP VTE HIGH RISK PATIENT  Once         11/20/24 0452     Place sequential compression device  Until discontinued         11/20/24 0452                    Discharge Planning   LAUREN:      Code Status: DNR   Is the patient medically ready for discharge?:     Reason for patient still in hospital (select all that apply): Patient trending condition, PT / OT  recommendations, and Pending disposition  Discharge Plan A: Home Health, Home with family                  Karey Le MD  Department of Hospital Medicine   [facility] - Emergency Dept

## 2024-11-20 NOTE — ACP (ADVANCE CARE PLANNING)
Seen patient on team rounds with Dr. Figueroa and the [facility] FM Team.    Patient alert and oriented. Answers questions appropriately. Follows commands.    Discussed with patient code status. Explained in the event that her heart were to stop beating on its own and/or she were to stop breathing on her own what possible interventions such as but not limited to chest compressions and intubation as well as the risks and benefits of the interventions. The patient stated they want no chest compressions or intubation. Again explained the meaning of DNR status. Patient verbally agreed. Status ordered as DNR.    Spent 20 minutes discussing end-of-life/goals of care with patient.    ________________________  Yon Ramirez MD  [facility] Family Medicine PGY-3       Physical Exam    Vital Signs: I have reviewed the initial vital signs.  Constitutional: well-nourished, appears stated age, no acute distress  Eyes: Conjunctiva pink, Sclera clear, PERRLA, EOMI.  Cardiovascular: S1 and S2, regular rate, regular rhythm, well-perfused extremities, radial pulses equal and 2+  Respiratory: unlabored respiratory effort, clear to auscultation bilaterally no wheezing, rales and rhonchi  Gastrointestinal: soft, non-tender abdomen, no pulsatile mass, normal bowl sounds  Musculoskeletal: supple neck, no lower extremity edema, no midline tenderness  Integumentary: warm, dry, no rash  Neurologic: awake, alert, cranial nerves II-XII grossly intact, extremities’ motor and sensory functions grossly intact  Psychiatric: appropriate mood, appropriate affect

## 2024-11-20 NOTE — ASSESSMENT & PLAN NOTE
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 The patient's most recent sodium results are listed below.  Recent Labs     11/19/24 2053   *     Plan  - Obtain the following studies: Urine sodium, urine osmolality, serum osmolality.  - Will treat the hyponatremia with Fluid restriction of:  1.5 liter per day  - Monitor sodium Daily.   - Patient hyponatremia is stable

## 2024-11-20 NOTE — HPI
Patient is a 60 yo female w/ PMHx of end stage COPD, depression, alcoholic liver disease, seizures presenting today with shortness of breath. Patient states she has been having more shortness of breath on exertion for the past x4 days despite being on her baseline home medication regimen and oxygen 2L. Additionally, she states she has been having dark brown sputum production. Denies any swelling or edema. Denies orthopnea. Denies any sick contacts. Patient follows with Dr. Samuels with Pulmonology at [facility]. Last seen July 16, 2024. Diagnosed with severe end stage COPD w/ FEV1 17% predicted. On spiriva and symbicort with chronic prednisone. Also on 2L NC oxygen as well as BIPAP at night.     In the ED, initial vital signs /86, HR 99, Temp 98.1, SpO2 99% on home 2L oxygen. Labs include CBC with stable H/H 11.5/36 and WBC within normal limits 8.1. CMP with Na 129, Cl 87 CO2 35 otherwise normal electrolytes and BUN/Cr 11/0.87 (baseline Cr 0.8). NT Pro-BNP wnl at 448. Troponin wnl. CXR w/ small left pleural effusion. Initiated on albuterol, azithromycin, and prednisone in the ED. [facility] consulted for evaluation for admission for dyspnea 2/2 COPD exacerbation.

## 2024-11-20 NOTE — ASSESSMENT & PLAN NOTE
History of end stage COPD followed Pulmonology at [facility]  PFT's notable for FEV1/FVC ratio 36.34 in 2022  On Symbicort and Spiriva as well as chronic prednisone 10mg with inability to be weaned off due to decompensation  On 2L oxygen at home, currently at baseline  On exam, poor inspiratory/expiratory effort, however, no wheezing noted   CXR largely unremarkable without areas of consolidation  Subjective purulent sputum production  Likely progression of COPD, however, patient would benefit from COPD exacerbation treatment  Patient's COPD is with exacerbation noted by continued dyspnea.    Plan:  Duonebs q4hr prn; space as tolerable  Azithromycin 500mg x3 days  Prednisone 60mg x5 days  SpO2 goal 88-92%, currently on baseline oxygen 2L NC  Continue home maintenance inhalers  VBG pending  Patient may benefit from palliative care while inpatient for hospice discussions given end stage COPD

## 2024-11-21 PROBLEM — E43 SEVERE PROTEIN-CALORIE MALNUTRITION: Status: ACTIVE | Noted: 2024-11-21

## 2024-11-21 LAB
ALBUMIN SERPL BCP-MCNC: 3.2 G/DL (ref 3.5–5.2)
ALP SERPL-CCNC: 42 U/L (ref 40–150)
ALT SERPL W/O P-5'-P-CCNC: 19 U/L (ref 10–44)
ANION GAP SERPL CALC-SCNC: 10 MMOL/L (ref 8–16)
AST SERPL-CCNC: 18 U/L (ref 10–40)
BASOPHILS # BLD AUTO: 0.01 K/UL (ref 0–0.2)
BASOPHILS NFR BLD: 0.1 % (ref 0–1.9)
BILIRUB SERPL-MCNC: 0.1 MG/DL (ref 0.1–1)
BUN SERPL-MCNC: 17 MG/DL (ref 6–20)
CALCIUM SERPL-MCNC: 8.8 MG/DL (ref 8.7–10.5)
CHLORIDE SERPL-SCNC: 91 MMOL/L (ref 95–110)
CO2 SERPL-SCNC: 28 MMOL/L (ref 23–29)
CREAT SERPL-MCNC: 0.9 MG/DL (ref 0.5–1.4)
DIFFERENTIAL METHOD BLD: ABNORMAL
EOSINOPHIL # BLD AUTO: 0 K/UL (ref 0–0.5)
EOSINOPHIL NFR BLD: 0.1 % (ref 0–8)
ERYTHROCYTE [DISTWIDTH] IN BLOOD BY AUTOMATED COUNT: 13.2 % (ref 11.5–14.5)
EST. GFR  (NO RACE VARIABLE): >60 ML/MIN/1.73 M^2
GLUCOSE SERPL-MCNC: 115 MG/DL (ref 70–110)
HCT VFR BLD AUTO: 34 % (ref 37–48.5)
HGB BLD-MCNC: 11 G/DL (ref 12–16)
IMM GRANULOCYTES # BLD AUTO: 0.08 K/UL (ref 0–0.04)
IMM GRANULOCYTES NFR BLD AUTO: 0.7 % (ref 0–0.5)
LYMPHOCYTES # BLD AUTO: 0.8 K/UL (ref 1–4.8)
LYMPHOCYTES NFR BLD: 7 % (ref 18–48)
MAGNESIUM SERPL-MCNC: 1.7 MG/DL (ref 1.6–2.6)
MCH RBC QN AUTO: 27.7 PG (ref 27–31)
MCHC RBC AUTO-ENTMCNC: 32.4 G/DL (ref 32–36)
MCV RBC AUTO: 86 FL (ref 82–98)
MONOCYTES # BLD AUTO: 0.3 K/UL (ref 0.3–1)
MONOCYTES NFR BLD: 2.5 % (ref 4–15)
NEUTROPHILS # BLD AUTO: 9.7 K/UL (ref 1.8–7.7)
NEUTROPHILS NFR BLD: 89.6 % (ref 38–73)
NRBC BLD-RTO: 0 /100 WBC
PHOSPHATE SERPL-MCNC: 3.4 MG/DL (ref 2.7–4.5)
PLATELET # BLD AUTO: 214 K/UL (ref 150–450)
PMV BLD AUTO: 8.6 FL (ref 9.2–12.9)
POTASSIUM SERPL-SCNC: 4.7 MMOL/L (ref 3.5–5.1)
PROT SERPL-MCNC: 6.4 G/DL (ref 6–8.4)
RBC # BLD AUTO: 3.97 M/UL (ref 4–5.4)
SODIUM SERPL-SCNC: 129 MMOL/L (ref 136–145)
WBC # BLD AUTO: 10.84 K/UL (ref 3.9–12.7)

## 2024-11-21 PROCEDURE — 80177 DRUG SCRN QUAN LEVETIRACETAM: CPT

## 2024-11-21 PROCEDURE — 84100 ASSAY OF PHOSPHORUS: CPT

## 2024-11-21 PROCEDURE — 25000003 PHARM REV CODE 250

## 2024-11-21 PROCEDURE — 96376 TX/PRO/DX INJ SAME DRUG ADON: CPT

## 2024-11-21 PROCEDURE — 80053 COMPREHEN METABOLIC PANEL: CPT

## 2024-11-21 PROCEDURE — 97165 OT EVAL LOW COMPLEX 30 MIN: CPT

## 2024-11-21 PROCEDURE — 97162 PT EVAL MOD COMPLEX 30 MIN: CPT

## 2024-11-21 PROCEDURE — 83735 ASSAY OF MAGNESIUM: CPT

## 2024-11-21 PROCEDURE — 97116 GAIT TRAINING THERAPY: CPT

## 2024-11-21 PROCEDURE — 85025 COMPLETE CBC W/AUTO DIFF WBC: CPT

## 2024-11-21 PROCEDURE — 63600175 PHARM REV CODE 636 W HCPCS

## 2024-11-21 PROCEDURE — 63700000 PHARM REV CODE 250 ALT 637 W/O HCPCS

## 2024-11-21 PROCEDURE — G0378 HOSPITAL OBSERVATION PER HR: HCPCS

## 2024-11-21 PROCEDURE — 96372 THER/PROPH/DIAG INJ SC/IM: CPT

## 2024-11-21 PROCEDURE — 96366 THER/PROPH/DIAG IV INF ADDON: CPT

## 2024-11-21 PROCEDURE — 94761 N-INVAS EAR/PLS OXIMETRY MLT: CPT

## 2024-11-21 PROCEDURE — 97530 THERAPEUTIC ACTIVITIES: CPT

## 2024-11-21 PROCEDURE — 97535 SELF CARE MNGMENT TRAINING: CPT

## 2024-11-21 PROCEDURE — 94640 AIRWAY INHALATION TREATMENT: CPT | Mod: XB

## 2024-11-21 PROCEDURE — 36415 COLL VENOUS BLD VENIPUNCTURE: CPT

## 2024-11-21 RX ORDER — FLUOXETINE HYDROCHLORIDE 40 MG/1
40 CAPSULE ORAL DAILY
Qty: 30 CAPSULE | Refills: 11 | Status: SHIPPED | OUTPATIENT
Start: 2024-11-22 | End: 2024-11-22

## 2024-11-21 RX ORDER — LURASIDONE HYDROCHLORIDE 20 MG/1
40 TABLET, FILM COATED ORAL NIGHTLY
Start: 2024-11-21

## 2024-11-21 RX ORDER — TIOTROPIUM BROMIDE 18 UG/1
18 CAPSULE ORAL; RESPIRATORY (INHALATION) DAILY
Qty: 30 CAPSULE | Refills: 0 | Status: SHIPPED | OUTPATIENT
Start: 2024-11-21 | End: 2024-12-21

## 2024-11-21 RX ORDER — KETOROLAC TROMETHAMINE 30 MG/ML
15 INJECTION, SOLUTION INTRAMUSCULAR; INTRAVENOUS ONCE
Status: COMPLETED | OUTPATIENT
Start: 2024-11-21 | End: 2024-11-21

## 2024-11-21 RX ORDER — ACETAMINOPHEN 325 MG/1
650 TABLET ORAL ONCE
Status: COMPLETED | OUTPATIENT
Start: 2024-11-21 | End: 2024-11-21

## 2024-11-21 RX ORDER — BACITRACIN 500 [USP'U]/G
OINTMENT TOPICAL 2 TIMES DAILY
Status: DISCONTINUED | OUTPATIENT
Start: 2024-11-21 | End: 2024-11-22 | Stop reason: HOSPADM

## 2024-11-21 RX ORDER — DIAZEPAM 5 MG/1
2.5 TABLET ORAL NIGHTLY PRN
Start: 2024-11-21 | End: 2024-11-22 | Stop reason: HOSPADM

## 2024-11-21 RX ORDER — MAGNESIUM SULFATE HEPTAHYDRATE 40 MG/ML
2 INJECTION, SOLUTION INTRAVENOUS ONCE
Status: COMPLETED | OUTPATIENT
Start: 2024-11-21 | End: 2024-11-21

## 2024-11-21 RX ORDER — IPRATROPIUM BROMIDE AND ALBUTEROL SULFATE 2.5; .5 MG/3ML; MG/3ML
3 SOLUTION RESPIRATORY (INHALATION) EVERY 4 HOURS PRN
Qty: 90 ML | Refills: 0 | Status: SHIPPED | OUTPATIENT
Start: 2024-11-21 | End: 2024-12-21

## 2024-11-21 RX ORDER — BUDESONIDE AND FORMOTEROL FUMARATE DIHYDRATE 160; 4.5 UG/1; UG/1
2 AEROSOL RESPIRATORY (INHALATION) EVERY 12 HOURS
Qty: 10.2 G | Refills: 1 | Status: SHIPPED | OUTPATIENT
Start: 2024-11-21 | End: 2024-12-21

## 2024-11-21 RX ADMIN — PRIMIDONE 50 MG: 50 TABLET ORAL at 08:11

## 2024-11-21 RX ADMIN — FLUTICASONE FUROATE AND VILANTEROL TRIFENATATE 1 PUFF: 100; 25 POWDER RESPIRATORY (INHALATION) at 08:11

## 2024-11-21 RX ADMIN — NAPROXEN 250 MG: 250 TABLET ORAL at 12:11

## 2024-11-21 RX ADMIN — DIAZEPAM 2 MG: 2 TABLET ORAL at 08:11

## 2024-11-21 RX ADMIN — SENNOSIDES AND DOCUSATE SODIUM 1 TABLET: 8.6; 5 TABLET ORAL at 08:11

## 2024-11-21 RX ADMIN — BACITRACIN: 500 OINTMENT TOPICAL at 09:11

## 2024-11-21 RX ADMIN — ONDANSETRON 4 MG: 2 INJECTION INTRAMUSCULAR; INTRAVENOUS at 12:11

## 2024-11-21 RX ADMIN — SENNOSIDES AND DOCUSATE SODIUM 1 TABLET: 8.6; 5 TABLET ORAL at 09:11

## 2024-11-21 RX ADMIN — METOPROLOL TARTRATE 50 MG: 50 TABLET, FILM COATED ORAL at 09:11

## 2024-11-21 RX ADMIN — MAGNESIUM SULFATE HEPTAHYDRATE 2 G: 40 INJECTION, SOLUTION INTRAVENOUS at 06:11

## 2024-11-21 RX ADMIN — AZITHROMYCIN DIHYDRATE 500 MG: 250 TABLET ORAL at 08:11

## 2024-11-21 RX ADMIN — ACETAMINOPHEN 650 MG: 325 TABLET ORAL at 08:11

## 2024-11-21 RX ADMIN — ENOXAPARIN SODIUM 40 MG: 40 INJECTION SUBCUTANEOUS at 04:11

## 2024-11-21 RX ADMIN — KETOROLAC TROMETHAMINE 15 MG: 30 INJECTION, SOLUTION INTRAMUSCULAR; INTRAVENOUS at 02:11

## 2024-11-21 RX ADMIN — PREDNISONE 40 MG: 20 TABLET ORAL at 09:11

## 2024-11-21 RX ADMIN — METOPROLOL TARTRATE 50 MG: 50 TABLET, FILM COATED ORAL at 08:11

## 2024-11-21 RX ADMIN — ACETAMINOPHEN 650 MG: 325 TABLET ORAL at 02:11

## 2024-11-21 RX ADMIN — FLUOXETINE HYDROCHLORIDE 40 MG: 20 CAPSULE ORAL at 09:11

## 2024-11-21 RX ADMIN — LEVETIRACETAM 500 MG: 500 TABLET, FILM COATED ORAL at 08:11

## 2024-11-21 RX ADMIN — LEVETIRACETAM 500 MG: 500 TABLET, FILM COATED ORAL at 09:11

## 2024-11-21 RX ADMIN — THERA TABS 1 TABLET: TAB at 11:11

## 2024-11-21 RX ADMIN — TIOTROPIUM BROMIDE INHALATION SPRAY 2 PUFF: 3.12 SPRAY, METERED RESPIRATORY (INHALATION) at 08:11

## 2024-11-21 NOTE — ASSESSMENT & PLAN NOTE
Patient had fall about a week ago. Has significant echymosis around periorbital region and nose.   CT Max face obtained without evidence for acute facial fracture.  Pt concern that she is drowsy during the day due to her psychiatric medications. Will discuss with psychiatry.    PT/OT consulted, recommending moderate intensity therapy  Discussed medication management with patient's outpatient psychiatrist  - Decrease Latuda to 40mg nightly  - Decrease valium to 2mg nightly

## 2024-11-21 NOTE — SUBJECTIVE & OBJECTIVE
Interval History: Pt feels that she is breathing better this morning. She is tolerating RA with pulse ox in the low 90s. PT/OT recommending moderate intensity therapy. Pt amenable to going to skilled nursing. Pt feels that her urine is strong smelling, UA not concerning for infection.     Review of Systems   Constitutional:  Negative for chills and fever.   Respiratory:  Negative for shortness of breath.    Cardiovascular:  Negative for chest pain.   Gastrointestinal:  Negative for abdominal pain, constipation, diarrhea and nausea.   Genitourinary:  Negative for difficulty urinating.   Skin:  Negative for rash.     Objective:     Vital Signs (Most Recent):  Temp: 97.5 °F (36.4 °C) (11/21/24 0859)  Pulse: 73 (11/21/24 1246)  Resp: 18 (11/21/24 0859)  BP: (!) 143/84 (11/21/24 1246)  SpO2: (!) 94 % (11/21/24 1100) Vital Signs (24h Range):  Temp:  [97.5 °F (36.4 °C)-98.4 °F (36.9 °C)] 97.5 °F (36.4 °C)  Pulse:  [64-86] 73  Resp:  [18-19] 18  SpO2:  [91 %-98 %] 94 %  BP: (129-144)/(65-85) 143/84     Weight: 47 kg (103 lb 9.9 oz)  Body mass index is 20.24 kg/m².    Intake/Output Summary (Last 24 hours) at 11/21/2024 1404  Last data filed at 11/21/2024 0434  Gross per 24 hour   Intake 120 ml   Output --   Net 120 ml         Physical Exam  Constitutional:       Appearance: She is not ill-appearing.   HENT:      Head: Normocephalic and atraumatic.      Comments: Facial bruising from subjective fall 1 week ago  Tracheostomy      Mouth/Throat:      Mouth: Mucous membranes are moist.      Pharynx: Oropharynx is clear.   Eyes:      Extraocular Movements: Extraocular movements intact.      Conjunctiva/sclera: Conjunctivae normal.      Pupils: Pupils are equal, round, and reactive to light.   Cardiovascular:      Rate and Rhythm: Normal rate and regular rhythm.      Pulses: Normal pulses.      Heart sounds: Normal heart sounds.   Pulmonary:      Comments: No wheezing noted   Improved air movement  Abdominal:      General: Abdomen  is flat.      Palpations: Abdomen is soft.   Musculoskeletal:         General: Normal range of motion.      Cervical back: Normal range of motion and neck supple.   Skin:     General: Skin is warm and dry.      Capillary Refill: Capillary refill takes less than 2 seconds.   Neurological:      General: No focal deficit present.      Mental Status: She is alert.   Psychiatric:         Mood and Affect: Mood normal.             Significant Labs: All pertinent labs within the past 24 hours have been reviewed.  CBC:   Recent Labs   Lab 11/19/24 2053 11/20/24 0529 11/21/24  0234   WBC 8.10 6.12 10.84   HGB 11.5* 10.8* 11.0*   HCT 36.0* 33.3* 34.0*    209 214     CMP:   Recent Labs   Lab 11/19/24 2053 11/20/24 0529 11/21/24  0234   * 128* 129*   K 4.4 4.5 4.7   CL 87* 89* 91*   CO2 35* 30* 28   GLU 92 159* 115*   BUN 11 11 17   CREATININE 0.87 0.9 0.9   CALCIUM 8.9 8.9 8.8   PROT 7.6 7.0 6.4   ALBUMIN 4.3 3.2* 3.2*   BILITOT 0.3 0.2 0.1   ALKPHOS 54 47 42   AST 27 22 18   ALT 27 25 19   ANIONGAP 7* 9 10     Urine Studies:   Recent Labs   Lab 11/20/24  1740   COLORU Yellow   APPEARANCEUA Hazy*   PHUR 7.0   SPECGRAV 1.015   PROTEINUA Negative   GLUCUA Negative   KETONESU Negative   BILIRUBINUA Negative   OCCULTUA Trace*   NITRITE Negative   UROBILINOGEN Negative   LEUKOCYTESUR Negative       Significant Imaging: I have reviewed all pertinent imaging results/findings within the past 24 hours.

## 2024-11-21 NOTE — NURSING
VIRTUAL NURSE: Pt arrived to unit. Permission received to turn camera to view patient. VIP model explained; Patient informed this VN will be working with bedside nurse and the rest of the care team.      Admission questions completed.  Plan of care reviewed with patient.  Educated patient on VTE and fall risk. Safety precautions in place. Call light within reach, side rails up x2.  Called and spoke to daughter, Georgie, to complete some of questions d/t patient's hard of hearing.     Patient instucted to ask staff for assistance. Patient verbalized complete understanding.  Will continue to be available and intervene as needed.    Labs, notes, orders, and careplan reviewed.      11/20/24 5740   Admission   Initial VN Admission Questions Complete   Shift   Virtual Nurse - Rounding Complete   Virtual Nurse - Patient Verbalized Approval Of Camera Use;VN Rounding   Type of Frequent Check   Type Patient Rounds   Safety/Activity   Patient Rounds bed in low position;bed wheels locked;call light in patient/parent reach;ID band on;visualized patient   Safety Promotion/Fall Prevention assistive device/personal item within reach;side rails raised x 2   Safety Precautions emergency equipment at bedside   Activity Assistance Provided assistance, 1 person   Positioning   Body Position position changed independently   Head of Bed (HOB) Positioning HOB elevated

## 2024-11-21 NOTE — CONSULTS
[facility] - Telemetry  Adult Nutrition  Consult Note    SUMMARY     Recommendations  1. Continue regular diet 2000ml fluid restriction   2. Add Boost Plus BID   3. Monitor weight and labs   4. Encourage PO intake as tolerated    Goals: Pt will consume 75% of meals by RD follow up    Nutrition Goal Status: new  Communication of RD Recs:  (POC)    Assessment and Plan  Endocrine  Severe protein-calorie malnutrition  Malnutrition Type:  Context: social/environmental circumstances  Level: severe    Related to (etiology):   Malabsorption     Signs and Symptoms (as evidenced by):   Alcohol and cocaine abuse      Malnutrition Characteristic Summary:  Weight Loss (Malnutrition): greater than 10% in 6 months (Noted 18 lb weight loss in 11 months (-14.8%))  Subcutaneous Fat (Malnutrition): severe depletion  Muscle Mass (Malnutrition): severe depletion    Interventions:  Collaboration by nutrition professional with other providers   [employer] medical food supplement therapy- Boost Plus BID     Nutrition Diagnosis Status:   New    Malnutrition Assessment  Malnutrition Context: social/environmental circumstances  Malnutrition Level: severe          Weight Loss (Malnutrition): greater than 10% in 6 months (Noted 18 lb weight loss in 11 months (-14.8%))  Subcutaneous Fat (Malnutrition): severe depletion  Muscle Mass (Malnutrition): severe depletion   Orbital Region (Subcutaneous Fat Loss): severe depletion  Upper Arm Region (Subcutaneous Fat Loss): moderate depletion   [place] Region (Muscle Loss): severe depletion  Clavicle Bone Region (Muscle Loss): moderate depletion  Clavicle and Acromion Bone Region (Muscle Loss): moderate depletion  Dorsal Hand (Muscle Loss): severe depletion  Patellar Region (Muscle Loss): severe depletion  Anterior Thigh Region (Muscle Loss): severe depletion  Posterior Calf Region (Muscle Loss): severe depletion     Reason for Assessment  Reason For Assessment: consult (weight loss 15-17 lbs, loss  of appetite, no teeth)  Diagnosis: pulmonary disease (COPD exacerbation)  General Information Comments: Pt is currently on a regular fluid 2000 ml. Du-20/right anterior knee, left anterior knee. No meal intake noted. Pt presented with SOB. Noted 18 lb weight loss in 11 months. Noted brusing on arms. Pt attributes weight loss to drug use, pt is unsure how much samuel she lost. Nause, constipation. PEG from 2019 has since been removed. Noted severe malnutrition 24.  Nutrition Discharge Planning: d/c on regular diet with ONS TID    Nutrition Risk Screen  Nutrition Risk Screen: no indicators present    Nutrition/Diet History  Patient Reported Diet/Restrictions/Preferences: general  Food Preferences: No prefs identified at this time  Factors Affecting Nutritional Intake: nausea/vomiting, difficulty/impaired swallowing, decreased appetite, chewing difficulties/inability to chew food    Food Insecurity: No Food Insecurity (2024)    Hunger Vital Sign     Worried About Running Out of Food in the Last Year: Never true     Ran Out of Food in the Last Year: Never true     Anthropometrics  Temp: 97.5 °F (36.4 °C)  Height Method: Stated  Height: 5' (152.4 cm)  Height (inches): 60 in  Weight Method: Bed Scale  Weight: 47 kg (103 lb 9.9 oz)  Weight (lb): 103.62 lb  Ideal Body Weight (IBW), Female: 100 lb  % Ideal Body Weight, Female (lb): 103.62 %  BMI (Calculated): 20.2  BMI Grade: 18.5-24.9 - normal  Usual Body Weight (UBW), k kg (23)  % Usual Body Weight: 85.63  % Weight Change From Usual Weight: -14.55 %     Lab/Procedures/Meds  Pertinent Labs Reviewed: reviewed  Pertinent Labs Comments: Na 129, Cl 91  Pertinent Medications Reviewed: reviewed  Pertinent Medications Comments: enoxaparin, fluticasone, prednisone, senna    Estimated/Assessed Needs  Weight Used For Calorie Calculations: 47 kg (103 lb 9.9 oz)  Energy Calorie Requirements (kcal): 1410 kcals (30 kcals/kg)  Energy Need Method: Kcal/kg  Protein  Requirements: 56g (1.2g/kg)  Weight Used For Protein Calculations: 47 kg (103 lb 9.9 oz)     Estimated Fluid Requirement Method: RDA Method  RDA Method (mL): 1410     Nutrition Prescription Ordered  Current Diet Order: Regular fluid 2000 ml    Evaluation of Received Nutrient/Fluid Intake  I/O: 120/350  Energy Calories Required: not meeting needs  Protein Required: not meeting needs  Fluid Required: not meeting needs  Comments: LBM-11/17/24  Tolerance: tolerating  % Intake of Estimated Energy Needs: Other: No meal intake   % Meal Intake: Other: No meal intake     Nutrition Risk  Level of Risk/Frequency of Follow-up: high (1x/week)     Monitor and Evaluation  Food and Nutrient Intake: energy intake, food and beverage intake  Food and Nutrient Adminstration: diet order  Anthropometric Measurements: weight change, body mass index  Biochemical Data, Medical Tests and Procedures: electrolyte and renal panel, gastrointestinal profile, inflammatory profile, lipid profile  Nutrition-Focused Physical Findings: overall appearance     Nutrition Follow-Up  RD Follow-up?: Yes

## 2024-11-21 NOTE — PLAN OF CARE
Problem: Physical Therapy  Goal: Physical Therapy Goal  Description: Goals to be met by: 24     Patient will increase functional independence with mobility by performin. Supine to sit with Modified [place]  2. Sit to supine with Modified [place]  3. Sit to stand transfer with Modified [place] with use of RW.   4. Bed to chair transfer with Modified [place] using Rolling Walker  5. Gait  x 150 feet with Modified [place] using Rolling Walker.   6. Ascend/descend 5 stair with right Handrails Stand-by Assistance.    Outcome: Progressing    PT/OT co-evaluation completed due to anticipated complexity of pt's presentation. Pt's PLOF: Independent with no AD (reports 2 falls in past 3 months). Pt at this time requires MIN A/CGA with mobility with use of RW. Therapy recommending moderate intensity therapy. Therapy will continue to progress pt as able.

## 2024-11-21 NOTE — ASSESSMENT & PLAN NOTE
Malnutrition Type:  Context: social/environmental circumstances  Level: severe    Related to (etiology):   Malabsorption     Signs and Symptoms (as evidenced by):   Alcohol and cocaine abuse      Malnutrition Characteristic Summary:  Weight Loss (Malnutrition): greater than 10% in 6 months (Noted 18 lb weight loss in 11 months (-14.8%))  Subcutaneous Fat (Malnutrition): severe depletion  Muscle Mass (Malnutrition): severe depletion    Interventions:  Collaboration by nutrition professional with other providers   [employer] medical food supplement therapy- Boost Plus TID     Nutrition Diagnosis Status:   New

## 2024-11-21 NOTE — ED NOTES
Attempted to call report to floor. Nurse was unable to come to phone at this time. Will  call back.

## 2024-11-21 NOTE — PROGRESS NOTES
[facility] Medicine  Progress Note    Patient Name: Elif Archibald  MRN: [id number]  Patient Class: OP- Observation   Admission Date: 11/19/2024  Length of Stay: 0 days  Attending Physician: Joe Figueroa,*  Primary Care Provider: Zeeshan Guillen MD        Subjective:     Principal Problem:COPD exacerbation        HPI:  Patient is a 60 yo female w/ PMHx of end stage COPD, depression, alcoholic liver disease, seizures presenting today with shortness of breath. Patient states she has been having more shortness of breath on exertion for the past x4 days despite being on her baseline home medication regimen and oxygen 2L. Additionally, she states she has been having dark brown sputum production. Denies any swelling or edema. Denies orthopnea. Denies any sick contacts. Patient follows with Dr. Samuels with Pulmonology at [facility]. Last seen July 16, 2024. Diagnosed with severe end stage COPD w/ FEV1 17% predicted. On spiriva and symbicort with chronic prednisone. Also on 2L NC oxygen as well as BIPAP at night.     In the ED, initial vital signs /86, HR 99, Temp 98.1, SpO2 99% on home 2L oxygen. Labs include CBC with stable H/H 11.5/36 and WBC within normal limits 8.1. CMP with Na 129, Cl 87 CO2 35 otherwise normal electrolytes and BUN/Cr 11/0.87 (baseline Cr 0.8). NT Pro-BNP wnl at 448. Troponin wnl. CXR w/ small left pleural effusion. Initiated on albuterol, azithromycin, and prednisone in the ED. [facility] consulted for evaluation for admission for dyspnea 2/2 COPD exacerbation.    Overview/Hospital Course:  60yo female presents to [facility] as a transfer from OSH with 4d hx of SOB and change in sputum, [facility] Family Medicine team for COPD exacerbation. Patient initiated on azithromycin, albuterol MDI, and stress dose steroids. CXR illustrated chronic L pleural effusion. Labs significant for mild hyponatremia to 128. Initial PE with B/L infraorbital edema and nasal bridge  tenderness following fall at home ~1wk ago that was not evaluated. CT max/face with no bony injury requiring acute intervention. Previous TTE unremarkable, update during this hospitalization with LVEF of 59% and PASP 37. Palliative consulted for ongoing goals of care conversation and additional layer of support during this hospitalization. Family expressed desire for patient to complete pulmonary rehab prior to further discussions involving hospice. Patients home psychiatrist (Dr. Herrera) contacted for possible medication adjustment setting of patients report of increased somnolence and recent falls which could be attributed to polypharmacy. Recommendations from Dr. Herrera were to decrease Latuda to 40mg qhs and diazepam 2mg qhs. Plan of care discussed with patient and family.     Interval History: Pt feels that she is breathing better this morning. She is tolerating RA with pulse ox in the low 90s. PT/OT recommending moderate intensity therapy. Pt amenable to going to skilled nursing, pending placement. Pt feels that her urine is strong smelling, UA not concerning for infection.     Review of Systems   Constitutional:  Negative for chills and fever.   Respiratory:  Negative for shortness of breath.    Cardiovascular:  Negative for chest pain.   Gastrointestinal:  Negative for abdominal pain, constipation, diarrhea and nausea.   Genitourinary:  Negative for difficulty urinating.   Skin:  Negative for rash.     Objective:     Vital Signs (Most Recent):  Temp: 97.5 °F (36.4 °C) (11/21/24 0859)  Pulse: 73 (11/21/24 1246)  Resp: 18 (11/21/24 0859)  BP: (!) 143/84 (11/21/24 1246)  SpO2: (!) 94 % (11/21/24 1100) Vital Signs (24h Range):  Temp:  [97.5 °F (36.4 °C)-98.4 °F (36.9 °C)] 97.5 °F (36.4 °C)  Pulse:  [64-86] 73  Resp:  [18-19] 18  SpO2:  [91 %-98 %] 94 %  BP: (129-144)/(65-85) 143/84     Weight: 47 kg (103 lb 9.9 oz)  Body mass index is 20.24 kg/m².    Intake/Output Summary (Last 24 hours) at 11/21/2024  1404  Last data filed at 11/21/2024 0434  Gross per 24 hour   Intake 120 ml   Output --   Net 120 ml         Physical Exam  Constitutional:       Appearance: She is not ill-appearing.   HENT:      Head: Normocephalic and atraumatic.      Comments: Facial bruising from subjective fall 1 week ago  Tracheostomy      Mouth/Throat:      Mouth: Mucous membranes are moist.      Pharynx: Oropharynx is clear.   Eyes:      Extraocular Movements: Extraocular movements intact.      Conjunctiva/sclera: Conjunctivae normal.      Pupils: Pupils are equal, round, and reactive to light.   Cardiovascular:      Rate and Rhythm: Normal rate and regular rhythm.      Pulses: Normal pulses.      Heart sounds: Normal heart sounds.   Pulmonary:      Comments: No wheezing noted   Improved air movement  Abdominal:      General: Abdomen is flat.      Palpations: Abdomen is soft.   Musculoskeletal:         General: Normal range of motion.      Cervical back: Normal range of motion and neck supple.   Skin:     General: Skin is warm and dry.      Capillary Refill: Capillary refill takes less than 2 seconds.   Neurological:      General: No focal deficit present.      Mental Status: She is alert.   Psychiatric:         Mood and Affect: Mood normal.             Significant Labs: All pertinent labs within the past 24 hours have been reviewed.  CBC:   Recent Labs   Lab 11/19/24 2053 11/20/24  0529 11/21/24  0234   WBC 8.10 6.12 10.84   HGB 11.5* 10.8* 11.0*   HCT 36.0* 33.3* 34.0*    209 214     CMP:   Recent Labs   Lab 11/19/24 2053 11/20/24  0529 11/21/24  0234   * 128* 129*   K 4.4 4.5 4.7   CL 87* 89* 91*   CO2 35* 30* 28   GLU 92 159* 115*   BUN 11 11 17   CREATININE 0.87 0.9 0.9   CALCIUM 8.9 8.9 8.8   PROT 7.6 7.0 6.4   ALBUMIN 4.3 3.2* 3.2*   BILITOT 0.3 0.2 0.1   ALKPHOS 54 47 42   AST 27 22 18   ALT 27 25 19   ANIONGAP 7* 9 10     Urine Studies:   Recent Labs   Lab 11/20/24  1740   COLORU Yellow   APPEARANCEUA Hazy*   PHUR  7.0   SPECGRAV 1.015   PROTEINUA Negative   GLUCUA Negative   KETONESU Negative   BILIRUBINUA Negative   OCCULTUA Trace*   NITRITE Negative   UROBILINOGEN Negative   LEUKOCYTESUR Negative       Significant Imaging: I have reviewed all pertinent imaging results/findings within the past 24 hours.    Assessment/Plan:      * COPD exacerbation  History of end stage COPD followed Pulmonology at [facility]  PFT's notable for FEV1/FVC ratio 36.34 in 2022  On Symbicort and Spiriva as well as chronic prednisone 10mg with inability to be weaned off due to decompensation  On 2L oxygen at home, currently at baseline  On exam, poor inspiratory/expiratory effort, however, no wheezing noted   CXR largely unremarkable without areas of consolidation  Subjective purulent sputum production  Likely progression of COPD, however, patient would benefit from COPD exacerbation treatment  Patient's COPD is with exacerbation noted by continued dyspnea.    Plan:  Duonebs q4hr prn; space as tolerable  Azithromycin 500mg x3 days  Prednisone 60mg x5 days  SpO2 goal 88-92%, can tolerate 1L NC at rest. Provided fan for comfort.  Continue home maintenance inhalers  VBG pending  Palliative consulted- Patient's family would like to try pulmonary rehab after discharge and follow with palliative outpt.    History of recent fall  Patient had fall about a week ago. Has significant echymosis around periorbital region and nose.   CT Max face obtained without evidence for acute facial fracture.  Pt concern that she is drowsy during the day due to her psychiatric medications. Will discuss with psychiatry.    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 The patient's most recent sodium results are listed below.  Recent Labs      11/19/24 2053 11/20/24 0529 11/21/24  0234   * 128* 129*       Plan  - Obtain the following studies: Urine sodium, urine osmolality, serum osmolality.  - Will treat the hyponatremia with Fluid restriction of:  1.5 liter per day  - Monitor sodium Daily.   - Patient hyponatremia is stable    Seizure disorder  Plan:  Continue home Keppra    Essential hypertension  Plan:  Continue home metoprolol    Recurrent major depressive disorder, in partial remission  Plan:  Continue home fluoxetine       VTE Risk Mitigation (From admission, onward)           Ordered     enoxaparin injection 40 mg  Daily         11/20/24 2018     IP VTE HIGH RISK PATIENT  Once         11/20/24 0452     Place sequential compression device  Until discontinued         11/20/24 0452                    Discharge Planning   LAUREN:      Code Status: DNR   Is the patient medically ready for discharge?:     Reason for patient still in hospital (select all that apply): Pending disposition  Discharge Plan A: Home Health, Home with family                  Karey Le MD  Department of Hospital Medicine   [place] [facility]

## 2024-11-21 NOTE — PLAN OF CARE
CM met with pt - she pleasantly answered all questions and is AAO x 3; unable to confirm her address, however   Lives with son Vito ; daughter in law Georgie Mchugh    CM called and spoke with Vito and Georgie   Pt has Home O2 and Bipap; owns a RW but doesn't use it     Therapy recc Mod Intensity therapy -- pt only has Medicaid .  Per son Vito - pt was told she doesn't qualify for Medicare due to her age.  Receives SSI -- CM sent email to SSI disability office at [facility] asking them to contact pt's son to set up an appointment to see if pt qualifies for Medicare; note sent to Stephanie with pt assistance to see if pt qualifies for Medicare.      [facility] doesn't take pt's Medicaid; Per Shari with [facility] - NO facilities will take this pt for SNF due to her Medicaid plan.  CM will send to [facility] and [facility] to see if they can accept - Referrals sent to numerous facilities     Pt may have to discharge to home with OP therapy if no accepting facilities are identified. Son and daughter in law are aware that placement is unlikely due to her insurance.    LOCET called in to [facility]        11/21/24 1726   Discharge Assessment   Assessment Type Discharge Planning Assessment   Confirmed/corrected address, phone number and insurance Yes   Confirmed Demographics Correct on Facesheet   Source of Information patient;family;health record   Communicated LAUREN with patient/caregiver Yes   People in Home child(scott), adult  (ayla Padron   ; daughter in law Georgie Mchugh  [phone])   Do you expect to return to your current living situation? No  (wants SNF plcmt)   Do you have help at home or someone to help you manage your care at home? No   Who are your caregiver(s) and their phone number(s)? ayla Padron ; daughter in law Georgie Mchugh    Prior to hospitilization cognitive status: Unable to Assess   Current cognitive status: Unable to Assess   Walking  or Climbing Stairs Difficulty yes   Equipment Currently Used at Home oxygen;BIPAP;walker, rolling  (O2 2l/min per NC, Bipap, owns but doesn't use - RW)   Patient currently being followed by outpatient case management? No   Do you currently have service(s) that help you manage your care at home? No   How do you get to doctors appointments? family or friend will provide   Are you on dialysis? No   Do you take coumadin? No   Discharge Plan A Skilled Nursing Facility   Discharge Plan B Home;Home with family   DME Needed Upon Discharge    (tbd)

## 2024-11-21 NOTE — PLAN OF CARE
Recommendations  1. Continue regular diet 2000ml fluid restriction   2. Add Boost Plus BID   3. Monitor weight and labs   4. Encourage PO intake as tolerated    Goals: Pt will consume 75% of meals by RD follow up

## 2024-11-21 NOTE — CARE UPDATE
Called patient psychiatrist, Dr. Hendrix ([phone]). Updated him on patient status and concerns for medication dosing possibly contributing to patient's endorsed drowsy at home and potentially falls. Decided to decrease latuda 40mg from BID to QHS and valium QHS from 5mg to 2mg. Continue PRN amitriptyline 10mg.     Patient has follow up appointment with him on 12/2 at 3pm.     ________________________  Yon Ramirez MD  [facility] PGY-3

## 2024-11-21 NOTE — PT/OT/SLP EVAL
Physical Therapy Evaluation and Treatment    Patient Name:  Elif Archibald   MRN:  [id number]    Recommendations:     Discharge Recommendations: Moderate Intensity Therapy   Discharge Equipment Recommendations: walker, rolling   Barriers to discharge:  limited functional independence/endurance    The mobility limitation cannot be sufficiently resolved by the use of a cane.   Patient's functional mobility deficit can be sufficiently resolved with the use of a rolling walker. Patient's mobility limitation significantly impairs their ability to participate in one of more activities of daily living. The use of a rolling walker will significantly improve the patient's ability to participate in MRADLS and the patient will use it on regular basis in the home.       Assessment:     Elif Archibald is a 59 y.o. female admitted with a medical diagnosis of COPD exacerbation.  She presents with the following impairments/functional limitations: weakness, impaired endurance, impaired self care skills, impaired functional mobility, gait instability, impaired balance, pain, decreased safety awareness, impaired skin, impaired cardiopulmonary response to activity.    PT/OT co-evaluation completed due to anticipated complexity of pt's presentation. Pt's PLOF: Independent with no AD (reports 2 falls in past 3 months). Pt at this time requires MIN A/CGA with mobility with use of RW. Therapy recommending moderate intensity therapy. Therapy will continue to progress pt as able.     Rehab Prognosis: Good; patient would benefit from acute skilled PT services to address these deficits and reach maximum level of function.    Recent Surgery: * No surgery found *      Plan:     During this hospitalization, patient to be seen 4 x/week to address the identified rehab impairments via gait training, therapeutic activities, therapeutic exercises, neuromuscular re-education and progress toward the following goals:    Plan of Care Expires:   12/21/24    Subjective     Chief Complaint: pain; nausea with mobility  Patient/Family Comments/goals: to increase functional strength  Pain/Comfort:  Pain Rating 1: 9/10  Location 1: face  Pain Addressed 1: Reposition, Cessation of Activity, Nurse notified  Pain Rating Post-Intervention 1:  (not rated)    Patients cultural, spiritual, Yazidi conflicts given the current situation: no    Living Environment:  Lives with son and DIL in  with 5 steps to enter with 1 HR. Tub/shower with SC and grab bars.   Prior to admission, patients level of function was Independent with no AD with mobility (reports 2 recent falls in past 3 months); assist with bathing and dressing.  Equipment used at home: rollator, oxygen, bedside commode.  DME owned (not currently used): none.  Upon discharge, patient will have assistance from family (unsure of 24/7).    Objective:     Communicated with Nurse prior to session.  Patient found HOB elevated with bed alarm, oxygen  upon PT entry to room.    General Precautions: Standard, fall  Orthopedic Precautions:N/A   Braces: N/A  Respiratory Status: initially with NC donned but 0L O2; initially 91% on RA, 87% on toilet after walking on RA; donned 1L O2 pt improves to 94%. *Pt reports usually being on 2L O2    Exams:  Cognitive Exam:  Patient is oriented to Person, Place, and able to recall current month; difficulty with recalling current year  Sensation:    -       Intact  light/touch to BLE  RLE ROM: WFL  RLE Strength: WFL  LLE ROM: WFL  LLE Strength: WFL    Functional Mobility:  Bed Mobility:     Supine to Sit: minimum assistance  Sit to Supine: contact guard assistance  Transfers:     Sit to Stand: MIN A with no AD; CGA with RW  Gait: ~20ft with MIN A with no AD; 45ft with use of RW and CGA  Stairs: ascend/descend x2 steps with use of B HHA; MIN A      AM-PAC 6 CLICK MOBILITY  Total Score:18       Treatment & Education:  Pt educated on role of therapy.   Pt educated on improving posture  and sequencing with mobility/use of RW to increase safety.  Pt reports nausea with increased ambulation; resolution of nausea with seated rest break.   Pt educated on use of call button; pt understanding.     Patient left HOB elevated with all lines intact, call button in reach, bed alarm on, and Nurse notified.    GOALS:   Multidisciplinary Problems       Physical Therapy Goals          Problem: Physical Therapy    Goal Priority Disciplines Outcome Interventions   Physical Therapy Goal     PT, PT/OT Progressing    Description: Goals to be met by: 24     Patient will increase functional independence with mobility by performin. Supine to sit with Modified [place]  2. Sit to supine with Modified [place]  3. Sit to stand transfer with Modified [place] with use of RW.   4. Bed to chair transfer with Modified [place] using Rolling Walker  5. Gait  x 150 feet with Modified [place] using Rolling Walker.   6. Ascend/descend 5 stair with right Handrails Stand-by Assistance.                         History:     Past Medical History:   Diagnosis Date    Anxiety     Asthma     Bipolar 1 disorder     Cirrhosis, alcoholic     Cocaine abuse     COPD (chronic obstructive pulmonary disease)     Depression     Encounter for blood transfusion     Schizophrenia     Seizures     Subdural hematoma     Tachycardia        Past Surgical History:   Procedure Laterality Date    APPENDECTOMY      CRANIECTOMY Left 2019    ESOPHAGOGASTRODUODENOSCOPY      PEG W/TRACHEOSTOMY PLACEMENT  2019       Time Tracking:     PT Received On: 24  PT Start Time: 1016     PT Stop Time: 1048  PT Total Time (min): 32 min With OT    Billable Minutes: Evaluation 10 and Gait Training 15      2024

## 2024-11-21 NOTE — ASSESSMENT & PLAN NOTE
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 The patient's most recent sodium results are listed below.  Recent Labs     11/19/24 2053 11/20/24  0529 11/21/24  0234   * 128* 129*       Plan  - Obtain the following studies: Urine sodium, urine osmolality, serum osmolality.  - Will treat the hyponatremia with Fluid restriction of:  1.5 liter per day  - Monitor sodium Daily.   - Patient hyponatremia is stable

## 2024-11-21 NOTE — PT/OT/SLP EVAL
Occupational Therapy   Evaluation and treatment    Name: Elif Archibald  MRN: [id number]  Admitting Diagnosis: COPD exacerbation  Recent Surgery: * No surgery found *      Recommendations:     Discharge Recommendations: Moderate Intensity Therapy  Discharge Equipment Recommendations:  walker, rolling, to be determined by next level of care, other (see comments) (Portable O2 tank)  Barriers to discharge:  Decreased caregiver support, Other (Comment) (increased fall risk)    Assessment:     Elif Archibald is a 59 y.o. female with a medical diagnosis of COPD exacerbation.  She presents with The primary encounter diagnosis was COPD exacerbation. Diagnoses of Shortness of breath, SOB (shortness of breath), Chronic obstructive pulmonary disease with acute lower respiratory infection, Chronic obstructive pulmonary disease, unspecified COPD type, and Chronic respiratory failure with hypoxia were also pertinent to this visit. . Performance deficits affecting function: weakness, impaired endurance, decreased safety awareness, impaired functional mobility, gait instability, impaired balance, impaired skin, impaired cardiopulmonary response to activity.      OT / PT coeval completed, coeval due to anticipated complexity of pt's presentation. On evaluation this date patient able to perform LB dressing with SPV with therapist performing item retrieval and toilet transfer with rolling walker with Min A due to dizziness. Skilled acute OT to follow. Recommend Moderate level intensity. DME anticipated recommendations ongoing pending progression.     Rehab Prognosis: Good; patient would benefit from acute skilled OT services to address these deficits and reach maximum level of function.       Plan:     Patient to be seen 4 x/week to address the above listed problems via self-care/home management, therapeutic activities, therapeutic exercises  Plan of Care Expires: 12/19/24  Plan of Care Reviewed with:      Subjective     Chief  Complaint: Pain; nausea with mobility   Patient/Family Comments/goals: To increase strength     Occupational Profile:  Living Environment: Pt lives with son and SHERRIE in MH with 5STE with 1HR. Tub/shower with SC and grab bars  Previous level of function: Patient with PLOF of managing at modified independence for ADLs and mobility with recent need for assist for dressing and bathing and with hx of 2 falls in the past 3 months.    Equipment Used at Home: rollator, oxygen, bedside commode  Assistance upon Discharge: Family assistance (unsure of 24/7)    Pain/Comfort:  Pain Rating 1: 9/10  Location 1: face  Pain Addressed 1: Reposition, Cessation of Activity, Nurse notified  Pain Rating Post-Intervention 1:  (not rated)        Objective:     Communicated with: Nurse prior to session.  Patient found HOB elevated with bed alarm, telemetry, oxygen upon OT entry to room.    General Precautions: Standard, fall  Orthopedic Precautions: N/A  Braces: N/A  Respiratory Status: Upon entry NC donned but 0L O2; initially 91%, 59bpm on RA, 87% on toilet after walking on RA; Donned 1L of O2, pt's SpO2 improved to 94%  ** Pt reports being on 2L of O2 at home     Occupational Performance:    Bed Mobility:    Patient completed Supine to Sit with minimum assistance  Patient completed Sit to Supine with contact guard assistance    Functional Mobility/Transfers:  Patient completed Sit <> Stand Transfer with minimum assistance  with  no assistive device; CGA with rolling walker.   Patient completed Toilet Transfer Step Transfer technique with minimum assistance with  rolling walker; pt reports dizziness.   Functional Mobility: In room mobility from bed to toilet to bed with minimum assistance with rolling walker    Activities of Daily Living:  Lower Body Dressing: supervision OT student with item retrieval   Toileting: minimum assistance on true toilet; no voiding     Cognitive/Visual Perceptual:  Cognitive/Psychosocial Skills:     -        Oriented to: Person, Place, and Time   -       Follows Commands/attention:Follows two-step commands  -       Safety awareness/insight to disability: Pt reports memory impaired however 3/3 STM recall intact; limited health literacy    Physical Exam:  Skin integrity: Bruising of face due to fall   Upper Extremity Range of Motion:     -       Right Upper Extremity: WFL  -       Left Upper Extremity: WFL  Upper Extremity Strength:    -       Right Upper Extremity: WFL  -       Left Upper Extremity: WFL   Strength:    -       Right Upper Extremity: WFL  -       Left Upper Extremity: WFL.  Sensation:   BUE intact     AMPAC 6 Click ADL:  AMPAC Total Score: 19    Treatment & Education:  Patient educated on role of OT/ POC development.  Co-evaluation with physical therapy in consideration of anticipated complexity of pt's presentation. Occupational profile developed.    Patient guided to transition eob for assessment.   Initiated ADL / functional mobility training as above with instruction on hand placement when using rolling walker.   Pt reports nausea with increased ambulation; resolved with seated rest break.  Educated patient on importance of out of bed mobility and movement/repositioning throughout the day . Answered questions within scope.  Pt educated on use of call light with understanding.       Patient left HOB elevated with all lines intact, call button in reach, bed alarm on, and nurse notified    GOALS:   Multidisciplinary Problems       Occupational Therapy Goals          Problem: Occupational Therapy    Goal Priority Disciplines Outcome Interventions   Occupational Therapy Goal     OT, PT/OT Progressing    Description: Goals to be met by: 12/19/2024     Patient will increase functional independence with ADLs by performing:    UE Dressing with Modified [place].  LE Dressing with Modified [place].  Toileting from toilet with Modified [place] for hygiene and clothing management.   Bathing from  sitting/standing at sink with Modified [place].  Toilet transfer to toilet with Modified [place].  Functional mobility x5 minutes without adverse s/s with modified independence.                         History:     Past Medical History:   Diagnosis Date    Anxiety     Asthma     Bipolar 1 disorder     Cirrhosis, alcoholic     Cocaine abuse     COPD (chronic obstructive pulmonary disease)     Depression     Encounter for blood transfusion     Schizophrenia     Seizures     Subdural hematoma     Tachycardia          Past Surgical History:   Procedure Laterality Date    APPENDECTOMY      CRANIECTOMY Left 01/31/2019    ESOPHAGOGASTRODUODENOSCOPY      PEG W/TRACHEOSTOMY PLACEMENT  02/16/2019       Time Tracking:     OT Date of Treatment: 11/21/24  OT Start Time: 1016  OT Stop Time: 1048  OT Total Time (min): 32 min with PT     Billable Minutes:Evaluation 10  Self Care/Home Management 8  Therapeutic Activity 10    11/21/2024

## 2024-11-21 NOTE — ED NOTES
Unable to find dose In pyxis. Contacted pharmacy , pharmacy states that Writer has to contact MD to edit dosage. Will notify MD.

## 2024-11-21 NOTE — PLAN OF CARE
OT / PT coeval completed, coeval due to anticipated complexity of pt's presentation. Patient with PLOF of managing at modified independence for ADLs and mobility with recent need for assist for dressing and bathing and with hx of 2 falls in the past 3 months. On evaluation this date patient able to perform LB dressing with SPV with therapist performing item retrieval and toilet transfer with rolling walker with Min A due to dizziness. Skilled acute OT to follow. Recommend Moderate level intensity. DME anticipated recommendations ongoing pending progression.    Problem: Occupational Therapy  Goal: Occupational Therapy Goal  Description: Goals to be met by: 12/19/2024     Patient will increase functional independence with ADLs by performing:    UE Dressing with Modified [place].  LE Dressing with Modified [place].  Toileting from toilet with Modified [place] for hygiene and clothing management.   Bathing from sitting/standing at sink with Modified [place].  Toilet transfer to toilet with Modified [place].  Functional mobility x5 minutes without adverse s/s with modified independence.    Outcome: Progressing

## 2024-11-21 NOTE — PLAN OF CARE
Problem: Adult Inpatient Plan of Care  Goal: Plan of Care Review  Outcome: Progressing  Goal: Patient-Specific Goal (Individualized)  Outcome: Progressing  Goal: Absence of Hospital-Acquired Illness or Injury  Outcome: Progressing  Goal: Optimal Comfort and Wellbeing  Outcome: Progressing  Goal: Readiness for Transition of Care  Outcome: Progressing     Problem: Coping Ineffective  Goal: Effective Coping  Outcome: Progressing     Problem: Fall Injury Risk  Goal: Absence of Fall and Fall-Related Injury  Outcome: Progressing     Problem: Comorbidity Management  Goal: Maintenance of Behavioral Health Symptom Control  Outcome: Progressing  Goal: Maintenance of COPD Symptom Control  Outcome: Progressing  Goal: Blood Pressure in Desired Range  Outcome: Progressing  Goal: Maintenance of Seizure Control  Outcome: Progressing     Problem: COPD (Chronic Obstructive Pulmonary Disease)  Goal: Optimal Chronic Illness Coping  Outcome: Progressing  Goal: Optimal Level of Functional [place]  Outcome: Progressing  Goal: Absence of Infection Signs and Symptoms  Outcome: Progressing  Goal: Improved Oral Intake  Outcome: Progressing  Goal: Effective Oxygenation and Ventilation  Outcome: Progressing     Problem: Wound  Goal: Optimal Coping  Outcome: Progressing  Goal: Optimal Functional Ability  Outcome: Progressing  Goal: Absence of Infection Signs and Symptoms  Outcome: Progressing  Goal: Improved Oral Intake  Outcome: Progressing  Goal: Optimal Pain Control and Function  Outcome: Progressing  Goal: Skin Health and Integrity  Outcome: Progressing  Goal: Optimal Wound Healing  Outcome: Progressing     Problem: Sensory Impairment  Goal: Optimal Sensory Management  Outcome: Progressing

## 2024-11-22 VITALS
HEART RATE: 67 BPM | HEIGHT: 60 IN | OXYGEN SATURATION: 99 % | WEIGHT: 103.63 LBS | BODY MASS INDEX: 20.35 KG/M2 | SYSTOLIC BLOOD PRESSURE: 121 MMHG | TEMPERATURE: 98 F | DIASTOLIC BLOOD PRESSURE: 71 MMHG | RESPIRATION RATE: 20 BRPM

## 2024-11-22 LAB
ALBUMIN SERPL BCP-MCNC: 2.8 G/DL (ref 3.5–5.2)
ALP SERPL-CCNC: 35 U/L (ref 40–150)
ALT SERPL W/O P-5'-P-CCNC: 17 U/L (ref 10–44)
ANION GAP SERPL CALC-SCNC: 8 MMOL/L (ref 8–16)
AST SERPL-CCNC: 17 U/L (ref 10–40)
BASOPHILS # BLD AUTO: 0.02 K/UL (ref 0–0.2)
BASOPHILS NFR BLD: 0.2 % (ref 0–1.9)
BILIRUB SERPL-MCNC: 0.2 MG/DL (ref 0.1–1)
BUN SERPL-MCNC: 15 MG/DL (ref 6–20)
CALCIUM SERPL-MCNC: 8.4 MG/DL (ref 8.7–10.5)
CHLORIDE SERPL-SCNC: 88 MMOL/L (ref 95–110)
CO2 SERPL-SCNC: 29 MMOL/L (ref 23–29)
CREAT SERPL-MCNC: 0.8 MG/DL (ref 0.5–1.4)
DIFFERENTIAL METHOD BLD: ABNORMAL
EOSINOPHIL # BLD AUTO: 0.1 K/UL (ref 0–0.5)
EOSINOPHIL NFR BLD: 0.8 % (ref 0–8)
ERYTHROCYTE [DISTWIDTH] IN BLOOD BY AUTOMATED COUNT: 13.1 % (ref 11.5–14.5)
EST. GFR  (NO RACE VARIABLE): >60 ML/MIN/1.73 M^2
GLUCOSE SERPL-MCNC: 71 MG/DL (ref 70–110)
HCT VFR BLD AUTO: 29.6 % (ref 37–48.5)
HGB BLD-MCNC: 9.9 G/DL (ref 12–16)
IMM GRANULOCYTES # BLD AUTO: 0.05 K/UL (ref 0–0.04)
IMM GRANULOCYTES NFR BLD AUTO: 0.5 % (ref 0–0.5)
LYMPHOCYTES # BLD AUTO: 2.6 K/UL (ref 1–4.8)
LYMPHOCYTES NFR BLD: 26.3 % (ref 18–48)
MAGNESIUM SERPL-MCNC: 1.8 MG/DL (ref 1.6–2.6)
MCH RBC QN AUTO: 28 PG (ref 27–31)
MCHC RBC AUTO-ENTMCNC: 33.4 G/DL (ref 32–36)
MCV RBC AUTO: 84 FL (ref 82–98)
MONOCYTES # BLD AUTO: 1.1 K/UL (ref 0.3–1)
MONOCYTES NFR BLD: 11.3 % (ref 4–15)
NEUTROPHILS # BLD AUTO: 5.9 K/UL (ref 1.8–7.7)
NEUTROPHILS NFR BLD: 60.9 % (ref 38–73)
NRBC BLD-RTO: 0 /100 WBC
PHOSPHATE SERPL-MCNC: 2.8 MG/DL (ref 2.7–4.5)
PLATELET # BLD AUTO: 206 K/UL (ref 150–450)
PMV BLD AUTO: 8.7 FL (ref 9.2–12.9)
POTASSIUM SERPL-SCNC: 4 MMOL/L (ref 3.5–5.1)
PROT SERPL-MCNC: 5.7 G/DL (ref 6–8.4)
RBC # BLD AUTO: 3.54 M/UL (ref 4–5.4)
SODIUM SERPL-SCNC: 125 MMOL/L (ref 136–145)
WBC # BLD AUTO: 9.7 K/UL (ref 3.9–12.7)

## 2024-11-22 PROCEDURE — 84100 ASSAY OF PHOSPHORUS: CPT

## 2024-11-22 PROCEDURE — 99900035 HC TECH TIME PER 15 MIN (STAT)

## 2024-11-22 PROCEDURE — 97530 THERAPEUTIC ACTIVITIES: CPT

## 2024-11-22 PROCEDURE — 96372 THER/PROPH/DIAG INJ SC/IM: CPT

## 2024-11-22 PROCEDURE — 85025 COMPLETE CBC W/AUTO DIFF WBC: CPT

## 2024-11-22 PROCEDURE — 97116 GAIT TRAINING THERAPY: CPT

## 2024-11-22 PROCEDURE — 25000003 PHARM REV CODE 250

## 2024-11-22 PROCEDURE — 90471 IMMUNIZATION ADMIN: CPT | Performed by: HOSPITALIST

## 2024-11-22 PROCEDURE — G0378 HOSPITAL OBSERVATION PER HR: HCPCS

## 2024-11-22 PROCEDURE — 83735 ASSAY OF MAGNESIUM: CPT

## 2024-11-22 PROCEDURE — 80053 COMPREHEN METABOLIC PANEL: CPT

## 2024-11-22 PROCEDURE — 96376 TX/PRO/DX INJ SAME DRUG ADON: CPT

## 2024-11-22 PROCEDURE — 90656 IIV3 VACC NO PRSV 0.5 ML IM: CPT | Performed by: HOSPITALIST

## 2024-11-22 PROCEDURE — 63600175 PHARM REV CODE 636 W HCPCS: Performed by: HOSPITALIST

## 2024-11-22 PROCEDURE — 63600175 PHARM REV CODE 636 W HCPCS

## 2024-11-22 PROCEDURE — 27000221 HC OXYGEN, UP TO 24 HOURS

## 2024-11-22 PROCEDURE — 94640 AIRWAY INHALATION TREATMENT: CPT | Mod: XB

## 2024-11-22 PROCEDURE — 36415 COLL VENOUS BLD VENIPUNCTURE: CPT

## 2024-11-22 PROCEDURE — 97535 SELF CARE MNGMENT TRAINING: CPT

## 2024-11-22 PROCEDURE — 96366 THER/PROPH/DIAG IV INF ADDON: CPT

## 2024-11-22 PROCEDURE — 94761 N-INVAS EAR/PLS OXIMETRY MLT: CPT

## 2024-11-22 RX ORDER — FLUOXETINE 10 MG/1
10 CAPSULE ORAL DAILY
Qty: 30 CAPSULE | Refills: 11 | Status: SHIPPED | OUTPATIENT
Start: 2024-11-22 | End: 2025-11-22

## 2024-11-22 RX ORDER — ALUMINUM HYDROXIDE, MAGNESIUM HYDROXIDE, AND SIMETHICONE 1200; 120; 1200 MG/30ML; MG/30ML; MG/30ML
30 SUSPENSION ORAL ONCE
Status: COMPLETED | OUTPATIENT
Start: 2024-11-22 | End: 2024-11-22

## 2024-11-22 RX ORDER — DIAZEPAM 2 MG/1
2 TABLET ORAL NIGHTLY
Qty: 30 TABLET | Refills: 0 | Status: SHIPPED | OUTPATIENT
Start: 2024-11-22 | End: 2024-12-22

## 2024-11-22 RX ORDER — FLUOXETINE HYDROCHLORIDE 20 MG/1
40 CAPSULE ORAL DAILY
Status: DISCONTINUED | OUTPATIENT
Start: 2024-11-22 | End: 2024-11-22 | Stop reason: HOSPADM

## 2024-11-22 RX ORDER — LIDOCAINE HYDROCHLORIDE 20 MG/ML
15 SOLUTION OROPHARYNGEAL ONCE
Status: COMPLETED | OUTPATIENT
Start: 2024-11-22 | End: 2024-11-22

## 2024-11-22 RX ORDER — DOCUSATE SODIUM 100 MG
400 CAPSULE ORAL
Status: DISCONTINUED | OUTPATIENT
Start: 2024-11-22 | End: 2024-11-22 | Stop reason: HOSPADM

## 2024-11-22 RX ORDER — BACITRACIN 500 [USP'U]/G
OINTMENT TOPICAL 2 TIMES DAILY
Qty: 14 G | Refills: 0 | Status: SHIPPED | OUTPATIENT
Start: 2024-11-22

## 2024-11-22 RX ORDER — MAGNESIUM SULFATE HEPTAHYDRATE 40 MG/ML
2 INJECTION, SOLUTION INTRAVENOUS ONCE
Status: COMPLETED | OUTPATIENT
Start: 2024-11-22 | End: 2024-11-22

## 2024-11-22 RX ADMIN — METOPROLOL TARTRATE 50 MG: 50 TABLET, FILM COATED ORAL at 09:11

## 2024-11-22 RX ADMIN — POLYETHYLENE GLYCOL 3350 17 G: 17 POWDER, FOR SOLUTION ORAL at 01:11

## 2024-11-22 RX ADMIN — ONDANSETRON 4 MG: 2 INJECTION INTRAMUSCULAR; INTRAVENOUS at 11:11

## 2024-11-22 RX ADMIN — LEVETIRACETAM 500 MG: 500 TABLET, FILM COATED ORAL at 09:11

## 2024-11-22 RX ADMIN — ALUMINUM HYDROXIDE, MAGNESIUM HYDROXIDE, AND SIMETHICONE 30 ML: 200; 200; 20 SUSPENSION ORAL at 09:11

## 2024-11-22 RX ADMIN — Medication 400 ML: at 10:11

## 2024-11-22 RX ADMIN — ENOXAPARIN SODIUM 40 MG: 40 INJECTION SUBCUTANEOUS at 05:11

## 2024-11-22 RX ADMIN — TIOTROPIUM BROMIDE INHALATION SPRAY 2 PUFF: 3.12 SPRAY, METERED RESPIRATORY (INHALATION) at 07:11

## 2024-11-22 RX ADMIN — MAGNESIUM SULFATE HEPTAHYDRATE 2 G: 40 INJECTION, SOLUTION INTRAVENOUS at 09:11

## 2024-11-22 RX ADMIN — SENNOSIDES AND DOCUSATE SODIUM 1 TABLET: 8.6; 5 TABLET ORAL at 09:11

## 2024-11-22 RX ADMIN — FLUTICASONE FUROATE AND VILANTEROL TRIFENATATE 1 PUFF: 100; 25 POWDER RESPIRATORY (INHALATION) at 07:11

## 2024-11-22 RX ADMIN — THERA TABS 1 TABLET: TAB at 09:11

## 2024-11-22 RX ADMIN — FLUOXETINE HYDROCHLORIDE 40 MG: 20 CAPSULE ORAL at 10:11

## 2024-11-22 RX ADMIN — BACITRACIN: 500 OINTMENT TOPICAL at 10:11

## 2024-11-22 RX ADMIN — PREDNISONE 40 MG: 20 TABLET ORAL at 09:11

## 2024-11-22 RX ADMIN — LIDOCAINE HYDROCHLORIDE 15 ML: 20 SOLUTION ORAL at 09:11

## 2024-11-22 RX ADMIN — INFLUENZA VIRUS VACCINE 0.5 ML: 15; 15; 15 SUSPENSION INTRAMUSCULAR at 03:11

## 2024-11-22 RX ADMIN — Medication 400 ML: at 06:11

## 2024-11-22 RX ADMIN — Medication 400 ML: at 02:11

## 2024-11-22 NOTE — PT/OT/SLP PROGRESS
Occupational Therapy   Treatment    Name: Elif Archibald  MRN: [id number]  Admitting Diagnosis:  COPD exacerbation       Recommendations:     Discharge Recommendations: Moderate Intensity Therapy  Discharge Equipment Recommendations:  walker, rolling  Barriers to discharge:  Decreased caregiver support    Assessment:     Elif Archibald is a 59 y.o. female with a medical diagnosis of COPD exacerbation.  She presents with The primary encounter diagnosis was COPD exacerbation. Diagnoses of Shortness of breath, SOB (shortness of breath), Chronic obstructive pulmonary disease with acute lower respiratory infection, Chronic obstructive pulmonary disease, unspecified COPD type, Chronic respiratory failure with hypoxia, and Debility were also pertinent to this visit.  Performance deficits affecting function are weakness, impaired balance, impaired endurance, decreased safety awareness, gait instability, impaired functional mobility, pain, impaired self care skills, impaired cardiopulmonary response to activity.     Rehab Prognosis:  Good; patient would benefit from acute skilled OT services to address these deficits and reach maximum level of function.       Plan:     Patient to be seen 4 x/week to address the above listed problems via self-care/home management, therapeutic activities, therapeutic exercises  Plan of Care Expires: 12/19/24  Plan of Care Reviewed with: patient    Subjective     Chief Complaint: nauseated  Patient/Family Comments/goals:  To go home  Pain/Comfort:  Pain Rating 1: 0/10 (Pt reports feeling nauseous)  Pain Rating Post-Intervention 1: 0/10 (no pain just nauseous)    Objective:     Communicated with: Nurse prior to session.  Patient found HOB elevated with bed alarm, oxygen, PureWick, telemetry upon OT entry to room.    General Precautions: Standard, fall    Orthopedic Precautions:N/A  Braces: N/A  Respiratory Status: Nasal cannula, flow 2 L/min; 94%     Occupational Performance:     Bed Mobility:     Patient completed Supine to Sit with stand by assistance  Patient completed Sit to Supine with stand by assistance     Functional Mobility/Transfers:  Patient completed Sit <> Stand Transfer with contact guard assistance  with  hand-held assist x5 trials  Functional Mobility: Sitting balance EOB: CGA     Activities of Daily Living:  Grooming: minimum assistance seated eob       [facility] 6 Click ADL: 20    Treatment & Education:  Patient re-educated on role of OT.  Patient oriented x 4 needed choices for year but eventually got it.   ADL / functional mobility  training as above.   Utilized verbal cues throughout session.  Educated on fall prevention strategies( Pt able to provide 3 fall prevention strategies to use at home)   Pt able to cough up phlegm after movement with therapy  100% reciprocation for call light.  Answered inquires in scope.    Patient left HOB elevated with all lines intact, call button in reach, bed alarm on, and nurse notified    GOALS:   Multidisciplinary Problems       Occupational Therapy Goals          Problem: Occupational Therapy    Goal Priority Disciplines Outcome Interventions   Occupational Therapy Goal     OT, PT/OT Progressing    Description: Goals to be met by: 12/19/2024     Patient will increase functional independence with ADLs by performing:    UE Dressing with Modified [place].  LE Dressing with Modified [place].  Toileting from toilet with Modified [place] for hygiene and clothing management.   Bathing from sitting/standing at sink with Modified [place].  Toilet transfer to toilet with Modified [place].  Functional mobility x5 minutes without adverse s/s with modified independence.                         Time Tracking:     OT Date of Treatment: 11/22/24  OT Start Time: 1138  OT Stop Time: 1201  OT Total Time (min): 23 min    Billable Minutes:Self Care/Home Management 14  Therapeutic Activity 9    OT/KRISHNA: OT          11/22/2024

## 2024-11-22 NOTE — PLAN OF CARE
Problem: Physical Therapy  Goal: Physical Therapy Goal  Description: Goals to be met by: 24     Patient will increase functional independence with mobility by performin. Supine to sit with Modified [place]  2. Sit to supine with Modified [place]  3. Sit to stand transfer with Modified [place] with use of RW.   4. Bed to chair transfer with Modified [place] using Rolling Walker  5. Gait  x 150 feet with Modified [place] using Rolling Walker.   6. Ascend/descend 5 stair with right Handrails Stand-by Assistance.    Outcome: Progressing     Pt participates in bed mobility, transfers to standing, and ambulation with use of RW. Therapy will continue to progress pt as able.

## 2024-11-22 NOTE — PT/OT/SLP PROGRESS
Physical Therapy Treatment    Patient Name:  Elif Archibald   MRN:  [id number]    Recommendations:     Discharge Recommendations: Moderate Intensity Therapy  Discharge Equipment Recommendations: walker, rolling  Barriers to discharge:  limited functional independence and endurance    The mobility limitation cannot be sufficiently resolved by the use of a cane.   Patient's functional mobility deficit can be sufficiently resolved with the use of a rolling walker. Patient's mobility limitation significantly impairs their ability to participate in one of more activities of daily living. The use of a rolling walker will significantly improve the patient's ability to participate in MRADLS and the patient will use it on regular basis in the home.       Assessment:     Elif Archibald is a 59 y.o. female admitted with a medical diagnosis of COPD exacerbation.  She presents with the following impairments/functional limitations: weakness, impaired endurance, impaired self care skills, impaired functional mobility, gait instability, impaired balance, impaired cardiopulmonary response to activity.      Pt participates in bed mobility, transfers to standing, and ambulation with use of RW. Therapy will continue to progress pt as able.     Rehab Prognosis: Good; patient would benefit from acute skilled PT services to address these deficits and reach maximum level of function.    Recent Surgery: * No surgery found *      Plan:     During this hospitalization, patient to be seen 4 x/week to address the identified rehab impairments via gait training, therapeutic activities, therapeutic exercises, neuromuscular re-education and progress toward the following goals:    Plan of Care Expires:  12/21/24    Subjective     Chief Complaint: SOB with increased mobility; SpO2 96% on 2L O2  Patient/Family Comments/goals: to progress functional mobility  Pain/Comfort:  Pain Rating 1: 0/10  Pain Rating Post-Intervention 1: 0/10      Objective:      Communicated with Nurse prior to session.  Patient found HOB elevated with bed alarm, oxygen, PureWick upon PT entry to room.     General Precautions: Standard, fall  Orthopedic Precautions: N/A  Braces: N/A  Respiratory Status: Nasal cannula, flow 2 L/min     Functional Mobility:  Bed Mobility:     Supine to Sit: contact guard assistance  Sit to Supine: stand by assistance  Transfers:     Sit to Stand:  stand by assistance with rolling walker  Gait: ~35ft with use of RW and seated rest break; additional 60ft with use of RW- standing rest break with additional 45ft then seated rest break; MIN A/CGA      AM-PAC 6 CLICK MOBILITY  Turning over in bed (including adjusting bedclothes, sheets and blankets)?: 3  Sitting down on and standing up from a chair with arms (e.g., wheelchair, bedside commode, etc.): 3  Moving from lying on back to sitting on the side of the bed?: 3  Moving to and from a bed to a chair (including a wheelchair)?: 3  Need to walk in hospital room?: 3  Climbing 3-5 steps with a railing?: 3  Basic Mobility Total Score: 18       Treatment & Education:  Pt reports having lots of gas today.   Pt's BP after first short distance ambulation 116/72; BP after 2nd ambulation trial 117/71.   Pt with mild dizziness with mobility and reported SOB; SpO2 remains 95-96% on 2L O2.   Nursing made present during session and notified of vitals with mobility.   Pt safely returned to bed and educated on use of call button for mobility needs; pt understanding.     Patient left HOB elevated with all lines intact, call button in reach, bed alarm on, and Nurse notified.    GOALS:   Multidisciplinary Problems       Physical Therapy Goals          Problem: Physical Therapy    Goal Priority Disciplines Outcome Interventions   Physical Therapy Goal     PT, PT/OT Progressing    Description: Goals to be met by: 24     Patient will increase functional independence with mobility by performin. Supine to sit with  Modified [place]  2. Sit to supine with Modified [place]  3. Sit to stand transfer with Modified [place] with use of RW.   4. Bed to chair transfer with Modified [place] using Rolling Walker  5. Gait  x 150 feet with Modified [place] using Rolling Walker.   6. Ascend/descend 5 stair with right Handrails Stand-by Assistance.                         Time Tracking:     PT Received On: 11/22/24  PT Start Time: 1511     PT Stop Time: 1542  PT Total Time (min): 31 min     Billable Minutes: Gait Training 20 and Therapeutic Activity 11    Treatment Type: Treatment  PT/PTA: PT     Number of PTA visits since last PT visit: 0     11/22/2024

## 2024-11-22 NOTE — PROGRESS NOTES
"LEIGH ANN rec'd a call from Skye with [facility]   [phone] - pt accepted -- LEIGH ANN advised her that pt must d/c today.   ----------------------------------------  Pt accepted by [facility] - pt's son, pt and pt's daughter Maude informed   Orders sent per [facility] and faxed to  - Connie aware.       LEIGH ANN rec'd call from Connie at [facility]  [phone] - they can also accept this pt -- LEIGH ANN advised her that pt must leave today.  She will call CM before 11 am with "ok"    --------------------------------------[id number]   LEIGH ANN called Connie to make sure she rec'd orders - she will call CM back.     "

## 2024-11-22 NOTE — PLAN OF CARE
Pt stable for d/c today to [facility] - pt has been accepted at [facility] per Connie -   Report to be called to  - ask for SALE[facility] Mendoza or [facility] with O2 set up with [facility] for 4:30 pm    - bedside nurse aware      CM spoke earlier today with pts son Vito and daughter Maude - explained that pt was ready for d/c an R was able to accept pt - gave Vito phone # and address of facility.     Packet next to pt's chart.    Pt choice form completed.   CM met with pt prior to discharge - she is aware she is discharging to [facility] in [place].         11/22/24 1643   Final Note   Assessment Type Final Discharge Note   Anticipated Discharge Disposition Rehab  ([facility])   What phone number can be called within the next 1-3 days to see how you are doing after discharge? [phone]   Hospital Resources/Appts/Education Provided Post-Acute resouces added to AVS   Post-Acute Status   Post-Acute Authorization Placement   Post-Acute Placement Status Set-up Complete/Auth obtained   Patient choice form signed by patient/caregiver   (pt choice form done)   Discharge Delays None known at this time

## 2024-11-22 NOTE — PLAN OF CARE
Problem: Adult Inpatient Plan of Care  Goal: Plan of Care Review  Outcome: Progressing  Flowsheets (Taken 11/22/2024 0511)  Plan of Care Reviewed With: patient     Problem: Coping Ineffective  Goal: Effective Coping  Outcome: Progressing  Intervention: Support and Enhance Coping Strategies  Flowsheets (Taken 11/22/2024 0511)  Supportive Measures:   active listening utilized   relaxation techniques promoted   verbalization of feelings encouraged   self-care encouraged   self-reflection promoted   positive reinforcement provided   self-responsibility promoted     Problem: Fall Injury Risk  Goal: Absence of Fall and Fall-Related Injury  Outcome: Progressing  Intervention: Identify and Manage Contributors  Flowsheets (Taken 11/22/2024 0511)  Self-Care Promotion:   independence encouraged   BADL personal objects within reach   BADL personal routines maintained  Medication Review/Management:   medications reviewed   high-risk medications identified     Problem: Comorbidity Management  Goal: Maintenance of Behavioral Health Symptom Control  Outcome: Progressing  Intervention: Maintain Behavioral Health Symptom Control  Flowsheets (Taken 11/22/2024 0511)  Medication Review/Management:   medications reviewed   high-risk medications identified     Problem: COPD (Chronic Obstructive Pulmonary Disease)  Goal: Optimal Chronic Illness Coping  Outcome: Progressing  Intervention: Support and Optimize Psychosocial Response  Flowsheets (Taken 11/22/2024 0511)  Supportive Measures:   active listening utilized   relaxation techniques promoted   verbalization of feelings encouraged   self-care encouraged   self-reflection promoted   positive reinforcement provided   self-responsibility promoted     Problem: Wound  Goal: Optimal Coping  Outcome: Progressing  Intervention: Support Patient and Family Response  Flowsheets (Taken 11/22/2024 0511)  Supportive Measures:   active listening utilized   relaxation techniques promoted    verbalization of feelings encouraged   self-care encouraged   self-reflection promoted   positive reinforcement provided   self-responsibility promoted     Problem: Sensory Impairment  Goal: Optimal Sensory Management  Outcome: Progressing  Intervention: Optimize Sensory Function  Flowsheets (Taken 11/22/2024 3776)  Sensory Impairment Compensatory Techniques: skin protection techniques promoted   Plan of care progressing.

## 2024-11-22 NOTE — PLAN OF CARE
VN note: VN completed AVS and attachments and notified bedside nurse, Fiona. Will cont to be available and intervene prn.

## 2024-11-22 NOTE — DISCHARGE SUMMARY
[facility] Medicine  Discharge Summary      Patient Name: Elif Archibald  MRN: [id number]  LEISA: [id number]  Patient Class: OP- Observation  Admission Date: 11/19/2024  Hospital Length of Stay: 0 days  Discharge Date and Time:  11/22/2024 5:37 PM  Attending Physician: Joe Figueroa,*   Discharging Provider: Karey Le MD  Primary Care Provider: Zeeshan Guillen MD    Primary Care Team: Networked reference to record PCT     HPI:   Patient is a 60 yo female w/ PMHx of end stage COPD, depression, alcoholic liver disease, seizures presenting today with shortness of breath. Patient states she has been having more shortness of breath on exertion for the past x4 days despite being on her baseline home medication regimen and oxygen 2L. Additionally, she states she has been having dark brown sputum production. Denies any swelling or edema. Denies orthopnea. Denies any sick contacts. Patient follows with Dr. Samuels with Pulmonology at [facility]. Last seen July 16, 2024. Diagnosed with severe end stage COPD w/ FEV1 17% predicted. On spiriva and symbicort with chronic prednisone. Also on 2L NC oxygen as well as BIPAP at night.     In the ED, initial vital signs /86, HR 99, Temp 98.1, SpO2 99% on home 2L oxygen. Labs include CBC with stable H/H 11.5/36 and WBC within normal limits 8.1. CMP with Na 129, Cl 87 CO2 35 otherwise normal electrolytes and BUN/Cr 11/0.87 (baseline Cr 0.8). NT Pro-BNP wnl at 448. Troponin wnl. CXR w/ small left pleural effusion. Initiated on albuterol, azithromycin, and prednisone in the ED. [facility] consulted for evaluation for admission for dyspnea 2/2 COPD exacerbation.    * No surgery found *      Hospital Course:   58yo female presents to [facility] as a transfer from OSH with 4d hx of SOB and change in sputum, [facility] team for COPD exacerbation. Patient initiated on azithromycin, albuterol MDI, and stress dose steroids. CXR illustrated chronic  "L pleural effusion. Labs significant for mild hyponatremia. Initial PE with B/L infraorbital edema and nasal bridge tenderness following fall at home ~1wk ago that was not evaluated. CT max/face with no bony injury requiring acute intervention. Previous TTE unremarkable, update during this hospitalization with LVEF of 59% and PASP 37. Palliative consulted for ongoing goals of care conversation and additional layer of support during this hospitalization. Family expressed desire for patient to complete pulmonary rehab prior to further discussions involving hospice. Patients home psychiatrist (Dr. Herrera) contacted for possible medication adjustment setting of patients report of increased somnolence and recent falls which could be attributed to polypharmacy. Recommendations from Dr. Herrera were to decrease Latuda to 40mg qhs and diazepam 2mg qhs. Plan of care discussed with patient and family.     On day of discharge patient was back to baseline and hemodynamically stable. Patient was sent to inpatient rehab for moderate intensity therapy with PT/OT with instructions to continue home medications as stated under "Medication Reconciliation" below. Decrease Latuda to 40mg QHS and diazepam to 2mg. Started on duoneb nebulizer every 4 hours as needed.  Close outpatient f/u was advised with PCP, Psychiatry, Palliative and Pulmonology. Of note, pt has chronic hyponatremia likely to her chronic conditions. Patient agreeable to discharge plan & expressed understanding of all aforementioned changes. All questions were answered and return precautions were discussed & detailed in the after-visit summary.       Physical Exam  Constitutional:       Appearance: She is not ill-appearing.   HENT:      Head: Normocephalic and atraumatic.      Comments: Facial bruising from subjective fall 1 week ago  Tracheostomy      Mouth/Throat:      Mouth: Mucous membranes are moist.      Pharynx: Oropharynx is clear.   Eyes:      Extraocular " Movements: Extraocular movements intact.      Conjunctiva/sclera: Conjunctivae normal.      Pupils: Pupils are equal, round, and reactive to light.   Cardiovascular:      Rate and Rhythm: Normal rate and regular rhythm.      Pulses: Normal pulses.      Heart sounds: Normal heart sounds.   Pulmonary:      Comments: No wheezing noted   Improved air movement  Abdominal:      General: Abdomen is flat.      Palpations: Abdomen is soft.   Musculoskeletal:         General: Normal range of motion.      Cervical back: Normal range of motion and neck supple.   Skin:     General: Skin is warm and dry.      Capillary Refill: Capillary refill takes less than 2 seconds.   Neurological:      General: No focal deficit present.      Mental Status: She is alert.   Psychiatric:         Mood and Affect: Mood normal.     Goals of Care Treatment Preferences:  Code Status: DNR    Health care agent: ayla Archibald  Health care agent number: [phone]          Per patient and son's wishes, would like to try pulmonary rehab and PT/OT to try and regain functional status. Pt does not want prolonged suffering.      SDOH Screening:  The patient was screened for utility difficulties, food insecurity, transport difficulties, housing insecurity, and interpersonal safety and there were no concerns identified this admission.     Consults:   Consults (From admission, onward)          Status Ordering Provider     Inpatient consult to Registered Dietitian/Nutritionist  Once        Provider:  (Not yet assigned)    Completed IMMANUEL OSUNA     Inpatient consult to Social Work  Once        Provider:  (Not yet assigned)    Completed IMMANUEL OSUNA     Inpatient consult to Palliative Care  Once        Provider:  (Not yet assigned)    Completed MISAEL MOSHER            No new Assessment & Plan notes have been filed under this hospital service since the last note was generated.  Service: Hospital Medicine    Final Active  "Diagnoses:    Diagnosis Date Noted POA    PRINCIPAL PROBLEM:  COPD exacerbation [J44.1] 11/20/2024 Yes     Chronic    Severe protein-calorie malnutrition [E43] 11/21/2024 Yes    Chronic hyponatremia [E87.1] 11/20/2024 Yes    History of recent fall [Z91.81] 11/20/2024 Not Applicable    Seizure disorder [G40.909] 03/09/2020 Yes    Recurrent major depressive disorder, in partial remission [F33.41] 04/05/2019 Yes    Essential hypertension [I10] 04/05/2019 Yes      Problems Resolved During this Admission:       Discharged Condition: stable    Disposition: Skilled Nursing Facility    Follow Up:   Follow-up Information       [facility] Follow up.    Specialty: Rehabilitation  Contact information:  [address] [place] [address] [phone]                           Patient Instructions:      WALKER FOR HOME USE     Order Specific Question Answer Comments   Type of Walker: Adult (5'4"-6'6")    With wheels? Yes    Height: 5' (1.524 m)    Weight: 47 kg (103 lb 9.9 oz)    Length of need (1-99 months): 99    Does patient have medical equipment at home? rollator    Does patient have medical equipment at home? oxygen    Does patient have medical equipment at home? bedside commode    Please check all that apply: Patient is unable to safely ambulate without equipment.      Ambulatory referral/consult to Pulmonary Rehab   Standing Status: Future   Referral Priority: Routine Referral Type: Consultation   Referral Reason: Specialty Services Required   Requested Specialty: Pulmonary Disease   Number of Visits Requested: 1     Diet Adult Regular     Notify your health care provider if you experience any of the following:  temperature >100.4     Notify your health care provider if you experience any of the following:  persistent nausea and vomiting or diarrhea     Notify your health care provider if you experience any of the following:  severe uncontrolled pain     Notify your health care provider if you " experience any of the following:  redness, tenderness, or signs of infection (pain, swelling, redness, odor or green/yellow discharge around incision site)     Notify your health care provider if you experience any of the following:  difficulty breathing or increased cough     Notify your health care provider if you experience any of the following:  severe persistent headache     Notify your health care provider if you experience any of the following:  worsening rash     Notify your health care provider if you experience any of the following:  persistent dizziness, light-headedness, or visual disturbances     Activity as tolerated       Significant Diagnostic Studies: Labs: CMP   Recent Labs   Lab 11/21/24 0234 11/22/24 0218   * 125*   K 4.7 4.0   CL 91* 88*   CO2 28 29   * 71   BUN 17 15   CREATININE 0.9 0.8   CALCIUM 8.8 8.4*   PROT 6.4 5.7*   ALBUMIN 3.2* 2.8*   BILITOT 0.1 0.2   ALKPHOS 42 35*   AST 18 17   ALT 19 17   ANIONGAP 10 8    and CBC   Recent Labs   Lab 11/21/24 0234 11/22/24 0218   WBC 10.84 9.70   HGB 11.0* 9.9*   HCT 34.0* 29.6*    206     Imaging Results              CT Maxillofacial Without Contrast (Final result)  Result time 11/20/24 13:14:20      Final result by Jose Manuel Brennan MD (11/20/24 13:14:20)                   Impression:      1. No acute displaced fracture or dislocation of the maxillofacial region.  2. Right maxillary sinus disease, likely chronic component.  3. Prominence of the ventricular system particularly on the left.  The ventricular system appears to have mildly increased in caliber somewhat since most recent comparative examination of 2021.  Comparison with any more recent exams would be helpful.      Electronically signed by: Jose Manuel Brennan MD  Date:    11/20/2024  Time:    13:14               Narrative:    EXAMINATION:  CT MAXILLOFACIAL WITHOUT CONTRAST    CLINICAL HISTORY:  Facial trauma, blunt;    TECHNIQUE:  Low dose axial images, sagittal  and coronal reformations were obtained through the face.  Contrast was not administered.    COMPARISON:  10/25/2019    FINDINGS:  There is stable surgical change of left calvarial craniectomy.  There is remote injury of the posterior right medial orbital wall.  Otherwise, the bilateral orbital walls, maxillary sinus walls, and zygomatic arches are intact.  The bilateral mandibular condyles are in appropriate location.  The mandible is intact.  No acute displaced fracture or dislocation of the maxillofacial region.  The soft tissues of the neck are grossly unremarkable.  The bilateral globes and orbital content are unremarkable.  There is mucous membrane thickening and aerated secretion within the right maxillary sinus noting hypertrophy of the right maxillary sinus walls suggesting sequela of chronic sinusitis.  There is prominence of the ventricular system, particularly the left lateral ventricle.  Overall, the ventricular system has mildly increased in caliber since most recent comparative examination of 2021.  Comparison with any more recent exams would be helpful.                                       X-Ray Chest AP Portable (Final result)  Result time 11/19/24 20:49:29      Final result by Tao Gonzalez MD (Timothy) (11/19/24 20:49:29)                   Impression:      Small left pleural effusion.  Follow-up is recommended.      Electronically signed by: Tao Gonzalez MD  Date:    11/19/2024  Time:    20:49               Narrative:    EXAMINATION:  XR CHEST AP PORTABLE    CLINICAL HISTORY:  Shortness of breath, sob;    COMPARISON:  Chest, 01/14/2022.    FINDINGS:  Heart is normal in size.  Small left pleural effusion.  Right lung is clear.                                        Pending Diagnostic Studies:       Procedure Component Value Units Date/Time    Levetiracetam level [[id number]] Collected: 11/21/24 0233    Order Status: Sent Lab Status: In process Updated: 11/21/24 1421    Specimen: Blood             Medications:  Reconciled Home Medications:      Medication List        START taking these medications      bacitracin 500 unit/gram ointment  Apply topically 2 (two) times a day.            CHANGE how you take these medications      albuterol-ipratropium 2.5 mg-0.5 mg/3 mL nebulizer solution  Commonly known as: DUO-NEB  Use one vial (3 mL) by nebulization every 4 (four) hours as needed for Wheezing - Rescue  What changed:   when to take this  additional instructions     diazePAM 2 MG tablet  Commonly known as: VALIUM  Take 1 tablet (2 mg total) by mouth every evening.  What changed:   medication strength  how much to take  how to take this  when to take this     FLUoxetine 10 MG capsule  Take 1 capsule (10 mg total) by mouth once daily.  What changed:   medication strength  how much to take  how to take this  when to take this     LATUDA 20 mg Tab tablet  Generic drug: lurasidone  Take 2 tablets (40 mg total) by mouth every evening.  What changed: when to take this     tiotropium 18 mcg inhalation capsule  Commonly known as: SPIRIVA  Inhale contents of 1 capsule (18 mcg total) into the lungs via handihaler once daily - Controller  What changed: additional instructions            CONTINUE taking these medications      * albuterol 2.5 mg /3 mL (0.083 %) nebulizer solution  Commonly known as: PROVENTIL  Take 3 mLs (2.5 mg total) by nebulization every 6 (six) hours as needed for Wheezing. Rescue     * albuterol 90 mcg/actuation inhaler  Commonly known as: PROVENTIL/VENTOLIN HFA  Inhale 2 puffs into the lungs every 6 (six) hours as needed for Wheezing or Shortness of Breath. Rescue     amitriptyline 10 MG tablet  Commonly known as: ELAVIL  Take 1 tablet (10 mg total) by mouth nightly as needed for Insomnia.     budesonide-formoterol 160-4.5 mcg 160-4.5 mcg/actuation Hfaa  Commonly known as: SYMBICORT  Inhale 2 puffs into the lungs every 12 (twelve) hours.     fluticasone propionate 50 mcg/actuation nasal  spray  Commonly known as: FLONASE  2 sprays (100 mcg total) by Each Nostril route once daily.     hydrocortisone 1 % cream  Apply topically 2 (two) times daily.     levETIRAcetam 500 MG Tab  Commonly known as: KEPPRA  Take 1 tablet (500 mg total) by mouth 2 (two) times daily.     LIDOcaine 5 %  Commonly known as: LIDODERM  Place 1 patch onto the skin once daily. Remove & Discard patch within 12 hours or as directed by MD     metoprolol tartrate 50 MG tablet  Commonly known as: LOPRESSOR  Take 1 tablet (50 mg total) by mouth 2 (two) times a day.     multivitamin Chew  Take by mouth once daily.     ondansetron 4 MG tablet  Commonly known as: ZOFRAN  Take 1 tablet (4 mg total) by mouth daily as needed for Nausea (nausea).     predniSONE 10 MG tablet  Commonly known as: DELTASONE  Take 10 mg by mouth.     primidone 50 MG Tab  Commonly known as: MYSOLINE  Take 1 tablet (50 mg total) by mouth every evening.           * This list has 2 medication(s) that are the same as other medications prescribed for you. Read the directions carefully, and ask your doctor or other care provider to review them with you.                STOP taking these medications      clonazePAM 0.5 MG tablet  Commonly known as: KlonoPIN     diclofenac sodium 1 % Gel  Commonly known as: VOLTAREN     FLUARIX QUAD 2587-7602 (PF) 60 mcg (15 mcg x 4)/0.5 mL Syrg  Generic drug: flu vacc [id number] 6mos up(PF)     fluconazole 150 MG Tab  Commonly known as: DIFLUCAN     ibuprofen 600 MG tablet  Commonly known as: ADVIL,MOTRIN     PNEUMOVAX-23 25 mcg/0.5 mL vaccine  Generic drug: pneumococcal 23-thom ps     sertraline 100 MG tablet  Commonly known as: ZOLOFT              Indwelling Lines/Drains at time of discharge:   Lines/Drains/Airways       None                   Time spent on the discharge of patient: 30 minutes         Karey Le MD  Department of Hospital Medicine  [facility]   10

## 2024-11-25 LAB — LEVETIRACETAM SERPL-MCNC: 18.7 UG/ML (ref 3–60)

## 2024-12-10 ENCOUNTER — PATIENT MESSAGE (OUTPATIENT)
Dept: ADMINISTRATIVE | Facility: HOSPITAL | Age: 59
End: 2024-12-10
Payer: MEDICAID

## 2024-12-16 ENCOUNTER — OFFICE VISIT (OUTPATIENT)
Dept: PALLIATIVE MEDICINE | Facility: CLINIC | Age: 59
End: 2024-12-16
Payer: MEDICAID

## 2024-12-16 DIAGNOSIS — R09.02 HYPOXEMIA REQUIRING SUPPLEMENTAL OXYGEN: ICD-10-CM

## 2024-12-16 DIAGNOSIS — K70.30 ALCOHOLIC CIRRHOSIS OF LIVER WITHOUT ASCITES: ICD-10-CM

## 2024-12-16 DIAGNOSIS — J44.9 CHRONIC OBSTRUCTIVE PULMONARY DISEASE, UNSPECIFIED COPD TYPE: Primary | ICD-10-CM

## 2024-12-16 DIAGNOSIS — Z51.5 PALLIATIVE CARE ENCOUNTER: ICD-10-CM

## 2024-12-16 DIAGNOSIS — Z99.81 HYPOXEMIA REQUIRING SUPPLEMENTAL OXYGEN: ICD-10-CM

## 2024-12-16 PROCEDURE — 99215 OFFICE O/P EST HI 40 MIN: CPT | Mod: 95,,, | Performed by: STUDENT IN AN ORGANIZED HEALTH CARE EDUCATION/TRAINING PROGRAM

## 2024-12-16 NOTE — PROGRESS NOTES
Palliative Medicine Clinic Note - [facility]    Consult Requested By: Shirley Baker    Primary Care Physician:   No primary care provider on file.    Reason for Consult: Advance care planning and symptom management in the setting of severe COPD    ASSESSMENT/PLAN:     Ms. 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    She reports she had a very difficult time at the SNF related to care (difficult interactions with care team, bowel management, oxygen inappropriately turned too high or turned off). States that she participated in therapy, but that it was difficult because of the timing of the day, but she was able to complete her stay and has noticed some small improvement with that therapy. She says they sent her with an outpatient follow-up plan with PT, however they report they are going to ask their PCP tomorrow for this order. They will also be asking for a pulmonary rehab consult as she feels ready to try this.     Ms. Archibald has a significant symptom burden in addition to her history of schizophrenia and bipolar disorder. This has made treating pain difficult as many of the medications she is currently on (fluoxetine, amitriptyline, etc) are intended primarily for psychiatric treatment and have required frequent titration from psychiatry for control of her symptoms. I am concerned that she is exhibiting some cognitive decline, episodes of confusion/daytime fatigue, that may be due to polypharmacy from multiple sedating medications. I discussed this with Ms. Archibald and her daughter-in-law, Georgie, and that it may be helpful to get a visit with a palliative provider with  "psychiatry training to help with these changes.    Plan/Recommendations:  #Pain - Ms. Asencio endorses ongoing aching back, leg, and shoulder pain which sounds musculoskeletal in origin. She states that this pain makes it difficult for her to participate in physical therapy sometimes and that at the rehab facility, they tried "a pain pill" before her sessions that was helpful, but she doesn't remember what (I suspect a low dose opioid). We discussed that with her many other medications including Valium, amitriptyline, risperidone, and primidone, using opioid pain medications on a regular basis would not be a safe first step.  - recommended starting Tylenol 1000mg BID scheduled  - Georgie asked about naproxen, I recommended using ibuprofen as needed sparingly, for example 400-600mg just before a physical therapy session to allow her to tolerate. Discussed that frequent long-term use would be hard on kidneys and on her stomach.  - I think she would benefit from an SNRI such as duloxetine for pain and anxiety, but Georgie states fluoxetine has been the only thing that helped her mood, would like assistance from psychiatry for changes. We discussed changing timing of her TCA and increasing dose with intent of pain control, but Georgie also says this has helped with her psychosis symptoms and they were told to give during the day. As she has significant fall history with injury, I am very reluctant to increase her dose if she is taking it during the day.     #Breathing - Pursuing pulmonary rehab, reestablishing with pulmonary provider at . On 2LNC, does not get breathless at rest.     #Caregiver burden - Discussed with Georgie and Elif about establishing with palliative therapy provider Natasha Law to help discuss chronic illness burden and coping mechanisms. They agreed to this, especially as adding medications for treatment of insomnia and pain means increasing her already substantial risk for falls and " oversedation.    Advance Care Planning   Advance Directives:   Goals of Care: What is most important right now is to focus on maximizing functional status recovery, knowing that pulmonary rehab and PT/OT will provide slow, modest improvement in the best case scenario. Accordingly, we have decided that the best plan to meet the patient's goals includes pursuing these treatments, maximizing disease-directed therapy through her pulmonologist.    Many of the barriers she has encountered in treatment of her lung disease seem to be related to her psychiatric diagnoses and medications, increased fall risk, polypharmacy, daytime sedation. Referring for visit with psych and pall trained Dr. Ramirez for assistance with this patient.    Understanding of Disease and Illness Trajectory: Patient and Family  has  adequate understanding of her illness, they can benefit from continued education on what to expect in the future.     Follow up: With me in 1 month.     Plan discussed with patient and daughter-in-law Georgie    SUBJECTIVE:     History of Present Illness / Interval History:  Ms. 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    Discussed the role of palliative care, in that we can be helpful in helping patients feel better, aid in communication, and planning for future difficult decisions.     Review of Symptoms      Symptom Assessment (ESAS 0-10 Scale)  Unable to complete assessment due to Other         Pain Assessment in Advanced Demential Scale (PAINAD)   Breathing - Independent of vocalization:  0  Negative vocalization:  0  Facial expression:  0  Body language:  0  Consolability:  0  Total:   0    Performance Status:  50    Living Arrangements:  Lives with family    Psychosocial/Cultural:   See Palliative Psychosocial Note: No  Social Issues Identified: Coping deficit pt/family, New Diagnosis/Trauma, Mental Health, and Financial Issues  Bereavement Risk: No  Caregiver Needs Discussed. Caregiver Distress: No: Intensity of family caregiving and Caregiver support and community resources discussed.    Cultural: no needs identified  **Primary  to Follow**  Palliative Care  Consult: Yes     Time-Based Charting:  Yes  Chart Review: 20 minutes  Face to Face: 60 minutes    Total Time Spent: 80 minutes      Active Diagnoses & Disease History:  COPD - FEV1 17% 4/2024, home O2 2LNC  Seizure disorder - on Keppra  Schizophrenia, bipolar 1, generalized anxiety, major depression - reported by family, no psychiatric treatment history in our chart from what I can find, but she is prescribed and taking risperidone, primidone, amitriptyline, fluoxetine, and valium.  Alcohol use disorder in remission  Cirrhosis due to alcohol use  Cocaine use disorder in remission  Prior fall resulting in SDH s/p craniectomy and significant respiratory failure event in 2019 that resulted in trach/PEG, now decannulated    Previous experience or exposure to a serious illness: Yes    Medications:    Current Outpatient Medications:     albuterol (PROVENTIL/VENTOLIN HFA) 90 mcg/actuation inhaler, Inhale 2 puffs into the lungs every 6 (six) hours as needed for Wheezing or Shortness of Breath. Rescue, Disp: 18 g, Rfl: 12    albuterol-ipratropium (DUO-NEB) 2.5 mg-0.5 mg/3 mL nebulizer solution, Use one vial (3 mL) by nebulization every 4 (four) hours as needed for Wheezing - Rescue, Disp: 90 mL, Rfl: 0    amitriptyline (ELAVIL) 10 MG tablet, Take 1 tablet (10 mg total) by mouth nightly as needed for Insomnia., Disp: 90 tablet, Rfl: 0    bacitracin 500 unit/gram ointment, Apply topically 2 (two) times a day., Disp: 14 g,  Rfl: 0    budesonide-formoterol 160-4.5 mcg (SYMBICORT) 160-4.5 mcg/actuation HFAA, Inhale 2 puffs into the lungs every 12 (twelve) hours., Disp: 10.2 g, Rfl: 1    diazePAM (VALIUM) 2 MG tablet, Take 1 tablet (2 mg total) by mouth every evening., Disp: 30 tablet, Rfl: 0    FLUoxetine 10 MG capsule, Take 1 capsule (10 mg total) by mouth once daily., Disp: 30 capsule, Rfl: 11    fluticasone propionate (FLONASE) 50 mcg/actuation nasal spray, 2 sprays (100 mcg total) by Each Nostril route once daily., Disp: 16 g, Rfl: 2    hydrocortisone 1 % cream, Apply topically 2 (two) times daily., Disp: 20 g, Rfl: 0    LATUDA 20 mg Tab tablet, Take 2 tablets (40 mg total) by mouth every evening., Disp: , Rfl:     levETIRAcetam (KEPPRA) 500 MG Tab, Take 1 tablet (500 mg total) by mouth 2 (two) times daily., Disp: 60 tablet, Rfl: 1    LIDOcaine (LIDODERM) 5 %, Place 1 patch onto the skin once daily. Remove & Discard patch within 12 hours or as directed by MD, Disp: 30 patch, Rfl: 1    metoprolol tartrate (LOPRESSOR) 50 MG tablet, Take 1 tablet (50 mg total) by mouth 2 (two) times a day., Disp: 90 tablet, Rfl: 1    multivitamin Chew, Take by mouth once daily., Disp: , Rfl:     ondansetron (ZOFRAN) 4 MG tablet, Take 1 tablet (4 mg total) by mouth daily as needed for Nausea (nausea)., Disp: 30 tablet, Rfl: 0    predniSONE (DELTASONE) 10 MG tablet, Take 10 mg by mouth., Disp: , Rfl:     primidone (MYSOLINE) 50 MG Tab, Take 1 tablet (50 mg total) by mouth every evening., Disp: 30 tablet, Rfl: 11    tiotropium (SPIRIVA) 18 mcg inhalation capsule, Inhale contents of 1 capsule (18 mcg total) into the lungs via handihaler once daily - Controller, Disp: 30 capsule, Rfl: 0    External  database queried on 12/16/24 by Claudia Ramm.   The results reviewed and considered with the clinical data in the decision whether or not to prescribe a controlled substance.    Past Medical History:   Diagnosis Date    Anxiety     Asthma     Bipolar 1  disorder     Cirrhosis, alcoholic     Cocaine abuse     COPD (chronic obstructive pulmonary disease)     Depression     Encounter for blood transfusion     Schizophrenia     Seizures     Subdural hematoma     Tachycardia      Past Surgical History:   Procedure Laterality Date    APPENDECTOMY      CRANIECTOMY Left 01/31/2019    ESOPHAGOGASTRODUODENOSCOPY      PEG W/TRACHEOSTOMY PLACEMENT  02/16/2019     Family History   Problem Relation Name Age of Onset    COPD Mother      Cirrhosis Father       Review of patient's allergies indicates:   Allergen Reactions    Dye Anxiety, Blisters, Hives, Itching, Rash and Swelling     OBJECTIVE:     Vitals: Vitals not done due to video visit  Physical Exam  Constitutional:       General: She is not in acute distress.     Appearance: She is ill-appearing.   HENT:      Head: Normocephalic and atraumatic.      Nose:      Comments: Nasal cannula in place.     Mouth/Throat:      Mouth: Mucous membranes are dry.      Pharynx: No oropharyngeal exudate.   Eyes:      General: No scleral icterus.     Extraocular Movements: Extraocular movements intact.   Neurological:      Mental Status: She is alert.   Psychiatric:      Comments: Normal affect, sometimes inattentive to topic of conversation, but will offer full sentence interactions in conversation unprompted. Often defers to Georgie for assistance with remembering details of past events. No hallucinations.        Labs:  CBC:   WBC   Date Value Ref Range Status   11/22/2024 9.70 3.90 - 12.70 K/uL Final     Hemoglobin   Date Value Ref Range Status   11/22/2024 9.9 (L) 12.0 - 16.0 g/dL Final     POC Hematocrit   Date Value Ref Range Status   01/17/2019 39 36 - 54 %PCV Final     Hematocrit   Date Value Ref Range Status   11/22/2024 29.6 (L) 37.0 - 48.5 % Final     MCV   Date Value Ref Range Status   11/22/2024 84 82 - 98 fL Final     Platelets   Date Value Ref Range Status   11/22/2024 206 150 - 450 K/uL Final     Lab Results   Component Value  Date    CREATININE 0.8 11/22/2024    BUN 15 11/22/2024     (L) 11/22/2024    K 4.0 11/22/2024    CL 88 (L) 11/22/2024    CO2 29 11/22/2024      LFT:   Lab Results   Component Value Date    AST 17 11/22/2024    ALKPHOS 35 (L) 11/22/2024    BILITOT 0.2 11/22/2024     Albumin:   Albumin   Date Value Ref Range Status   11/22/2024 2.8 (L) 3.5 - 5.2 g/dL Final     Protein:   Total Protein   Date Value Ref Range Status   11/22/2024 5.7 (L) 6.0 - 8.4 g/dL Final     Radiology:  CT Maxillofacial Without Contrast  Narrative: EXAMINATION:  CT MAXILLOFACIAL WITHOUT CONTRAST    CLINICAL HISTORY:  Facial trauma, blunt;    TECHNIQUE:  Low dose axial images, sagittal and coronal reformations were obtained through the face.  Contrast was not administered.    COMPARISON:  10/25/2019    FINDINGS:  There is stable surgical change of left calvarial craniectomy.  There is remote injury of the posterior right medial orbital wall.  Otherwise, the bilateral orbital walls, maxillary sinus walls, and zygomatic arches are intact.  The bilateral mandibular condyles are in appropriate location.  The mandible is intact.  No acute displaced fracture or dislocation of the maxillofacial region.  The soft tissues of the neck are grossly unremarkable.  The bilateral globes and orbital content are unremarkable.  There is mucous membrane thickening and aerated secretion within the right maxillary sinus noting hypertrophy of the right maxillary sinus walls suggesting sequela of chronic sinusitis.  There is prominence of the ventricular system, particularly the left lateral ventricle.  Overall, the ventricular system has mildly increased in caliber since most recent comparative examination of 2021.  Comparison with any more recent exams would be helpful.  Impression: 1. No acute displaced fracture or dislocation of the maxillofacial region.  2. Right maxillary sinus disease, likely chronic component.  3. Prominence of the ventricular system  particularly on the left.  The ventricular system appears to have mildly increased in caliber somewhat since most recent comparative examination of 2021.  Comparison with any more recent exams would be helpful.    Electronically signed by: Jose Manuel Brennan MD  Date:    11/20/2024  Time:    13:14  Echo    Left Ventricle: The left ventricle is normal in size. Normal wall   thickness. Unable to assess wall motion. There is normal systolic   function. Biplane (2D) method of discs ejection fraction is 59%. Diastolic   function cannot be reliably determined in the presence of mitral annular   calcification.    Right Ventricle: Normal right ventricular cavity size. Systolic   function is normal.    Aortic Valve: There is aortic valve sclerosis.    Pulmonary Artery: There is borderline elevated pulmonary hypertension.   The estimated pulmonary artery systolic pressure is 37 mmHg.     Ronnell Padgett MD  Hospice and Palliative Medicine  [facility]

## 2024-12-17 ENCOUNTER — TELEPHONE (OUTPATIENT)
Dept: PALLIATIVE MEDICINE | Facility: CLINIC | Age: 59
End: 2024-12-17
Payer: MEDICAID

## 2024-12-17 NOTE — TELEPHONE ENCOUNTER
Attempted to reach pt regarding scheduling for Shirley Law was told to call son, I have left a voicemail for a return back

## 2025-02-25 RX ORDER — PRIMIDONE 50 MG/1
50 TABLET ORAL NIGHTLY
Qty: 30 TABLET | Refills: 11 | Status: SHIPPED | OUTPATIENT
Start: 2025-02-25 | End: 2026-02-25

## 2025-04-13 ENCOUNTER — HOSPITAL ENCOUNTER (EMERGENCY)
Facility: HOSPITAL | Age: 60
Discharge: HOME | End: 2025-04-13
Attending: EMERGENCY MEDICINE
Payer: MEDICAID

## 2025-04-13 VITALS
HEART RATE: 78 BPM | DIASTOLIC BLOOD PRESSURE: 74 MMHG | RESPIRATION RATE: 20 BRPM | HEIGHT: 60 IN | SYSTOLIC BLOOD PRESSURE: 129 MMHG | BODY MASS INDEX: 20.22 KG/M2 | OXYGEN SATURATION: 98 % | WEIGHT: 103 LBS | TEMPERATURE: 98 F

## 2025-04-13 DIAGNOSIS — R55 NEAR SYNCOPE: ICD-10-CM

## 2025-04-13 LAB
ABSOLUTE EOSINOPHIL (OHS): 0.14 K/UL
ABSOLUTE MONOCYTE (OHS): 0.85 K/UL (ref 0.3–1)
ABSOLUTE NEUTROPHIL COUNT (OHS): 5.57 K/UL (ref 1.8–7.7)
ALBUMIN SERPL BCP-MCNC: 3.6 G/DL (ref 3.5–5.2)
ALP SERPL-CCNC: 46 UNIT/L (ref 40–150)
ALT SERPL W/O P-5'-P-CCNC: 23 UNIT/L (ref 10–44)
ANION GAP (OHS): 9 MMOL/L (ref 8–16)
AST SERPL-CCNC: 21 UNIT/L (ref 11–45)
BASOPHILS # BLD AUTO: 0.02 K/UL
BASOPHILS NFR BLD AUTO: 0.2 %
BILIRUB SERPL-MCNC: 0.1 MG/DL (ref 0.1–1)
BNP SERPL-MCNC: 129 PG/ML (ref 0–99)
BUN SERPL-MCNC: 11 MG/DL (ref 6–20)
CALCIUM SERPL-MCNC: 8.6 MG/DL (ref 8.7–10.5)
CHLORIDE SERPL-SCNC: 97 MMOL/L (ref 95–110)
CO2 SERPL-SCNC: 27 MMOL/L (ref 23–29)
CREAT SERPL-MCNC: 0.7 MG/DL (ref 0.5–1.4)
ERYTHROCYTE [DISTWIDTH] IN BLOOD BY AUTOMATED COUNT: 13.9 % (ref 11.5–14.5)
GFR SERPLBLD CREATININE-BSD FMLA CKD-EPI: >60 ML/MIN/1.73/M2
GLUCOSE SERPL-MCNC: 81 MG/DL (ref 70–110)
HCT VFR BLD AUTO: 36.1 % (ref 37–48.5)
HGB BLD-MCNC: 10.8 GM/DL (ref 12–16)
IMM GRANULOCYTES # BLD AUTO: 0.04 K/UL (ref 0–0.04)
IMM GRANULOCYTES NFR BLD AUTO: 0.5 % (ref 0–0.5)
LYMPHOCYTES # BLD AUTO: 1.77 K/UL (ref 1–4.8)
MAGNESIUM SERPL-MCNC: 1.8 MG/DL (ref 1.6–2.6)
MCH RBC QN AUTO: 26.5 PG (ref 27–31)
MCHC RBC AUTO-ENTMCNC: 29.9 G/DL (ref 32–36)
MCV RBC AUTO: 89 FL (ref 82–98)
NUCLEATED RBC (/100WBC) (OHS): 0 /100 WBC
PLATELET # BLD AUTO: 197 K/UL (ref 150–450)
PMV BLD AUTO: 8.7 FL (ref 9.2–12.9)
POTASSIUM SERPL-SCNC: 4.3 MMOL/L (ref 3.5–5.1)
PROT SERPL-MCNC: 7.1 GM/DL (ref 6–8.4)
RBC # BLD AUTO: 4.08 M/UL (ref 4–5.4)
RELATIVE EOSINOPHIL (OHS): 1.7 %
RELATIVE LYMPHOCYTE (OHS): 21.1 % (ref 18–48)
RELATIVE MONOCYTE (OHS): 10.1 % (ref 4–15)
RELATIVE NEUTROPHIL (OHS): 66.4 % (ref 38–73)
SODIUM SERPL-SCNC: 133 MMOL/L (ref 136–145)
T4 FREE SERPL-MCNC: 1.06 NG/DL (ref 0.71–1.51)
TROPONIN I SERPL DL<=0.01 NG/ML-MCNC: <0.006 NG/ML
TSH SERPL-ACNC: 4.27 UIU/ML (ref 0.4–4)
WBC # BLD AUTO: 8.39 K/UL (ref 3.9–12.7)

## 2025-04-13 PROCEDURE — 85025 COMPLETE CBC W/AUTO DIFF WBC: CPT | Performed by: EMERGENCY MEDICINE

## 2025-04-13 PROCEDURE — 93010 ELECTROCARDIOGRAM REPORT: CPT | Mod: ,,, | Performed by: INTERNAL MEDICINE

## 2025-04-13 PROCEDURE — 83880 ASSAY OF NATRIURETIC PEPTIDE: CPT | Performed by: EMERGENCY MEDICINE

## 2025-04-13 PROCEDURE — 84484 ASSAY OF TROPONIN QUANT: CPT | Performed by: EMERGENCY MEDICINE

## 2025-04-13 PROCEDURE — 83735 ASSAY OF MAGNESIUM: CPT | Performed by: EMERGENCY MEDICINE

## 2025-04-13 PROCEDURE — 93005 ELECTROCARDIOGRAM TRACING: CPT

## 2025-04-13 PROCEDURE — 99285 EMERGENCY DEPT VISIT HI MDM: CPT | Mod: 25

## 2025-04-13 PROCEDURE — 80053 COMPREHEN METABOLIC PANEL: CPT | Performed by: EMERGENCY MEDICINE

## 2025-04-13 PROCEDURE — 84439 ASSAY OF FREE THYROXINE: CPT | Performed by: EMERGENCY MEDICINE

## 2025-04-13 NOTE — ED PROVIDER NOTES
"Encounter Date: 4/13/2025       History     Chief Complaint   Patient presents with    Episodes     Pt has episodes of tachycardia with associated feelings of feeling faint. Presents on home o2.  History obtained from family member due to patient stating " I don't know much about my medicines or anything, I am kind of illiterate".      59-year-old female brought to the emergency department for near syncopal episodes.  Daughter provides most of the history due to patient's age and frailty.  Daughter reports that for the past few weeks, whenever the patient stands up her heart rate goes up "to the 150s" and she has decreased blood pressure and she gets sweaty and feels lightheaded.  This resolves after a few minutes, or faster of the patient's sits back down.  Patient does not syncopized.  She states she feels little short of breath during these episodes.  Also feels a little short of breath now.  She has been having a persistent cough for the last several weeks since she was diagnosed with pneumonia a month ago.  Denies any vomiting or diarrhea.  Denies any chest pain at this time.  No other symptoms reported at this time.      Review of patient's allergies indicates:   Allergen Reactions    Dye Anxiety, Blisters, Hives, Itching, Rash and Swelling     Past Medical History:   Diagnosis Date    Anxiety     Asthma     Bipolar 1 disorder     Cirrhosis, alcoholic     Cocaine abuse     COPD (chronic obstructive pulmonary disease)     Depression     Encounter for blood transfusion     Schizophrenia     Seizures     Subdural hematoma     Tachycardia      Past Surgical History:   Procedure Laterality Date    APPENDECTOMY      CRANIECTOMY Left 01/31/2019    ESOPHAGOGASTRODUODENOSCOPY      PEG W/TRACHEOSTOMY PLACEMENT  02/16/2019     Family History   Problem Relation Name Age of Onset    COPD Mother      Cirrhosis Father       Social History[1]  Review of Systems   Constitutional:  Positive for fatigue. Negative for chills. "   HENT:  Positive for congestion.    Respiratory:  Positive for cough and shortness of breath.    Cardiovascular:  Positive for palpitations.   Gastrointestinal:  Negative for abdominal pain.   Musculoskeletal:  Negative for back pain.   Neurological:  Positive for light-headedness. Negative for headaches.       Physical Exam     Initial Vitals   BP Pulse Resp Temp SpO2   04/13/25 0150 04/13/25 0150 04/13/25 0150 04/13/25 0153 04/13/25 0200   (!) 143/82 85 (!) 24 97.8 °F (36.6 °C) 100 %      MAP       --                Physical Exam    Nursing note and vitals reviewed.  Constitutional: She appears well-developed and well-nourished. No distress.   HENT:   Head: Normocephalic and atraumatic.   Eyes: Conjunctivae and EOM are normal. Pupils are equal, round, and reactive to light.   Neck: Neck supple. No tracheal deviation present.   Normal range of motion.  Cardiovascular:  Normal rate and intact distal pulses.           Pulmonary/Chest: No respiratory distress.   Abdominal: Abdomen is soft. She exhibits no distension. There is no abdominal tenderness.   Musculoskeletal:         General: No tenderness or edema. Normal range of motion.      Cervical back: Normal range of motion and neck supple.     Neurological: She is alert and oriented to person, place, and time. She has normal strength. No cranial nerve deficit. GCS score is 15. GCS eye subscore is 4. GCS verbal subscore is 5. GCS motor subscore is 6.   Skin: Skin is warm and dry.         ED Course   Procedures  Labs Reviewed   COMPREHENSIVE METABOLIC PANEL - Abnormal       Result Value    Sodium 133 (*)     Potassium 4.3      Chloride 97      CO2 27      Glucose 81      BUN 11      Creatinine 0.7      Calcium 8.6 (*)     Protein Total 7.1      Albumin 3.6      Bilirubin Total 0.1      ALP 46      AST 21      ALT 23      Anion Gap 9      eGFR >60     TSH - Abnormal    TSH 4.269 (*)     Free T4 1.06     B-TYPE NATRIURETIC PEPTIDE - Abnormal     (*)    CBC WITH  DIFFERENTIAL - Abnormal    WBC 8.39      RBC 4.08      HGB 10.8 (*)     HCT 36.1 (*)     MCV 89      MCH 26.5 (*)     MCHC 29.9 (*)     RDW 13.9      Platelet Count 197      MPV 8.7 (*)     Nucleated RBC 0      Neut % 66.4      Lymph % 21.1      Mono % 10.1      Eos % 1.7      Basophil % 0.2      Imm Grans % 0.5      Neut # 5.57      Lymph # 1.77      Mono # 0.85      Eos # 0.14      Baso # 0.02      Imm Grans # 0.04     TROPONIN I - Normal    Troponin-I <0.006     MAGNESIUM - Normal    Magnesium  1.8     CBC W/ AUTO DIFFERENTIAL    Narrative:     The following orders were created for panel order CBC auto differential.  Procedure                               Abnormality         Status                     ---------                               -----------         ------                     CBC with Differential[[id number]]       Abnormal            Final result                 Please view results for these tests on the individual orders.   URINALYSIS   DRUG SCREEN PANEL, URINE EMERGENCY     EKG Readings: (Independently Interpreted)   Initial Reading: No STEMI. Previous EKG: Compared with most recent EKG Previous EKG Date: 11/19/2024 (Minimal change). Rhythm: Normal Sinus Rhythm. Heart Rate: 82. Ectopy: No Ectopy. ST Segments: Normal ST Segments. Axis: Normal.   EKG independently interpreted by me pending Cardiology review           X-Rays:   Independently Interpreted Readings:   Other Readings:  Chest x-ray independently interpreted by me pending radiology review: No acute infiltrate, no pneumothorax, no effusion    Medications - No data to display  Medical Decision Making  59-year-old female presents to the emergency department complaining of near syncopal episodes when standing with elevated heart rate, low blood pressure      Differential: Orthostatic hypotension, dehydration, electrolyte dyscrasias, arrhythmia, vasovagal, anemia, LAITH,      Patient's orthostatics here were benign, the concerns which she in the  daughter expressed are unable to be recreated in the emergency department.  Her labs and imaging are benign.  We discussed disposition including discharge with instructions on home management, follow up with primary care and pulmonology physicians, strict return precautions given.  Vital signs stable.    Problems Addressed:  Near syncope: acute illness or injury    Amount and/or Complexity of Data Reviewed  External Data Reviewed: ECG and notes.     Details: Reviewed most recent EKG for comparison     Reviewed most recent pulmonology note documenting baseline medications and past medical history  Labs: ordered.     Details: CBC without leukocytosis, mild anemia; CMP with normal renal and liver function tests, borderline hyponatremia; troponin negative  Radiology: ordered and independent interpretation performed. Decision-making details documented in ED Course.  ECG/medicine tests: ordered and independent interpretation performed. Decision-making details documented in ED Course.    Risk  OTC drugs.  Prescription drug management.                                      Clinical Impression:  Final diagnoses:  [R55] Near syncope                     [1]   Social History  Tobacco Use    Smoking status: Former     Current packs/day: 0.00     Average packs/day: 1 pack/day for 40.0 years (40.0 ttl pk-yrs)     Types: Cigarettes     Start date:      Quit date: 2019     Years since quittin.8     Passive exposure: Past    Smokeless tobacco: Never    Tobacco comments:     Previously 1 pack per day smoker - quit smoking 2019   Substance Use Topics    Alcohol use: Not Currently     Comment: social    Drug use: Not Currently        Shad Sprague MD  [phone]

## 2025-04-13 NOTE — DISCHARGE INSTRUCTIONS
Please follow-up with your primary care physician and pulmonologist for further evaluation and management.  Please return with any new or worsening symptoms.

## 2025-04-17 LAB
OHS QRS DURATION: 76 MS
OHS QTC CALCULATION: 448 MS